# Patient Record
Sex: MALE | HISPANIC OR LATINO | Employment: FULL TIME | URBAN - METROPOLITAN AREA
[De-identification: names, ages, dates, MRNs, and addresses within clinical notes are randomized per-mention and may not be internally consistent; named-entity substitution may affect disease eponyms.]

---

## 2020-06-21 ENCOUNTER — HOSPITAL ENCOUNTER (INPATIENT)
Facility: CLINIC | Age: 55
LOS: 1 days | Discharge: ANOTHER HOSPITAL | End: 2020-06-22
Attending: EMERGENCY MEDICINE | Admitting: INTERNAL MEDICINE
Payer: MEDICAID

## 2020-06-21 ENCOUNTER — APPOINTMENT (OUTPATIENT)
Dept: GENERAL RADIOLOGY | Facility: CLINIC | Age: 55
End: 2020-06-21
Attending: EMERGENCY MEDICINE
Payer: MEDICAID

## 2020-06-21 DIAGNOSIS — J96.01 ACUTE RESPIRATORY FAILURE WITH HYPOXIA (H): ICD-10-CM

## 2020-06-21 DIAGNOSIS — Z20.822 SUSPECTED COVID-19 VIRUS INFECTION: ICD-10-CM

## 2020-06-21 DIAGNOSIS — R06.02 SHORTNESS OF BREATH: ICD-10-CM

## 2020-06-21 DIAGNOSIS — J18.9 PNEUMONIA OF BOTH LOWER LOBES DUE TO INFECTIOUS ORGANISM: ICD-10-CM

## 2020-06-21 DIAGNOSIS — R50.9 FEVER IN ADULT: ICD-10-CM

## 2020-06-21 DIAGNOSIS — U07.1 COVID-19: Primary | ICD-10-CM

## 2020-06-21 PROBLEM — J96.00 ACUTE RESPIRATORY FAILURE (H): Status: ACTIVE | Noted: 2020-06-21

## 2020-06-21 LAB
ABO + RH BLD: NORMAL
ALBUMIN SERPL-MCNC: 2.9 G/DL (ref 3.4–5)
ALBUMIN SERPL-MCNC: 3.2 G/DL (ref 3.4–5)
ALP SERPL-CCNC: 56 U/L (ref 40–150)
ALP SERPL-CCNC: 63 U/L (ref 40–150)
ALT SERPL W P-5'-P-CCNC: 69 U/L (ref 0–70)
ALT SERPL W P-5'-P-CCNC: 71 U/L (ref 0–70)
ANION GAP SERPL CALCULATED.3IONS-SCNC: 3 MMOL/L (ref 3–14)
ANION GAP SERPL CALCULATED.3IONS-SCNC: 8 MMOL/L (ref 3–14)
APTT PPP: 35 SEC (ref 22–37)
AST SERPL W P-5'-P-CCNC: 106 U/L (ref 0–45)
AST SERPL W P-5'-P-CCNC: 108 U/L (ref 0–45)
BASE EXCESS BLDA CALC-SCNC: 0.5 MMOL/L
BASE EXCESS BLDA CALC-SCNC: 0.7 MMOL/L
BASE EXCESS BLDV CALC-SCNC: 2.7 MMOL/L
BASOPHILS # BLD AUTO: 0 10E9/L (ref 0–0.2)
BASOPHILS # BLD AUTO: 0 10E9/L (ref 0–0.2)
BASOPHILS NFR BLD AUTO: 0.2 %
BASOPHILS NFR BLD AUTO: 0.2 %
BILIRUB SERPL-MCNC: 0.5 MG/DL (ref 0.2–1.3)
BILIRUB SERPL-MCNC: 0.5 MG/DL (ref 0.2–1.3)
BLD GP AB SCN SERPL QL: NORMAL
BLOOD BANK CMNT PATIENT-IMP: NORMAL
BLOOD BANK CMNT PATIENT-IMP: NORMAL
BUN SERPL-MCNC: 10 MG/DL (ref 7–30)
BUN SERPL-MCNC: 9 MG/DL (ref 7–30)
CALCIUM SERPL-MCNC: 7.8 MG/DL (ref 8.5–10.1)
CALCIUM SERPL-MCNC: 8.4 MG/DL (ref 8.5–10.1)
CHLORIDE SERPL-SCNC: 106 MMOL/L (ref 94–109)
CHLORIDE SERPL-SCNC: 106 MMOL/L (ref 94–109)
CK SERPL-CCNC: 1775 U/L (ref 30–300)
CK SERPL-CCNC: 1864 U/L (ref 30–300)
CO2 SERPL-SCNC: 23 MMOL/L (ref 20–32)
CO2 SERPL-SCNC: 28 MMOL/L (ref 20–32)
CREAT SERPL-MCNC: 0.62 MG/DL (ref 0.66–1.25)
CREAT SERPL-MCNC: 0.78 MG/DL (ref 0.66–1.25)
CRP SERPL-MCNC: 130 MG/L (ref 0–8)
CRP SERPL-MCNC: 132 MG/L (ref 0–8)
DIFFERENTIAL METHOD BLD: NORMAL
DIFFERENTIAL METHOD BLD: NORMAL
EOSINOPHIL # BLD AUTO: 0 10E9/L (ref 0–0.7)
EOSINOPHIL # BLD AUTO: 0 10E9/L (ref 0–0.7)
EOSINOPHIL NFR BLD AUTO: 0.3 %
EOSINOPHIL NFR BLD AUTO: 0.5 %
ERYTHROCYTE [DISTWIDTH] IN BLOOD BY AUTOMATED COUNT: 14.8 % (ref 10–15)
ERYTHROCYTE [DISTWIDTH] IN BLOOD BY AUTOMATED COUNT: 14.8 % (ref 10–15)
FERRITIN SERPL-MCNC: 840 NG/ML (ref 26–388)
FERRITIN SERPL-MCNC: 859 NG/ML (ref 26–388)
FIBRINOGEN PPP-MCNC: 497 MG/DL (ref 200–420)
GFR SERPL CREATININE-BSD FRML MDRD: >90 ML/MIN/{1.73_M2}
GFR SERPL CREATININE-BSD FRML MDRD: >90 ML/MIN/{1.73_M2}
GLUCOSE BLDC GLUCOMTR-MCNC: 108 MG/DL (ref 70–99)
GLUCOSE BLDC GLUCOMTR-MCNC: 98 MG/DL (ref 70–99)
GLUCOSE SERPL-MCNC: 101 MG/DL (ref 70–99)
GLUCOSE SERPL-MCNC: 104 MG/DL (ref 70–99)
HCO3 BLD-SCNC: 25 MMOL/L (ref 21–28)
HCO3 BLD-SCNC: 25 MMOL/L (ref 21–28)
HCO3 BLDV-SCNC: 27 MMOL/L (ref 21–28)
HCT VFR BLD AUTO: 44.1 % (ref 40–53)
HCT VFR BLD AUTO: 44.5 % (ref 40–53)
HGB BLD-MCNC: 14.3 G/DL (ref 13.3–17.7)
HGB BLD-MCNC: 14.7 G/DL (ref 13.3–17.7)
IMM GRANULOCYTES # BLD: 0.1 10E9/L (ref 0–0.4)
IMM GRANULOCYTES # BLD: 0.1 10E9/L (ref 0–0.4)
IMM GRANULOCYTES NFR BLD: 0.8 %
IMM GRANULOCYTES NFR BLD: 0.8 %
INR PPP: 1.07 (ref 0.86–1.14)
LACTATE BLD-SCNC: 1.2 MMOL/L (ref 0.7–2)
LDH SERPL L TO P-CCNC: 592 U/L (ref 85–227)
LDH SERPL L TO P-CCNC: 604 U/L (ref 85–227)
LYMPHOCYTES # BLD AUTO: 0.8 10E9/L (ref 0.8–5.3)
LYMPHOCYTES # BLD AUTO: 1.1 10E9/L (ref 0.8–5.3)
LYMPHOCYTES NFR BLD AUTO: 12.5 %
LYMPHOCYTES NFR BLD AUTO: 16.3 %
MAGNESIUM SERPL-MCNC: 2.3 MG/DL (ref 1.6–2.3)
MCH RBC QN AUTO: 28.9 PG (ref 26.5–33)
MCH RBC QN AUTO: 29.5 PG (ref 26.5–33)
MCHC RBC AUTO-ENTMCNC: 32.4 G/DL (ref 31.5–36.5)
MCHC RBC AUTO-ENTMCNC: 33 G/DL (ref 31.5–36.5)
MCV RBC AUTO: 89 FL (ref 78–100)
MCV RBC AUTO: 89 FL (ref 78–100)
MONOCYTES # BLD AUTO: 0.6 10E9/L (ref 0–1.3)
MONOCYTES # BLD AUTO: 0.7 10E9/L (ref 0–1.3)
MONOCYTES NFR BLD AUTO: 10.3 %
MONOCYTES NFR BLD AUTO: 8.5 %
NEUTROPHILS # BLD AUTO: 4.7 10E9/L (ref 1.6–8.3)
NEUTROPHILS # BLD AUTO: 5.1 10E9/L (ref 1.6–8.3)
NEUTROPHILS NFR BLD AUTO: 71.9 %
NEUTROPHILS NFR BLD AUTO: 77.7 %
NRBC # BLD AUTO: 0 10*3/UL
NRBC # BLD AUTO: 0 10*3/UL
NRBC BLD AUTO-RTO: 0 /100
NRBC BLD AUTO-RTO: 0 /100
O2/TOTAL GAS SETTING VFR VENT: 70 %
O2/TOTAL GAS SETTING VFR VENT: ABNORMAL %
OXYHGB MFR BLD: 92 % (ref 92–100)
OXYHGB MFR BLD: 95 % (ref 92–100)
OXYHGB MFR BLDV: 61 %
PCO2 BLD: 38 MM HG (ref 35–45)
PCO2 BLD: 39 MM HG (ref 35–45)
PCO2 BLDV: 40 MM HG (ref 40–50)
PH BLD: 7.41 PH (ref 7.35–7.45)
PH BLD: 7.43 PH (ref 7.35–7.45)
PH BLDV: 7.44 PH (ref 7.32–7.43)
PHOSPHATE SERPL-MCNC: 2.5 MG/DL (ref 2.5–4.5)
PLATELET # BLD AUTO: 152 10E9/L (ref 150–450)
PLATELET # BLD AUTO: 163 10E9/L (ref 150–450)
PO2 BLD: 65 MM HG (ref 80–105)
PO2 BLD: 75 MM HG (ref 80–105)
PO2 BLDV: 32 MM HG (ref 25–47)
POTASSIUM SERPL-SCNC: 3.7 MMOL/L (ref 3.4–5.3)
POTASSIUM SERPL-SCNC: 3.9 MMOL/L (ref 3.4–5.3)
PROCALCITONIN SERPL-MCNC: 0.06 NG/ML
PROT SERPL-MCNC: 7.6 G/DL (ref 6.8–8.8)
PROT SERPL-MCNC: 8.1 G/DL (ref 6.8–8.8)
RBC # BLD AUTO: 4.94 10E12/L (ref 4.4–5.9)
RBC # BLD AUTO: 4.98 10E12/L (ref 4.4–5.9)
RETICS # AUTO: 28.7 10E9/L (ref 25–95)
RETICS/RBC NFR AUTO: 0.6 % (ref 0.5–2)
SARS-COV-2 PCR COMMENT: ABNORMAL
SARS-COV-2 RNA SPEC QL NAA+PROBE: NORMAL
SARS-COV-2 RNA SPEC QL NAA+PROBE: POSITIVE
SODIUM SERPL-SCNC: 137 MMOL/L (ref 133–144)
SODIUM SERPL-SCNC: 137 MMOL/L (ref 133–144)
SPECIMEN EXP DATE BLD: NORMAL
SPECIMEN EXP DATE BLD: NORMAL
SPECIMEN SOURCE: ABNORMAL
SPECIMEN SOURCE: NORMAL
TROPONIN I SERPL-MCNC: <0.015 UG/L (ref 0–0.04)
TROPONIN I SERPL-MCNC: <0.015 UG/L (ref 0–0.04)
WBC # BLD AUTO: 6.6 10E9/L (ref 4–11)
WBC # BLD AUTO: 6.6 10E9/L (ref 4–11)

## 2020-06-21 PROCEDURE — C9803 HOPD COVID-19 SPEC COLLECT: HCPCS

## 2020-06-21 PROCEDURE — 86140 C-REACTIVE PROTEIN: CPT | Performed by: EMERGENCY MEDICINE

## 2020-06-21 PROCEDURE — 85384 FIBRINOGEN ACTIVITY: CPT | Performed by: INTERNAL MEDICINE

## 2020-06-21 PROCEDURE — 85025 COMPLETE CBC W/AUTO DIFF WBC: CPT | Performed by: EMERGENCY MEDICINE

## 2020-06-21 PROCEDURE — 85610 PROTHROMBIN TIME: CPT | Performed by: INTERNAL MEDICINE

## 2020-06-21 PROCEDURE — 83735 ASSAY OF MAGNESIUM: CPT | Performed by: EMERGENCY MEDICINE

## 2020-06-21 PROCEDURE — 25800030 ZZH RX IP 258 OP 636: Performed by: INTERNAL MEDICINE

## 2020-06-21 PROCEDURE — 87040 BLOOD CULTURE FOR BACTERIA: CPT | Performed by: EMERGENCY MEDICINE

## 2020-06-21 PROCEDURE — 83605 ASSAY OF LACTIC ACID: CPT | Performed by: EMERGENCY MEDICINE

## 2020-06-21 PROCEDURE — 99207 ZZC CDG-CODE CATEGORY CHANGED: CPT | Performed by: INTERNAL MEDICINE

## 2020-06-21 PROCEDURE — 25000128 H RX IP 250 OP 636: Performed by: INTERNAL MEDICINE

## 2020-06-21 PROCEDURE — 82805 BLOOD GASES W/O2 SATURATION: CPT | Performed by: INTERNAL MEDICINE

## 2020-06-21 PROCEDURE — 36415 COLL VENOUS BLD VENIPUNCTURE: CPT | Performed by: INTERNAL MEDICINE

## 2020-06-21 PROCEDURE — 99223 1ST HOSP IP/OBS HIGH 75: CPT | Mod: AI | Performed by: INTERNAL MEDICINE

## 2020-06-21 PROCEDURE — 40000983 ZZH STATISTIC HFNC ADULT NON-CPAP

## 2020-06-21 PROCEDURE — 99292 CRITICAL CARE ADDL 30 MIN: CPT | Performed by: INTERNAL MEDICINE

## 2020-06-21 PROCEDURE — 83520 IMMUNOASSAY QUANT NOS NONAB: CPT | Performed by: INTERNAL MEDICINE

## 2020-06-21 PROCEDURE — 25000132 ZZH RX MED GY IP 250 OP 250 PS 637: Performed by: INTERNAL MEDICINE

## 2020-06-21 PROCEDURE — 80053 COMPREHEN METABOLIC PANEL: CPT | Performed by: INTERNAL MEDICINE

## 2020-06-21 PROCEDURE — 40000275 ZZH STATISTIC RCP TIME EA 10 MIN

## 2020-06-21 PROCEDURE — 85730 THROMBOPLASTIN TIME PARTIAL: CPT | Performed by: INTERNAL MEDICINE

## 2020-06-21 PROCEDURE — 84100 ASSAY OF PHOSPHORUS: CPT | Performed by: EMERGENCY MEDICINE

## 2020-06-21 PROCEDURE — 36415 COLL VENOUS BLD VENIPUNCTURE: CPT

## 2020-06-21 PROCEDURE — 27210300 ZZH CANNULA HIGH FLOW, ADULT

## 2020-06-21 PROCEDURE — 85300 ANTITHROMBIN III ACTIVITY: CPT | Performed by: INTERNAL MEDICINE

## 2020-06-21 PROCEDURE — 85045 AUTOMATED RETICULOCYTE COUNT: CPT | Performed by: INTERNAL MEDICINE

## 2020-06-21 PROCEDURE — 36600 WITHDRAWAL OF ARTERIAL BLOOD: CPT

## 2020-06-21 PROCEDURE — 82728 ASSAY OF FERRITIN: CPT | Performed by: EMERGENCY MEDICINE

## 2020-06-21 PROCEDURE — 86901 BLOOD TYPING SEROLOGIC RH(D): CPT | Performed by: INTERNAL MEDICINE

## 2020-06-21 PROCEDURE — 5A1945Z RESPIRATORY VENTILATION, 24-96 CONSECUTIVE HOURS: ICD-10-PCS | Performed by: INTERNAL MEDICINE

## 2020-06-21 PROCEDURE — 86140 C-REACTIVE PROTEIN: CPT | Performed by: INTERNAL MEDICINE

## 2020-06-21 PROCEDURE — 82805 BLOOD GASES W/O2 SATURATION: CPT | Performed by: EMERGENCY MEDICINE

## 2020-06-21 PROCEDURE — 71045 X-RAY EXAM CHEST 1 VIEW: CPT

## 2020-06-21 PROCEDURE — 25000132 ZZH RX MED GY IP 250 OP 250 PS 637: Performed by: EMERGENCY MEDICINE

## 2020-06-21 PROCEDURE — 84484 ASSAY OF TROPONIN QUANT: CPT | Performed by: EMERGENCY MEDICINE

## 2020-06-21 PROCEDURE — 20000003 ZZH R&B ICU

## 2020-06-21 PROCEDURE — 84484 ASSAY OF TROPONIN QUANT: CPT | Performed by: INTERNAL MEDICINE

## 2020-06-21 PROCEDURE — 82550 ASSAY OF CK (CPK): CPT | Performed by: INTERNAL MEDICINE

## 2020-06-21 PROCEDURE — 99291 CRITICAL CARE FIRST HOUR: CPT | Performed by: INTERNAL MEDICINE

## 2020-06-21 PROCEDURE — 99291 CRITICAL CARE FIRST HOUR: CPT | Mod: 25

## 2020-06-21 PROCEDURE — 83615 LACTATE (LD) (LDH) ENZYME: CPT | Performed by: EMERGENCY MEDICINE

## 2020-06-21 PROCEDURE — 86900 BLOOD TYPING SEROLOGIC ABO: CPT | Performed by: INTERNAL MEDICINE

## 2020-06-21 PROCEDURE — 85379 FIBRIN DEGRADATION QUANT: CPT | Performed by: INTERNAL MEDICINE

## 2020-06-21 PROCEDURE — 82550 ASSAY OF CK (CPK): CPT | Performed by: EMERGENCY MEDICINE

## 2020-06-21 PROCEDURE — 86850 RBC ANTIBODY SCREEN: CPT | Performed by: INTERNAL MEDICINE

## 2020-06-21 PROCEDURE — 00000146 ZZHCL STATISTIC GLUCOSE BY METER IP

## 2020-06-21 PROCEDURE — 84145 PROCALCITONIN (PCT): CPT | Performed by: INTERNAL MEDICINE

## 2020-06-21 PROCEDURE — 85025 COMPLETE CBC W/AUTO DIFF WBC: CPT | Performed by: INTERNAL MEDICINE

## 2020-06-21 PROCEDURE — 80053 COMPREHEN METABOLIC PANEL: CPT | Performed by: EMERGENCY MEDICINE

## 2020-06-21 PROCEDURE — 99292 CRITICAL CARE ADDL 30 MIN: CPT

## 2020-06-21 PROCEDURE — 83615 LACTATE (LD) (LDH) ENZYME: CPT | Performed by: INTERNAL MEDICINE

## 2020-06-21 PROCEDURE — 82805 BLOOD GASES W/O2 SATURATION: CPT | Performed by: SURGERY

## 2020-06-21 PROCEDURE — 82728 ASSAY OF FERRITIN: CPT | Performed by: INTERNAL MEDICINE

## 2020-06-21 RX ORDER — HYDRALAZINE HYDROCHLORIDE 20 MG/ML
10 INJECTION INTRAMUSCULAR; INTRAVENOUS EVERY 4 HOURS PRN
Status: DISCONTINUED | OUTPATIENT
Start: 2020-06-21 | End: 2020-06-22 | Stop reason: HOSPADM

## 2020-06-21 RX ORDER — ONDANSETRON 2 MG/ML
4 INJECTION INTRAMUSCULAR; INTRAVENOUS EVERY 6 HOURS PRN
Status: DISCONTINUED | OUTPATIENT
Start: 2020-06-21 | End: 2020-06-22 | Stop reason: HOSPADM

## 2020-06-21 RX ORDER — ACETAMINOPHEN 325 MG/1
975 TABLET ORAL ONCE
Status: COMPLETED | OUTPATIENT
Start: 2020-06-21 | End: 2020-06-21

## 2020-06-21 RX ORDER — AZITHROMYCIN 500 MG/1
500 INJECTION, POWDER, LYOPHILIZED, FOR SOLUTION INTRAVENOUS EVERY 24 HOURS
Status: DISCONTINUED | OUTPATIENT
Start: 2020-06-21 | End: 2020-06-21 | Stop reason: ALTCHOICE

## 2020-06-21 RX ORDER — OXYCODONE HYDROCHLORIDE 5 MG/1
5-10 TABLET ORAL
Status: DISCONTINUED | OUTPATIENT
Start: 2020-06-21 | End: 2020-06-22 | Stop reason: HOSPADM

## 2020-06-21 RX ORDER — LIDOCAINE 40 MG/G
CREAM TOPICAL
Status: DISCONTINUED | OUTPATIENT
Start: 2020-06-21 | End: 2020-06-22 | Stop reason: HOSPADM

## 2020-06-21 RX ORDER — ONDANSETRON 4 MG/1
4 TABLET, ORALLY DISINTEGRATING ORAL EVERY 6 HOURS PRN
Status: DISCONTINUED | OUTPATIENT
Start: 2020-06-21 | End: 2020-06-22 | Stop reason: HOSPADM

## 2020-06-21 RX ORDER — ACETAMINOPHEN 325 MG/1
650 TABLET ORAL EVERY 4 HOURS PRN
Status: DISCONTINUED | OUTPATIENT
Start: 2020-06-21 | End: 2020-06-22 | Stop reason: HOSPADM

## 2020-06-21 RX ORDER — SODIUM CHLORIDE, SODIUM LACTATE, POTASSIUM CHLORIDE, CALCIUM CHLORIDE 600; 310; 30; 20 MG/100ML; MG/100ML; MG/100ML; MG/100ML
INJECTION, SOLUTION INTRAVENOUS CONTINUOUS
Status: DISCONTINUED | OUTPATIENT
Start: 2020-06-21 | End: 2020-06-21

## 2020-06-21 RX ORDER — CEFTRIAXONE 2 G/1
2 INJECTION, POWDER, FOR SOLUTION INTRAMUSCULAR; INTRAVENOUS EVERY 24 HOURS
Status: DISCONTINUED | OUTPATIENT
Start: 2020-06-21 | End: 2020-06-21 | Stop reason: ALTCHOICE

## 2020-06-21 RX ORDER — ALBUTEROL SULFATE 90 UG/1
2 AEROSOL, METERED RESPIRATORY (INHALATION) 4 TIMES DAILY
Status: DISCONTINUED | OUTPATIENT
Start: 2020-06-21 | End: 2020-06-22

## 2020-06-21 RX ORDER — PIPERACILLIN SODIUM, TAZOBACTAM SODIUM 3; .375 G/15ML; G/15ML
3.38 INJECTION, POWDER, LYOPHILIZED, FOR SOLUTION INTRAVENOUS EVERY 6 HOURS
Status: DISCONTINUED | OUTPATIENT
Start: 2020-06-21 | End: 2020-06-22 | Stop reason: HOSPADM

## 2020-06-21 RX ORDER — DEXAMETHASONE SODIUM PHOSPHATE 4 MG/ML
6 INJECTION, SOLUTION INTRA-ARTICULAR; INTRALESIONAL; INTRAMUSCULAR; INTRAVENOUS; SOFT TISSUE EVERY 24 HOURS
Status: DISCONTINUED | OUTPATIENT
Start: 2020-06-21 | End: 2020-06-22 | Stop reason: HOSPADM

## 2020-06-21 RX ORDER — PROCHLORPERAZINE MALEATE 5 MG
10 TABLET ORAL EVERY 6 HOURS PRN
Status: DISCONTINUED | OUTPATIENT
Start: 2020-06-21 | End: 2020-06-22 | Stop reason: HOSPADM

## 2020-06-21 RX ORDER — ALBUTEROL SULFATE 90 UG/1
2 AEROSOL, METERED RESPIRATORY (INHALATION)
Status: DISCONTINUED | OUTPATIENT
Start: 2020-06-21 | End: 2020-06-22 | Stop reason: HOSPADM

## 2020-06-21 RX ORDER — AMOXICILLIN 250 MG
1 CAPSULE ORAL 2 TIMES DAILY PRN
Status: DISCONTINUED | OUTPATIENT
Start: 2020-06-21 | End: 2020-06-22 | Stop reason: HOSPADM

## 2020-06-21 RX ORDER — AMOXICILLIN 250 MG
2 CAPSULE ORAL 2 TIMES DAILY PRN
Status: DISCONTINUED | OUTPATIENT
Start: 2020-06-21 | End: 2020-06-22 | Stop reason: HOSPADM

## 2020-06-21 RX ORDER — ACETAMINOPHEN 650 MG/1
650 SUPPOSITORY RECTAL EVERY 4 HOURS PRN
Status: DISCONTINUED | OUTPATIENT
Start: 2020-06-21 | End: 2020-06-22 | Stop reason: HOSPADM

## 2020-06-21 RX ORDER — PROCHLORPERAZINE 25 MG
25 SUPPOSITORY, RECTAL RECTAL EVERY 12 HOURS PRN
Status: DISCONTINUED | OUTPATIENT
Start: 2020-06-21 | End: 2020-06-22 | Stop reason: HOSPADM

## 2020-06-21 RX ORDER — NALOXONE HYDROCHLORIDE 0.4 MG/ML
.1-.4 INJECTION, SOLUTION INTRAMUSCULAR; INTRAVENOUS; SUBCUTANEOUS
Status: DISCONTINUED | OUTPATIENT
Start: 2020-06-21 | End: 2020-06-22 | Stop reason: HOSPADM

## 2020-06-21 RX ORDER — SODIUM CHLORIDE, SODIUM LACTATE, POTASSIUM CHLORIDE, CALCIUM CHLORIDE 600; 310; 30; 20 MG/100ML; MG/100ML; MG/100ML; MG/100ML
INJECTION, SOLUTION INTRAVENOUS CONTINUOUS
Status: DISCONTINUED | OUTPATIENT
Start: 2020-06-21 | End: 2020-06-22 | Stop reason: HOSPADM

## 2020-06-21 RX ADMIN — PIPERACILLIN SODIUM AND TAZOBACTAM SODIUM 3.38 G: 3; .375 INJECTION, POWDER, LYOPHILIZED, FOR SOLUTION INTRAVENOUS at 19:03

## 2020-06-21 RX ADMIN — ACETAMINOPHEN 975 MG: 325 TABLET, FILM COATED ORAL at 13:39

## 2020-06-21 RX ADMIN — GUAIFENESIN 10 ML: 200 SOLUTION ORAL at 20:42

## 2020-06-21 RX ADMIN — ENOXAPARIN SODIUM 40 MG: 40 INJECTION SUBCUTANEOUS at 20:02

## 2020-06-21 RX ADMIN — VANCOMYCIN HYDROCHLORIDE 2000 MG: 5 INJECTION, POWDER, LYOPHILIZED, FOR SOLUTION INTRAVENOUS at 19:59

## 2020-06-21 RX ADMIN — DEXAMETHASONE SODIUM PHOSPHATE 6 MG: 4 INJECTION, SOLUTION INTRAMUSCULAR; INTRAVENOUS at 18:56

## 2020-06-21 RX ADMIN — CEFTRIAXONE 2 G: 2 INJECTION, POWDER, FOR SOLUTION INTRAMUSCULAR; INTRAVENOUS at 18:19

## 2020-06-21 ASSESSMENT — ENCOUNTER SYMPTOMS
ABDOMINAL PAIN: 0
HEADACHES: 1
COUGH: 1
FEVER: 1
SHORTNESS OF BREATH: 1
DIARRHEA: 0

## 2020-06-21 ASSESSMENT — ACTIVITIES OF DAILY LIVING (ADL): ADLS_ACUITY_SCORE: 15

## 2020-06-21 ASSESSMENT — MIFFLIN-ST. JEOR: SCORE: 1664.07

## 2020-06-21 NOTE — PROGRESS NOTES
RT was called to place pt on HFNC. Pt is currently on HFNC 50 LPM, FiO2 70% with SpO2 93%. Mepilex and opti foam in place. Skin intact.  Will cont to monitor closely.  6/21/2020  Sierra Yoo, RT

## 2020-06-21 NOTE — PHARMACY-ADMISSION MEDICATION HISTORY
Pharmacy Medication History  Admission medication history interview status for the 6/21/2020  admission is complete. See EPIC admission navigator for prior to admission medications     Medication history sources: Patient  Medication history source reliability: Good  Adherence assessment: Good    Significant changes made to the medication list:  None      Additional medication history information:   None    Medication reconciliation completed by provider prior to medication history? No    Time spent in this activity: 5      Prior to Admission medications    Not on File     Rowena Wetzel, PharmD  [phone]  June 21, 2020

## 2020-06-21 NOTE — ED TRIAGE NOTES
SOB/cough/HA x3 days.  Febrile on arrival.  States took motrin this morning for HA.  O2 sats 85% on RA for medics.  Improved with 6L NC to 92-94%

## 2020-06-21 NOTE — H&P
[facility]    History and Physical - Hospitalist Service       Date of Admission:  6/21/2020    Assessment & Plan   Tobias Palmer is a 54 year old male with no known medical problems who presents with shortness of breath, fever and cough.    VIRTUAL (VIDEO) communication was used to evaluate Tobias due to the COVID pandemic.    Tobias consented to the use of video communication: yes  Video START time: 2:52 pm, 6/21/2020  Video STOP time: 3:00 pm, 6/21/2020   Patient's location: [facility]   Provider's location during the visit: [facility]       Bilateral pneumonia likely secondary to COVID-19  COVID-19 rule out  Hypoxic respiratory failure secondary to above  -Presented with dry cough, shortness of breath and fever.  Some generalized chest pain that started after multiple bouts of coughing today.  -Blood cultures drawn in the ED.  His symptoms high suspicion for COVID-19.  He however does not report to loss of sense of taste or smell and no GI symptoms.  -Until COVID ruled out will start him on antibiotics for community-acquired pneumonia, check procalcitonin, discontinue antibiotics if negative procalcitonin and positive COVID  -DVT prophylaxis with Lovenox  -Check d-dimer, ferritin, interleukin-6 level, fibrinogen level for prognosis  -Inhalers for breathing treatment  -Antitussive, incentive spirometer    Addendum: Called by ED physician that during ED stay he started becoming hypoxic even on 6 L oxygen and currently requiring high flow oxygen.  Discussed with intensivist on-call Dr Jones.  Will admit the patient in ICU and consult intensivist.      Hypertension  -No known history of hypertension, blood pressure on arrival was high at 146/100  -Could be related to acute stress, PRN hydralazine  -Monitor    Acute transaminitis  -Monitor    Diet: Regular  DVT Prophylaxis: Enoxaparin (Lovenox) SQ  Hillman Catheter: not present  Code Status: Full code, discussed with  "patient  Rule Out COVID-19 Handoff:  Tobias is NOT A LOW SUSPICION PUI (needs further investigation).    Follow these instructions:    If COVID test is positive -> continue isolation precautions    If 1st COVID test is negative -> continue isolation precautions    -  Order a repeat COVID test to be done 72 hours after the 1st test  -  Place \"PUI Isolation\" nurse communication order  -  Consider ID consult    If 2nd COVID test performed after 72 hours is negative -> consider discontinuing COVID-specific isolation precautions if clinical course atypical for COVID and/or an alternative diagnosis emerges       Disposition Plan   Expected discharge: 2 - 3 days, recommended to prior living arrangement once O2 use less than 1 liters/minute or if positive COVID to transfer to COVID specific facility.  Entered: Minerva Méndez MD 06/21/2020, 3:00 PM     The patient's care was discussed with the Patient.    Minerva Méndez MD  [facility]    ______________________________________________________________________    Chief Complaint   Cough, shortness of breath and fever    History is obtained from the patient    History of Present Illness      VIRTUAL (VIDEO) communication was used to evaluate Tobias due to the COVID pandemic.    Tobias consented to the use of video communication: yes  Video START time: 2:52 pm, 6/21/2020  Video STOP time: 3:00 pm, 6/21/2020   Patient's location: [facility]   Provider's location during the visit: [facility]       Tobias Palmer is a 54 year old male with no known medical problems who presents with shortness of breath, fever and cough.    The patient reports that his shortness of breath with cough that started 3 days back.  His cough started today.  With multiple episodes of coughing he reports chest pain mainly in the lower part of the chest.  He denied any fever but had fever upon arrival to the ED.  He does have some central headache.  He denies any " nausea vomiting or diarrhea.  He also denies loss of sense of taste or smell.  He denies any known contacts with COVID or any other sick contacts.    When he presented to the ED vitals showed temperature of 101.8, pulse rate 98 blood pressure 146/100 respiratory 28.  He was hypoxic on arrival.  Currently on 6 L oxygen.  Labs showed ALT 71  otherwise unremarkable CMP lactate 1.2, CBC unremarkable, blood cultures were drawn in the ED and COVID-19 test was sent.  Chest x-ray shows bilateral pneumonia.    Review of Systems    The 10 point Review of Systems is negative other than noted in the HPI or here.     Past Medical History    I have reviewed this patient's medical history and updated it with pertinent information if needed.   History reviewed. No pertinent past medical history.    Past Surgical History   I have reviewed this patient's surgical history and updated it with pertinent information if needed.  History reviewed. No pertinent surgical history.    Social History   I have reviewed this patient's social history and updated it with pertinent information if needed.  Social History     Tobacco Use     Smoking status: Never Smoker     Smokeless tobacco: Never Used   Substance Use Topics     Alcohol use: Yes     Comment: wine occ     Drug use: Never       Family History   Family history was reviewed and is noncontributory    Prior to Admission Medications   None     Allergies   Allergies   Allergen Reactions     Aspirin Rash       Physical Exam   Vital Signs: Temp: 101.8  F (38.8  C) Temp src: Oral BP: (!) 146/100   Heart Rate: 92 Resp: 28 SpO2: 94 % O2 Device: Nasal cannula Oxygen Delivery: 6 LPM  Weight: 200 lbs 0 oz    Visit/Communication Style   VIRTUAL (VIDEO) communication was used to evaluate Tobias due to the COVID pandemic.    Tobias consented to the use of video communication: yes  Video START time: 2:52 pm, 6/21/2020  Video STOP time: 3:00 pm, 6/21/2020   Patient's location: [facility]   Provider's location during the visit: [facility]       Exam:  Constitutional: Awake, alert and no distress. Appears comfortable on video examination  Head: Normocephalic.   For rest of the physical examination please review Dr. Mitchell's note       Data   Data reviewed today: I reviewed all medications, new labs and imaging results over the last 24 hours. I personally reviewed the chest x-ray image(s) showing Bilateral pneumonia, see below for details.    Recent Labs   Lab 06/21/20  1331   WBC 6.6   HGB 14.7   MCV 89         POTASSIUM 3.9   CHLORIDE 106   CO2 28   BUN 9   CR 0.78   ANIONGAP 3   FREDO 8.4*   *   ALBUMIN 3.2*   PROTTOTAL 8.1   BILITOTAL 0.5   ALKPHOS 63   ALT 71*   *     Recent Results (from the past 24 hour(s))   XR Chest Port 1 View    Narrative    CHEST PORTABLE ONE VIEW  6/21/2020 2:32 PM     HISTORY: Cough, fever.    COMPARISON: None.      Impression    IMPRESSION: Portable chest. Lung consolidation is noted bilaterally  consistent with bilateral pneumonia. No pneumothorax or significant  pleural effusions. Heart appears normal in size.    VAL SO MD

## 2020-06-21 NOTE — ED NOTES
Bed: ED07  Expected date: 6/21/20  Expected time: 1:09 PM  Means of arrival: Ambulance  Comments:  516 SOB

## 2020-06-21 NOTE — PLAN OF CARE
RECEIVING UNIT ED HANDOFF REVIEW    ED Nurse Handoff Report was reviewed by: Angeline Mac RN on June 21, 2020 at 3:25 PM

## 2020-06-21 NOTE — ED PROVIDER NOTES
"  History     Chief Complaint:  Shortness of Breath; Cough; and Fever      HPI   Tobias Palmer is a 54 year old male who presents with shortness of breath, cough, and fever. The patient reports that his shortness of breath started 3 days he ago. He also reports some chest pain and a cough that started today. He denies fever, but has a fever upon arrival today. He also notes a headache. The patient denies diarrhea, leg swelling, or abdominal pain. He has not had any known ill contacts, or been in contact with anyone who is known to have COVID. He has no history of asthma or blood clots.    Allergies:  Aspirin     Medications:    The patient is not currently taking any prescribed medications.     Past Medical History:    The patient denies any significant past medical history.     Past Surgical History:    The patient does not have any pertinent past surgical history.     Family History:    No past pertinent family history.     Social History:  Smoking status: No  Alcohol use: Yes  Drug use: No  Presents to the ED her self       Review of Systems   Constitutional: Positive for fever.   Respiratory: Positive for cough and shortness of breath.    Cardiovascular: Positive for chest pain. Negative for leg swelling.   Gastrointestinal: Negative for abdominal pain and diarrhea.   Neurological: Positive for headaches.   All other systems reviewed and are negative.    Physical Exam     Patient Vitals for the past 24 hrs:   BP Temp Temp src Heart Rate Resp SpO2 Height Weight   06/21/20 1349 -- -- -- 92 28 94 % -- --   06/21/20 1334 (!) 146/100 101.8  F (38.8  C) Oral 98 28 91 % 1.651 m (5' 5\") 90.7 kg (200 lb)     Physical Exam  General: Alert, appears well-developed and well-nourished. Cooperative.     In moderate respiratory distress  HEENT:  Head:  Atraumatic  Ears:  External ears are normal  Mouth/Throat:  Oropharynx is without erythema or exudate and mucous membranes are moist.   Eyes:   Conjunctivae normal and EOM are " normal. No scleral icterus.    Pupils are equal, round, and reactive to light.   Neck:   Normal range of motion. Neck supple.  CV:  Normal rate, regular rhythm, normal heart sounds and radial pulses are 2+ and symmetric.  No murmur.  Resp:  Breath sounds are coarse bilaterally with crackles to bilateral bases.    Mild laboring.  Required 6L O2 by NC to achieve O2 sat >92%.  Appears much improved after application of oxygen.   GI:  Obese. Abdomen is soft, no distension, no tenderness. No rebound or guarding.  No CVA tenderness bilaterally  MS:  Normal range of motion. No edema.    Normal strength in all 4 extremities.     Back atraumatic.    No midline cervical, thoracic, or lumbar tenderness  Skin:  Warm and dry.  No rash or lesions noted.  Neuro: Alert. Normal strength.  GCS: 15  Psych:  Normal mood and affect.    Emergency Department Course     Imaging:  Radiology findings were communicated with the patient who voiced understanding of the findings.    XR Chest Port 1 View:  Portable chest. Lung consolidation is noted bilaterally  consistent with bilateral pneumonia. No pneumothorax or significant pleural effusions. Heart appears normal in size.  As per radiology.    Laboratory:  Laboratory findings were communicated with the patient who voiced understanding of the findings.    CBC: WNL. (WBC 6.6, HGB 14.7, )   CMP: Glucose 104 (H), Calcium 8.4 (L), ALBUMIN 3.2 (L), ALT 71 (H),  (H) o/w WNL (Creatinine 0.78)    Lactic acid whole blood 1.2    Blood culture Pending x2    Symptomatic COVID-19 Virus (Coronavirus) by PCR Nasopharyngeal swab: pending       Interventions:  1339 Tylenol 975 mg PO    Emergency Department Course:    1331 IV was inserted and blood was drawn for laboratory testing, results above.    1336 A nasopharyngeal sample was obtained for laboratory testing as documented above.    1359 Nursing notes and vitals reviewed.    1402 I performed an exam of the patient as documented above.      1418 The patient was sent for a XR Chest Port 1 View while in the emergency department, results above.      1438 Blood was drawn for laboratory testing, results above.     1452 I spoke with Dr. Méndez of the hospitalist service from [facility] regarding patient's presentation, findings, and plan of care.     1605 I re-spoke with Dr. Méndez of the hospitalist service due to patient's increasing respiratory needs.  Now on HFNC.  Will change bed status to ICU admission.     Findings and plan explained to the Patient who consents to admission. Discussed the patient with Dr. Méndez, who will admit the patient to a ICU bed for further monitoring, evaluation, and treatment.     Impression & Plan     Covid-19  Tobias Palmer was evaluated during a global COVID-19 pandemic, which necessitated consideration that the patient might be at risk for infection with the SARS-CoV-2 virus that causes COVID-19.   Applicable protocols for evaluation were followed during the patient's care.   COVID-19 was considered as part of the patient's evaluation. The plan for testing is:  a test was obtained during this visit.     Medical Decision Making:  Tobias Palmer is a 54 year old male who presents to the Emergency Department with viral-like symptoms, SOB and hypoxia. I did my interview from the telephone through the glass doors. The patient appears unwell and short of breath on arrival.  Much improved after oxygen application.  Unfortunately, patient required escalating O2 needs while in the ED.  Initially 6L O2 by NC, eventually transitioning to oxymask at 8LPM to maintain sats >90%.  We will transition to HFNC for oxygenation support at this time and admit to ICU instead of originally intended medical bed.  Patient is full code and would want to be intubated if symptoms worsen.  He is febrile on arrival and given antipyretics here.  I have high suspicion his presentation is due to COVID 19.  WBC normal.  Lactate normal, low concern  for severe sepsis/septic shock.  CXR concerning for bilateral infiltrates.  Will defer antibiotics at this time as higher concern for viral PNA and COVID 19 rather than acute bacterial pneumonia.   The patient received a COVID test here in the ED and will be admitted to an ICU bed with Dr. Méndez.     Critical Care time was 40 minutes for this patient excluding procedures.    Discharge Diagnosis:    ICD-10-CM    1. Acute respiratory failure with hypoxia (H)  J96.01 CBC with platelets differential     Blood culture     Blood culture     Symptomatic COVID-19 Virus (Coronavirus) by PCR     Blood gas venous and oxyhgb     SARS-CoV-2 COVID-19 Virus (Coronavirus) RT-PCR     SARS-CoV-2 COVID-19 Virus (Coronavirus) RT-PCR   2. Shortness of breath  R06.02    3. Fever in adult  R50.9    4. Suspected COVID-19 virus infection  Z20.828    5. Pneumonia of both lower lobes due to infectious organism  J18.9      Disposition:  The patient is admitted into the care of Dr. Méndez.      Scribe Disclosure:  I, Malik Isidro, am serving as a scribe at 2:03 PM on 6/21/2020 to document services personally performed by Nemesio Mitchell MD based on my observations and the provider's statements to me.           Nemesio Mitchell MD  06/21/20 4520

## 2020-06-21 NOTE — ED NOTES
Video round by writer upon assuming pt care.  Pt alert and answering questions with no acute respiratory distress. Denies any needs at this time

## 2020-06-21 NOTE — ED NOTES
"[facility]  ED Nurse Handoff Report    ED Chief complaint: Shortness of Breath; Cough; and Fever      ED Diagnosis:   Final diagnoses:   Acute respiratory failure with hypoxia (H)   Shortness of breath   Fever in adult   Suspected COVID-19 virus infection       Code Status: Full Code    Allergies:   Allergies   Allergen Reactions     Aspirin Rash       Patient Story: Patient comes from home with a 3 day history of SOB/cough/Headache.  Called medics today d/t increasing SOB and difficulty breathing while lying flat.  Medics reported O2 sats in mid 80s on RA.  Focused Assessment:  Patient started on 6L NR with sats 92-96%.  RR 20s-30s.  Suspicious of COVID.  Received tylenol for fever of 101.8.  XR shows bilat. pneumonia.  Patient a/o x4 and independent.      Treatments and/or interventions provided: tylenol/antibiotics  Patient's response to treatments and/or interventions:     To be done/followed up on inpatient unit:  na    Does this patient have any cognitive concerns?: na    Activity level - Baseline/Home:  Independent  Activity Level - Current:   Stand with Assist    Patient's Preferred language: Data Unavailable   Needed?: No    Isolation: None and Other: contact/droplet  Infection: Not Applicable  Other pending COVID  Bariatric?: No    Vital Signs:   Vitals:    06/21/20 1334 06/21/20 1349   BP: (!) 146/100    Resp: 28 28   Temp: 101.8  F (38.8  C)    TempSrc: Oral    SpO2: 91% 94%   Weight: 90.7 kg (200 lb)    Height: 1.651 m (5' 5\")        Cardiac Rhythm:Cardiac Rhythm: Normal sinus rhythm    Was the PSS-3 completed:   Yes  What interventions are required if any?               Family Comments: na  OBS brochure/video discussed/provided to patient/family: N/A              Name of person given brochure if not patient: na              Relationship to patient:     For the majority of the shift this patient's behavior was Green.   Behavioral interventions performed were na.    ED NURSE " PHONE NUMBER: *[phone]

## 2020-06-21 NOTE — PHARMACY-VANCOMYCIN DOSING SERVICE
Pharmacy Vancomycin Initial Note  Date of Service 2020  Patient's  1965  54 year old, male    Indication: Aspiration Pneumonia    Current estimated CrCl = Estimated Creatinine Clearance: 109.5 mL/min (based on SCr of 0.78 mg/dL).    Creatinine for last 3 days  2020:  1:31 PM Creatinine 0.78 mg/dL      Nephrotoxins and other renal medications (From now, onward)    Start     Dose/Rate Route Frequency Ordered Stop    20 1900  vancomycin 2000 mg in 0.9% NaCl 500 ml intermittent infusion 2,000 mg      2,000 mg  over 90 Minutes Intravenous EVERY 12 HOURS 20 [id number]  piperacillin-tazobactam (ZOSYN) 3.375 g vial to attach to  mL bag      3.375 g  over 30 Minutes Intravenous EVERY 6 HOURS 20 181              Plan:  1.  Start vancomycin  2000 mg IV q12h.   2.  Goal Trough Level: 15-20 mg/L   3.  Pharmacy will check trough levels as appropriate in 3-4 doses.    4. Serum creatinine levels will be ordered daily for the first week of therapy and at least twice weekly for subsequent weeks.    5. Newborn method utilized to dose vancomycin therapy: Method 1    Luz Aburto RPH

## 2020-06-22 ENCOUNTER — ANESTHESIA EVENT (OUTPATIENT)
Dept: INTENSIVE CARE | Facility: CLINIC | Age: 55
End: 2020-06-22
Payer: MEDICAID

## 2020-06-22 ENCOUNTER — APPOINTMENT (OUTPATIENT)
Dept: GENERAL RADIOLOGY | Facility: CLINIC | Age: 55
End: 2020-06-22
Attending: INTERNAL MEDICINE
Payer: MEDICAID

## 2020-06-22 ENCOUNTER — HOSPITAL ENCOUNTER (INPATIENT)
Dept: MEDSURG UNIT | Facility: CLINIC | Age: 55
Discharge: NURSING, REHAB OR LONG-TERM CARE | End: 2020-08-04
Attending: INTERNAL MEDICINE | Admitting: INTERNAL MEDICINE
Payer: MEDICAID

## 2020-06-22 ENCOUNTER — ANESTHESIA (OUTPATIENT)
Dept: INTENSIVE CARE | Facility: CLINIC | Age: 55
End: 2020-06-22
Payer: MEDICAID

## 2020-06-22 ENCOUNTER — ANESTHESIA - HEALTHEAST (OUTPATIENT)
Dept: PULMONOLOGY | Facility: CLINIC | Age: 55
End: 2020-06-22

## 2020-06-22 ENCOUNTER — TRANSFERRED RECORDS (OUTPATIENT)
Dept: HEALTH INFORMATION MANAGEMENT | Facility: CLINIC | Age: 55
End: 2020-06-22

## 2020-06-22 VITALS
RESPIRATION RATE: 20 BRPM | HEIGHT: 65 IN | TEMPERATURE: 98.4 F | SYSTOLIC BLOOD PRESSURE: 105 MMHG | BODY MASS INDEX: 44.81 KG/M2 | WEIGHT: 268.96 LBS | HEART RATE: 86 BPM | OXYGEN SATURATION: 93 % | DIASTOLIC BLOOD PRESSURE: 70 MMHG

## 2020-06-22 DIAGNOSIS — J80 ARDS (ADULT RESPIRATORY DISTRESS SYNDROME) (H): ICD-10-CM

## 2020-06-22 DIAGNOSIS — J96.21 ACUTE AND CHRONIC RESPIRATORY FAILURE WITH HYPOXIA (H): ICD-10-CM

## 2020-06-22 DIAGNOSIS — R57.9 SHOCK (H): ICD-10-CM

## 2020-06-22 DIAGNOSIS — G93.40 ENCEPHALOPATHY: ICD-10-CM

## 2020-06-22 DIAGNOSIS — I48.91 ATRIAL FIBRILLATION WITH RAPID VENTRICULAR RESPONSE (H): ICD-10-CM

## 2020-06-22 DIAGNOSIS — Z99.11 ON MECHANICALLY ASSISTED VENTILATION (H): ICD-10-CM

## 2020-06-22 DIAGNOSIS — Z91.89 AT HIGH RISK FOR IMPAIRED SKIN INTEGRITY: ICD-10-CM

## 2020-06-22 DIAGNOSIS — J15.9 BACTERIAL PNEUMONIA: ICD-10-CM

## 2020-06-22 DIAGNOSIS — T14.8XXA OPEN WOUND: ICD-10-CM

## 2020-06-22 DIAGNOSIS — U07.1 COVID-19: ICD-10-CM

## 2020-06-22 LAB
A-TUMOR NECROSIS FACT SERPL-MCNC: 10.7 PG/ML
ABO/RH(D): NORMAL
ALBUMIN SERPL-MCNC: 2.7 G/DL (ref 3.5–5)
ALBUMIN SERPL-MCNC: 2.7 G/DL (ref 3.5–5)
ALBUMIN UR-MCNC: ABNORMAL MG/DL
ALP SERPL-CCNC: 49 U/L (ref 45–120)
ALP SERPL-CCNC: 49 U/L (ref 45–120)
ALT SERPL W P-5'-P-CCNC: 45 U/L (ref 0–45)
ALT SERPL W P-5'-P-CCNC: 45 U/L (ref 0–45)
ANION GAP SERPL CALCULATED.3IONS-SCNC: 12 MMOL/L (ref 5–18)
ANION GAP SERPL CALCULATED.3IONS-SCNC: 4 MMOL/L (ref 3–14)
APPEARANCE UR: ABNORMAL
APTT PPP: 38 SECONDS (ref 24–37)
AST SERPL W P-5'-P-CCNC: 69 U/L (ref 0–40)
AST SERPL W P-5'-P-CCNC: 69 U/L (ref 0–40)
AT III ACT/NOR PPP CHRO: 69 % (ref 85–135)
BASE EXCESS BLDA CALC-SCNC: 0.8 MMOL/L
BASE EXCESS BLDA CALC-SCNC: 4.2 MMOL/L
BASE EXCESS BLDA CALC-SCNC: 7 MMOL/L
BASE EXCESS BLDA CALC-SCNC: 8.1 MMOL/L
BASOPHILS # BLD AUTO: 0 THOU/UL (ref 0–0.2)
BASOPHILS NFR BLD AUTO: 0 % (ref 0–2)
BILIRUB DIRECT SERPL-MCNC: 0.2 MG/DL
BILIRUB SERPL-MCNC: 0.4 MG/DL (ref 0–1)
BILIRUB SERPL-MCNC: 0.4 MG/DL (ref 0–1)
BILIRUB UR QL STRIP: NEGATIVE
BLD PROD TYP BPU: NORMAL
BLD PROD TYP BPU: NORMAL
BLD UNIT ID BPU: NORMAL
BLD UNIT ID BPU: NORMAL
BLOOD PRODUCT CODE: NORMAL
BLOOD PRODUCT CODE: NORMAL
BPU ID: NORMAL
BPU ID: NORMAL
BUN SERPL-MCNC: 12 MG/DL (ref 7–30)
BUN SERPL-MCNC: 14 MG/DL (ref 8–22)
C REACTIVE PROTEIN LHE: 12.6 MG/DL (ref 0–0.8)
CALCIUM SERPL-MCNC: 7.7 MG/DL (ref 8.5–10.1)
CALCIUM SERPL-MCNC: 7.7 MG/DL (ref 8.5–10.5)
CHLORIDE BLD-SCNC: 105 MMOL/L (ref 98–107)
CHLORIDE SERPL-SCNC: 110 MMOL/L (ref 94–109)
CK SERPL-CCNC: 1062 U/L (ref 30–190)
CK SERPL-CCNC: 1168 U/L (ref 30–190)
CO2 SERPL-SCNC: 28 MMOL/L (ref 22–31)
CO2 SERPL-SCNC: 30 MMOL/L (ref 20–32)
COHGB MFR BLD: 95.3 % (ref 96–97)
COHGB MFR BLD: 96.3 % (ref 96–97)
COLOR UR AUTO: YELLOW
CREAT SERPL-MCNC: 0.77 MG/DL (ref 0.7–1.3)
CREAT SERPL-MCNC: 0.82 MG/DL (ref 0.66–1.25)
D DIMER PPP FEU-MCNC: 0.46 FEU UG/ML
D DIMER PPP FEU-MCNC: 0.7 UG/ML FEU (ref 0–0.5)
EOSINOPHIL # BLD AUTO: 0 THOU/UL (ref 0–0.4)
EOSINOPHIL NFR BLD AUTO: 0 % (ref 0–6)
ERYTHROCYTE [DISTWIDTH] IN BLOOD BY AUTOMATED COUNT: 14.8 % (ref 11–14.5)
FASTING STATUS PATIENT QL REPORTED: YES
FERRITIN SERPL-MCNC: 795 NG/ML (ref 27–300)
FERRITIN SERPL-MCNC: 807 NG/ML (ref 26–388)
FIBRINOGEN PPP-MCNC: 459 MG/DL (ref 170–410)
GFR SERPL CREATININE-BSD FRML MDRD: >60 ML/MIN/1.73M2
GFR SERPL CREATININE-BSD FRML MDRD: >90 ML/MIN/{1.73_M2}
GLUCOSE BLD-MCNC: 126 MG/DL (ref 70–125)
GLUCOSE BLDC GLUCOMTR-MCNC: 141 MG/DL (ref 70–99)
GLUCOSE SERPL-MCNC: 131 MG/DL (ref 70–99)
GLUCOSE UR STRIP-MCNC: ABNORMAL MG/DL
GRAM STN SPEC: NORMAL
GRAM STN SPEC: NORMAL
HCO3 BLD-SCNC: 28 MMOL/L (ref 21–28)
HCO3 BLD-SCNC: 29 MMOL/L (ref 21–28)
HCO3, ARTERIAL CALC - HISTORICAL: 30.3 MMOL/L (ref 23–29)
HCO3, ARTERIAL CALC - HISTORICAL: 31 MMOL/L (ref 23–29)
HCT VFR BLD AUTO: 40 % (ref 40–54)
HGB BLD-MCNC: 12.7 G/DL (ref 14–18)
HGB UR QL STRIP: ABNORMAL
IL-1BETA: 0.5 PG/ML
IL6 SERPL-MCNC: 158.11 PG/ML
IL6 SERPL-MCNC: 66.8 PG/ML
IL8 SERPL-MCNC: 61.3 PG/ML
INR PPP: 0.99 (ref 0.9–1.1)
KETONES UR STRIP-MCNC: ABNORMAL MG/DL
LACTATE SERPL-SCNC: 1.4 MMOL/L (ref 0.5–2.2)
LDH SERPL L TO P-CCNC: 722 U/L (ref 125–220)
LEUKOCYTE ESTERASE UR QL STRIP: NEGATIVE
LYMPHOCYTES # BLD AUTO: 0.7 THOU/UL (ref 0.8–4.4)
LYMPHOCYTES NFR BLD AUTO: 13 % (ref 20–40)
MAGNESIUM SERPL-MCNC: 2.1 MG/DL (ref 1.8–2.6)
MCH RBC QN AUTO: 28.7 PG (ref 27–34)
MCHC RBC AUTO-ENTMCNC: 31.8 G/DL (ref 32–36)
MCV RBC AUTO: 91 FL (ref 80–100)
MONOCYTES # BLD AUTO: 0.4 THOU/UL (ref 0–0.9)
MONOCYTES NFR BLD AUTO: 8 % (ref 2–10)
NEUTROPHILS # BLD AUTO: 4.2 THOU/UL (ref 2–7.7)
NEUTROPHILS NFR BLD AUTO: 78 % (ref 50–70)
NITRATE UR QL: NEGATIVE
O2/TOTAL GAS SETTING VFR VENT: 100 %
O2/TOTAL GAS SETTING VFR VENT: 100 %
O2/TOTAL GAS SETTING VFR VENT: 90 %
OXYHEMOGLOBIN - HISTORICAL: 93.8 % (ref 96–97)
OXYHEMOGLOBIN - HISTORICAL: 94.6 % (ref 96–97)
OXYHGB MFR BLD: 93 % (ref 92–100)
OXYHGB MFR BLD: 95 % (ref 92–100)
PCO2 BLD: 43 MM HG (ref 35–45)
PCO2 BLD: 45 MM HG (ref 35–45)
PCO2 BLD: 46 MM HG (ref 35–45)
PCO2 BLD: 57 MM HG (ref 35–45)
PEEP: 16 CM H2O
PEEP: 18 CM H2O
PH BLD: 7.31 PH (ref 7.35–7.45)
PH BLD: 7.42 PH (ref 7.35–7.45)
PH BLD: 7.45 [PH] (ref 7.37–7.44)
PH BLD: 7.48 [PH] (ref 7.37–7.44)
PH UR STRIP: 6 [PH] (ref 4.5–8)
PHOSPHATE SERPL-MCNC: 3.1 MG/DL (ref 2.5–4.5)
PLATELET # BLD AUTO: 150 THOU/UL (ref 140–440)
PMV BLD AUTO: 11.7 FL (ref 8.5–12.5)
PO2 BLD: 68 MM HG (ref 80–105)
PO2 BLD: 73 MM HG (ref 80–90)
PO2 BLD: 74 MM HG (ref 80–90)
PO2 BLD: 92 MM HG (ref 80–105)
POTASSIUM BLD-SCNC: 3.6 MMOL/L (ref 3.5–5)
POTASSIUM SERPL-SCNC: 3.7 MMOL/L (ref 3.4–5.3)
PROCALCITONIN SERPL-MCNC: 0.12 NG/ML (ref 0–0.49)
PROT SERPL-MCNC: 6.5 G/DL (ref 6–8)
PROT SERPL-MCNC: 6.5 G/DL (ref 6–8)
RATE: 20 RR/MIN
RATE: 20 RR/MIN
RBC # BLD AUTO: 4.42 MILL/UL (ref 4.4–6.2)
RETICS # AUTO: 0.03 MILL/UL (ref 0.01–0.11)
RETICS/RBC NFR AUTO: 0.57 % (ref 0.8–2.7)
SODIUM SERPL-SCNC: 144 MMOL/L (ref 133–144)
SODIUM SERPL-SCNC: 145 MMOL/L (ref 136–145)
SP GR UR STRIP: 1.03 (ref 1–1.03)
SPECIMEN SOURCE: NORMAL
TEMPERATURE: 37 DEGREES C
TEMPERATURE: 37 DEGREES C
TRANSFUSION STATUS PATIENT QL: NORMAL
TRIGL SERPL-MCNC: 138 MG/DL
TROPONIN I SERPL-MCNC: 0.02 NG/ML (ref 0–0.29)
TROPONIN I SERPL-MCNC: <0.015 UG/L (ref 0–0.04)
UROBILINOGEN UR STRIP-ACNC: ABNORMAL
VENTILATION MODE: ABNORMAL
VENTILATION MODE: AC
VENTILATOR TIDAL VOLUME: 450 ML
VENTILATOR TIDAL VOLUME: 480 ML
WBC: 5.3 THOU/UL (ref 4–11)

## 2020-06-22 PROCEDURE — 36600 WITHDRAWAL OF ARTERIAL BLOOD: CPT

## 2020-06-22 PROCEDURE — 00000146 ZZHCL STATISTIC GLUCOSE BY METER IP

## 2020-06-22 PROCEDURE — 80048 BASIC METABOLIC PNL TOTAL CA: CPT | Performed by: INTERNAL MEDICINE

## 2020-06-22 PROCEDURE — 25800030 ZZH RX IP 258 OP 636: Performed by: INTERNAL MEDICINE

## 2020-06-22 PROCEDURE — 83615 LACTATE (LD) (LDH) ENZYME: CPT | Performed by: INTERNAL MEDICINE

## 2020-06-22 PROCEDURE — 36415 COLL VENOUS BLD VENIPUNCTURE: CPT | Performed by: INTERNAL MEDICINE

## 2020-06-22 PROCEDURE — 25000128 H RX IP 250 OP 636: Performed by: NURSE ANESTHETIST, CERTIFIED REGISTERED

## 2020-06-22 PROCEDURE — 25000128 H RX IP 250 OP 636: Performed by: INTERNAL MEDICINE

## 2020-06-22 PROCEDURE — 25000128 H RX IP 250 OP 636: Performed by: SURGERY

## 2020-06-22 PROCEDURE — 84484 ASSAY OF TROPONIN QUANT: CPT | Performed by: INTERNAL MEDICINE

## 2020-06-22 PROCEDURE — 85027 COMPLETE CBC AUTOMATED: CPT | Performed by: INTERNAL MEDICINE

## 2020-06-22 PROCEDURE — 25000132 ZZH RX MED GY IP 250 OP 250 PS 637: Performed by: INTERNAL MEDICINE

## 2020-06-22 PROCEDURE — 40000275 ZZH STATISTIC RCP TIME EA 10 MIN

## 2020-06-22 PROCEDURE — 40000986 XR CHEST PORT 1 VW

## 2020-06-22 PROCEDURE — 40000344 ZZHCL STATISTIC THAWING COMPONENT: Performed by: PATHOLOGY

## 2020-06-22 PROCEDURE — 94002 VENT MGMT INPAT INIT DAY: CPT

## 2020-06-22 PROCEDURE — 40000008 ZZH STATISTIC AIRWAY CARE

## 2020-06-22 PROCEDURE — 82805 BLOOD GASES W/O2 SATURATION: CPT | Performed by: SURGERY

## 2020-06-22 PROCEDURE — 40001215: Performed by: PATHOLOGY

## 2020-06-22 PROCEDURE — 82728 ASSAY OF FERRITIN: CPT | Performed by: INTERNAL MEDICINE

## 2020-06-22 PROCEDURE — 87205 SMEAR GRAM STAIN: CPT | Performed by: INTERNAL MEDICINE

## 2020-06-22 PROCEDURE — 99239 HOSP IP/OBS DSCHRG MGMT >30: CPT | Performed by: INTERNAL MEDICINE

## 2020-06-22 PROCEDURE — 25000128 H RX IP 250 OP 636

## 2020-06-22 PROCEDURE — 82805 BLOOD GASES W/O2 SATURATION: CPT | Performed by: INTERNAL MEDICINE

## 2020-06-22 PROCEDURE — 93010 ELECTROCARDIOGRAM REPORT: CPT | Performed by: INTERNAL MEDICINE

## 2020-06-22 PROCEDURE — 31500 INSERT EMERGENCY AIRWAY: CPT

## 2020-06-22 PROCEDURE — 40000671 ZZH STATISTIC ANESTHESIA CASE

## 2020-06-22 PROCEDURE — 25000125 ZZHC RX 250: Performed by: INTERNAL MEDICINE

## 2020-06-22 PROCEDURE — 93005 ELECTROCARDIOGRAM TRACING: CPT

## 2020-06-22 PROCEDURE — 87070 CULTURE OTHR SPECIMN AEROBIC: CPT | Performed by: INTERNAL MEDICINE

## 2020-06-22 RX ORDER — ONDANSETRON 2 MG/ML
4 INJECTION INTRAMUSCULAR; INTRAVENOUS EVERY 6 HOURS PRN
Status: ON HOLD | DISCHARGE
Start: 2020-06-22 | End: 2020-08-17

## 2020-06-22 RX ORDER — MIDAZOLAM (PF) 1 MG/ML IN 0.9 % SODIUM CHLORIDE INTRAVENOUS SOLUTION
1-8 CONTINUOUS
Status: DISCONTINUED | OUTPATIENT
Start: 2020-06-22 | End: 2020-06-22 | Stop reason: HOSPADM

## 2020-06-22 RX ORDER — ALBUTEROL SULFATE 0.83 MG/ML
2.5 SOLUTION RESPIRATORY (INHALATION) EVERY 6 HOURS PRN
Status: DISCONTINUED | OUTPATIENT
Start: 2020-06-22 | End: 2020-06-22 | Stop reason: HOSPADM

## 2020-06-22 RX ORDER — PROPOFOL 10 MG/ML
INJECTION, EMULSION INTRAVENOUS PRN
Status: DISCONTINUED | OUTPATIENT
Start: 2020-06-22 | End: 2020-06-22

## 2020-06-22 RX ORDER — MIDAZOLAM (PF) 1 MG/ML IN 0.9 % SODIUM CHLORIDE INTRAVENOUS SOLUTION
1-8 CONTINUOUS
Status: ON HOLD | DISCHARGE
Start: 2020-06-22 | End: 2020-08-17

## 2020-06-22 RX ORDER — PROPOFOL 10 MG/ML
5-75 INJECTION, EMULSION INTRAVENOUS CONTINUOUS
Status: DISCONTINUED | OUTPATIENT
Start: 2020-06-22 | End: 2020-06-22 | Stop reason: HOSPADM

## 2020-06-22 RX ORDER — PROPOFOL 10 MG/ML
INJECTION, EMULSION INTRAVENOUS
Status: COMPLETED
Start: 2020-06-22 | End: 2020-06-22

## 2020-06-22 RX ORDER — HYDRALAZINE HYDROCHLORIDE 20 MG/ML
10 INJECTION INTRAMUSCULAR; INTRAVENOUS EVERY 4 HOURS PRN
Status: ON HOLD | DISCHARGE
Start: 2020-06-22 | End: 2020-08-17

## 2020-06-22 RX ORDER — DEXAMETHASONE SODIUM PHOSPHATE 4 MG/ML
INJECTION, SOLUTION INTRA-ARTICULAR; INTRALESIONAL; INTRAMUSCULAR; INTRAVENOUS; SOFT TISSUE
Status: ON HOLD | DISCHARGE
Start: 2020-06-22 | End: 2020-08-17

## 2020-06-22 RX ORDER — ALBUTEROL SULFATE 0.83 MG/ML
2.5 SOLUTION RESPIRATORY (INHALATION) EVERY 6 HOURS PRN
Status: ON HOLD | DISCHARGE
Start: 2020-06-22 | End: 2020-08-17

## 2020-06-22 RX ORDER — ALBUTEROL SULFATE 0.83 MG/ML
SOLUTION RESPIRATORY (INHALATION)
Status: DISCONTINUED
Start: 2020-06-22 | End: 2020-06-22 | Stop reason: HOSPADM

## 2020-06-22 RX ORDER — OXYCODONE HYDROCHLORIDE 5 MG/1
5-10 TABLET ORAL
Refills: 0 | Status: ON HOLD | DISCHARGE
Start: 2020-06-22 | End: 2020-08-17

## 2020-06-22 RX ADMIN — PIPERACILLIN SODIUM AND TAZOBACTAM SODIUM 3.38 G: 3; .375 INJECTION, POWDER, LYOPHILIZED, FOR SOLUTION INTRAVENOUS at 00:21

## 2020-06-22 RX ADMIN — MIDAZOLAM (PF) 1 MG/ML IN 0.9 % SODIUM CHLORIDE INTRAVENOUS SOLUTION 2 MG/HR: at 01:23

## 2020-06-22 RX ADMIN — PROPOFOL 150 MG: 10 INJECTION, EMULSION INTRAVENOUS at 01:31

## 2020-06-22 RX ADMIN — PROPOFOL 20 MCG/KG/MIN: 10 INJECTION, EMULSION INTRAVENOUS at 00:55

## 2020-06-22 RX ADMIN — PROPOFOL 30 MCG/KG/MIN: 10 INJECTION, EMULSION INTRAVENOUS at 08:02

## 2020-06-22 RX ADMIN — ALBUTEROL SULFATE 2 PUFF: 90 AEROSOL, METERED RESPIRATORY (INHALATION) at 00:09

## 2020-06-22 RX ADMIN — ALBUTEROL SULFATE 2.5 MG: 2.5 SOLUTION RESPIRATORY (INHALATION) at 09:46

## 2020-06-22 RX ADMIN — PROPOFOL 40 MCG/KG/MIN: 10 INJECTION, EMULSION INTRAVENOUS at 04:56

## 2020-06-22 RX ADMIN — ENOXAPARIN SODIUM 40 MG: 40 INJECTION SUBCUTANEOUS at 08:02

## 2020-06-22 RX ADMIN — PROPOFOL 55 MCG/KG/MIN: 10 INJECTION, EMULSION INTRAVENOUS at 02:14

## 2020-06-22 RX ADMIN — PIPERACILLIN SODIUM AND TAZOBACTAM SODIUM 3.38 G: 3; .375 INJECTION, POWDER, LYOPHILIZED, FOR SOLUTION INTRAVENOUS at 06:13

## 2020-06-22 RX ADMIN — SUCCINYLCHOLINE CHLORIDE 100 MG: 20 INJECTION, SOLUTION INTRAMUSCULAR; INTRAVENOUS; PARENTERAL at 01:31

## 2020-06-22 RX ADMIN — MIDAZOLAM HYDROCHLORIDE 3 MG: 1 INJECTION, SOLUTION INTRAMUSCULAR; INTRAVENOUS at 00:53

## 2020-06-22 RX ADMIN — VANCOMYCIN HYDROCHLORIDE 2000 MG: 5 INJECTION, POWDER, LYOPHILIZED, FOR SOLUTION INTRAVENOUS at 08:02

## 2020-06-22 RX ADMIN — EPOPROSTENOL 20 NG/KG/MIN: 1.5 INJECTION, POWDER, LYOPHILIZED, FOR SOLUTION INTRAVENOUS at 10:28

## 2020-06-22 RX ADMIN — SODIUM CHLORIDE, POTASSIUM CHLORIDE, SODIUM LACTATE AND CALCIUM CHLORIDE: 600; 310; 30; 20 INJECTION, SOLUTION INTRAVENOUS at 03:01

## 2020-06-22 ASSESSMENT — ACTIVITIES OF DAILY LIVING (ADL)
ADLS_ACUITY_SCORE: 16
ADLS_ACUITY_SCORE: 16
ADLS_ACUITY_SCORE: 15

## 2020-06-22 ASSESSMENT — MIFFLIN-ST. JEOR: SCORE: 1976.88

## 2020-06-22 NOTE — PROGRESS NOTES
Remdesivir Allocation Note      This patient has been selected using standard criteria to receive remdesivir by the [facility] Interdisciplinary Triage Team based on criteria developed and distributed by the [facility] on May 23, 2020.   This is under the FDA Emergency Use Authorization.     The treatment team should order Remdesivir 200 mg IV x1 and then 100 mg daily for 9 subsequent days if the patient/decision maker consent and if medically indicated.       Please contact Dr. Harley Cuevas with questions at [phone].      Vira Cheema PA-C

## 2020-06-22 NOTE — PROGRESS NOTES
ICU NOTE  Patient is coughing more  RR is 45.    PO2 is 65 on FIO2 of 0.7 (P:F is 93)  Will intubate and mechanically ventilate  Harshad Perez MD, FACS  [facility], Surgical Critical Care  [phone] pager

## 2020-06-22 NOTE — PROGRESS NOTES
[facility] Intensive Care Progress Note    Date of Service (when I saw the patient): 06/21/2020    PROBLEM LIST:    1.  Severe COVID-19 with respiratory failure  2.  Obesity    Past 24 Hrs:  Mr. Palmer reports no exposure to sick contacts.  He was admitted earlier today through the ED with respiratory failure.  He has cough which is been present for 3 days and headaches.       Assessment & Plan   Tobias Palmer is a 54 year old male who was admitted on 6/21/2020 with severe COVID disease and acute hypoxic respiratory failure      Cardiovascular  Hemodynamically stable.  Rhythm is sinus  No EKG available    Plan:  Monitor hemodynamics.  Replace electrolytes as needed.  EKG.    Pulmonary  Acute hypoxic respiratory failure due to COVID pneumonia/lung injury  Obesity  Plan:  High flow oxygen.  Would consider reintubation.    Renal  Creatinine in normal range.  CK elevated.  Calcium low  Plan:  Replace electrolytes as needed.  Renal dosing of medications.  Replace electrolytes as able  Monitor CK.  Consider bicarb drip if CK goes up higher    ID   SARS-CoV-2 positive  Plan:  - Dexamethasone 6 mg/day  - Consented for blood products.  Blood bank considering CCP.  - Cytokine storm panel sent.  Consider Tocilizumab if IL-6 elevated  - EUA remdesiveir if meets criteria.  This is done centrally.  -CRS panel sent    GI  Elevated AST.  Occasional alcohol use.  This is related to SARS-CoV-2 infection    Plan:  -Monitor    Nutrition    N.p.o. for now    Endocrine    ICU insulin protocol    Heme/Onc  WBC normal range.  Hemoglobin acceptable  Plan:  Transfuse per ICU parameters    DVT Prophylaxis: Enoxaparin (Lovenox) SQ  GI Prophylaxis: Not indicated    Restraints: Restraints for medical healing needed: NO    Family update by me today: Discussed with the patient    Plans for next 24 Hrs:        Time Spent on this Encounter   Billing:  I spent 90 minutes bedside and on the inpatient unit  today managing the critical care of Tobias Palmer in relation to the issues listed in this note.      Physical Exam   Temp: 101.8  F (38.8  C) Temp src: Oral Temp  Min: 101.8  F (38.8  C)  Max: 101.8  F (38.8  C) BP: 129/81 Pulse: 92 Heart Rate: 90 Resp: (!) 31 SpO2: 94 % O2 Device: High Flow Nasal Cannula (HFNC) Oxygen Delivery: Other (Comments)(50L)  Vitals:    06/21/20 1334   Weight: 90.7 kg (200 lb)       FiO2 (%): 70 %  Resp: (!) 31    No intake/output data recorded.    GEN: Obese in mild respiratory distress  HEENT: PERRLA  CHEST: Coarse breath sounds bilaterally  CVS: Clear rate and rhythm  ABDO: Obese, bowel sounds present  EXTREM: No clubbing no cyanosis no edema  SKIN: No rash  NEURO: Nonfocal    Lines: No erythema or discharge at entry site for No invasive lines  Current lines are required for patient management    Medications     lactated ringers       - MEDICATION INSTRUCTIONS -         albuterol  2 puff Inhalation 4x Daily     dexamethasone  6 mg Intravenous Q24H     enoxaparin ANTICOAGULANT  40 mg Subcutaneous Q12H     piperacillin-tazobactam  3.375 g Intravenous Q6H     sodium chloride (PF)  3 mL Intracatheter Q8H     vancomycin (VANCOCIN) IV  2,000 mg Intravenous Q12H       Data   Recent Labs   Lab 06/21/20  1811 06/21/20  1331   WBC 6.6 6.6   HGB 14.3 14.7   MCV 89 89    152   INR 1.07  --     137   POTASSIUM 3.7 3.9   CHLORIDE 106 106   CO2 23 28   BUN 10 9   CR 0.62* 0.78   ANIONGAP 8 3   FREDO 7.8* 8.4*   * 104*   ALBUMIN 2.9* 3.2*   PROTTOTAL 7.6 8.1   BILITOTAL 0.5 0.5   ALKPHOS 56 63   ALT 69 71*   * 106*   TROPI <0.015 <0.015     Recent Results (from the past 24 hour(s))   XR Chest Port 1 View    Narrative    CHEST PORTABLE ONE VIEW  6/21/2020 2:32 PM     HISTORY: Cough, fever.    COMPARISON: None.      Impression    IMPRESSION: Portable chest. Lung consolidation is noted bilaterally  consistent with bilateral pneumonia. No pneumothorax or significant  pleural  effusions. Heart appears normal in size.    VAL SO MD       This note was created using dragon voice recognition.  Please excuse transcription errors or typos created due to the software.

## 2020-06-22 NOTE — PROGRESS NOTES
FILIPE. Pt transported to [place] via EMS. Report given to BARRETT Rendon. Belongings sent with patient.

## 2020-06-22 NOTE — PROVIDER NOTIFICATION
Dr. Omega Jones informed verbally of critically elevated CK called by lab of 1864 - no new orders received as pt.likely covid positive and plan of care is being executed.

## 2020-06-22 NOTE — PLAN OF CARE
Pt intubated overnight for worsening hypoxia related to Covid -19 positive status. Sedated with prop/versed gtt. VSS. Given 2 units of  convalescent plasma. Several ABG's drawn overnight with subsequent vent changes made. Diminished lung sounds. OG in place. Blood sugar ok. Hillman in place with ok UOP. Peripheral lines for access. Distended abdominal with positive bowel sounds. BUE restraints in place. Patient updated family prior intubation. No skin issues and placed sacral mepilex.     Addendum.  Writer called son and updated him with POC.

## 2020-06-22 NOTE — PROGRESS NOTES
Date of Admission: 6/21/2020     Date of Intubation (most recent):6/22/2020     Reason for Mechanical Ventilation:Airway protection     Number of Days on Mechanical Ventilation:1     Met Criteria for Spontaneous Breathing Trial: No per MD order      Significant Events Today: intubated and abg was draw, peep was increases to +10 100%  Recent Labs   Lab 06/21/20  2348 06/21/20  1910 06/21/20  1438   PH 7.41 7.43  --    PCO2 39 38  --    PO2 65* 75*  --    HCO3 25 25  --    O2PER 70  --  91% 6L NC     Ventilation Mode: CMV/AC  (Continuous Mandatory Ventilation/ Assist Control)  FiO2 (%): 100 %  Rate Set (breaths/minute): 16 breaths/min  Tidal Volume Set (mL): 500 mL  PEEP (cm H2O): 10 cmH2O  Oxygen Concentration (%): 100 %  Resp: 17    Plan; continue ventilatory support SBT's daily

## 2020-06-22 NOTE — PROGRESS NOTES
Brief hospitalist note  Patient intubated for COVID 19 pneumonia with respiratory failure  hospitalist will sign off    Call if question

## 2020-06-22 NOTE — PLAN OF CARE
Pt. Admitted from ED at 1718, increased respiratory rate with shallow breathing noted, lungs clear and diminished, frequent non-productive  cough, elevated temp, adequate BP, pt. Was calm and accepting, plan of care explained by ICU MD at bedside including high probability for covid positive status, blood consent was signed, and covid plasma consent was planned to be initiated, IV fluid and antibiotics started, lab work suspicious of covid and pt. Ultimately called back as covid positive neat end of shift, no voiding yet, but pt. Feeling urge.

## 2020-06-22 NOTE — PROGRESS NOTES
Tobias VogelEdenes has consented (by self or legal proxy) to receive COVID convalescent plasma (CCP) as part of a research study.  Treatment has been completed. The patient received 2 units of CCP on 6/22/20.

## 2020-06-22 NOTE — PROVIDER NOTIFICATION
Received Covid positive result along with CK level of 1775.  Results reported to Dr. Jones.  Dr. Perez also aware of + Covid result.

## 2020-06-22 NOTE — DISCHARGE SUMMARY
[facility]    Discharge Summary  [facility] Intensive Care    Date of Admission:  6/21/2020  Date of Discharge:  6/22/2020  Discharging Provider: Hua Robert    Discharge Diagnoses   Patient Active Problem List   Diagnosis     Acute respiratory failure (H)       History of Present Illness   Tobias Palmer is a 54 year old male who presented with shortness of breath, cough, and fever.He tested positive for COVID-19 The patient reported that his shortness of breath started 3 days he ago. He also reported some chest pain and a cough that started yesterday. He denied fever, but had a fever upon arrival. He also noted a headache. The patient denied diarrhea, leg swelling, or abdominal pain. He has not had any known ill contacts, or been in contact with anyone who is known to have COVID. He has no history of asthma or blood clots. He rapidly developed respiratory failure upon presentation to the ED, and required high flow oxygen and then intubation and mechanical ventilation.    Hospital Course   Tobias Palmer was admitted on 6/21/2020.  The following problems were addressed during his hospitalization:    Respiratory failure due to COVID-19    Hua Robert MD    Significant Results and Procedures   Intubation and mechanical ventilation  COVID-19 positive    Pending Results     Unresulted Labs Ordered in the Past 30 Days of this Admission     Date and Time Order Name Status Description    6/22/2020 0001 Ferritin In process     6/22/2020 0001 Troponin I In process     6/22/2020 0001 Basic metabolic panel In process     6/21/2020 2240 Cytokine Storm 4-Plex In process     6/21/2020 2038 Convalescent Plasma Prepare Order unit In process     6/21/2020 1752 Interleukin 6 Blood In process     6/21/2020 1752 Antithrombin III In process     6/21/2020 1752 Sputum Culture Aerobic Bacterial Preliminary     6/21/2020 1329 Blood culture Preliminary     6/21/2020 1329 Blood culture  Preliminary           Code Status   Full Code    Primary Care Physician   Physician No Ref-Primary        Time Spent on this Encounter   I, Hua Robert MD, personally saw the patient today and spent greater than 30 minutes discharging this patient.    Discharge Disposition   Transferred to [address]  Condition at discharge: Critical    Consultations This Hospital Stay   INTENSIVIST IP CONSULT  PHARMACY TO DOSE VANCO    Discharge Orders   No discharge procedures on file.  Discharge Medications   There are no discharge medications for this patient.    Allergies   Allergies   Allergen Reactions     Aspirin Rash     Data   Most Recent 3 CBC's:  Recent Labs   Lab Test 06/21/20  1811 06/21/20  1331   WBC 6.6 6.6   HGB 14.3 14.7   MCV 89 89    152      Most Recent 3 BMP's:  Recent Labs   Lab Test 06/21/20  1811 06/21/20  1331    137   POTASSIUM 3.7 3.9   CHLORIDE 106 106   CO2 23 28   BUN 10 9   CR 0.62* 0.78   ANIONGAP 8 3   FREDO 7.8* 8.4*   * 104*     Most Recent 2 LFT's:  Recent Labs   Lab Test 06/21/20  1811 06/21/20  1331   * 106*   ALT 69 71*   ALKPHOS 56 63   BILITOTAL 0.5 0.5     Most Recent INR's and Anticoagulation Dosing History:  Anticoagulation Dose History     Recent Dosing and Labs Latest Ref Rng & Units 6/21/2020    INR 0.86 - 1.14 1.07        Most Recent 3 Troponin's:  Recent Labs   Lab Test 06/21/20  1811 06/21/20  1331   TROPI <0.015 <0.015     Most Recent Cholesterol Panel:No lab results found.  Most Recent 6 Bacteria Isolates From Any Culture (See EPIC Reports for Culture Details):  Recent Labs   Lab Test 06/22/20  0102 06/21/20  1438 06/21/20  1331   CULT PENDING No growth after 13 hours No growth after 15 hours     Most Recent TSH, T4 and A1c Labs:No lab results found.  Results for orders placed or performed during the hospital encounter of 06/21/20   XR Chest Port 1 View    Narrative    CHEST PORTABLE ONE VIEW  6/21/2020 2:32 PM     HISTORY:  Cough, fever.    COMPARISON: None.      Impression    IMPRESSION: Portable chest. Lung consolidation is noted bilaterally  consistent with bilateral pneumonia. No pneumothorax or significant  pleural effusions. Heart appears normal in size.    VAL SO MD   XR Chest Port 1 View    Narrative    EXAM: XR CHEST PORT 1 VW  LOCATION: [facility]  DATE/TIME: 6/22/2020 2:36 AM    INDICATION: Respiratory failure.  COMPARISON: None.      Impression    IMPRESSION: Difficult to evaluate heart size. Tip of ETT just above the melissa 1 cm. NG tube extends below left hemidiaphragm. Bilateral airspace opacities with consolidation greatest left lower lung. Findings suspicious for inflammatory or infectious   process. Covid-19 pneumonia could have this appearance. Small left pleural effusion may be present. No pneumothorax. Monitor electrodes.     Hua Robert MD  [facility] Intensivist Service

## 2020-06-23 LAB
ANION GAP SERPL CALCULATED.3IONS-SCNC: 13 MMOL/L (ref 5–18)
AT III ACT/NOR PPP CHRO: 63 % (ref 85–135)
BASE EXCESS BLDA CALC-SCNC: 1.6 MMOL/L
BASE EXCESS BLDA CALC-SCNC: 11.4 MMOL/L
BASE EXCESS BLDA CALC-SCNC: 6 MMOL/L
BASE EXCESS BLDA CALC-SCNC: 8 MMOL/L
BASOPHILS # BLD AUTO: 0 THOU/UL (ref 0–0.2)
BASOPHILS NFR BLD AUTO: 0 % (ref 0–2)
BUN SERPL-MCNC: 21 MG/DL (ref 8–22)
C REACTIVE PROTEIN LHE: 12 MG/DL (ref 0–0.8)
CALCIUM SERPL-MCNC: 8 MG/DL (ref 8.5–10.5)
CHLORIDE BLD-SCNC: 104 MMOL/L (ref 98–107)
CK SERPL-CCNC: 631 U/L (ref 30–190)
CO2 SERPL-SCNC: 31 MMOL/L (ref 22–31)
COHGB MFR BLD: 94.6 % (ref 96–97)
COHGB MFR BLD: 96.8 % (ref 96–97)
COHGB MFR BLD: 98.6 % (ref 96–97)
COHGB MFR BLD: 99.4 % (ref 96–97)
CREAT SERPL-MCNC: 0.79 MG/DL (ref 0.7–1.3)
D DIMER PPP FEU-MCNC: 0.42 FEU UG/ML
EOSINOPHIL # BLD AUTO: 0 THOU/UL (ref 0–0.4)
EOSINOPHIL NFR BLD AUTO: 0 % (ref 0–6)
ERYTHROCYTE [DISTWIDTH] IN BLOOD BY AUTOMATED COUNT: 15 % (ref 11–14.5)
FIBRINOGEN PPP-MCNC: 528 MG/DL (ref 170–410)
GFR SERPL CREATININE-BSD FRML MDRD: >60 ML/MIN/1.73M2
GLUCOSE BLD-MCNC: 144 MG/DL (ref 70–125)
GLUCOSE BLDC GLUCOMTR-MCNC: 133 MG/DL (ref 70–139)
GLUCOSE BLDC GLUCOMTR-MCNC: 170 MG/DL (ref 70–139)
GLUCOSE BLDC GLUCOMTR-MCNC: 177 MG/DL (ref 70–139)
GLUCOSE BLDC GLUCOMTR-MCNC: 194 MG/DL (ref 70–139)
GLUCOSE BLDC GLUCOMTR-MCNC: 210 MG/DL (ref 70–139)
HCO3, ARTERIAL CALC - HISTORICAL: 24.7 MMOL/L (ref 23–29)
HCO3, ARTERIAL CALC - HISTORICAL: 29.5 MMOL/L (ref 23–29)
HCO3, ARTERIAL CALC - HISTORICAL: 30.4 MMOL/L (ref 23–29)
HCO3, ARTERIAL CALC - HISTORICAL: 33.8 MMOL/L (ref 23–29)
HCT VFR BLD AUTO: 41.6 % (ref 40–54)
HGB BLD-MCNC: 13.2 G/DL (ref 14–18)
INR PPP: 0.99 (ref 0.9–1.1)
LDH SERPL L TO P-CCNC: 480 U/L (ref 125–220)
LYMPHOCYTES # BLD AUTO: 0.8 THOU/UL (ref 0.8–4.4)
LYMPHOCYTES NFR BLD AUTO: 9 % (ref 20–40)
MAGNESIUM SERPL-MCNC: 2.4 MG/DL (ref 1.8–2.6)
MCH RBC QN AUTO: 28.6 PG (ref 27–34)
MCHC RBC AUTO-ENTMCNC: 31.7 G/DL (ref 32–36)
MCV RBC AUTO: 90 FL (ref 80–100)
MONOCYTES # BLD AUTO: 0.6 THOU/UL (ref 0–0.9)
MONOCYTES NFR BLD AUTO: 7 % (ref 2–10)
NEUTROPHILS # BLD AUTO: 7.4 THOU/UL (ref 2–7.7)
NEUTROPHILS NFR BLD AUTO: 83 % (ref 50–70)
O2/TOTAL GAS SETTING VFR VENT: 70 %
O2/TOTAL GAS SETTING VFR VENT: 80 %
O2/TOTAL GAS SETTING VFR VENT: 85 %
O2/TOTAL GAS SETTING VFR VENT: 95 %
OXYHEMOGLOBIN - HISTORICAL: 92.9 % (ref 96–97)
OXYHEMOGLOBIN - HISTORICAL: 94.6 % (ref 96–97)
OXYHEMOGLOBIN - HISTORICAL: 96.9 % (ref 96–97)
OXYHEMOGLOBIN - HISTORICAL: 97.9 % (ref 96–97)
PCO2 BLD: 43 MM HG (ref 35–45)
PCO2 BLD: 45 MM HG (ref 35–45)
PCO2 BLD: 51 MM HG (ref 35–45)
PCO2 BLD: 55 MM HG (ref 35–45)
PEEP: 20 CM H2O
PH BLD: 7.32 [PH] (ref 7.37–7.44)
PH BLD: 7.41 [PH] (ref 7.37–7.44)
PH BLD: 7.46 [PH] (ref 7.37–7.44)
PH BLD: 7.51 [PH] (ref 7.37–7.44)
PHOSPHATE SERPL-MCNC: 3.1 MG/DL (ref 2.5–4.5)
PLATELET # BLD AUTO: 188 THOU/UL (ref 140–440)
PMV BLD AUTO: 11.5 FL (ref 8.5–12.5)
PO2 BLD: 161 MM HG (ref 80–90)
PO2 BLD: 212 MM HG (ref 80–90)
PO2 BLD: 82 MM HG (ref 80–90)
PO2 BLD: 83 MM HG (ref 80–90)
POTASSIUM BLD-SCNC: 3.9 MMOL/L (ref 3.5–5)
RATE: 18 RR/MIN
RATE: 20 RR/MIN
RBC # BLD AUTO: 4.62 MILL/UL (ref 4.4–6.2)
RETICS # AUTO: 0.03 MILL/UL (ref 0.01–0.11)
RETICS/RBC NFR AUTO: 0.63 % (ref 0.8–2.7)
SODIUM SERPL-SCNC: 148 MMOL/L (ref 136–145)
TEMPERATURE: 37 DEGREES C
TROPONIN I SERPL-MCNC: 0.02 NG/ML (ref 0–0.29)
VENTILATION MODE: AC
VENTILATOR TIDAL VOLUME: 450 ML
WBC: 8.9 THOU/UL (ref 4–11)

## 2020-06-24 LAB
ANION GAP SERPL CALCULATED.3IONS-SCNC: 7 MMOL/L (ref 5–18)
BACTERIA SPEC CULT: NORMAL
BASE EXCESS BLDA CALC-SCNC: -0.5 MMOL/L
BASE EXCESS BLDA CALC-SCNC: 0.7 MMOL/L
BASE EXCESS BLDA CALC-SCNC: 0.9 MMOL/L
BASE EXCESS BLDA CALC-SCNC: 1.2 MMOL/L
BASOPHILS # BLD AUTO: 0 THOU/UL (ref 0–0.2)
BASOPHILS NFR BLD AUTO: 0 % (ref 0–2)
BUN SERPL-MCNC: 22 MG/DL (ref 8–22)
C REACTIVE PROTEIN LHE: 10 MG/DL (ref 0–0.8)
CALCIUM SERPL-MCNC: 7.9 MG/DL (ref 8.5–10.5)
CHLORIDE BLD-SCNC: 111 MMOL/L (ref 98–107)
CK SERPL-CCNC: 364 U/L (ref 30–190)
CO2 SERPL-SCNC: 27 MMOL/L (ref 22–31)
COHGB MFR BLD: 97.3 % (ref 96–97)
COHGB MFR BLD: 97.5 % (ref 96–97)
COHGB MFR BLD: 98 % (ref 96–97)
COHGB MFR BLD: 98.8 % (ref 96–97)
CREAT SERPL-MCNC: 0.74 MG/DL (ref 0.7–1.3)
D DIMER PPP FEU-MCNC: 0.89 FEU UG/ML
EOSINOPHIL COUNT (ABSOLUTE): 0 THOU/UL (ref 0–0.4)
EOSINOPHIL NFR BLD AUTO: 0 % (ref 0–6)
ERYTHROCYTE [DISTWIDTH] IN BLOOD BY AUTOMATED COUNT: 15.1 % (ref 11–14.5)
FERRITIN SERPL-MCNC: 881 NG/ML (ref 27–300)
FIBRINOGEN PPP-MCNC: 570 MG/DL (ref 170–410)
GFR SERPL CREATININE-BSD FRML MDRD: >60 ML/MIN/1.73M2
GLUCOSE BLD-MCNC: 190 MG/DL (ref 70–125)
GLUCOSE BLDC GLUCOMTR-MCNC: 162 MG/DL (ref 70–139)
GLUCOSE BLDC GLUCOMTR-MCNC: 164 MG/DL (ref 70–139)
GLUCOSE BLDC GLUCOMTR-MCNC: 173 MG/DL (ref 70–139)
GLUCOSE BLDC GLUCOMTR-MCNC: 176 MG/DL (ref 70–139)
GLUCOSE BLDC GLUCOMTR-MCNC: 200 MG/DL (ref 70–139)
HCO3, ARTERIAL CALC - HISTORICAL: 24.5 MMOL/L (ref 23–29)
HCO3, ARTERIAL CALC - HISTORICAL: 24.6 MMOL/L (ref 23–29)
HCO3, ARTERIAL CALC - HISTORICAL: 25.6 MMOL/L (ref 23–29)
HCO3, ARTERIAL CALC - HISTORICAL: 25.8 MMOL/L (ref 23–29)
HCT VFR BLD AUTO: 45.8 % (ref 40–54)
HGB BLD-MCNC: 14 G/DL (ref 14–18)
INR PPP: 1.06 (ref 0.9–1.1)
INTERLEUKIN 6 BLOOD: 42.73 PG/ML
LDH SERPL L TO P-CCNC: 477 U/L (ref 125–220)
LYMPHOCYTES # BLD AUTO: 1 THOU/UL (ref 0.8–4.4)
LYMPHOCYTES NFR BLD AUTO: 13 % (ref 20–40)
Lab: NORMAL
MAGNESIUM SERPL-MCNC: 2.4 MG/DL (ref 1.8–2.6)
MANUAL NRBC PER 100 CELLS: 1
MCH RBC QN AUTO: 28.7 PG (ref 27–34)
MCHC RBC AUTO-ENTMCNC: 30.6 G/DL (ref 32–36)
MCV RBC AUTO: 94 FL (ref 80–100)
METAMYELOCYTES (ABSOLUTE): 0.2 THOU/UL
METAMYELOCYTES NFR BLD MANUAL: 3 %
MONOCYTES # BLD AUTO: 0.4 THOU/UL (ref 0–0.9)
MONOCYTES NFR BLD AUTO: 6 % (ref 2–10)
MYELOCYTES (ABSOLUTE): 0.2 THOU/UL
MYELOCYTES NFR BLD MANUAL: 3 %
O2/TOTAL GAS SETTING VFR VENT: 55 %
O2/TOTAL GAS SETTING VFR VENT: 65 %
OXYHEMOGLOBIN - HISTORICAL: 95.2 % (ref 96–97)
OXYHEMOGLOBIN - HISTORICAL: 95.8 % (ref 96–97)
OXYHEMOGLOBIN - HISTORICAL: 96 % (ref 96–97)
OXYHEMOGLOBIN - HISTORICAL: 96.7 % (ref 96–97)
PCO2 BLD: 41 MM HG (ref 35–45)
PCO2 BLD: 42 MM HG (ref 35–45)
PCO2 BLD: 46 MM HG (ref 35–45)
PCO2 BLD: 48 MM HG (ref 35–45)
PEEP: 20 CM H2O
PH BLD: 7.36 [PH] (ref 7.37–7.44)
PH BLD: 7.37 [PH] (ref 7.37–7.44)
PH BLD: 7.38 [PH] (ref 7.37–7.44)
PH BLD: 7.41 [PH] (ref 7.37–7.44)
PHOSPHATE SERPL-MCNC: 2.9 MG/DL (ref 2.5–4.5)
PLAT MORPH BLD: NORMAL
PLATELET # BLD AUTO: 190 THOU/UL (ref 140–440)
PMV BLD AUTO: 11.5 FL (ref 8.5–12.5)
PO2 BLD: 101 MM HG (ref 80–90)
PO2 BLD: 131 MM HG (ref 80–90)
PO2 BLD: 83 MM HG (ref 80–90)
PO2 BLD: 95 MM HG (ref 80–90)
POTASSIUM BLD-SCNC: 4.7 MMOL/L (ref 3.5–5)
PROCALCITONIN SERPL-MCNC: 0.1 NG/ML (ref 0–0.49)
RATE: 20 RR/MIN
RBC # BLD AUTO: 4.88 MILL/UL (ref 4.4–6.2)
RETICS # AUTO: 0.03 MILL/UL (ref 0.01–0.11)
RETICS/RBC NFR AUTO: 0.6 % (ref 0.8–2.7)
SODIUM SERPL-SCNC: 145 MMOL/L (ref 136–145)
SPECIMEN SOURCE: NORMAL
TEMPERATURE: 37 DEGREES C
TOTAL NEUTROPHILS-ABS(DIFF): 6.1 THOU/UL (ref 2–7.7)
TOTAL NEUTROPHILS-REL(DIFF): 77 % (ref 50–70)
VENTILATION MODE: AC
VENTILATION MODE: AC
VENTILATOR TIDAL VOLUME: 450 ML
WBC: 8 THOU/UL (ref 4–11)

## 2020-06-25 LAB
ANION GAP SERPL CALCULATED.3IONS-SCNC: 9 MMOL/L (ref 5–18)
BASE EXCESS BLDA CALC-SCNC: 0.2 MMOL/L
BASE EXCESS BLDA CALC-SCNC: 1.2 MMOL/L
BASE EXCESS BLDA CALC-SCNC: 2 MMOL/L
BASOPHILS # BLD AUTO: 0 THOU/UL (ref 0–0.2)
BASOPHILS NFR BLD AUTO: 0 % (ref 0–2)
BUN SERPL-MCNC: 33 MG/DL (ref 8–22)
C REACTIVE PROTEIN LHE: 4.2 MG/DL (ref 0–0.8)
CALCIUM SERPL-MCNC: 7.8 MG/DL (ref 8.5–10.5)
CHLORIDE BLD-SCNC: 114 MMOL/L (ref 98–107)
CK SERPL-CCNC: 240 U/L (ref 30–190)
CO2 SERPL-SCNC: 24 MMOL/L (ref 22–31)
COHGB MFR BLD: 97.6 % (ref 96–97)
COHGB MFR BLD: 97.9 % (ref 96–97)
COHGB MFR BLD: 98.3 % (ref 96–97)
CREAT SERPL-MCNC: 0.78 MG/DL (ref 0.7–1.3)
D DIMER PPP FEU-MCNC: 0.77 FEU UG/ML
EOSINOPHIL COUNT (ABSOLUTE): 0 THOU/UL (ref 0–0.4)
EOSINOPHIL NFR BLD AUTO: 0 % (ref 0–6)
ERYTHROCYTE [DISTWIDTH] IN BLOOD BY AUTOMATED COUNT: 15.3 % (ref 11–14.5)
FERRITIN SERPL-MCNC: 867 NG/ML (ref 27–300)
FIBRINOGEN PPP-MCNC: 506 MG/DL (ref 170–410)
GFR SERPL CREATININE-BSD FRML MDRD: >60 ML/MIN/1.73M2
GLUCOSE BLD-MCNC: 233 MG/DL (ref 70–125)
GLUCOSE BLDC GLUCOMTR-MCNC: 165 MG/DL (ref 70–139)
GLUCOSE BLDC GLUCOMTR-MCNC: 169 MG/DL (ref 70–139)
GLUCOSE BLDC GLUCOMTR-MCNC: 170 MG/DL (ref 70–139)
GLUCOSE BLDC GLUCOMTR-MCNC: 180 MG/DL (ref 70–139)
GLUCOSE BLDC GLUCOMTR-MCNC: 196 MG/DL (ref 70–139)
GLUCOSE BLDC GLUCOMTR-MCNC: 215 MG/DL (ref 70–139)
HBV SURFACE AG SERPL QL IA: NEGATIVE
HCO3, ARTERIAL CALC - HISTORICAL: 24.5 MMOL/L (ref 23–29)
HCO3, ARTERIAL CALC - HISTORICAL: 25.6 MMOL/L (ref 23–29)
HCO3, ARTERIAL CALC - HISTORICAL: 25.8 MMOL/L (ref 23–29)
HCT VFR BLD AUTO: 44.3 % (ref 40–54)
HCV AB SERPL QL IA: NEGATIVE
HGB BLD-MCNC: 13.6 G/DL (ref 14–18)
INR PPP: 1.07 (ref 0.9–1.1)
INTERPRETATION ECG - MUSE: NORMAL
LDH SERPL L TO P-CCNC: 434 U/L (ref 125–220)
LYMPHOCYTES # BLD AUTO: 0.9 THOU/UL (ref 0.8–4.4)
LYMPHOCYTES NFR BLD AUTO: 10 % (ref 20–40)
MAGNESIUM SERPL-MCNC: 2.6 MG/DL (ref 1.8–2.6)
MANUAL NRBC PER 100 CELLS: 1
MCH RBC QN AUTO: 28.8 PG (ref 27–34)
MCHC RBC AUTO-ENTMCNC: 30.7 G/DL (ref 32–36)
MCV RBC AUTO: 94 FL (ref 80–100)
METAMYELOCYTES (ABSOLUTE): 0.2 THOU/UL
METAMYELOCYTES NFR BLD MANUAL: 3 %
MONOCYTES # BLD AUTO: 0.3 THOU/UL (ref 0–0.9)
MONOCYTES NFR BLD AUTO: 3 % (ref 2–10)
MYELOCYTES (ABSOLUTE): 0.3 THOU/UL
MYELOCYTES NFR BLD MANUAL: 3 %
O2/TOTAL GAS SETTING VFR VENT: 65 %
O2/TOTAL GAS SETTING VFR VENT: 75 %
O2/TOTAL GAS SETTING VFR VENT: 90 %
OXYHEMOGLOBIN - HISTORICAL: 96 % (ref 96–97)
OXYHEMOGLOBIN - HISTORICAL: 96.2 % (ref 96–97)
OXYHEMOGLOBIN - HISTORICAL: 96.2 % (ref 96–97)
PCO2 BLD: 41 MM HG (ref 35–45)
PCO2 BLD: 41 MM HG (ref 35–45)
PCO2 BLD: 45 MM HG (ref 35–45)
PEEP: 20 CM H2O
PH BLD: 7.38 [PH] (ref 7.37–7.44)
PH BLD: 7.39 [PH] (ref 7.37–7.44)
PH BLD: 7.41 [PH] (ref 7.37–7.44)
PHOSPHATE SERPL-MCNC: 2 MG/DL (ref 2.5–4.5)
PLAT MORPH BLD: NORMAL
PLATELET # BLD AUTO: 199 THOU/UL (ref 140–440)
PMV BLD AUTO: 11.9 FL (ref 8.5–12.5)
PO2 BLD: 101 MM HG (ref 80–90)
PO2 BLD: 103 MM HG (ref 80–90)
PO2 BLD: 104 MM HG (ref 80–90)
POTASSIUM BLD-SCNC: 3.8 MMOL/L (ref 3.5–5)
RATE: 20 RR/MIN
RBC # BLD AUTO: 4.73 MILL/UL (ref 4.4–6.2)
RETICS # AUTO: 0.03 MILL/UL (ref 0.01–0.11)
RETICS/RBC NFR AUTO: 0.59 % (ref 0.8–2.7)
SODIUM SERPL-SCNC: 147 MMOL/L (ref 136–145)
TEMPERATURE: 37 DEGREES C
TOTAL NEUTROPHILS-ABS(DIFF): 7.5 THOU/UL (ref 2–7.7)
TOTAL NEUTROPHILS-REL(DIFF): 82 % (ref 50–70)
TROPONIN I SERPL-MCNC: 0.01 NG/ML (ref 0–0.29)
VENTILATION MODE: AC
VENTILATOR TIDAL VOLUME: 450 ML
VENTILATOR TIDAL VOLUME: 450 ML
VENTILATOR TIDAL VOLUME: 480 ML
WBC: 9.2 THOU/UL (ref 4–11)

## 2020-06-25 ASSESSMENT — MIFFLIN-ST. JEOR: SCORE: 1944.88

## 2020-06-26 LAB
ANION GAP SERPL CALCULATED.3IONS-SCNC: 8 MMOL/L (ref 5–18)
BACTERIA SPEC CULT: ABNORMAL
BACTERIA SPEC CULT: ABNORMAL
BASE EXCESS BLDA CALC-SCNC: 2.2 MMOL/L
BASE EXCESS BLDA CALC-SCNC: 2.4 MMOL/L
BASE EXCESS BLDA CALC-SCNC: 2.9 MMOL/L
BASOPHILS # BLD AUTO: 0 THOU/UL (ref 0–0.2)
BASOPHILS NFR BLD AUTO: 0 % (ref 0–2)
BUN SERPL-MCNC: 47 MG/DL (ref 8–22)
C REACTIVE PROTEIN LHE: 2.1 MG/DL (ref 0–0.8)
CALCIUM SERPL-MCNC: 7.9 MG/DL (ref 8.5–10.5)
CHLORIDE BLD-SCNC: 109 MMOL/L (ref 98–107)
CO2 SERPL-SCNC: 28 MMOL/L (ref 22–31)
COHGB MFR BLD: 98.2 % (ref 96–97)
COHGB MFR BLD: 99 % (ref 96–97)
COHGB MFR BLD: 99.1 % (ref 96–97)
CREAT SERPL-MCNC: 0.95 MG/DL (ref 0.7–1.3)
D DIMER PPP FEU-MCNC: 0.72 FEU UG/ML
EOSINOPHIL COUNT (ABSOLUTE): 0 THOU/UL (ref 0–0.4)
EOSINOPHIL NFR BLD AUTO: 0 % (ref 0–6)
ERYTHROCYTE [DISTWIDTH] IN BLOOD BY AUTOMATED COUNT: 15.4 % (ref 11–14.5)
FERRITIN SERPL-MCNC: 642 NG/ML (ref 27–300)
FIBRINOGEN PPP-MCNC: 505 MG/DL (ref 170–410)
GAMMA INTERFERON BACKGROUND BLD IA-ACNC: 0.09 IU/ML
GFR SERPL CREATININE-BSD FRML MDRD: >60 ML/MIN/1.73M2
GLUCOSE BLD-MCNC: 152 MG/DL (ref 70–125)
GLUCOSE BLDC GLUCOMTR-MCNC: 146 MG/DL (ref 70–139)
GLUCOSE BLDC GLUCOMTR-MCNC: 153 MG/DL (ref 70–139)
GLUCOSE BLDC GLUCOMTR-MCNC: 181 MG/DL (ref 70–139)
GLUCOSE BLDC GLUCOMTR-MCNC: 183 MG/DL (ref 70–139)
GLUCOSE BLDC GLUCOMTR-MCNC: 232 MG/DL (ref 70–139)
GRAM STAIN RESULT: ABNORMAL
GRAM STAIN RESULT: ABNORMAL
HCO3, ARTERIAL CALC - HISTORICAL: 26.6 MMOL/L (ref 23–29)
HCO3, ARTERIAL CALC - HISTORICAL: 26.8 MMOL/L (ref 23–29)
HCO3, ARTERIAL CALC - HISTORICAL: 27.2 MMOL/L (ref 23–29)
HCT VFR BLD AUTO: 45.9 % (ref 40–54)
HGB BLD-MCNC: 14 G/DL (ref 14–18)
INR PPP: 1.16 (ref 0.9–1.1)
LDH SERPL L TO P-CCNC: 452 U/L (ref 125–220)
LYMPHOCYTES # BLD AUTO: 0.2 THOU/UL (ref 0.8–4.4)
LYMPHOCYTES NFR BLD AUTO: 2 % (ref 20–40)
M TB IFN-G BLD-IMP: NEGATIVE
MAGNESIUM SERPL-MCNC: 2.6 MG/DL (ref 1.8–2.6)
MANUAL NRBC PER 100 CELLS: 1
MCH RBC QN AUTO: 28.2 PG (ref 27–34)
MCHC RBC AUTO-ENTMCNC: 30.5 G/DL (ref 32–36)
MCV RBC AUTO: 92 FL (ref 80–100)
METAMYELOCYTES (ABSOLUTE): 0.2 THOU/UL
METAMYELOCYTES NFR BLD MANUAL: 2 %
MITOGEN IGNF BCKGRD COR BLD-ACNC: -0.03 IU/ML
MITOGEN IGNF BCKGRD COR BLD-ACNC: 0 IU/ML
MONOCYTES # BLD AUTO: 0.6 THOU/UL (ref 0–0.9)
MONOCYTES NFR BLD AUTO: 6 % (ref 2–10)
MYELOCYTES (ABSOLUTE): 0.1 THOU/UL
MYELOCYTES NFR BLD MANUAL: 1 %
O2/TOTAL GAS SETTING VFR VENT: 75 %
O2/TOTAL GAS SETTING VFR VENT: ABNORMAL %
O2/TOTAL GAS SETTING VFR VENT: ABNORMAL %
OXYHEMOGLOBIN - HISTORICAL: 95.9 % (ref 96–97)
OXYHEMOGLOBIN - HISTORICAL: 96.6 % (ref 96–97)
OXYHEMOGLOBIN - HISTORICAL: 97 % (ref 96–97)
PCO2 BLD: 42 MM HG (ref 35–45)
PCO2 BLD: 43 MM HG (ref 35–45)
PCO2 BLD: 43 MM HG (ref 35–45)
PEEP: 20 CM H2O
PH BLD: 7.41 [PH] (ref 7.37–7.44)
PH BLD: 7.42 [PH] (ref 7.37–7.44)
PH BLD: 7.42 [PH] (ref 7.37–7.44)
PHOSPHATE SERPL-MCNC: 3.4 MG/DL (ref 2.5–4.5)
PLAT MORPH BLD: NORMAL
PLATELET # BLD AUTO: 220 THOU/UL (ref 140–440)
PMV BLD AUTO: 12.3 FL (ref 8.5–12.5)
PO2 BLD: 100 MM HG (ref 80–90)
PO2 BLD: 102 MM HG (ref 80–90)
PO2 BLD: 191 MM HG (ref 80–90)
POTASSIUM BLD-SCNC: 4.3 MMOL/L (ref 3.5–5)
QTF INTERPRETATION: NORMAL
QTF MITOGEN - NIL: 0.71 IU/ML
RATE: 20 RR/MIN
RBC # BLD AUTO: 4.97 MILL/UL (ref 4.4–6.2)
RETICS # AUTO: 0.04 MILL/UL (ref 0.01–0.11)
RETICS/RBC NFR AUTO: 0.71 % (ref 0.8–2.7)
SODIUM SERPL-SCNC: 145 MMOL/L (ref 136–145)
TEMPERATURE: 37 DEGREES C
TOTAL NEUTROPHILS-ABS(DIFF): 8.4 THOU/UL (ref 2–7.7)
TOTAL NEUTROPHILS-REL(DIFF): 89 % (ref 50–70)
VENTILATION MODE: AC
VENTILATOR TIDAL VOLUME: 450 ML
WBC: 9.4 THOU/UL (ref 4–11)

## 2020-06-27 LAB
AEROBIC BLOOD CULTURE BOTTLE: NO GROWTH
AEROBIC BLOOD CULTURE BOTTLE: NO GROWTH
ALBUMIN SERPL-MCNC: 2.4 G/DL (ref 3.5–5)
ALBUMIN UR-MCNC: NEGATIVE MG/DL
ALP SERPL-CCNC: 50 U/L (ref 45–120)
ALT SERPL W P-5'-P-CCNC: 53 U/L (ref 0–45)
ANAEROBIC BLOOD CULTURE BOTTLE: NO GROWTH
ANAEROBIC BLOOD CULTURE BOTTLE: NO GROWTH
ANION GAP SERPL CALCULATED.3IONS-SCNC: 10 MMOL/L (ref 5–18)
APPEARANCE UR: CLEAR
AST SERPL W P-5'-P-CCNC: 54 U/L (ref 0–40)
BACTERIA #/AREA URNS HPF: ABNORMAL HPF
BACTERIA SPEC CULT: NO GROWTH
BACTERIA SPEC CULT: NO GROWTH
BASE EXCESS BLDA CALC-SCNC: 3.2 MMOL/L
BASE EXCESS BLDA CALC-SCNC: 5.6 MMOL/L
BASE EXCESS BLDA CALC-SCNC: 6.8 MMOL/L
BASOPHILS # BLD AUTO: 0 THOU/UL (ref 0–0.2)
BASOPHILS NFR BLD AUTO: 0 % (ref 0–2)
BILIRUB DIRECT SERPL-MCNC: 0.2 MG/DL
BILIRUB SERPL-MCNC: 0.4 MG/DL (ref 0–1)
BILIRUB UR QL STRIP: NEGATIVE
BUN SERPL-MCNC: 45 MG/DL (ref 8–22)
CALCIUM SERPL-MCNC: 7.9 MG/DL (ref 8.5–10.5)
CHLORIDE BLD-SCNC: 108 MMOL/L (ref 98–107)
CO2 SERPL-SCNC: 27 MMOL/L (ref 22–31)
COHGB MFR BLD: 96.1 % (ref 96–97)
COHGB MFR BLD: 96.9 % (ref 96–97)
COHGB MFR BLD: 98.3 % (ref 96–97)
COLOR UR AUTO: YELLOW
CREAT SERPL-MCNC: 1.01 MG/DL (ref 0.7–1.3)
EOSINOPHIL # BLD AUTO: 0 THOU/UL (ref 0–0.4)
EOSINOPHIL NFR BLD AUTO: 0 % (ref 0–6)
ERYTHROCYTE [DISTWIDTH] IN BLOOD BY AUTOMATED COUNT: 15.2 % (ref 11–14.5)
GFR SERPL CREATININE-BSD FRML MDRD: >60 ML/MIN/1.73M2
GLUCOSE BLD-MCNC: 195 MG/DL (ref 70–125)
GLUCOSE BLDC GLUCOMTR-MCNC: 152 MG/DL (ref 70–139)
GLUCOSE BLDC GLUCOMTR-MCNC: 183 MG/DL (ref 70–139)
GLUCOSE BLDC GLUCOMTR-MCNC: 187 MG/DL (ref 70–139)
GLUCOSE BLDC GLUCOMTR-MCNC: 266 MG/DL (ref 70–139)
GLUCOSE BLDC GLUCOMTR-MCNC: 287 MG/DL (ref 70–139)
GLUCOSE UR STRIP-MCNC: NEGATIVE MG/DL
HCO3, ARTERIAL CALC - HISTORICAL: 27 MMOL/L (ref 23–29)
HCO3, ARTERIAL CALC - HISTORICAL: 28.9 MMOL/L (ref 23–29)
HCO3, ARTERIAL CALC - HISTORICAL: 30.2 MMOL/L (ref 23–29)
HCT VFR BLD AUTO: 45.7 % (ref 40–54)
HGB BLD-MCNC: 14.2 G/DL (ref 14–18)
HGB UR QL STRIP: ABNORMAL
KETONES UR STRIP-MCNC: NEGATIVE MG/DL
LACTATE SERPL-SCNC: 2.1 MMOL/L (ref 0.5–2.2)
LACTATE SERPL-SCNC: 2.4 MMOL/L (ref 0.5–2.2)
LACTATE SERPL-SCNC: 2.8 MMOL/L (ref 0.5–2.2)
LACTATE SERPL-SCNC: 3.3 MMOL/L (ref 0.5–2.2)
LEUKOCYTE ESTERASE UR QL STRIP: NEGATIVE
LYMPHOCYTES # BLD AUTO: 0.6 THOU/UL (ref 0.8–4.4)
LYMPHOCYTES NFR BLD AUTO: 5 % (ref 20–40)
MAGNESIUM SERPL-MCNC: 2.6 MG/DL (ref 1.8–2.6)
MCH RBC QN AUTO: 28.6 PG (ref 27–34)
MCHC RBC AUTO-ENTMCNC: 31.1 G/DL (ref 32–36)
MCV RBC AUTO: 92 FL (ref 80–100)
MONOCYTES # BLD AUTO: 1 THOU/UL (ref 0–0.9)
MONOCYTES NFR BLD AUTO: 9 % (ref 2–10)
NEUTROPHILS # BLD AUTO: 9.9 THOU/UL (ref 2–7.7)
NEUTROPHILS NFR BLD AUTO: 84 % (ref 50–70)
NITRATE UR QL: NEGATIVE
O2/TOTAL GAS SETTING VFR VENT: 45 %
OXYHEMOGLOBIN - HISTORICAL: 94.3 % (ref 96–97)
OXYHEMOGLOBIN - HISTORICAL: 94.7 % (ref 96–97)
OXYHEMOGLOBIN - HISTORICAL: 96.6 % (ref 96–97)
PCO2 BLD: 40 MM HG (ref 35–45)
PCO2 BLD: 42 MM HG (ref 35–45)
PCO2 BLD: 43 MM HG (ref 35–45)
PEEP: 12 CM H2O
PEEP: 12 CM H2O
PEEP: 18 CM H2O
PH BLD: 7.45 [PH] (ref 7.37–7.44)
PH BLD: 7.45 [PH] (ref 7.37–7.44)
PH BLD: 7.47 [PH] (ref 7.37–7.44)
PH UR STRIP: 7 [PH] (ref 4.5–8)
PHOSPHATE SERPL-MCNC: 2.8 MG/DL (ref 2.5–4.5)
PLATELET # BLD AUTO: 219 THOU/UL (ref 140–440)
PMV BLD AUTO: 12.6 FL (ref 8.5–12.5)
PO2 BLD: 113 MM HG (ref 80–90)
PO2 BLD: 74 MM HG (ref 80–90)
PO2 BLD: 80 MM HG (ref 80–90)
POTASSIUM BLD-SCNC: 4.2 MMOL/L (ref 3.5–5)
PROCALCITONIN SERPL-MCNC: 0.09 NG/ML (ref 0–0.49)
PROT SERPL-MCNC: 6.5 G/DL (ref 6–8)
RATE: 18 RR/MIN
RATE: 25 RR/MIN
RBC # BLD AUTO: 4.96 MILL/UL (ref 4.4–6.2)
RBC #/AREA URNS AUTO: ABNORMAL HPF
SODIUM SERPL-SCNC: 145 MMOL/L (ref 136–145)
SP GR UR STRIP: 1.02 (ref 1–1.03)
SPECIMEN SOURCE: NORMAL
SPECIMEN SOURCE: NORMAL
SQUAMOUS #/AREA URNS AUTO: ABNORMAL LPF
TEMPERATURE: 37 DEGREES C
UROBILINOGEN UR STRIP-ACNC: ABNORMAL
VENTILATION MODE: ABNORMAL
VENTILATION MODE: AC
VENTILATOR TIDAL VOLUME: 450 ML
VENTILATOR TIDAL VOLUME: 450 ML
WBC #/AREA URNS AUTO: ABNORMAL HPF
WBC: 11.7 THOU/UL (ref 4–11)

## 2020-06-28 LAB
ANION GAP SERPL CALCULATED.3IONS-SCNC: 8 MMOL/L (ref 5–18)
BASE EXCESS BLDA CALC-SCNC: 9 MMOL/L
BASOPHILS # BLD AUTO: 0 THOU/UL (ref 0–0.2)
BASOPHILS NFR BLD AUTO: 0 % (ref 0–2)
BUN SERPL-MCNC: 35 MG/DL (ref 8–22)
CALCIUM SERPL-MCNC: 7.8 MG/DL (ref 8.5–10.5)
CHLORIDE BLD-SCNC: 107 MMOL/L (ref 98–107)
CO2 SERPL-SCNC: 30 MMOL/L (ref 22–31)
COHGB MFR BLD: 93.1 % (ref 96–97)
CREAT SERPL-MCNC: 0.76 MG/DL (ref 0.7–1.3)
EOSINOPHIL # BLD AUTO: 0 THOU/UL (ref 0–0.4)
EOSINOPHIL NFR BLD AUTO: 0 % (ref 0–6)
ERYTHROCYTE [DISTWIDTH] IN BLOOD BY AUTOMATED COUNT: 14.9 % (ref 11–14.5)
GFR SERPL CREATININE-BSD FRML MDRD: >60 ML/MIN/1.73M2
GLUCOSE BLD-MCNC: 278 MG/DL (ref 70–125)
GLUCOSE BLDC GLUCOMTR-MCNC: 160 MG/DL (ref 70–139)
GLUCOSE BLDC GLUCOMTR-MCNC: 171 MG/DL (ref 70–139)
GLUCOSE BLDC GLUCOMTR-MCNC: 180 MG/DL (ref 70–139)
GLUCOSE BLDC GLUCOMTR-MCNC: 181 MG/DL (ref 70–139)
GLUCOSE BLDC GLUCOMTR-MCNC: 182 MG/DL (ref 70–139)
GLUCOSE BLDC GLUCOMTR-MCNC: 188 MG/DL (ref 70–139)
GLUCOSE BLDC GLUCOMTR-MCNC: 190 MG/DL (ref 70–139)
GLUCOSE BLDC GLUCOMTR-MCNC: 191 MG/DL (ref 70–139)
GLUCOSE BLDC GLUCOMTR-MCNC: 196 MG/DL (ref 70–139)
GLUCOSE BLDC GLUCOMTR-MCNC: 200 MG/DL (ref 70–139)
GLUCOSE BLDC GLUCOMTR-MCNC: 204 MG/DL (ref 70–139)
GLUCOSE BLDC GLUCOMTR-MCNC: 204 MG/DL (ref 70–139)
GLUCOSE BLDC GLUCOMTR-MCNC: 210 MG/DL (ref 70–139)
GLUCOSE BLDC GLUCOMTR-MCNC: 212 MG/DL (ref 70–139)
GLUCOSE BLDC GLUCOMTR-MCNC: 302 MG/DL (ref 70–139)
HCO3, ARTERIAL CALC - HISTORICAL: 31.4 MMOL/L (ref 23–29)
HCT VFR BLD AUTO: 44.6 % (ref 40–54)
HGB BLD-MCNC: 13.7 G/DL (ref 14–18)
LACTATE SERPL-SCNC: 2 MMOL/L (ref 0.5–2.2)
LYMPHOCYTES # BLD AUTO: 0.7 THOU/UL (ref 0.8–4.4)
LYMPHOCYTES NFR BLD AUTO: 6 % (ref 20–40)
MAGNESIUM SERPL-MCNC: 2.3 MG/DL (ref 1.8–2.6)
MCH RBC QN AUTO: 28.7 PG (ref 27–34)
MCHC RBC AUTO-ENTMCNC: 30.7 G/DL (ref 32–36)
MCV RBC AUTO: 94 FL (ref 80–100)
MONOCYTES # BLD AUTO: 0.9 THOU/UL (ref 0–0.9)
MONOCYTES NFR BLD AUTO: 7 % (ref 2–10)
NEUTROPHILS # BLD AUTO: 10.8 THOU/UL (ref 2–7.7)
NEUTROPHILS NFR BLD AUTO: 86 % (ref 50–70)
O2/TOTAL GAS SETTING VFR VENT: 45 %
OXYHEMOGLOBIN - HISTORICAL: 91.4 % (ref 96–97)
PCO2 BLD: 47 MM HG (ref 35–45)
PEEP: 12 CM H2O
PH BLD: 7.46 [PH] (ref 7.37–7.44)
PHOSPHATE SERPL-MCNC: 2.5 MG/DL (ref 2.5–4.5)
PLATELET # BLD AUTO: 196 THOU/UL (ref 140–440)
PLATELET # BLD AUTO: 198 THOU/UL (ref 140–440)
PMV BLD AUTO: 13.2 FL (ref 8.5–12.5)
PO2 BLD: 64 MM HG (ref 80–90)
POTASSIUM BLD-SCNC: 4 MMOL/L (ref 3.5–5)
POTASSIUM BLD-SCNC: 4.1 MMOL/L (ref 3.5–5)
RATE: 20 RR/MIN
RBC # BLD AUTO: 4.77 MILL/UL (ref 4.4–6.2)
SODIUM SERPL-SCNC: 145 MMOL/L (ref 136–145)
STRONGYLOIDES IGG SER IA-ACNC: 0.3 IV
TEMPERATURE: 37 DEGREES C
VANCOMYCIN SERPL-MCNC: 22.2 UG/ML
VENTILATION MODE: AC
VENTILATOR TIDAL VOLUME: 420 ML
WBC: 12.6 THOU/UL (ref 4–11)

## 2020-06-29 LAB
ANION GAP SERPL CALCULATED.3IONS-SCNC: 7 MMOL/L (ref 5–18)
ATRIAL RATE - MUSE: 150 BPM
BASE EXCESS BLDA CALC-SCNC: 5.6 MMOL/L
BUN SERPL-MCNC: 31 MG/DL (ref 8–22)
CALCIUM SERPL-MCNC: 7.7 MG/DL (ref 8.5–10.5)
CHLORIDE BLD-SCNC: 110 MMOL/L (ref 98–107)
CO2 SERPL-SCNC: 27 MMOL/L (ref 22–31)
COHGB MFR BLD: 95.7 % (ref 96–97)
CREAT SERPL-MCNC: 0.71 MG/DL (ref 0.7–1.3)
DIASTOLIC BLOOD PRESSURE - MUSE: NORMAL
ERYTHROCYTE [DISTWIDTH] IN BLOOD BY AUTOMATED COUNT: 14.9 % (ref 11–14.5)
GFR SERPL CREATININE-BSD FRML MDRD: >60 ML/MIN/1.73M2
GLUCOSE BLD-MCNC: 173 MG/DL (ref 70–125)
GLUCOSE BLDC GLUCOMTR-MCNC: 122 MG/DL (ref 70–139)
GLUCOSE BLDC GLUCOMTR-MCNC: 138 MG/DL (ref 70–139)
GLUCOSE BLDC GLUCOMTR-MCNC: 144 MG/DL (ref 70–139)
GLUCOSE BLDC GLUCOMTR-MCNC: 145 MG/DL (ref 70–139)
GLUCOSE BLDC GLUCOMTR-MCNC: 148 MG/DL (ref 70–139)
GLUCOSE BLDC GLUCOMTR-MCNC: 152 MG/DL (ref 70–139)
GLUCOSE BLDC GLUCOMTR-MCNC: 155 MG/DL (ref 70–139)
GLUCOSE BLDC GLUCOMTR-MCNC: 166 MG/DL (ref 70–139)
GLUCOSE BLDC GLUCOMTR-MCNC: 168 MG/DL (ref 70–139)
GLUCOSE BLDC GLUCOMTR-MCNC: 171 MG/DL (ref 70–139)
GLUCOSE BLDC GLUCOMTR-MCNC: 173 MG/DL (ref 70–139)
GLUCOSE BLDC GLUCOMTR-MCNC: 182 MG/DL (ref 70–139)
GLUCOSE BLDC GLUCOMTR-MCNC: 187 MG/DL (ref 70–139)
GLUCOSE BLDC GLUCOMTR-MCNC: 188 MG/DL (ref 70–139)
GLUCOSE BLDC GLUCOMTR-MCNC: 188 MG/DL (ref 70–139)
GLUCOSE BLDC GLUCOMTR-MCNC: 189 MG/DL (ref 70–139)
GLUCOSE BLDC GLUCOMTR-MCNC: 192 MG/DL (ref 70–139)
GLUCOSE BLDC GLUCOMTR-MCNC: 197 MG/DL (ref 70–139)
HCO3, ARTERIAL CALC - HISTORICAL: 29 MMOL/L (ref 23–29)
HCT VFR BLD AUTO: 44.4 % (ref 40–54)
HGB BLD-MCNC: 13.6 G/DL (ref 14–18)
INTERPRETATION ECG - MUSE: NORMAL
MAGNESIUM SERPL-MCNC: 2.2 MG/DL (ref 1.8–2.6)
MCH RBC QN AUTO: 28.5 PG (ref 27–34)
MCHC RBC AUTO-ENTMCNC: 30.6 G/DL (ref 32–36)
MCV RBC AUTO: 93 FL (ref 80–100)
O2/TOTAL GAS SETTING VFR VENT: 45 %
OXYHEMOGLOBIN - HISTORICAL: 94.1 % (ref 96–97)
P AXIS - MUSE: NORMAL
PCO2 BLD: 41 MM HG (ref 35–45)
PEEP: 12 CM H2O
PH BLD: 7.47 [PH] (ref 7.37–7.44)
PHOSPHATE SERPL-MCNC: 2.7 MG/DL (ref 2.5–4.5)
PLATELET # BLD AUTO: 198 THOU/UL (ref 140–440)
PMV BLD AUTO: 13.1 FL (ref 8.5–12.5)
PO2 BLD: 74 MM HG (ref 80–90)
POTASSIUM BLD-SCNC: 4.3 MMOL/L (ref 3.5–5)
PR INTERVAL - MUSE: NORMAL
QRS DURATION - MUSE: 70 MS
QT - MUSE: 298 MS
QTC - MUSE: 472 MS
R AXIS - MUSE: -1 DEGREES
RATE: 18 RR/MIN
RBC # BLD AUTO: 4.78 MILL/UL (ref 4.4–6.2)
SODIUM SERPL-SCNC: 144 MMOL/L (ref 136–145)
SYSTOLIC BLOOD PRESSURE - MUSE: NORMAL
T AXIS - MUSE: 23 DEGREES
TEMPERATURE: 37 DEGREES C
VANCOMYCIN SERPL-MCNC: 7.4 UG/ML
VENTILATION MODE: AC
VENTILATOR TIDAL VOLUME: 320 ML
VENTRICULAR RATE- MUSE: 151 BPM
WBC: 18.5 THOU/UL (ref 4–11)

## 2020-06-29 ASSESSMENT — MIFFLIN-ST. JEOR: SCORE: 1906.88

## 2020-06-30 LAB
ANION GAP SERPL CALCULATED.3IONS-SCNC: 5 MMOL/L (ref 5–18)
BACTERIA SPEC CULT: NORMAL
BASE EXCESS BLDA CALC-SCNC: 4.8 MMOL/L
BASE EXCESS BLDA CALC-SCNC: 5.3 MMOL/L
BUN SERPL-MCNC: 38 MG/DL (ref 8–22)
CALCIUM SERPL-MCNC: 7.5 MG/DL (ref 8.5–10.5)
CHLORIDE BLD-SCNC: 105 MMOL/L (ref 98–107)
CO2 SERPL-SCNC: 30 MMOL/L (ref 22–31)
COHGB MFR BLD: 98.3 % (ref 96–97)
COHGB MFR BLD: 99 % (ref 96–97)
CREAT SERPL-MCNC: 0.71 MG/DL (ref 0.7–1.3)
ERYTHROCYTE [DISTWIDTH] IN BLOOD BY AUTOMATED COUNT: 14.8 % (ref 11–14.5)
GFR SERPL CREATININE-BSD FRML MDRD: >60 ML/MIN/1.73M2
GLUCOSE BLD-MCNC: 151 MG/DL (ref 70–125)
GLUCOSE BLDC GLUCOMTR-MCNC: 107 MG/DL (ref 70–139)
GLUCOSE BLDC GLUCOMTR-MCNC: 114 MG/DL (ref 70–139)
GLUCOSE BLDC GLUCOMTR-MCNC: 119 MG/DL (ref 70–139)
GLUCOSE BLDC GLUCOMTR-MCNC: 133 MG/DL (ref 70–139)
GLUCOSE BLDC GLUCOMTR-MCNC: 135 MG/DL (ref 70–139)
GLUCOSE BLDC GLUCOMTR-MCNC: 137 MG/DL (ref 70–139)
GLUCOSE BLDC GLUCOMTR-MCNC: 141 MG/DL (ref 70–139)
GLUCOSE BLDC GLUCOMTR-MCNC: 141 MG/DL (ref 70–139)
GLUCOSE BLDC GLUCOMTR-MCNC: 142 MG/DL (ref 70–139)
GLUCOSE BLDC GLUCOMTR-MCNC: 143 MG/DL (ref 70–139)
GLUCOSE BLDC GLUCOMTR-MCNC: 146 MG/DL (ref 70–139)
GLUCOSE BLDC GLUCOMTR-MCNC: 148 MG/DL (ref 70–139)
GLUCOSE BLDC GLUCOMTR-MCNC: 155 MG/DL (ref 70–139)
GLUCOSE BLDC GLUCOMTR-MCNC: 158 MG/DL (ref 70–139)
GRAM STAIN RESULT: NORMAL
GRAM STAIN RESULT: NORMAL
HCO3, ARTERIAL CALC - HISTORICAL: 28.6 MMOL/L (ref 23–29)
HCO3, ARTERIAL CALC - HISTORICAL: 29 MMOL/L (ref 23–29)
HCT VFR BLD AUTO: 42.4 % (ref 40–54)
HGB BLD-MCNC: 13.2 G/DL (ref 14–18)
MAGNESIUM SERPL-MCNC: 2.3 MG/DL (ref 1.8–2.6)
MCH RBC QN AUTO: 28.8 PG (ref 27–34)
MCHC RBC AUTO-ENTMCNC: 31.1 G/DL (ref 32–36)
MCV RBC AUTO: 92 FL (ref 80–100)
O2/TOTAL GAS SETTING VFR VENT: 50 %
O2/TOTAL GAS SETTING VFR VENT: 60 %
OXYHEMOGLOBIN - HISTORICAL: 95.7 % (ref 96–97)
OXYHEMOGLOBIN - HISTORICAL: 96.4 % (ref 96–97)
PCO2 BLD: 45 MM HG (ref 35–45)
PCO2 BLD: 46 MM HG (ref 35–45)
PEEP: 10 CM H2O
PEEP: 12 CM H2O
PH BLD: 7.43 [PH] (ref 7.37–7.44)
PH BLD: 7.43 [PH] (ref 7.37–7.44)
PHOSPHATE SERPL-MCNC: 4.3 MG/DL (ref 2.5–4.5)
PLATELET # BLD AUTO: 165 THOU/UL (ref 140–440)
PMV BLD AUTO: 13.8 FL (ref 8.5–12.5)
PO2 BLD: 146 MM HG (ref 80–90)
PO2 BLD: 92 MM HG (ref 80–90)
POTASSIUM BLD-SCNC: 4 MMOL/L (ref 3.5–5)
RATE: 18 RR/MIN
RATE: 18 RR/MIN
RBC # BLD AUTO: 4.59 MILL/UL (ref 4.4–6.2)
SODIUM SERPL-SCNC: 140 MMOL/L (ref 136–145)
TEMPERATURE: 37 DEGREES C
TEMPERATURE: 37 DEGREES C
VANCOMYCIN SERPL-MCNC: 21.2 UG/ML
VENTILATION MODE: AC
VENTILATION MODE: AC
VENTILATOR TIDAL VOLUME: 430 ML
VENTILATOR TIDAL VOLUME: 430 ML
WBC: 15.1 THOU/UL (ref 4–11)

## 2020-07-01 LAB
ANION GAP SERPL CALCULATED.3IONS-SCNC: 7 MMOL/L (ref 5–18)
ARUP MISCELLANEOUS TEST: NORMAL
BASE EXCESS BLDA CALC-SCNC: 3.5 MMOL/L
BASE EXCESS BLDA CALC-SCNC: 6.2 MMOL/L
BUN SERPL-MCNC: 38 MG/DL (ref 8–22)
CALCIUM SERPL-MCNC: 7.5 MG/DL (ref 8.5–10.5)
CHLORIDE BLD-SCNC: 106 MMOL/L (ref 98–107)
CO2 SERPL-SCNC: 29 MMOL/L (ref 22–31)
COHGB MFR BLD: 98.3 % (ref 96–97)
COHGB MFR BLD: 98.5 % (ref 96–97)
CREAT SERPL-MCNC: 0.73 MG/DL (ref 0.7–1.3)
ERYTHROCYTE [DISTWIDTH] IN BLOOD BY AUTOMATED COUNT: 14.7 % (ref 11–14.5)
GFR SERPL CREATININE-BSD FRML MDRD: >60 ML/MIN/1.73M2
GLUCOSE BLD-MCNC: 116 MG/DL (ref 70–125)
GLUCOSE BLDC GLUCOMTR-MCNC: 105 MG/DL (ref 70–139)
GLUCOSE BLDC GLUCOMTR-MCNC: 121 MG/DL (ref 70–139)
GLUCOSE BLDC GLUCOMTR-MCNC: 128 MG/DL (ref 70–139)
GLUCOSE BLDC GLUCOMTR-MCNC: 129 MG/DL (ref 70–139)
GLUCOSE BLDC GLUCOMTR-MCNC: 132 MG/DL (ref 70–139)
GLUCOSE BLDC GLUCOMTR-MCNC: 162 MG/DL (ref 70–139)
HCO3, ARTERIAL CALC - HISTORICAL: 27.6 MMOL/L (ref 23–29)
HCO3, ARTERIAL CALC - HISTORICAL: 29.7 MMOL/L (ref 23–29)
HCT VFR BLD AUTO: 41.2 % (ref 40–54)
HGB BLD-MCNC: 12.8 G/DL (ref 14–18)
MAGNESIUM SERPL-MCNC: 2.3 MG/DL (ref 1.8–2.6)
MCH RBC QN AUTO: 28.5 PG (ref 27–34)
MCHC RBC AUTO-ENTMCNC: 31.1 G/DL (ref 32–36)
MCV RBC AUTO: 92 FL (ref 80–100)
O2/TOTAL GAS SETTING VFR VENT: 50 %
O2/TOTAL GAS SETTING VFR VENT: 50 %
OXYHEMOGLOBIN - HISTORICAL: 96.2 % (ref 96–97)
OXYHEMOGLOBIN - HISTORICAL: 96.5 % (ref 96–97)
PCO2 BLD: 47 MM HG (ref 35–45)
PCO2 BLD: 49 MM HG (ref 35–45)
PEEP: 10 CM H2O
PEEP: 8 CM H2O
PH BLD: 7.4 [PH] (ref 7.37–7.44)
PH BLD: 7.42 [PH] (ref 7.37–7.44)
PHOSPHATE SERPL-MCNC: 3.6 MG/DL (ref 2.5–4.5)
PLATELET # BLD AUTO: 141 THOU/UL (ref 140–440)
PMV BLD AUTO: 13.9 FL (ref 8.5–12.5)
PO2 BLD: 101 MM HG (ref 80–90)
PO2 BLD: 108 MM HG (ref 80–90)
POTASSIUM BLD-SCNC: 4.1 MMOL/L (ref 3.5–5)
RATE: 18 RR/MIN
RATE: 18 RR/MIN
RBC # BLD AUTO: 4.49 MILL/UL (ref 4.4–6.2)
SODIUM SERPL-SCNC: 142 MMOL/L (ref 136–145)
TEMPERATURE: 37 DEGREES C
TEMPERATURE: 37 DEGREES C
VENTILATION MODE: AC
VENTILATION MODE: AC
VENTILATOR TIDAL VOLUME: 430 ML
VENTILATOR TIDAL VOLUME: 430 ML
WBC: 12.5 THOU/UL (ref 4–11)

## 2020-07-02 LAB
AEROBIC BLOOD CULTURE BOTTLE: NO GROWTH
AEROBIC BLOOD CULTURE BOTTLE: NO GROWTH
ANAEROBIC BLOOD CULTURE BOTTLE: NO GROWTH
ANAEROBIC BLOOD CULTURE BOTTLE: NO GROWTH
ANION GAP SERPL CALCULATED.3IONS-SCNC: 5 MMOL/L (ref 5–18)
BASE EXCESS BLDA CALC-SCNC: 4 MMOL/L
BUN SERPL-MCNC: 32 MG/DL (ref 8–22)
CALCIUM SERPL-MCNC: 7.4 MG/DL (ref 8.5–10.5)
CHLORIDE BLD-SCNC: 110 MMOL/L (ref 98–107)
CO2 SERPL-SCNC: 28 MMOL/L (ref 22–31)
COHGB MFR BLD: 95.4 % (ref 96–97)
CREAT SERPL-MCNC: 0.69 MG/DL (ref 0.7–1.3)
ERYTHROCYTE [DISTWIDTH] IN BLOOD BY AUTOMATED COUNT: 14.9 % (ref 11–14.5)
GFR SERPL CREATININE-BSD FRML MDRD: >60 ML/MIN/1.73M2
GLUCOSE BLD-MCNC: 112 MG/DL (ref 70–125)
GLUCOSE BLDC GLUCOMTR-MCNC: 110 MG/DL (ref 70–139)
GLUCOSE BLDC GLUCOMTR-MCNC: 112 MG/DL (ref 70–139)
GLUCOSE BLDC GLUCOMTR-MCNC: 130 MG/DL (ref 70–139)
GLUCOSE BLDC GLUCOMTR-MCNC: 133 MG/DL (ref 70–139)
GLUCOSE BLDC GLUCOMTR-MCNC: 153 MG/DL (ref 70–139)
GLUCOSE BLDC GLUCOMTR-MCNC: 157 MG/DL (ref 70–139)
HCO3, ARTERIAL CALC - HISTORICAL: 27.9 MMOL/L (ref 23–29)
HCT VFR BLD AUTO: 40.9 % (ref 40–54)
HGB BLD-MCNC: 12.7 G/DL (ref 14–18)
LACTATE SERPL-SCNC: 1.5 MMOL/L (ref 0.5–2.2)
MAGNESIUM SERPL-MCNC: 2.4 MG/DL (ref 1.8–2.6)
MCH RBC QN AUTO: 28.9 PG (ref 27–34)
MCHC RBC AUTO-ENTMCNC: 31.1 G/DL (ref 32–36)
MCV RBC AUTO: 93 FL (ref 80–100)
O2/TOTAL GAS SETTING VFR VENT: 50 %
OXYHEMOGLOBIN - HISTORICAL: 92.8 % (ref 96–97)
PCO2 BLD: 48 MM HG (ref 35–45)
PEEP: 8 CM H2O
PH BLD: 7.4 [PH] (ref 7.37–7.44)
PHOSPHATE SERPL-MCNC: 3.5 MG/DL (ref 2.5–4.5)
PLATELET # BLD AUTO: 143 THOU/UL (ref 140–440)
PMV BLD AUTO: 14 FL (ref 8.5–12.5)
PO2 BLD: 69 MM HG (ref 80–90)
POTASSIUM BLD-SCNC: 4.3 MMOL/L (ref 3.5–5)
RATE: 18 RR/MIN
RBC # BLD AUTO: 4.4 MILL/UL (ref 4.4–6.2)
SODIUM SERPL-SCNC: 143 MMOL/L (ref 136–145)
TEMPERATURE: 37 DEGREES C
VENTILATOR TIDAL VOLUME: 430 ML
WBC: 9.4 THOU/UL (ref 4–11)

## 2020-07-03 LAB
ALBUMIN SERPL-MCNC: 2.4 G/DL (ref 3.5–5)
ALP SERPL-CCNC: 45 U/L (ref 45–120)
ALT SERPL W P-5'-P-CCNC: 41 U/L (ref 0–45)
ANION GAP SERPL CALCULATED.3IONS-SCNC: 5 MMOL/L (ref 5–18)
AST SERPL W P-5'-P-CCNC: 33 U/L (ref 0–40)
BACTERIA SPEC CULT: NO GROWTH
BASE EXCESS BLDA CALC-SCNC: 5.7 MMOL/L
BILIRUB DIRECT SERPL-MCNC: 0.2 MG/DL
BILIRUB SERPL-MCNC: 0.4 MG/DL (ref 0–1)
BUN SERPL-MCNC: 30 MG/DL (ref 8–22)
CALCIUM SERPL-MCNC: 7.2 MG/DL (ref 8.5–10.5)
CHLORIDE BLD-SCNC: 111 MMOL/L (ref 98–107)
CO2 SERPL-SCNC: 30 MMOL/L (ref 22–31)
COHGB MFR BLD: 98.1 % (ref 96–97)
CREAT SERPL-MCNC: 0.66 MG/DL (ref 0.7–1.3)
ERYTHROCYTE [DISTWIDTH] IN BLOOD BY AUTOMATED COUNT: 14.7 % (ref 11–14.5)
GFR SERPL CREATININE-BSD FRML MDRD: >60 ML/MIN/1.73M2
GLUCOSE BLD-MCNC: 137 MG/DL (ref 70–125)
GLUCOSE BLDC GLUCOMTR-MCNC: 131 MG/DL (ref 70–139)
GLUCOSE BLDC GLUCOMTR-MCNC: 141 MG/DL (ref 70–139)
GLUCOSE BLDC GLUCOMTR-MCNC: 142 MG/DL (ref 70–139)
GLUCOSE BLDC GLUCOMTR-MCNC: 145 MG/DL (ref 70–139)
GLUCOSE BLDC GLUCOMTR-MCNC: 157 MG/DL (ref 70–139)
HCO3, ARTERIAL CALC - HISTORICAL: 28.5 MMOL/L (ref 23–29)
HCT VFR BLD AUTO: 41.2 % (ref 40–54)
HGB BLD-MCNC: 12.6 G/DL (ref 14–18)
MAGNESIUM SERPL-MCNC: 2.3 MG/DL (ref 1.8–2.6)
MCH RBC QN AUTO: 28.5 PG (ref 27–34)
MCHC RBC AUTO-ENTMCNC: 30.6 G/DL (ref 32–36)
MCV RBC AUTO: 93 FL (ref 80–100)
MISCELLANEOUS TEST DEPT. - HE HISTORICAL: NORMAL
O2/TOTAL GAS SETTING VFR VENT: 55 %
OXYHEMOGLOBIN - HISTORICAL: 96.3 % (ref 96–97)
PCO2 BLD: 50 MM HG (ref 35–45)
PEEP: 8 CM H2O
PERFORMING LAB: NORMAL
PH BLD: 7.39 [PH] (ref 7.37–7.44)
PHOSPHATE SERPL-MCNC: 3 MG/DL (ref 2.5–4.5)
PLATELET # BLD AUTO: 160 THOU/UL (ref 140–440)
PMV BLD AUTO: 13.4 FL (ref 8.5–12.5)
PO2 BLD: 110 MM HG (ref 80–90)
POTASSIUM BLD-SCNC: 3.9 MMOL/L (ref 3.5–5)
PROT SERPL-MCNC: 5.2 G/DL (ref 6–8)
RATE: 18 RR/MIN
RBC # BLD AUTO: 4.42 MILL/UL (ref 4.4–6.2)
SODIUM SERPL-SCNC: 146 MMOL/L (ref 136–145)
SPECIMEN STATUS: NORMAL
TEMPERATURE: 37 DEGREES C
TEST NAME: NORMAL
VENTILATION MODE: AC
VENTILATOR TIDAL VOLUME: 430 ML
WBC: 9.4 THOU/UL (ref 4–11)

## 2020-07-03 ASSESSMENT — MIFFLIN-ST. JEOR: SCORE: 1908.55

## 2020-07-04 LAB
ANION GAP SERPL CALCULATED.3IONS-SCNC: 4 MMOL/L (ref 5–18)
BACTERIA SPEC CULT: ABNORMAL
BASE EXCESS BLDA CALC-SCNC: 4.5 MMOL/L
BUN SERPL-MCNC: 21 MG/DL (ref 8–22)
CALCIUM SERPL-MCNC: 7.3 MG/DL (ref 8.5–10.5)
CHLORIDE BLD-SCNC: 109 MMOL/L (ref 98–107)
CO2 SERPL-SCNC: 29 MMOL/L (ref 22–31)
COHGB MFR BLD: 94.8 % (ref 96–97)
CREAT SERPL-MCNC: 0.63 MG/DL (ref 0.7–1.3)
ERYTHROCYTE [DISTWIDTH] IN BLOOD BY AUTOMATED COUNT: 14.6 % (ref 11–14.5)
GFR SERPL CREATININE-BSD FRML MDRD: >60 ML/MIN/1.73M2
GLUCOSE BLD-MCNC: 136 MG/DL (ref 70–125)
GLUCOSE BLDC GLUCOMTR-MCNC: 125 MG/DL (ref 70–139)
GLUCOSE BLDC GLUCOMTR-MCNC: 144 MG/DL (ref 70–139)
GLUCOSE BLDC GLUCOMTR-MCNC: 148 MG/DL (ref 70–139)
GLUCOSE BLDC GLUCOMTR-MCNC: 157 MG/DL (ref 70–139)
GLUCOSE BLDC GLUCOMTR-MCNC: 181 MG/DL (ref 70–139)
GRAM STAIN RESULT: ABNORMAL
GRAM STAIN RESULT: ABNORMAL
HCO3, ARTERIAL CALC - HISTORICAL: 28.3 MMOL/L (ref 23–29)
HCT VFR BLD AUTO: 40.2 % (ref 40–54)
HGB BLD-MCNC: 12.3 G/DL (ref 14–18)
MAGNESIUM SERPL-MCNC: 2.2 MG/DL (ref 1.8–2.6)
MCH RBC QN AUTO: 28.1 PG (ref 27–34)
MCHC RBC AUTO-ENTMCNC: 30.6 G/DL (ref 32–36)
MCV RBC AUTO: 92 FL (ref 80–100)
O2/TOTAL GAS SETTING VFR VENT: 60 %
OXYHEMOGLOBIN - HISTORICAL: 92.8 % (ref 96–97)
PCO2 BLD: 46 MM HG (ref 35–45)
PEEP: 8 CM H2O
PH BLD: 7.42 [PH] (ref 7.37–7.44)
PHOSPHATE SERPL-MCNC: 2.3 MG/DL (ref 2.5–4.5)
PLATELET # BLD AUTO: 155 THOU/UL (ref 140–440)
PMV BLD AUTO: 13.5 FL (ref 8.5–12.5)
PO2 BLD: 64 MM HG (ref 80–90)
POTASSIUM BLD-SCNC: 4.2 MMOL/L (ref 3.5–5)
RBC # BLD AUTO: 4.37 MILL/UL (ref 4.4–6.2)
SODIUM SERPL-SCNC: 142 MMOL/L (ref 136–145)
TEMPERATURE: 37 DEGREES C
VANCOMYCIN SERPL-MCNC: 16.8 UG/ML
VENTILATION MODE: AC
WBC: 9.2 THOU/UL (ref 4–11)

## 2020-07-05 LAB
ANION GAP SERPL CALCULATED.3IONS-SCNC: 6 MMOL/L (ref 5–18)
BASE EXCESS BLDA CALC-SCNC: -0.3 MMOL/L
BASE EXCESS BLDA CALC-SCNC: 2 MMOL/L
BASE EXCESS BLDA CALC-SCNC: 3.4 MMOL/L
BASE EXCESS BLDA CALC-SCNC: 3.6 MMOL/L
BUN SERPL-MCNC: 18 MG/DL (ref 8–22)
CALCIUM SERPL-MCNC: 7.4 MG/DL (ref 8.5–10.5)
CALCIUM, IONIZED MEASURED: 1.01 MMOL/L (ref 1.11–1.3)
CHLORIDE BLD-SCNC: 109 MMOL/L (ref 98–107)
CO2 SERPL-SCNC: 27 MMOL/L (ref 22–31)
COHGB MFR BLD: 98.6 % (ref 96–97)
COHGB MFR BLD: 98.7 % (ref 96–97)
COHGB MFR BLD: 98.9 % (ref 96–97)
COHGB MFR BLD: 99 % (ref 96–97)
CREAT SERPL-MCNC: 0.6 MG/DL (ref 0.7–1.3)
ERYTHROCYTE [DISTWIDTH] IN BLOOD BY AUTOMATED COUNT: 15 % (ref 11–14.5)
GFR SERPL CREATININE-BSD FRML MDRD: >60 ML/MIN/1.73M2
GLUCOSE BLD-MCNC: 141 MG/DL (ref 70–125)
GLUCOSE BLDC GLUCOMTR-MCNC: 110 MG/DL (ref 70–139)
GLUCOSE BLDC GLUCOMTR-MCNC: 112 MG/DL (ref 70–139)
GLUCOSE BLDC GLUCOMTR-MCNC: 122 MG/DL (ref 70–139)
GLUCOSE BLDC GLUCOMTR-MCNC: 136 MG/DL (ref 70–139)
GLUCOSE BLDC GLUCOMTR-MCNC: 140 MG/DL (ref 70–139)
GLUCOSE BLDC GLUCOMTR-MCNC: 141 MG/DL (ref 70–139)
HCO3, ARTERIAL CALC - HISTORICAL: 24.7 MMOL/L (ref 23–29)
HCO3, ARTERIAL CALC - HISTORICAL: 26.5 MMOL/L (ref 23–29)
HCO3, ARTERIAL CALC - HISTORICAL: 26.5 MMOL/L (ref 23–29)
HCO3, ARTERIAL CALC - HISTORICAL: 27.6 MMOL/L (ref 23–29)
HCT VFR BLD AUTO: 40.4 % (ref 40–54)
HGB BLD-MCNC: 12.3 G/DL (ref 14–18)
ION CA PH 7.4: 1 MMOL/L (ref 1.11–1.3)
MAGNESIUM SERPL-MCNC: 2.2 MG/DL (ref 1.8–2.6)
MCH RBC QN AUTO: 28.8 PG (ref 27–34)
MCHC RBC AUTO-ENTMCNC: 30.4 G/DL (ref 32–36)
MCV RBC AUTO: 95 FL (ref 80–100)
O2/TOTAL GAS SETTING VFR VENT: 50 %
O2/TOTAL GAS SETTING VFR VENT: 50 %
O2/TOTAL GAS SETTING VFR VENT: 60 %
O2/TOTAL GAS SETTING VFR VENT: 80 %
OXYHEMOGLOBIN - HISTORICAL: 96.9 % (ref 96–97)
OXYHEMOGLOBIN - HISTORICAL: 96.9 % (ref 96–97)
OXYHEMOGLOBIN - HISTORICAL: 97 % (ref 96–97)
OXYHEMOGLOBIN - HISTORICAL: 97.3 % (ref 96–97)
PCO2 BLD: 45 MM HG (ref 35–45)
PCO2 BLD: 48 MM HG (ref 35–45)
PCO2 BLD: 55 MM HG (ref 35–45)
PCO2 BLD: 57 MM HG (ref 35–45)
PEEP: 12 CM H2O
PEEP: 12 CM H2O
PEEP: 14 CM H2O
PEEP: 14 CM H2O
PH BLD: 7.33 [PH] (ref 7.37–7.44)
PH BLD: 7.33 [PH] (ref 7.37–7.44)
PH BLD: 7.36 [PH] (ref 7.37–7.44)
PH BLD: 7.39 [PH] (ref 7.37–7.44)
PH: 7.37 (ref 7.35–7.45)
PHOSPHATE SERPL-MCNC: 3.6 MG/DL (ref 2.5–4.5)
PLATELET # BLD AUTO: 179 THOU/UL (ref 140–440)
PMV BLD AUTO: 13.1 FL (ref 8.5–12.5)
PO2 BLD: 116 MM HG (ref 80–90)
PO2 BLD: 125 MM HG (ref 80–90)
PO2 BLD: 135 MM HG (ref 80–90)
PO2 BLD: 171 MM HG (ref 80–90)
POTASSIUM BLD-SCNC: 3.8 MMOL/L (ref 3.5–5)
RATE: 18 RR/MIN
RBC # BLD AUTO: 4.27 MILL/UL (ref 4.4–6.2)
SODIUM SERPL-SCNC: 142 MMOL/L (ref 136–145)
TEMPERATURE: 37 DEGREES C
VENTILATION MODE: ABNORMAL
VENTILATION MODE: AC
VENTILATOR TIDAL VOLUME: 430 ML
WBC: 12.8 THOU/UL (ref 4–11)

## 2020-07-05 ASSESSMENT — MIFFLIN-ST. JEOR: SCORE: 2042.88

## 2020-07-06 LAB
ALBUMIN SERPL-MCNC: 2.1 G/DL (ref 3.5–5)
ALP SERPL-CCNC: 44 U/L (ref 45–120)
ALT SERPL W P-5'-P-CCNC: 27 U/L (ref 0–45)
ANION GAP SERPL CALCULATED.3IONS-SCNC: 5 MMOL/L (ref 5–18)
AST SERPL W P-5'-P-CCNC: 30 U/L (ref 0–40)
BACTERIA SPEC CULT: ABNORMAL
BACTERIA SPEC CULT: ABNORMAL
BASE EXCESS BLDA CALC-SCNC: 2 MMOL/L
BASE EXCESS BLDA CALC-SCNC: 2.5 MMOL/L
BASE EXCESS BLDA CALC-SCNC: 3.6 MMOL/L
BASE EXCESS BLDA CALC-SCNC: 3.9 MMOL/L
BILIRUB DIRECT SERPL-MCNC: 0.1 MG/DL
BILIRUB SERPL-MCNC: 0.3 MG/DL (ref 0–1)
BUN SERPL-MCNC: 16 MG/DL (ref 8–22)
CALCIUM SERPL-MCNC: 7.4 MG/DL (ref 8.5–10.5)
CHLORIDE BLD-SCNC: 111 MMOL/L (ref 98–107)
CK SERPL-CCNC: 832 U/L (ref 30–190)
CO2 SERPL-SCNC: 28 MMOL/L (ref 22–31)
COHGB MFR BLD: 93.7 % (ref 96–97)
COHGB MFR BLD: 95.2 % (ref 96–97)
COHGB MFR BLD: 95.2 % (ref 96–97)
COHGB MFR BLD: 97.9 % (ref 96–97)
CREAT SERPL-MCNC: 0.5 MG/DL (ref 0.7–1.3)
ERYTHROCYTE [DISTWIDTH] IN BLOOD BY AUTOMATED COUNT: 15.2 % (ref 11–14.5)
FASTING STATUS PATIENT QL REPORTED: YES
GFR SERPL CREATININE-BSD FRML MDRD: >60 ML/MIN/1.73M2
GLUCOSE BLD-MCNC: 107 MG/DL (ref 70–125)
GLUCOSE BLDC GLUCOMTR-MCNC: 105 MG/DL (ref 70–139)
GLUCOSE BLDC GLUCOMTR-MCNC: 110 MG/DL (ref 70–139)
GLUCOSE BLDC GLUCOMTR-MCNC: 125 MG/DL (ref 70–139)
GLUCOSE BLDC GLUCOMTR-MCNC: 98 MG/DL (ref 70–139)
GLUCOSE BLDC GLUCOMTR-MCNC: 99 MG/DL (ref 70–139)
GRAM STAIN RESULT: ABNORMAL
HCO3, ARTERIAL CALC - HISTORICAL: 26.5 MMOL/L (ref 23–29)
HCO3, ARTERIAL CALC - HISTORICAL: 26.8 MMOL/L (ref 23–29)
HCO3, ARTERIAL CALC - HISTORICAL: 27.6 MMOL/L (ref 23–29)
HCO3, ARTERIAL CALC - HISTORICAL: 27.9 MMOL/L (ref 23–29)
HCT VFR BLD AUTO: 36.2 % (ref 40–54)
HGB BLD-MCNC: 11.1 G/DL (ref 14–18)
LACTATE SERPL-SCNC: 1 MMOL/L (ref 0.5–2.2)
MAGNESIUM SERPL-MCNC: 2.2 MG/DL (ref 1.8–2.6)
MCH RBC QN AUTO: 28.8 PG (ref 27–34)
MCHC RBC AUTO-ENTMCNC: 30.7 G/DL (ref 32–36)
MCV RBC AUTO: 94 FL (ref 80–100)
O2/TOTAL GAS SETTING VFR VENT: 40 %
O2/TOTAL GAS SETTING VFR VENT: 50 %
O2/TOTAL GAS SETTING VFR VENT: 50 %
O2/TOTAL GAS SETTING VFR VENT: 75 %
OXYHEMOGLOBIN - HISTORICAL: 91.8 % (ref 96–97)
OXYHEMOGLOBIN - HISTORICAL: 92.9 % (ref 96–97)
OXYHEMOGLOBIN - HISTORICAL: 93 % (ref 96–97)
OXYHEMOGLOBIN - HISTORICAL: 95.8 % (ref 96–97)
PCO2 BLD: 46 MM HG (ref 35–45)
PCO2 BLD: 50 MM HG (ref 35–45)
PCO2 BLD: 55 MM HG (ref 35–45)
PCO2 BLD: 56 MM HG (ref 35–45)
PEEP: 12 CM H2O
PEEP: 14 CM H2O
PH BLD: 7.33 [PH] (ref 7.37–7.44)
PH BLD: 7.33 [PH] (ref 7.37–7.44)
PH BLD: 7.38 [PH] (ref 7.37–7.44)
PH BLD: 7.41 [PH] (ref 7.37–7.44)
PHOSPHATE SERPL-MCNC: 3.2 MG/DL (ref 2.5–4.5)
PLATELET # BLD AUTO: 143 THOU/UL (ref 140–440)
PMV BLD AUTO: 13 FL (ref 8.5–12.5)
PO2 BLD: 62 MM HG (ref 80–90)
PO2 BLD: 66 MM HG (ref 80–90)
PO2 BLD: 69 MM HG (ref 80–90)
PO2 BLD: 98 MM HG (ref 80–90)
POTASSIUM BLD-SCNC: 3.7 MMOL/L (ref 3.5–5)
PROT SERPL-MCNC: 4.5 G/DL (ref 6–8)
RATE: 18 RR/MIN
RBC # BLD AUTO: 3.86 MILL/UL (ref 4.4–6.2)
SODIUM SERPL-SCNC: 144 MMOL/L (ref 136–145)
TEMPERATURE: 37 DEGREES C
TRIGL SERPL-MCNC: 503 MG/DL
VENTILATION MODE: ABNORMAL
VENTILATION MODE: ABNORMAL
VENTILATION MODE: AC
VENTILATION MODE: AC
VENTILATOR TIDAL VOLUME: 430 ML
WBC: 8.5 THOU/UL (ref 4–11)

## 2020-07-07 LAB
AEROBIC BLOOD CULTURE BOTTLE: NO GROWTH
AEROBIC BLOOD CULTURE BOTTLE: NO GROWTH
ANAEROBIC BLOOD CULTURE BOTTLE: NO GROWTH
ANAEROBIC BLOOD CULTURE BOTTLE: NO GROWTH
ANION GAP SERPL CALCULATED.3IONS-SCNC: 7 MMOL/L (ref 5–18)
ANION GAP SERPL CALCULATED.3IONS-SCNC: 8 MMOL/L (ref 5–18)
BASE EXCESS BLDA CALC-SCNC: 4.2 MMOL/L
BASE EXCESS BLDA CALC-SCNC: 4.7 MMOL/L
BUN SERPL-MCNC: 17 MG/DL (ref 8–22)
BUN SERPL-MCNC: 18 MG/DL (ref 8–22)
CALCIUM SERPL-MCNC: 7.3 MG/DL (ref 8.5–10.5)
CALCIUM SERPL-MCNC: 7.4 MG/DL (ref 8.5–10.5)
CHLORIDE BLD-SCNC: 109 MMOL/L (ref 98–107)
CHLORIDE BLD-SCNC: 111 MMOL/L (ref 98–107)
CK SERPL-CCNC: 528 U/L (ref 30–190)
CO2 SERPL-SCNC: 25 MMOL/L (ref 22–31)
CO2 SERPL-SCNC: 28 MMOL/L (ref 22–31)
COHGB MFR BLD: 95.4 % (ref 96–97)
COHGB MFR BLD: 96.6 % (ref 96–97)
CREAT SERPL-MCNC: 0.48 MG/DL (ref 0.7–1.3)
CREAT SERPL-MCNC: 0.49 MG/DL (ref 0.7–1.3)
ERYTHROCYTE [DISTWIDTH] IN BLOOD BY AUTOMATED COUNT: 15.7 % (ref 11–14.5)
FASTING STATUS PATIENT QL REPORTED: NO
GFR SERPL CREATININE-BSD FRML MDRD: >60 ML/MIN/1.73M2
GFR SERPL CREATININE-BSD FRML MDRD: >60 ML/MIN/1.73M2
GLUCOSE BLD-MCNC: 101 MG/DL (ref 70–125)
GLUCOSE BLD-MCNC: 116 MG/DL (ref 70–125)
GLUCOSE BLDC GLUCOMTR-MCNC: 103 MG/DL (ref 70–139)
GLUCOSE BLDC GLUCOMTR-MCNC: 104 MG/DL (ref 70–139)
GLUCOSE BLDC GLUCOMTR-MCNC: 114 MG/DL (ref 70–139)
GLUCOSE BLDC GLUCOMTR-MCNC: 116 MG/DL (ref 70–139)
GLUCOSE BLDC GLUCOMTR-MCNC: 75 MG/DL (ref 70–139)
GLUCOSE BLDC GLUCOMTR-MCNC: 94 MG/DL (ref 70–139)
HCO3, ARTERIAL CALC - HISTORICAL: 27.5 MMOL/L (ref 23–29)
HCO3, ARTERIAL CALC - HISTORICAL: 28 MMOL/L (ref 23–29)
HCT VFR BLD AUTO: 37.6 % (ref 40–54)
HGB BLD-MCNC: 11.6 G/DL (ref 14–18)
MAGNESIUM SERPL-MCNC: 2.1 MG/DL (ref 1.8–2.6)
MCH RBC QN AUTO: 28.9 PG (ref 27–34)
MCHC RBC AUTO-ENTMCNC: 30.9 G/DL (ref 32–36)
MCV RBC AUTO: 94 FL (ref 80–100)
O2/TOTAL GAS SETTING VFR VENT: 0.65 %
O2/TOTAL GAS SETTING VFR VENT: 70 %
OXYHEMOGLOBIN - HISTORICAL: 93.5 % (ref 96–97)
OXYHEMOGLOBIN - HISTORICAL: 94.8 % (ref 96–97)
PCO2 BLD: 45 MM HG (ref 35–45)
PCO2 BLD: 48 MM HG (ref 35–45)
PEEP: 14 CM H2O
PEEP: 14 CM H2O
PH BLD: 7.4 [PH] (ref 7.37–7.44)
PH BLD: 7.42 [PH] (ref 7.37–7.44)
PHOSPHATE SERPL-MCNC: 3.4 MG/DL (ref 2.5–4.5)
PLATELET # BLD AUTO: 140 THOU/UL (ref 140–440)
PMV BLD AUTO: 12.9 FL (ref 8.5–12.5)
PO2 BLD: 75 MM HG (ref 80–90)
PO2 BLD: 87 MM HG (ref 80–90)
POTASSIUM BLD-SCNC: 3.7 MMOL/L (ref 3.5–5)
POTASSIUM BLD-SCNC: 4.1 MMOL/L (ref 3.5–5)
RATE: 22 RR/MIN
RATE: 22 RR/MIN
RBC # BLD AUTO: 4.01 MILL/UL (ref 4.4–6.2)
SODIUM SERPL-SCNC: 144 MMOL/L (ref 136–145)
SODIUM SERPL-SCNC: 144 MMOL/L (ref 136–145)
TEMPERATURE: 37 DEGREES C
TEMPERATURE: 37 DEGREES C
TRIGL SERPL-MCNC: 749 MG/DL
VENTILATION MODE: AC
VENTILATION MODE: AC
VENTILATOR TIDAL VOLUME: 430 ML
VENTILATOR TIDAL VOLUME: 430 ML
WBC: 7.8 THOU/UL (ref 4–11)

## 2020-07-08 LAB
ANION GAP SERPL CALCULATED.3IONS-SCNC: 6 MMOL/L (ref 5–18)
BASE EXCESS BLDA CALC-SCNC: 10.2 MMOL/L
BASE EXCESS BLDA CALC-SCNC: 8.4 MMOL/L
BASE EXCESS BLDA CALC-SCNC: 9.6 MMOL/L
BUN SERPL-MCNC: 17 MG/DL (ref 8–22)
CALCIUM SERPL-MCNC: 7.6 MG/DL (ref 8.5–10.5)
CHLORIDE BLD-SCNC: 108 MMOL/L (ref 98–107)
CO2 SERPL-SCNC: 30 MMOL/L (ref 22–31)
COHGB MFR BLD: 96.4 % (ref 96–97)
COHGB MFR BLD: 97.7 % (ref 96–97)
COHGB MFR BLD: 98.3 % (ref 96–97)
CREAT SERPL-MCNC: 0.52 MG/DL (ref 0.7–1.3)
ERYTHROCYTE [DISTWIDTH] IN BLOOD BY AUTOMATED COUNT: 15.9 % (ref 11–14.5)
GFR SERPL CREATININE-BSD FRML MDRD: >60 ML/MIN/1.73M2
GLUCOSE BLD-MCNC: 121 MG/DL (ref 70–125)
GLUCOSE BLDC GLUCOMTR-MCNC: 101 MG/DL (ref 70–139)
GLUCOSE BLDC GLUCOMTR-MCNC: 102 MG/DL (ref 70–139)
GLUCOSE BLDC GLUCOMTR-MCNC: 116 MG/DL (ref 70–139)
GLUCOSE BLDC GLUCOMTR-MCNC: 119 MG/DL (ref 70–139)
GLUCOSE BLDC GLUCOMTR-MCNC: 122 MG/DL (ref 70–139)
GLUCOSE BLDC GLUCOMTR-MCNC: 124 MG/DL (ref 70–139)
GLUCOSE BLDC GLUCOMTR-MCNC: 126 MG/DL (ref 70–139)
HCO3, ARTERIAL CALC - HISTORICAL: 30.8 MMOL/L (ref 23–29)
HCO3, ARTERIAL CALC - HISTORICAL: 32.4 MMOL/L (ref 23–29)
HCO3, ARTERIAL CALC - HISTORICAL: 32.5 MMOL/L (ref 23–29)
HCT VFR BLD AUTO: 37.5 % (ref 40–54)
HGB BLD-MCNC: 11.5 G/DL (ref 14–18)
MAGNESIUM SERPL-MCNC: 2.1 MG/DL (ref 1.8–2.6)
MCH RBC QN AUTO: 28.6 PG (ref 27–34)
MCHC RBC AUTO-ENTMCNC: 30.7 G/DL (ref 32–36)
MCV RBC AUTO: 93 FL (ref 80–100)
O2/TOTAL GAS SETTING VFR VENT: 0.5 %
O2/TOTAL GAS SETTING VFR VENT: 0.55 %
O2/TOTAL GAS SETTING VFR VENT: 60 %
OXYHEMOGLOBIN - HISTORICAL: 94.6 % (ref 96–97)
OXYHEMOGLOBIN - HISTORICAL: 95.9 % (ref 96–97)
OXYHEMOGLOBIN - HISTORICAL: 96.1 % (ref 96–97)
PCO2 BLD: 46 MM HG (ref 35–45)
PCO2 BLD: 51 MM HG (ref 35–45)
PCO2 BLD: 53 MM HG (ref 35–45)
PEEP: 14 CM H2O
PH BLD: 7.4 [PH] (ref 7.37–7.44)
PH BLD: 7.43 [PH] (ref 7.37–7.44)
PH BLD: 7.48 [PH] (ref 7.37–7.44)
PHOSPHATE SERPL-MCNC: 3.8 MG/DL (ref 2.5–4.5)
PLATELET # BLD AUTO: 132 THOU/UL (ref 140–440)
PMV BLD AUTO: 12.5 FL (ref 8.5–12.5)
PO2 BLD: 84 MM HG (ref 80–90)
PO2 BLD: 87 MM HG (ref 80–90)
PO2 BLD: 91 MM HG (ref 80–90)
POTASSIUM BLD-SCNC: 4.3 MMOL/L (ref 3.5–5)
RATE: 22 RR/MIN
RBC # BLD AUTO: 4.02 MILL/UL (ref 4.4–6.2)
SODIUM SERPL-SCNC: 144 MMOL/L (ref 136–145)
TEMPERATURE: 37 DEGREES C
VENTILATION MODE: AC
VENTILATOR TIDAL VOLUME: 430 ML
WBC: 6.9 THOU/UL (ref 4–11)

## 2020-07-08 ASSESSMENT — MIFFLIN-ST. JEOR: SCORE: 2026.88

## 2020-07-09 LAB
ANION GAP SERPL CALCULATED.3IONS-SCNC: 6 MMOL/L (ref 5–18)
BASE EXCESS BLDA CALC-SCNC: 11.1 MMOL/L
BASE EXCESS BLDA CALC-SCNC: 7.7 MMOL/L
BASE EXCESS BLDA CALC-SCNC: 9.6 MMOL/L
BUN SERPL-MCNC: 24 MG/DL (ref 8–22)
CALCIUM SERPL-MCNC: 7.8 MG/DL (ref 8.5–10.5)
CHLORIDE BLD-SCNC: 106 MMOL/L (ref 98–107)
CO2 SERPL-SCNC: 31 MMOL/L (ref 22–31)
COHGB MFR BLD: 95.3 % (ref 96–97)
COHGB MFR BLD: 96.3 % (ref 96–97)
COHGB MFR BLD: 97.2 % (ref 96–97)
CREAT SERPL-MCNC: 0.52 MG/DL (ref 0.7–1.3)
ERYTHROCYTE [DISTWIDTH] IN BLOOD BY AUTOMATED COUNT: 16.3 % (ref 11–14.5)
GFR SERPL CREATININE-BSD FRML MDRD: >60 ML/MIN/1.73M2
GLUCOSE BLD-MCNC: 106 MG/DL (ref 70–125)
GLUCOSE BLDC GLUCOMTR-MCNC: 103 MG/DL (ref 70–139)
GLUCOSE BLDC GLUCOMTR-MCNC: 107 MG/DL (ref 70–139)
GLUCOSE BLDC GLUCOMTR-MCNC: 113 MG/DL (ref 70–139)
GLUCOSE BLDC GLUCOMTR-MCNC: 115 MG/DL (ref 70–139)
GLUCOSE BLDC GLUCOMTR-MCNC: 121 MG/DL (ref 70–139)
HCO3, ARTERIAL CALC - HISTORICAL: 30.9 MMOL/L (ref 23–29)
HCO3, ARTERIAL CALC - HISTORICAL: 31.5 MMOL/L (ref 23–29)
HCO3, ARTERIAL CALC - HISTORICAL: 33.5 MMOL/L (ref 23–29)
HCT VFR BLD AUTO: 34.3 % (ref 40–54)
HGB BLD-MCNC: 10.7 G/DL (ref 14–18)
MAGNESIUM SERPL-MCNC: 2.2 MG/DL (ref 1.8–2.6)
MCH RBC QN AUTO: 28.9 PG (ref 27–34)
MCHC RBC AUTO-ENTMCNC: 31.2 G/DL (ref 32–36)
MCV RBC AUTO: 93 FL (ref 80–100)
O2/TOTAL GAS SETTING VFR VENT: 50 %
O2/TOTAL GAS SETTING VFR VENT: ABNORMAL %
O2/TOTAL GAS SETTING VFR VENT: ABNORMAL %
OXYHEMOGLOBIN - HISTORICAL: 93.3 % (ref 96–97)
OXYHEMOGLOBIN - HISTORICAL: 93.7 % (ref 96–97)
OXYHEMOGLOBIN - HISTORICAL: 95.1 % (ref 96–97)
PCO2 BLD: 46 MM HG (ref 35–45)
PCO2 BLD: 62 MM HG (ref 35–45)
PCO2 BLD: 62 MM HG (ref 35–45)
PEEP: 14 CM H2O
PH BLD: 7.36 [PH] (ref 7.37–7.44)
PH BLD: 7.36 [PH] (ref 7.37–7.44)
PH BLD: 7.48 [PH] (ref 7.37–7.44)
PHOSPHATE SERPL-MCNC: 3.2 MG/DL (ref 2.5–4.5)
PLATELET # BLD AUTO: 128 THOU/UL (ref 140–440)
PMV BLD AUTO: 12.2 FL (ref 8.5–12.5)
PO2 BLD: 100 MM HG (ref 80–90)
PO2 BLD: 72 MM HG (ref 80–90)
PO2 BLD: 73 MM HG (ref 80–90)
POTASSIUM BLD-SCNC: 4 MMOL/L (ref 3.5–5)
RATE: 22 RR/MIN
RBC # BLD AUTO: 3.7 MILL/UL (ref 4.4–6.2)
SODIUM SERPL-SCNC: 143 MMOL/L (ref 136–145)
TEMPERATURE: 37 DEGREES C
VENTILATION MODE: AC
VENTILATOR TIDAL VOLUME: 430 ML
WBC: 7.8 THOU/UL (ref 4–11)

## 2020-07-09 ASSESSMENT — MIFFLIN-ST. JEOR: SCORE: 2000.88

## 2020-07-10 LAB
ANION GAP SERPL CALCULATED.3IONS-SCNC: 4 MMOL/L (ref 5–18)
BASE EXCESS BLDA CALC-SCNC: 11.1 MMOL/L
BASE EXCESS BLDA CALC-SCNC: 11.2 MMOL/L
BASE EXCESS BLDA CALC-SCNC: 14.8 MMOL/L
BUN SERPL-MCNC: 21 MG/DL (ref 8–22)
CALCIUM SERPL-MCNC: 7.9 MG/DL (ref 8.5–10.5)
CHLORIDE BLD-SCNC: 102 MMOL/L (ref 98–107)
CO2 SERPL-SCNC: 35 MMOL/L (ref 22–31)
COHGB MFR BLD: 97.1 % (ref 96–97)
COHGB MFR BLD: 97.3 % (ref 96–97)
COHGB MFR BLD: 98.4 % (ref 96–97)
CREAT SERPL-MCNC: 0.54 MG/DL (ref 0.7–1.3)
ERYTHROCYTE [DISTWIDTH] IN BLOOD BY AUTOMATED COUNT: 16.4 % (ref 11–14.5)
GFR SERPL CREATININE-BSD FRML MDRD: >60 ML/MIN/1.73M2
GLUCOSE BLD-MCNC: 134 MG/DL (ref 70–125)
GLUCOSE BLDC GLUCOMTR-MCNC: 110 MG/DL (ref 70–139)
GLUCOSE BLDC GLUCOMTR-MCNC: 124 MG/DL (ref 70–139)
GLUCOSE BLDC GLUCOMTR-MCNC: 124 MG/DL (ref 70–139)
GLUCOSE BLDC GLUCOMTR-MCNC: 139 MG/DL (ref 70–139)
GLUCOSE BLDC GLUCOMTR-MCNC: 97 MG/DL (ref 70–139)
HCO3, ARTERIAL CALC - HISTORICAL: 32.4 MMOL/L (ref 23–29)
HCO3, ARTERIAL CALC - HISTORICAL: 33.6 MMOL/L (ref 23–29)
HCO3, ARTERIAL CALC - HISTORICAL: 36.2 MMOL/L (ref 23–29)
HCT VFR BLD AUTO: 38.1 % (ref 40–54)
HGB BLD-MCNC: 11.4 G/DL (ref 14–18)
MAGNESIUM SERPL-MCNC: 2.3 MG/DL (ref 1.8–2.6)
MCH RBC QN AUTO: 29 PG (ref 27–34)
MCHC RBC AUTO-ENTMCNC: 29.9 G/DL (ref 32–36)
MCV RBC AUTO: 97 FL (ref 80–100)
O2/TOTAL GAS SETTING VFR VENT: 60 %
O2/TOTAL GAS SETTING VFR VENT: 60 %
O2/TOTAL GAS SETTING VFR VENT: ABNORMAL %
OXYHEMOGLOBIN - HISTORICAL: 95 % (ref 96–97)
OXYHEMOGLOBIN - HISTORICAL: 95.3 % (ref 96–97)
OXYHEMOGLOBIN - HISTORICAL: 95.7 % (ref 96–97)
PCO2 BLD: 61 MM HG (ref 35–45)
PCO2 BLD: 67 MM HG (ref 35–45)
PCO2 BLD: 74 MM HG (ref 35–45)
PEEP: 14 CM H2O
PEEP: 14 CM H2O
PH BLD: 7.31 [PH] (ref 7.37–7.44)
PH BLD: 7.37 [PH] (ref 7.37–7.44)
PH BLD: 7.42 [PH] (ref 7.37–7.44)
PHOSPHATE SERPL-MCNC: 5 MG/DL (ref 2.5–4.5)
PLATELET # BLD AUTO: 125 THOU/UL (ref 140–440)
PMV BLD AUTO: 12.4 FL (ref 8.5–12.5)
PO2 BLD: 92 MM HG (ref 80–90)
PO2 BLD: 93 MM HG (ref 80–90)
PO2 BLD: 99 MM HG (ref 80–90)
POTASSIUM BLD-SCNC: 4.8 MMOL/L (ref 3.5–5)
RATE: 22 RR/MIN
RATE: 26 RR/MIN
RBC # BLD AUTO: 3.93 MILL/UL (ref 4.4–6.2)
SODIUM SERPL-SCNC: 141 MMOL/L (ref 136–145)
TEMPERATURE: 37 DEGREES C
VENTILATION MODE: AC
VENTILATOR TIDAL VOLUME: 350 ML
VENTILATOR TIDAL VOLUME: 350 ML
WBC: 10.3 THOU/UL (ref 4–11)

## 2020-07-10 ASSESSMENT — MIFFLIN-ST. JEOR: SCORE: 2006.88

## 2020-07-11 LAB
ANION GAP SERPL CALCULATED.3IONS-SCNC: 4 MMOL/L (ref 5–18)
BASE EXCESS BLDA CALC-SCNC: 10.8 MMOL/L
BASE EXCESS BLDA CALC-SCNC: 12.7 MMOL/L
BUN SERPL-MCNC: 25 MG/DL (ref 8–22)
C REACTIVE PROTEIN LHE: <0.1 MG/DL (ref 0–0.8)
CALCIUM SERPL-MCNC: 8 MG/DL (ref 8.5–10.5)
CHLORIDE BLD-SCNC: 102 MMOL/L (ref 98–107)
CO2 SERPL-SCNC: 36 MMOL/L (ref 22–31)
COHGB MFR BLD: 95.7 % (ref 96–97)
COHGB MFR BLD: 97.4 % (ref 96–97)
CREAT SERPL-MCNC: 0.58 MG/DL (ref 0.7–1.3)
ERYTHROCYTE [DISTWIDTH] IN BLOOD BY AUTOMATED COUNT: 16.2 % (ref 11–14.5)
GFR SERPL CREATININE-BSD FRML MDRD: >60 ML/MIN/1.73M2
GLUCOSE BLD-MCNC: 108 MG/DL (ref 70–125)
GLUCOSE BLDC GLUCOMTR-MCNC: 107 MG/DL (ref 70–139)
GLUCOSE BLDC GLUCOMTR-MCNC: 111 MG/DL (ref 70–139)
GLUCOSE BLDC GLUCOMTR-MCNC: 115 MG/DL (ref 70–139)
GLUCOSE BLDC GLUCOMTR-MCNC: 118 MG/DL (ref 70–139)
HCO3, ARTERIAL CALC - HISTORICAL: 33.3 MMOL/L (ref 23–29)
HCO3, ARTERIAL CALC - HISTORICAL: 34.7 MMOL/L (ref 23–29)
HCT VFR BLD AUTO: 34.6 % (ref 40–54)
HGB BLD-MCNC: 10.6 G/DL (ref 14–18)
LMWH PPP CHRO-ACNC: 0.46 IU/ML (ref 0.5–1)
MAGNESIUM SERPL-MCNC: 2.2 MG/DL (ref 1.8–2.6)
MCH RBC QN AUTO: 29 PG (ref 27–34)
MCHC RBC AUTO-ENTMCNC: 30.6 G/DL (ref 32–36)
MCV RBC AUTO: 95 FL (ref 80–100)
O2/TOTAL GAS SETTING VFR VENT: 45 %
O2/TOTAL GAS SETTING VFR VENT: ABNORMAL %
OXYHEMOGLOBIN - HISTORICAL: 92.8 % (ref 96–97)
OXYHEMOGLOBIN - HISTORICAL: 94.5 % (ref 96–97)
PCO2 BLD: 48 MM HG (ref 35–45)
PCO2 BLD: 54 MM HG (ref 35–45)
PEEP: 14 CM H2O
PH BLD: 7.46 [PH] (ref 7.37–7.44)
PH BLD: 7.48 [PH] (ref 7.37–7.44)
PHOSPHATE SERPL-MCNC: 3.1 MG/DL (ref 2.5–4.5)
PLATELET # BLD AUTO: 102 THOU/UL (ref 140–440)
PMV BLD AUTO: 11.5 FL (ref 8.5–12.5)
PO2 BLD: 68 MM HG (ref 80–90)
PO2 BLD: 77 MM HG (ref 80–90)
POTASSIUM BLD-SCNC: 3.8 MMOL/L (ref 3.5–5)
PROCALCITONIN SERPL-MCNC: 0.05 NG/ML (ref 0–0.49)
RATE: 26 RR/MIN
RBC # BLD AUTO: 3.65 MILL/UL (ref 4.4–6.2)
SODIUM SERPL-SCNC: 142 MMOL/L (ref 136–145)
TEMPERATURE: 37 DEGREES C
TEMPERATURE: 37 DEGREES C
VENTILATION MODE: AC
VENTILATOR TIDAL VOLUME: 350 ML
WBC: 10.4 THOU/UL (ref 4–11)

## 2020-07-11 ASSESSMENT — MIFFLIN-ST. JEOR: SCORE: 2007.88

## 2020-07-12 LAB
ANION GAP SERPL CALCULATED.3IONS-SCNC: 3 MMOL/L (ref 5–18)
BASE EXCESS BLDA CALC-SCNC: 10.3 MMOL/L
BASOPHILS # BLD AUTO: 0 THOU/UL (ref 0–0.2)
BASOPHILS NFR BLD AUTO: 0 % (ref 0–2)
BUN SERPL-MCNC: 24 MG/DL (ref 8–22)
CALCIUM SERPL-MCNC: 7.9 MG/DL (ref 8.5–10.5)
CHLORIDE BLD-SCNC: 104 MMOL/L (ref 98–107)
CO2 SERPL-SCNC: 34 MMOL/L (ref 22–31)
COHGB MFR BLD: 97.1 % (ref 96–97)
CREAT SERPL-MCNC: 0.57 MG/DL (ref 0.7–1.3)
EOSINOPHIL # BLD AUTO: 0.4 THOU/UL (ref 0–0.4)
EOSINOPHIL NFR BLD AUTO: 4 % (ref 0–6)
ERYTHROCYTE [DISTWIDTH] IN BLOOD BY AUTOMATED COUNT: 16.2 % (ref 11–14.5)
ERYTHROCYTE [DISTWIDTH] IN BLOOD BY AUTOMATED COUNT: 16.3 % (ref 11–14.5)
FASTING STATUS PATIENT QL REPORTED: NO
GFR SERPL CREATININE-BSD FRML MDRD: >60 ML/MIN/1.73M2
GLUCOSE BLD-MCNC: 106 MG/DL (ref 70–125)
GLUCOSE BLDC GLUCOMTR-MCNC: 109 MG/DL (ref 70–139)
GLUCOSE BLDC GLUCOMTR-MCNC: 123 MG/DL (ref 70–139)
GLUCOSE BLDC GLUCOMTR-MCNC: 131 MG/DL (ref 70–139)
HCO3, ARTERIAL CALC - HISTORICAL: 32.9 MMOL/L (ref 23–29)
HCT VFR BLD AUTO: 33.3 % (ref 40–54)
HCT VFR BLD AUTO: 34.1 % (ref 40–54)
HGB BLD-MCNC: 10.3 G/DL (ref 14–18)
HGB BLD-MCNC: 10.5 G/DL (ref 14–18)
LYMPHOCYTES # BLD AUTO: 0.9 THOU/UL (ref 0.8–4.4)
LYMPHOCYTES NFR BLD AUTO: 9 % (ref 20–40)
MAGNESIUM SERPL-MCNC: 2.2 MG/DL (ref 1.8–2.6)
MCH RBC QN AUTO: 29.1 PG (ref 27–34)
MCH RBC QN AUTO: 29.2 PG (ref 27–34)
MCHC RBC AUTO-ENTMCNC: 30.8 G/DL (ref 32–36)
MCHC RBC AUTO-ENTMCNC: 30.9 G/DL (ref 32–36)
MCV RBC AUTO: 94 FL (ref 80–100)
MCV RBC AUTO: 95 FL (ref 80–100)
MONOCYTES # BLD AUTO: 0.7 THOU/UL (ref 0–0.9)
MONOCYTES NFR BLD AUTO: 7 % (ref 2–10)
NEUTROPHILS # BLD AUTO: 8.1 THOU/UL (ref 2–7.7)
NEUTROPHILS NFR BLD AUTO: 79 % (ref 50–70)
O2/TOTAL GAS SETTING VFR VENT: 50 %
OXYHEMOGLOBIN - HISTORICAL: 94.5 % (ref 96–97)
PATH REPORT.MICROSCOPIC SPEC OTHER STN: ABNORMAL
PCO2 BLD: 53 MM HG (ref 35–45)
PEEP: 14 CM H2O
PH BLD: 7.44 [PH] (ref 7.37–7.44)
PHOSPHATE SERPL-MCNC: 4.2 MG/DL (ref 2.5–4.5)
PLATELET # BLD AUTO: 92 THOU/UL (ref 140–440)
PLATELET # BLD AUTO: 98 THOU/UL (ref 140–440)
PMV BLD AUTO: 12.4 FL (ref 8.5–12.5)
PMV BLD AUTO: 12.9 FL (ref 8.5–12.5)
PO2 BLD: 77 MM HG (ref 80–90)
POTASSIUM BLD-SCNC: 4 MMOL/L (ref 3.5–5)
RATE: 22 RR/MIN
RBC # BLD AUTO: 3.53 MILL/UL (ref 4.4–6.2)
RBC # BLD AUTO: 3.61 MILL/UL (ref 4.4–6.2)
SODIUM SERPL-SCNC: 141 MMOL/L (ref 136–145)
TEMPERATURE: 37 DEGREES C
TRIGL SERPL-MCNC: 195 MG/DL
VENTILATION MODE: AC
VENTILATOR TIDAL VOLUME: 350 ML
WBC: 10.2 THOU/UL (ref 4–11)
WBC: 8.8 THOU/UL (ref 4–11)

## 2020-07-12 ASSESSMENT — MIFFLIN-ST. JEOR: SCORE: 1980.88

## 2020-07-13 LAB
ANION GAP SERPL CALCULATED.3IONS-SCNC: 6 MMOL/L (ref 5–18)
BASE EXCESS BLDA CALC-SCNC: 10.2 MMOL/L
BUN SERPL-MCNC: 21 MG/DL (ref 8–22)
CALCIUM SERPL-MCNC: 8 MG/DL (ref 8.5–10.5)
CHLORIDE BLD-SCNC: 103 MMOL/L (ref 98–107)
CO2 SERPL-SCNC: 32 MMOL/L (ref 22–31)
COHGB MFR BLD: 94.7 % (ref 96–97)
CREAT SERPL-MCNC: 0.54 MG/DL (ref 0.7–1.3)
ERYTHROCYTE [DISTWIDTH] IN BLOOD BY AUTOMATED COUNT: 16.3 % (ref 11–14.5)
GFR SERPL CREATININE-BSD FRML MDRD: >60 ML/MIN/1.73M2
GLUCOSE BLD-MCNC: 119 MG/DL (ref 70–125)
GLUCOSE BLDC GLUCOMTR-MCNC: 111 MG/DL (ref 70–139)
GLUCOSE BLDC GLUCOMTR-MCNC: 115 MG/DL (ref 70–139)
GLUCOSE BLDC GLUCOMTR-MCNC: 122 MG/DL (ref 70–139)
GLUCOSE BLDC GLUCOMTR-MCNC: 123 MG/DL (ref 70–139)
HCO3, ARTERIAL CALC - HISTORICAL: 32.6 MMOL/L (ref 23–29)
HCT VFR BLD AUTO: 35 % (ref 40–54)
HGB BLD-MCNC: 10.8 G/DL (ref 14–18)
LAB AP CHARGES (HE HISTORICAL CONVERSION): NORMAL
MAGNESIUM SERPL-MCNC: 2.1 MG/DL (ref 1.8–2.6)
MCH RBC QN AUTO: 29.2 PG (ref 27–34)
MCHC RBC AUTO-ENTMCNC: 30.9 G/DL (ref 32–36)
MCV RBC AUTO: 95 FL (ref 80–100)
O2/TOTAL GAS SETTING VFR VENT: 45 %
OXYHEMOGLOBIN - HISTORICAL: 92.3 % (ref 96–97)
PATH REPORT.COMMENTS IMP SPEC: NORMAL
PATH REPORT.COMMENTS IMP SPEC: NORMAL
PATH REPORT.FINAL DX SPEC: NORMAL
PATH REPORT.MICROSCOPIC SPEC OTHER STN: NORMAL
PATH REPORT.RELEVANT HX SPEC: NORMAL
PCO2 BLD: 50 MM HG (ref 35–45)
PEEP: 14 CM H2O
PH BLD: 7.45 [PH] (ref 7.37–7.44)
PHOSPHATE SERPL-MCNC: 3.5 MG/DL (ref 2.5–4.5)
PLATELET # BLD AUTO: 87 THOU/UL (ref 140–440)
PMV BLD AUTO: 12.6 FL (ref 8.5–12.5)
PO2 BLD: 72 MM HG (ref 80–90)
POTASSIUM BLD-SCNC: 3.9 MMOL/L (ref 3.5–5)
RATE: 22 RR/MIN
RBC # BLD AUTO: 3.7 MILL/UL (ref 4.4–6.2)
SODIUM SERPL-SCNC: 141 MMOL/L (ref 136–145)
TEMPERATURE: 37 DEGREES C
VENTILATION MODE: AC
VENTILATOR TIDAL VOLUME: 350 ML
WBC: 10.8 THOU/UL (ref 4–11)

## 2020-07-14 LAB
ANION GAP SERPL CALCULATED.3IONS-SCNC: 7 MMOL/L (ref 5–18)
BASE EXCESS BLDA CALC-SCNC: 10.3 MMOL/L
BASE EXCESS BLDA CALC-SCNC: 8.5 MMOL/L
BUN SERPL-MCNC: 21 MG/DL (ref 8–22)
CALCIUM SERPL-MCNC: 8.3 MG/DL (ref 8.5–10.5)
CHLORIDE BLD-SCNC: 104 MMOL/L (ref 98–107)
CO2 SERPL-SCNC: 31 MMOL/L (ref 22–31)
COHGB MFR BLD: 92.4 % (ref 96–97)
COHGB MFR BLD: 92.6 % (ref 96–97)
CREAT SERPL-MCNC: 0.57 MG/DL (ref 0.7–1.3)
ERYTHROCYTE [DISTWIDTH] IN BLOOD BY AUTOMATED COUNT: 16.4 % (ref 11–14.5)
GFR SERPL CREATININE-BSD FRML MDRD: >60 ML/MIN/1.73M2
GLUCOSE BLD-MCNC: 120 MG/DL (ref 70–125)
GLUCOSE BLDC GLUCOMTR-MCNC: 120 MG/DL (ref 70–139)
HCO3, ARTERIAL CALC - HISTORICAL: 31.4 MMOL/L (ref 23–29)
HCO3, ARTERIAL CALC - HISTORICAL: 32.5 MMOL/L (ref 23–29)
HCT VFR BLD AUTO: 35.9 % (ref 40–54)
HGB BLD-MCNC: 11.3 G/DL (ref 14–18)
MAGNESIUM SERPL-MCNC: 2.1 MG/DL (ref 1.8–2.6)
MCH RBC QN AUTO: 29.8 PG (ref 27–34)
MCHC RBC AUTO-ENTMCNC: 31.5 G/DL (ref 32–36)
MCV RBC AUTO: 95 FL (ref 80–100)
O2/TOTAL GAS SETTING VFR VENT: 45 %
O2/TOTAL GAS SETTING VFR VENT: 45 %
OXYHEMOGLOBIN - HISTORICAL: 89.4 % (ref 96–97)
OXYHEMOGLOBIN - HISTORICAL: 90.3 % (ref 96–97)
PCO2 BLD: 50 MM HG (ref 35–45)
PCO2 BLD: 51 MM HG (ref 35–45)
PEEP: 12 CM H2O
PEEP: 12 CM H2O
PH BLD: 7.44 [PH] (ref 7.37–7.44)
PH BLD: 7.44 [PH] (ref 7.37–7.44)
PHOSPHATE SERPL-MCNC: 3.5 MG/DL (ref 2.5–4.5)
PLATELET # BLD AUTO: 94 THOU/UL (ref 140–440)
PMV BLD AUTO: 12.7 FL (ref 8.5–12.5)
PO2 BLD: 56 MM HG (ref 80–90)
PO2 BLD: 63 MM HG (ref 80–90)
POTASSIUM BLD-SCNC: 3.7 MMOL/L (ref 3.5–5)
RATE: 16 RR/MIN
RATE: 16 RR/MIN
RBC # BLD AUTO: 3.79 MILL/UL (ref 4.4–6.2)
SODIUM SERPL-SCNC: 142 MMOL/L (ref 136–145)
TEMPERATURE: 37 DEGREES C
TEMPERATURE: 37 DEGREES C
VENTILATION MODE: AC
VENTILATION MODE: AC
VENTILATOR TIDAL VOLUME: 350 ML
VENTILATOR TIDAL VOLUME: 350 ML
WBC: 9.3 THOU/UL (ref 4–11)

## 2020-07-14 ASSESSMENT — MIFFLIN-ST. JEOR: SCORE: 1988.88

## 2020-07-15 LAB
ANION GAP SERPL CALCULATED.3IONS-SCNC: 8 MMOL/L (ref 5–18)
BASE EXCESS BLDA CALC-SCNC: 7.9 MMOL/L
BASE EXCESS BLDA CALC-SCNC: 8.1 MMOL/L
BUN SERPL-MCNC: 20 MG/DL (ref 8–22)
CALCIUM SERPL-MCNC: 8.4 MG/DL (ref 8.5–10.5)
CHLORIDE BLD-SCNC: 104 MMOL/L (ref 98–107)
CO2 SERPL-SCNC: 29 MMOL/L (ref 22–31)
COHGB MFR BLD: 93.3 % (ref 96–97)
COHGB MFR BLD: 96 % (ref 96–97)
CREAT SERPL-MCNC: 0.58 MG/DL (ref 0.7–1.3)
ERYTHROCYTE [DISTWIDTH] IN BLOOD BY AUTOMATED COUNT: 16.2 % (ref 11–14.5)
GFR SERPL CREATININE-BSD FRML MDRD: >60 ML/MIN/1.73M2
GLUCOSE BLD-MCNC: 140 MG/DL (ref 70–125)
GLUCOSE BLDC GLUCOMTR-MCNC: 127 MG/DL (ref 70–139)
GLUCOSE BLDC GLUCOMTR-MCNC: 134 MG/DL (ref 70–139)
HCO3, ARTERIAL CALC - HISTORICAL: 30.5 MMOL/L (ref 23–29)
HCO3, ARTERIAL CALC - HISTORICAL: 30.8 MMOL/L (ref 23–29)
HCT VFR BLD AUTO: 36.2 % (ref 40–54)
HGB BLD-MCNC: 11.4 G/DL (ref 14–18)
MAGNESIUM SERPL-MCNC: 2.1 MG/DL (ref 1.8–2.6)
MCH RBC QN AUTO: 29.7 PG (ref 27–34)
MCHC RBC AUTO-ENTMCNC: 31.5 G/DL (ref 32–36)
MCV RBC AUTO: 94 FL (ref 80–100)
O2/TOTAL GAS SETTING VFR VENT: 55 %
O2/TOTAL GAS SETTING VFR VENT: 55 %
OXYHEMOGLOBIN - HISTORICAL: 91 % (ref 96–97)
OXYHEMOGLOBIN - HISTORICAL: 93.9 % (ref 96–97)
PCO2 BLD: 47 MM HG (ref 35–45)
PCO2 BLD: 51 MM HG (ref 35–45)
PEEP: 12 CM H2O
PEEP: 14 CM H2O
PH BLD: 7.42 [PH] (ref 7.37–7.44)
PH BLD: 7.44 [PH] (ref 7.37–7.44)
PHOSPHATE SERPL-MCNC: 3.9 MG/DL (ref 2.5–4.5)
PLATELET # BLD AUTO: 115 THOU/UL (ref 140–440)
PMV BLD AUTO: 12.7 FL (ref 8.5–12.5)
PO2 BLD: 66 MM HG (ref 80–90)
PO2 BLD: 82 MM HG (ref 80–90)
POTASSIUM BLD-SCNC: 4.3 MMOL/L (ref 3.5–5)
RATE: 16 RR/MIN
RATE: 16 RR/MIN
RBC # BLD AUTO: 3.84 MILL/UL (ref 4.4–6.2)
SODIUM SERPL-SCNC: 141 MMOL/L (ref 136–145)
TEMPERATURE: 37 DEGREES C
TEMPERATURE: 37 DEGREES C
VENTILATION MODE: AC
VENTILATION MODE: AC
VENTILATOR TIDAL VOLUME: 350 ML
VENTILATOR TIDAL VOLUME: 350 ML
WBC: 8.8 THOU/UL (ref 4–11)

## 2020-07-15 ASSESSMENT — MIFFLIN-ST. JEOR: SCORE: 1941.88

## 2020-07-16 LAB
ANION GAP SERPL CALCULATED.3IONS-SCNC: 5 MMOL/L (ref 5–18)
BASE EXCESS BLDA CALC-SCNC: 5.6 MMOL/L
BASE EXCESS BLDA CALC-SCNC: 6.5 MMOL/L
BASE EXCESS BLDA CALC-SCNC: 6.7 MMOL/L
BUN SERPL-MCNC: 23 MG/DL (ref 8–22)
CALCIUM SERPL-MCNC: 8.1 MG/DL (ref 8.5–10.5)
CHLORIDE BLD-SCNC: 104 MMOL/L (ref 98–107)
CO2 SERPL-SCNC: 30 MMOL/L (ref 22–31)
COHGB MFR BLD: 100 % (ref 96–97)
COHGB MFR BLD: 97.4 % (ref 96–97)
COHGB MFR BLD: 98 % (ref 96–97)
CREAT SERPL-MCNC: 0.54 MG/DL (ref 0.7–1.3)
ERYTHROCYTE [DISTWIDTH] IN BLOOD BY AUTOMATED COUNT: 15.9 % (ref 11–14.5)
GFR SERPL CREATININE-BSD FRML MDRD: >60 ML/MIN/1.73M2
GLUCOSE BLD-MCNC: 136 MG/DL (ref 70–125)
GLUCOSE BLDC GLUCOMTR-MCNC: 118 MG/DL (ref 70–139)
GLUCOSE BLDC GLUCOMTR-MCNC: 138 MG/DL (ref 70–139)
HCO3, ARTERIAL CALC - HISTORICAL: 29.3 MMOL/L (ref 23–29)
HCO3, ARTERIAL CALC - HISTORICAL: 29.8 MMOL/L (ref 23–29)
HCO3, ARTERIAL CALC - HISTORICAL: 29.9 MMOL/L (ref 23–29)
HCT VFR BLD AUTO: 35.2 % (ref 40–54)
HGB BLD-MCNC: 11 G/DL (ref 14–18)
MAGNESIUM SERPL-MCNC: 2.1 MG/DL (ref 1.8–2.6)
MCH RBC QN AUTO: 29.5 PG (ref 27–34)
MCHC RBC AUTO-ENTMCNC: 31.3 G/DL (ref 32–36)
MCV RBC AUTO: 94 FL (ref 80–100)
O2/TOTAL GAS SETTING VFR VENT: 50 %
O2/TOTAL GAS SETTING VFR VENT: 50 %
O2/TOTAL GAS SETTING VFR VENT: 60 %
OXYHEMOGLOBIN - HISTORICAL: 94.4 % (ref 96–97)
OXYHEMOGLOBIN - HISTORICAL: 95.1 % (ref 96–97)
OXYHEMOGLOBIN - HISTORICAL: 98 % (ref 96–97)
PCO2 BLD: 45 MM HG (ref 35–45)
PCO2 BLD: 51 MM HG (ref 35–45)
PCO2 BLD: 54 MM HG (ref 35–45)
PEEP: 14 CM H2O
PH BLD: 7.38 [PH] (ref 7.37–7.44)
PH BLD: 7.41 [PH] (ref 7.37–7.44)
PH BLD: 7.44 [PH] (ref 7.37–7.44)
PHOSPHATE SERPL-MCNC: 3.6 MG/DL (ref 2.5–4.5)
PLATELET # BLD AUTO: 139 THOU/UL (ref 140–440)
PMV BLD AUTO: 12.4 FL (ref 8.5–12.5)
PO2 BLD: 195 MM HG (ref 80–90)
PO2 BLD: 75 MM HG (ref 80–90)
PO2 BLD: 90 MM HG (ref 80–90)
POTASSIUM BLD-SCNC: 4.3 MMOL/L (ref 3.5–5)
RATE: 16 RR/MIN
RBC # BLD AUTO: 3.73 MILL/UL (ref 4.4–6.2)
SODIUM SERPL-SCNC: 139 MMOL/L (ref 136–145)
TEMPERATURE: 37 DEGREES C
VENTILATION MODE: AC
VENTILATOR TIDAL VOLUME: 350 ML
WBC: 7.7 THOU/UL (ref 4–11)

## 2020-07-16 ASSESSMENT — MIFFLIN-ST. JEOR: SCORE: 1956.88

## 2020-07-17 ENCOUNTER — APPOINTMENT (OUTPATIENT)
Dept: INTERPRETER SERVICES | Facility: CLINIC | Age: 55
End: 2020-07-17
Payer: MEDICAID

## 2020-07-17 LAB
ANION GAP SERPL CALCULATED.3IONS-SCNC: 8 MMOL/L (ref 5–18)
BASE EXCESS BLDA CALC-SCNC: 5.9 MMOL/L
BASE EXCESS BLDA CALC-SCNC: 7.2 MMOL/L
BUN SERPL-MCNC: 20 MG/DL (ref 8–22)
CALCIUM SERPL-MCNC: 8.3 MG/DL (ref 8.5–10.5)
CHLORIDE BLD-SCNC: 103 MMOL/L (ref 98–107)
CO2 SERPL-SCNC: 29 MMOL/L (ref 22–31)
COHGB MFR BLD: 97.1 % (ref 96–97)
COHGB MFR BLD: 98 % (ref 96–97)
CREAT SERPL-MCNC: 0.48 MG/DL (ref 0.7–1.3)
ERYTHROCYTE [DISTWIDTH] IN BLOOD BY AUTOMATED COUNT: 15.9 % (ref 11–14.5)
GFR SERPL CREATININE-BSD FRML MDRD: >60 ML/MIN/1.73M2
GLUCOSE BLD-MCNC: 155 MG/DL (ref 70–125)
GLUCOSE BLDC GLUCOMTR-MCNC: 130 MG/DL (ref 70–139)
GLUCOSE BLDC GLUCOMTR-MCNC: 135 MG/DL (ref 70–139)
GLUCOSE BLDC GLUCOMTR-MCNC: 141 MG/DL (ref 70–139)
HCO3, ARTERIAL CALC - HISTORICAL: 29.5 MMOL/L (ref 23–29)
HCO3, ARTERIAL CALC - HISTORICAL: 29.9 MMOL/L (ref 23–29)
HCT VFR BLD AUTO: 36.2 % (ref 40–54)
HGB BLD-MCNC: 11.2 G/DL (ref 14–18)
MAGNESIUM SERPL-MCNC: 2 MG/DL (ref 1.8–2.6)
MCH RBC QN AUTO: 29.2 PG (ref 27–34)
MCHC RBC AUTO-ENTMCNC: 30.9 G/DL (ref 32–36)
MCV RBC AUTO: 94 FL (ref 80–100)
O2/TOTAL GAS SETTING VFR VENT: ABNORMAL %
O2/TOTAL GAS SETTING VFR VENT: ABNORMAL %
OXYHEMOGLOBIN - HISTORICAL: 95 % (ref 96–97)
OXYHEMOGLOBIN - HISTORICAL: 95.2 % (ref 96–97)
PCO2 BLD: 50 MM HG (ref 35–45)
PCO2 BLD: 50 MM HG (ref 35–45)
PH BLD: 7.41 [PH] (ref 7.37–7.44)
PH BLD: 7.41 [PH] (ref 7.37–7.44)
PHOSPHATE SERPL-MCNC: 3.3 MG/DL (ref 2.5–4.5)
PLATELET # BLD AUTO: 163 THOU/UL (ref 140–440)
PMV BLD AUTO: 12.1 FL (ref 8.5–12.5)
PO2 BLD: 88 MM HG (ref 80–90)
PO2 BLD: 94 MM HG (ref 80–90)
POTASSIUM BLD-SCNC: 4.4 MMOL/L (ref 3.5–5)
RBC # BLD AUTO: 3.84 MILL/UL (ref 4.4–6.2)
SARS-COV-2 PCR COMMENT: ABNORMAL
SARS-COV-2 RNA SPEC QL NAA+PROBE: POSITIVE
SARS-COV-2 VIRUS SPECIMEN SOURCE: ABNORMAL
SODIUM SERPL-SCNC: 140 MMOL/L (ref 136–145)
TEMPERATURE: 37 DEGREES C
TEMPERATURE: 37 DEGREES C
WBC: 6.9 THOU/UL (ref 4–11)

## 2020-07-17 ASSESSMENT — MIFFLIN-ST. JEOR: SCORE: 1959.36

## 2020-07-18 LAB
ANION GAP SERPL CALCULATED.3IONS-SCNC: 7 MMOL/L (ref 5–18)
BACTERIA SPEC CULT: ABNORMAL
BACTERIA SPEC CULT: ABNORMAL
BASE EXCESS BLDA CALC-SCNC: 4.6 MMOL/L
BASE EXCESS BLDA CALC-SCNC: 4.9 MMOL/L
BUN SERPL-MCNC: 13 MG/DL (ref 8–22)
CALCIUM SERPL-MCNC: 8 MG/DL (ref 8.5–10.5)
CHLORIDE BLD-SCNC: 104 MMOL/L (ref 98–107)
CO2 SERPL-SCNC: 27 MMOL/L (ref 22–31)
COHGB MFR BLD: 98.1 % (ref 96–97)
COHGB MFR BLD: 98.4 % (ref 96–97)
CREAT SERPL-MCNC: 0.48 MG/DL (ref 0.7–1.3)
ERYTHROCYTE [DISTWIDTH] IN BLOOD BY AUTOMATED COUNT: 15.9 % (ref 11–14.5)
GFR SERPL CREATININE-BSD FRML MDRD: >60 ML/MIN/1.73M2
GLUCOSE BLD-MCNC: 154 MG/DL (ref 70–125)
GLUCOSE BLDC GLUCOMTR-MCNC: 121 MG/DL (ref 70–139)
GLUCOSE BLDC GLUCOMTR-MCNC: 132 MG/DL (ref 70–139)
GLUCOSE BLDC GLUCOMTR-MCNC: 132 MG/DL (ref 70–139)
GLUCOSE BLDC GLUCOMTR-MCNC: 142 MG/DL (ref 70–139)
GRAM STAIN RESULT: ABNORMAL
HBA1C MFR BLD: 6.5 %
HCO3, ARTERIAL CALC - HISTORICAL: 28.4 MMOL/L (ref 23–29)
HCO3, ARTERIAL CALC - HISTORICAL: 28.5 MMOL/L (ref 23–29)
HCT VFR BLD AUTO: 35.6 % (ref 40–54)
HGB BLD-MCNC: 11.1 G/DL (ref 14–18)
MAGNESIUM SERPL-MCNC: 1.8 MG/DL (ref 1.8–2.6)
MCH RBC QN AUTO: 29.2 PG (ref 27–34)
MCHC RBC AUTO-ENTMCNC: 31.2 G/DL (ref 32–36)
MCV RBC AUTO: 94 FL (ref 80–100)
O2/TOTAL GAS SETTING VFR VENT: ABNORMAL %
O2/TOTAL GAS SETTING VFR VENT: ABNORMAL %
OXYHEMOGLOBIN - HISTORICAL: 95.9 % (ref 96–97)
OXYHEMOGLOBIN - HISTORICAL: 96.2 % (ref 96–97)
PCO2 BLD: 42 MM HG (ref 35–45)
PCO2 BLD: 46 MM HG (ref 35–45)
PH BLD: 7.42 [PH] (ref 7.37–7.44)
PH BLD: 7.45 [PH] (ref 7.37–7.44)
PHOSPHATE SERPL-MCNC: 2.8 MG/DL (ref 2.5–4.5)
PLATELET # BLD AUTO: 174 THOU/UL (ref 140–440)
PMV BLD AUTO: 12.9 FL (ref 8.5–12.5)
PO2 BLD: 100 MM HG (ref 80–90)
PO2 BLD: 92 MM HG (ref 80–90)
POTASSIUM BLD-SCNC: 3.7 MMOL/L (ref 3.5–5)
RBC # BLD AUTO: 3.8 MILL/UL (ref 4.4–6.2)
SODIUM SERPL-SCNC: 138 MMOL/L (ref 136–145)
TEMPERATURE: 37 DEGREES C
TEMPERATURE: 37 DEGREES C
WBC: 6.9 THOU/UL (ref 4–11)

## 2020-07-18 ASSESSMENT — MIFFLIN-ST. JEOR: SCORE: 1961.88

## 2020-07-19 ENCOUNTER — TRANSFERRED RECORDS (OUTPATIENT)
Dept: HEALTH INFORMATION MANAGEMENT | Facility: CLINIC | Age: 55
End: 2020-07-19

## 2020-07-19 LAB
ANION GAP SERPL CALCULATED.3IONS-SCNC: 8 MMOL/L (ref 5–18)
BASE EXCESS BLDA CALC-SCNC: 5.5 MMOL/L
BUN SERPL-MCNC: 8 MG/DL (ref 8–22)
CALCIUM SERPL-MCNC: 8.2 MG/DL (ref 8.5–10.5)
CHLORIDE BLD-SCNC: 103 MMOL/L (ref 98–107)
CO2 SERPL-SCNC: 26 MMOL/L (ref 22–31)
COHGB MFR BLD: 95.9 % (ref 96–97)
CREAT SERPL-MCNC: 0.49 MG/DL (ref 0.7–1.3)
ERYTHROCYTE [DISTWIDTH] IN BLOOD BY AUTOMATED COUNT: 16 % (ref 11–14.5)
GFR SERPL CREATININE-BSD FRML MDRD: >60 ML/MIN/1.73M2
GLUCOSE BLD-MCNC: 151 MG/DL (ref 70–125)
GLUCOSE BLDC GLUCOMTR-MCNC: 130 MG/DL (ref 70–139)
GLUCOSE BLDC GLUCOMTR-MCNC: 148 MG/DL (ref 70–139)
HCO3, ARTERIAL CALC - HISTORICAL: 29.1 MMOL/L (ref 23–29)
HCT VFR BLD AUTO: 35.1 % (ref 40–54)
HGB BLD-MCNC: 11.1 G/DL (ref 14–18)
MAGNESIUM SERPL-MCNC: 1.8 MG/DL (ref 1.8–2.6)
MCH RBC QN AUTO: 29.1 PG (ref 27–34)
MCHC RBC AUTO-ENTMCNC: 31.6 G/DL (ref 32–36)
MCV RBC AUTO: 92 FL (ref 80–100)
O2/TOTAL GAS SETTING VFR VENT: ABNORMAL %
OXYHEMOGLOBIN - HISTORICAL: 92.9 % (ref 96–97)
PCO2 BLD: 37 MM HG (ref 35–45)
PH BLD: 7.5 [PH] (ref 7.37–7.44)
PHOSPHATE SERPL-MCNC: 3.1 MG/DL (ref 2.5–4.5)
PLATELET # BLD AUTO: 209 THOU/UL (ref 140–440)
PMV BLD AUTO: 12.2 FL (ref 8.5–12.5)
PO2 BLD: 71 MM HG (ref 80–90)
POTASSIUM BLD-SCNC: 4 MMOL/L (ref 3.5–5)
RBC # BLD AUTO: 3.81 MILL/UL (ref 4.4–6.2)
SODIUM SERPL-SCNC: 137 MMOL/L (ref 136–145)
TEMPERATURE: 37 DEGREES C
WBC: 8.2 THOU/UL (ref 4–11)

## 2020-07-19 ASSESSMENT — MIFFLIN-ST. JEOR: SCORE: 1965.25

## 2020-07-20 LAB
AEROBIC BLOOD CULTURE BOTTLE: NO GROWTH
AEROBIC BLOOD CULTURE BOTTLE: NO GROWTH
ANAEROBIC BLOOD CULTURE BOTTLE: NO GROWTH
ANAEROBIC BLOOD CULTURE BOTTLE: NO GROWTH
ANION GAP SERPL CALCULATED.3IONS-SCNC: 7 MMOL/L (ref 5–18)
BASE EXCESS BLDA CALC-SCNC: 2.7 MMOL/L
BUN SERPL-MCNC: 11 MG/DL (ref 8–22)
CALCIUM SERPL-MCNC: 8 MG/DL (ref 8.5–10.5)
CHLORIDE BLD-SCNC: 105 MMOL/L (ref 98–107)
CO2 SERPL-SCNC: 25 MMOL/L (ref 22–31)
COHGB MFR BLD: 96.9 % (ref 96–97)
CREAT SERPL-MCNC: 0.46 MG/DL (ref 0.7–1.3)
ERYTHROCYTE [DISTWIDTH] IN BLOOD BY AUTOMATED COUNT: 15.9 % (ref 11–14.5)
GFR SERPL CREATININE-BSD FRML MDRD: >60 ML/MIN/1.73M2
GLUCOSE BLD-MCNC: 142 MG/DL (ref 70–125)
GLUCOSE BLDC GLUCOMTR-MCNC: 116 MG/DL (ref 70–139)
GLUCOSE BLDC GLUCOMTR-MCNC: 125 MG/DL (ref 70–139)
GLUCOSE BLDC GLUCOMTR-MCNC: 125 MG/DL (ref 70–139)
GLUCOSE BLDC GLUCOMTR-MCNC: 135 MG/DL (ref 70–139)
GLUCOSE BLDC GLUCOMTR-MCNC: 153 MG/DL (ref 70–139)
HCO3, ARTERIAL CALC - HISTORICAL: 27 MMOL/L (ref 23–29)
HCT VFR BLD AUTO: 33.3 % (ref 40–54)
HGB BLD-MCNC: 10.7 G/DL (ref 14–18)
MAGNESIUM SERPL-MCNC: 1.8 MG/DL (ref 1.8–2.6)
MCH RBC QN AUTO: 29.7 PG (ref 27–34)
MCHC RBC AUTO-ENTMCNC: 32.1 G/DL (ref 32–36)
MCV RBC AUTO: 93 FL (ref 80–100)
O2/TOTAL GAS SETTING VFR VENT: ABNORMAL %
OXYHEMOGLOBIN - HISTORICAL: 94 % (ref 96–97)
PCO2 BLD: 35 MM HG (ref 35–45)
PH BLD: 7.48 [PH] (ref 7.37–7.44)
PHOSPHATE SERPL-MCNC: 3.2 MG/DL (ref 2.5–4.5)
PLATELET # BLD AUTO: 229 THOU/UL (ref 140–440)
PMV BLD AUTO: 11.5 FL (ref 8.5–12.5)
PO2 BLD: 80 MM HG (ref 80–90)
POTASSIUM BLD-SCNC: 3.7 MMOL/L (ref 3.5–5)
PROCALCITONIN SERPL-MCNC: 0.08 NG/ML (ref 0–0.49)
RBC # BLD AUTO: 3.6 MILL/UL (ref 4.4–6.2)
SODIUM SERPL-SCNC: 137 MMOL/L (ref 136–145)
TEMPERATURE: 37 DEGREES C
WBC: 8 THOU/UL (ref 4–11)

## 2020-07-20 ASSESSMENT — MIFFLIN-ST. JEOR: SCORE: 1978.26

## 2020-07-21 LAB
ANION GAP SERPL CALCULATED.3IONS-SCNC: 10 MMOL/L (ref 5–18)
BASE EXCESS BLDA CALC-SCNC: 1.1 MMOL/L
BUN SERPL-MCNC: 10 MG/DL (ref 8–22)
CALCIUM SERPL-MCNC: 8.3 MG/DL (ref 8.5–10.5)
CHLORIDE BLD-SCNC: 103 MMOL/L (ref 98–107)
CO2 SERPL-SCNC: 23 MMOL/L (ref 22–31)
COHGB MFR BLD: 97.5 % (ref 96–97)
CREAT SERPL-MCNC: 0.52 MG/DL (ref 0.7–1.3)
ERYTHROCYTE [DISTWIDTH] IN BLOOD BY AUTOMATED COUNT: 15.9 % (ref 11–14.5)
GFR SERPL CREATININE-BSD FRML MDRD: >60 ML/MIN/1.73M2
GLUCOSE BLD-MCNC: 135 MG/DL (ref 70–125)
GLUCOSE BLDC GLUCOMTR-MCNC: 116 MG/DL (ref 70–139)
GLUCOSE BLDC GLUCOMTR-MCNC: 130 MG/DL (ref 70–139)
GLUCOSE BLDC GLUCOMTR-MCNC: 143 MG/DL (ref 70–139)
GLUCOSE BLDC GLUCOMTR-MCNC: 143 MG/DL (ref 70–139)
HCO3, ARTERIAL CALC - HISTORICAL: 25.7 MMOL/L (ref 23–29)
HCT VFR BLD AUTO: 33 % (ref 40–54)
HGB BLD-MCNC: 10.5 G/DL (ref 14–18)
MAGNESIUM SERPL-MCNC: 1.8 MG/DL (ref 1.8–2.6)
MCH RBC QN AUTO: 29.2 PG (ref 27–34)
MCHC RBC AUTO-ENTMCNC: 31.8 G/DL (ref 32–36)
MCV RBC AUTO: 92 FL (ref 80–100)
MYCO F BLOOD CULTURE BOTTLE - HISTORICAL: NO GROWTH
O2/TOTAL GAS SETTING VFR VENT: 40 %
OXYHEMOGLOBIN - HISTORICAL: 94.7 % (ref 96–97)
PCO2 BLD: 37 MM HG (ref 35–45)
PEEP: 8 CM H2O
PH BLD: 7.44 [PH] (ref 7.37–7.44)
PHOSPHATE SERPL-MCNC: 3.9 MG/DL (ref 2.5–4.5)
PLATELET # BLD AUTO: 250 THOU/UL (ref 140–440)
PMV BLD AUTO: 12.1 FL (ref 8.5–12.5)
PO2 BLD: 82 MM HG (ref 80–90)
POTASSIUM BLD-SCNC: 4 MMOL/L (ref 3.5–5)
RATE: 16 RR/MIN
RBC # BLD AUTO: 3.6 MILL/UL (ref 4.4–6.2)
SODIUM SERPL-SCNC: 136 MMOL/L (ref 136–145)
TEMPERATURE: 37 DEGREES C
VENTILATION MODE: AC
VENTILATOR TIDAL VOLUME: 450 ML
WBC: 15.3 THOU/UL (ref 4–11)

## 2020-07-21 ASSESSMENT — MIFFLIN-ST. JEOR: SCORE: 1906.88

## 2020-07-22 LAB
ANION GAP SERPL CALCULATED.3IONS-SCNC: 11 MMOL/L (ref 5–18)
BACTERIA SPEC CULT: ABNORMAL
BASE EXCESS BLDA CALC-SCNC: 1.8 MMOL/L
BASOPHILS # BLD AUTO: 0.1 THOU/UL (ref 0–0.2)
BASOPHILS NFR BLD AUTO: 1 % (ref 0–2)
BUN SERPL-MCNC: 12 MG/DL (ref 8–22)
CALCIUM SERPL-MCNC: 8.6 MG/DL (ref 8.5–10.5)
CHLORIDE BLD-SCNC: 105 MMOL/L (ref 98–107)
CO2 SERPL-SCNC: 23 MMOL/L (ref 22–31)
COHGB MFR BLD: 97.6 % (ref 96–97)
CREAT SERPL-MCNC: 0.55 MG/DL (ref 0.7–1.3)
EOSINOPHIL # BLD AUTO: 0.6 THOU/UL (ref 0–0.4)
EOSINOPHIL NFR BLD AUTO: 5 % (ref 0–6)
ERYTHROCYTE [DISTWIDTH] IN BLOOD BY AUTOMATED COUNT: 16.1 % (ref 11–14.5)
GFR SERPL CREATININE-BSD FRML MDRD: >60 ML/MIN/1.73M2
GLUCOSE BLD-MCNC: 132 MG/DL (ref 70–125)
GLUCOSE BLDC GLUCOMTR-MCNC: 119 MG/DL (ref 70–139)
GLUCOSE BLDC GLUCOMTR-MCNC: 141 MG/DL (ref 70–139)
GLUCOSE BLDC GLUCOMTR-MCNC: 152 MG/DL (ref 70–139)
HCO3, ARTERIAL CALC - HISTORICAL: 26.3 MMOL/L (ref 23–29)
HCT VFR BLD AUTO: 32 % (ref 40–54)
HGB BLD-MCNC: 10.1 G/DL (ref 14–18)
LYMPHOCYTES # BLD AUTO: 1.3 THOU/UL (ref 0.8–4.4)
LYMPHOCYTES NFR BLD AUTO: 12 % (ref 20–40)
MAGNESIUM SERPL-MCNC: 2.1 MG/DL (ref 1.8–2.6)
MCH RBC QN AUTO: 29.5 PG (ref 27–34)
MCHC RBC AUTO-ENTMCNC: 31.6 G/DL (ref 32–36)
MCV RBC AUTO: 94 FL (ref 80–100)
MONOCYTES # BLD AUTO: 0.7 THOU/UL (ref 0–0.9)
MONOCYTES NFR BLD AUTO: 7 % (ref 2–10)
NEUTROPHILS # BLD AUTO: 7.7 THOU/UL (ref 2–7.7)
NEUTROPHILS NFR BLD AUTO: 74 % (ref 50–70)
O2/TOTAL GAS SETTING VFR VENT: ABNORMAL %
OXYHEMOGLOBIN - HISTORICAL: 95 % (ref 96–97)
PCO2 BLD: 45 MM HG (ref 35–45)
PH BLD: 7.39 [PH] (ref 7.37–7.44)
PHOSPHATE SERPL-MCNC: 4.7 MG/DL (ref 2.5–4.5)
PLATELET # BLD AUTO: 270 THOU/UL (ref 140–440)
PMV BLD AUTO: 12.6 FL (ref 8.5–12.5)
PO2 BLD: 87 MM HG (ref 80–90)
POTASSIUM BLD-SCNC: 3.9 MMOL/L (ref 3.5–5)
RBC # BLD AUTO: 3.42 MILL/UL (ref 4.4–6.2)
SODIUM SERPL-SCNC: 139 MMOL/L (ref 136–145)
TEMPERATURE: 37 DEGREES C
WBC: 10.4 THOU/UL (ref 4–11)

## 2020-07-22 ASSESSMENT — MIFFLIN-ST. JEOR: SCORE: 1904.02

## 2020-07-23 LAB
ANION GAP SERPL CALCULATED.3IONS-SCNC: 8 MMOL/L (ref 5–18)
BACTERIA SPEC CULT: ABNORMAL
BACTERIA SPEC CULT: ABNORMAL
BASE EXCESS BLDA CALC-SCNC: 3.8 MMOL/L
BASOPHILS # BLD AUTO: 0 THOU/UL (ref 0–0.2)
BASOPHILS NFR BLD AUTO: 0 % (ref 0–2)
BUN SERPL-MCNC: 15 MG/DL (ref 8–22)
CALCIUM SERPL-MCNC: 8.3 MG/DL (ref 8.5–10.5)
CHLORIDE BLD-SCNC: 108 MMOL/L (ref 98–107)
CO2 SERPL-SCNC: 26 MMOL/L (ref 22–31)
COHGB MFR BLD: 97.1 % (ref 96–97)
CREAT SERPL-MCNC: 0.51 MG/DL (ref 0.7–1.3)
EOSINOPHIL # BLD AUTO: 0.2 THOU/UL (ref 0–0.4)
EOSINOPHIL NFR BLD AUTO: 2 % (ref 0–6)
ERYTHROCYTE [DISTWIDTH] IN BLOOD BY AUTOMATED COUNT: 15.9 % (ref 11–14.5)
GFR SERPL CREATININE-BSD FRML MDRD: >60 ML/MIN/1.73M2
GLUCOSE BLD-MCNC: 120 MG/DL (ref 70–125)
GLUCOSE BLDC GLUCOMTR-MCNC: 130 MG/DL (ref 70–139)
GLUCOSE BLDC GLUCOMTR-MCNC: 136 MG/DL (ref 70–139)
GLUCOSE BLDC GLUCOMTR-MCNC: 137 MG/DL (ref 70–139)
GRAM STAIN RESULT: ABNORMAL
HCO3, ARTERIAL CALC - HISTORICAL: 27.8 MMOL/L (ref 23–29)
HCT VFR BLD AUTO: 29.3 % (ref 40–54)
HGB BLD-MCNC: 9.4 G/DL (ref 14–18)
LYMPHOCYTES # BLD AUTO: 1.2 THOU/UL (ref 0.8–4.4)
LYMPHOCYTES NFR BLD AUTO: 10 % (ref 20–40)
MAGNESIUM SERPL-MCNC: 1.8 MG/DL (ref 1.8–2.6)
MCH RBC QN AUTO: 29.7 PG (ref 27–34)
MCHC RBC AUTO-ENTMCNC: 32.1 G/DL (ref 32–36)
MCV RBC AUTO: 92 FL (ref 80–100)
MONOCYTES # BLD AUTO: 0.7 THOU/UL (ref 0–0.9)
MONOCYTES NFR BLD AUTO: 5 % (ref 2–10)
NEUTROPHILS # BLD AUTO: 9.9 THOU/UL (ref 2–7.7)
NEUTROPHILS NFR BLD AUTO: 81 % (ref 50–70)
O2/TOTAL GAS SETTING VFR VENT: ABNORMAL %
OXYHEMOGLOBIN - HISTORICAL: 93.8 % (ref 96–97)
PCO2 BLD: 38 MM HG (ref 35–45)
PH BLD: 7.47 [PH] (ref 7.37–7.44)
PHOSPHATE SERPL-MCNC: 3.5 MG/DL (ref 2.5–4.5)
PLATELET # BLD AUTO: 275 THOU/UL (ref 140–440)
PMV BLD AUTO: 11.1 FL (ref 8.5–12.5)
PO2 BLD: 77 MM HG (ref 80–90)
POTASSIUM BLD-SCNC: 3.6 MMOL/L (ref 3.5–5)
RBC # BLD AUTO: 3.17 MILL/UL (ref 4.4–6.2)
SODIUM SERPL-SCNC: 142 MMOL/L (ref 136–145)
TEMPERATURE: 37 DEGREES C
WBC: 12.1 THOU/UL (ref 4–11)

## 2020-07-23 ASSESSMENT — MIFFLIN-ST. JEOR: SCORE: 1954.88

## 2020-07-24 LAB
ANION GAP SERPL CALCULATED.3IONS-SCNC: 9 MMOL/L (ref 5–18)
BASE EXCESS BLDA CALC-SCNC: 3.1 MMOL/L
BASOPHILS # BLD AUTO: 0.1 THOU/UL (ref 0–0.2)
BASOPHILS NFR BLD AUTO: 1 % (ref 0–2)
BUN SERPL-MCNC: 14 MG/DL (ref 8–22)
CALCIUM SERPL-MCNC: 8.3 MG/DL (ref 8.5–10.5)
CHLORIDE BLD-SCNC: 105 MMOL/L (ref 98–107)
CO2 SERPL-SCNC: 25 MMOL/L (ref 22–31)
COHGB MFR BLD: 96 % (ref 96–97)
CREAT SERPL-MCNC: 0.52 MG/DL (ref 0.7–1.3)
EOSINOPHIL # BLD AUTO: 0.4 THOU/UL (ref 0–0.4)
EOSINOPHIL NFR BLD AUTO: 5 % (ref 0–6)
ERYTHROCYTE [DISTWIDTH] IN BLOOD BY AUTOMATED COUNT: 15.9 % (ref 11–14.5)
GFR SERPL CREATININE-BSD FRML MDRD: >60 ML/MIN/1.73M2
GLUCOSE BLD-MCNC: 125 MG/DL (ref 70–125)
GLUCOSE BLDC GLUCOMTR-MCNC: 103 MG/DL (ref 70–139)
GLUCOSE BLDC GLUCOMTR-MCNC: 131 MG/DL (ref 70–139)
GLUCOSE BLDC GLUCOMTR-MCNC: 146 MG/DL (ref 70–139)
GLUCOSE BLDC GLUCOMTR-MCNC: 147 MG/DL (ref 70–139)
GLUCOSE BLDC GLUCOMTR-MCNC: 161 MG/DL (ref 70–139)
HCO3, ARTERIAL CALC - HISTORICAL: 27 MMOL/L (ref 23–29)
HCT VFR BLD AUTO: 31.1 % (ref 40–54)
HGB BLD-MCNC: 9.7 G/DL (ref 14–18)
LYMPHOCYTES # BLD AUTO: 1.6 THOU/UL (ref 0.8–4.4)
LYMPHOCYTES NFR BLD AUTO: 19 % (ref 20–40)
MAGNESIUM SERPL-MCNC: 1.8 MG/DL (ref 1.8–2.6)
MCH RBC QN AUTO: 28.9 PG (ref 27–34)
MCHC RBC AUTO-ENTMCNC: 31.2 G/DL (ref 32–36)
MCV RBC AUTO: 93 FL (ref 80–100)
MONOCYTES # BLD AUTO: 0.7 THOU/UL (ref 0–0.9)
MONOCYTES NFR BLD AUTO: 8 % (ref 2–10)
NEUTROPHILS # BLD AUTO: 5.5 THOU/UL (ref 2–7.7)
NEUTROPHILS NFR BLD AUTO: 66 % (ref 50–70)
O2/TOTAL GAS SETTING VFR VENT: ABNORMAL %
OXYHEMOGLOBIN - HISTORICAL: 93.8 % (ref 96–97)
PCO2 BLD: 39 MM HG (ref 35–45)
PH BLD: 7.46 [PH] (ref 7.37–7.44)
PHOSPHATE SERPL-MCNC: 4.1 MG/DL (ref 2.5–4.5)
PLATELET # BLD AUTO: 293 THOU/UL (ref 140–440)
PMV BLD AUTO: 11 FL (ref 8.5–12.5)
PO2 BLD: 76 MM HG (ref 80–90)
POTASSIUM BLD-SCNC: 3.6 MMOL/L (ref 3.5–5)
RBC # BLD AUTO: 3.36 MILL/UL (ref 4.4–6.2)
SODIUM SERPL-SCNC: 139 MMOL/L (ref 136–145)
TEMPERATURE: 37 DEGREES C
WBC: 8.3 THOU/UL (ref 4–11)

## 2020-07-24 ASSESSMENT — MIFFLIN-ST. JEOR
SCORE: 1891.88
SCORE: 1926.88

## 2020-07-25 LAB
AEROBIC BLOOD CULTURE BOTTLE: NO GROWTH
AEROBIC BLOOD CULTURE BOTTLE: NO GROWTH
ANAEROBIC BLOOD CULTURE BOTTLE: NO GROWTH
ANAEROBIC BLOOD CULTURE BOTTLE: NO GROWTH
ANION GAP SERPL CALCULATED.3IONS-SCNC: 9 MMOL/L (ref 5–18)
BASE EXCESS BLDA CALC-SCNC: 3.1 MMOL/L
BASOPHILS # BLD AUTO: 0 THOU/UL (ref 0–0.2)
BASOPHILS NFR BLD AUTO: 0 % (ref 0–2)
BUN SERPL-MCNC: 13 MG/DL (ref 8–22)
CALCIUM SERPL-MCNC: 8.2 MG/DL (ref 8.5–10.5)
CHLORIDE BLD-SCNC: 103 MMOL/L (ref 98–107)
CO2 SERPL-SCNC: 25 MMOL/L (ref 22–31)
COHGB MFR BLD: 94 % (ref 96–97)
CREAT SERPL-MCNC: 0.53 MG/DL (ref 0.7–1.3)
EOSINOPHIL # BLD AUTO: 0.1 THOU/UL (ref 0–0.4)
EOSINOPHIL NFR BLD AUTO: 1 % (ref 0–6)
ERYTHROCYTE [DISTWIDTH] IN BLOOD BY AUTOMATED COUNT: 15.9 % (ref 11–14.5)
GFR SERPL CREATININE-BSD FRML MDRD: >60 ML/MIN/1.73M2
GLUCOSE BLD-MCNC: 136 MG/DL (ref 70–125)
GLUCOSE BLDC GLUCOMTR-MCNC: 131 MG/DL (ref 70–139)
GLUCOSE BLDC GLUCOMTR-MCNC: 132 MG/DL (ref 70–139)
GLUCOSE BLDC GLUCOMTR-MCNC: 146 MG/DL (ref 70–139)
GLUCOSE BLDC GLUCOMTR-MCNC: 148 MG/DL (ref 70–139)
HCO3, ARTERIAL CALC - HISTORICAL: 27.3 MMOL/L (ref 23–29)
HCT VFR BLD AUTO: 31.8 % (ref 40–54)
HGB BLD-MCNC: 10.4 G/DL (ref 14–18)
LYMPHOCYTES # BLD AUTO: 1.2 THOU/UL (ref 0.8–4.4)
LYMPHOCYTES NFR BLD AUTO: 12 % (ref 20–40)
MAGNESIUM SERPL-MCNC: 1.8 MG/DL (ref 1.8–2.6)
MCH RBC QN AUTO: 29.6 PG (ref 27–34)
MCHC RBC AUTO-ENTMCNC: 32.7 G/DL (ref 32–36)
MCV RBC AUTO: 91 FL (ref 80–100)
MONOCYTES # BLD AUTO: 0.6 THOU/UL (ref 0–0.9)
MONOCYTES NFR BLD AUTO: 7 % (ref 2–10)
NEUTROPHILS # BLD AUTO: 7.6 THOU/UL (ref 2–7.7)
NEUTROPHILS NFR BLD AUTO: 80 % (ref 50–70)
O2/TOTAL GAS SETTING VFR VENT: 35 %
OXYHEMOGLOBIN - HISTORICAL: 92.2 % (ref 96–97)
PCO2 BLD: 34 MM HG (ref 35–45)
PEEP: 5 CM H2O
PH BLD: 7.5 [PH] (ref 7.37–7.44)
PHOSPHATE SERPL-MCNC: 2.9 MG/DL (ref 2.5–4.5)
PLATELET # BLD AUTO: 331 THOU/UL (ref 140–440)
PMV BLD AUTO: 10.9 FL (ref 8.5–12.5)
PO2 BLD: 65 MM HG (ref 80–90)
POTASSIUM BLD-SCNC: 3.2 MMOL/L (ref 3.5–5)
RATE: 16 RR/MIN
RBC # BLD AUTO: 3.51 MILL/UL (ref 4.4–6.2)
SODIUM SERPL-SCNC: 137 MMOL/L (ref 136–145)
TEMPERATURE: 37 DEGREES C
VENTILATOR TIDAL VOLUME: 450 ML
WBC: 9.5 THOU/UL (ref 4–11)

## 2020-07-26 LAB
ANION GAP SERPL CALCULATED.3IONS-SCNC: 9 MMOL/L (ref 5–18)
BASE EXCESS BLDA CALC-SCNC: 0.1 MMOL/L
BUN SERPL-MCNC: 18 MG/DL (ref 8–22)
CALCIUM SERPL-MCNC: 7.6 MG/DL (ref 8.5–10.5)
CHLORIDE BLD-SCNC: 108 MMOL/L (ref 98–107)
CO2 SERPL-SCNC: 21 MMOL/L (ref 22–31)
COHGB MFR BLD: 98.7 % (ref 96–97)
CREAT SERPL-MCNC: 0.5 MG/DL (ref 0.7–1.3)
ERYTHROCYTE [DISTWIDTH] IN BLOOD BY AUTOMATED COUNT: 16.1 % (ref 11–14.5)
GFR SERPL CREATININE-BSD FRML MDRD: >60 ML/MIN/1.73M2
GLUCOSE BLD-MCNC: 110 MG/DL (ref 70–125)
GLUCOSE BLDC GLUCOMTR-MCNC: 115 MG/DL (ref 70–139)
GLUCOSE BLDC GLUCOMTR-MCNC: 133 MG/DL (ref 70–139)
GLUCOSE BLDC GLUCOMTR-MCNC: 141 MG/DL (ref 70–139)
HCO3, ARTERIAL CALC - HISTORICAL: 25 MMOL/L (ref 23–29)
HCT VFR BLD AUTO: 32.2 % (ref 40–54)
HGB BLD-MCNC: 10.2 G/DL (ref 14–18)
MAGNESIUM SERPL-MCNC: 1.7 MG/DL (ref 1.8–2.6)
MCH RBC QN AUTO: 29.6 PG (ref 27–34)
MCHC RBC AUTO-ENTMCNC: 31.7 G/DL (ref 32–36)
MCV RBC AUTO: 93 FL (ref 80–100)
O2/TOTAL GAS SETTING VFR VENT: ABNORMAL %
OXYHEMOGLOBIN - HISTORICAL: 96.4 % (ref 96–97)
PCO2 BLD: 32 MM HG (ref 35–45)
PH BLD: 7.47 [PH] (ref 7.37–7.44)
PHOSPHATE SERPL-MCNC: 3 MG/DL (ref 2.5–4.5)
PLATELET # BLD AUTO: 358 THOU/UL (ref 140–440)
PMV BLD AUTO: 11.5 FL (ref 8.5–12.5)
PO2 BLD: 109 MM HG (ref 80–90)
POTASSIUM BLD-SCNC: 3.4 MMOL/L (ref 3.5–5)
RBC # BLD AUTO: 3.45 MILL/UL (ref 4.4–6.2)
SODIUM SERPL-SCNC: 138 MMOL/L (ref 136–145)
TEMPERATURE: 37 DEGREES C
VENTILATION MODE: ABNORMAL
WBC: 9.7 THOU/UL (ref 4–11)

## 2020-07-27 LAB
ANION GAP SERPL CALCULATED.3IONS-SCNC: 6 MMOL/L (ref 5–18)
BASE EXCESS BLDA CALC-SCNC: -0.5 MMOL/L
BASE EXCESS BLDA CALC-SCNC: 0.5 MMOL/L
BUN SERPL-MCNC: 18 MG/DL (ref 8–22)
CALCIUM SERPL-MCNC: 8.5 MG/DL (ref 8.5–10.5)
CHLORIDE BLD-SCNC: 106 MMOL/L (ref 98–107)
CO2 SERPL-SCNC: 25 MMOL/L (ref 22–31)
COHGB MFR BLD: 94.5 % (ref 96–97)
COHGB MFR BLD: 98.2 % (ref 96–97)
CREAT SERPL-MCNC: 0.57 MG/DL (ref 0.7–1.3)
ERYTHROCYTE [DISTWIDTH] IN BLOOD BY AUTOMATED COUNT: 16 % (ref 11–14.5)
FLOW: 4 LPM
FLOW: 40 LPM
GFR SERPL CREATININE-BSD FRML MDRD: >60 ML/MIN/1.73M2
GLUCOSE BLD-MCNC: 123 MG/DL (ref 70–125)
GLUCOSE BLDC GLUCOMTR-MCNC: 118 MG/DL (ref 70–139)
GLUCOSE BLDC GLUCOMTR-MCNC: 126 MG/DL (ref 70–139)
GLUCOSE BLDC GLUCOMTR-MCNC: 130 MG/DL (ref 70–139)
GLUCOSE BLDC GLUCOMTR-MCNC: 131 MG/DL (ref 70–139)
HCO3, ARTERIAL CALC - HISTORICAL: 24.4 MMOL/L (ref 23–29)
HCO3, ARTERIAL CALC - HISTORICAL: 25.3 MMOL/L (ref 23–29)
HCT VFR BLD AUTO: 33.2 % (ref 40–54)
HGB BLD-MCNC: 10.3 G/DL (ref 14–18)
MAGNESIUM SERPL-MCNC: 2 MG/DL (ref 1.8–2.6)
MCH RBC QN AUTO: 29 PG (ref 27–34)
MCHC RBC AUTO-ENTMCNC: 31 G/DL (ref 32–36)
MCV RBC AUTO: 94 FL (ref 80–100)
OXYHEMOGLOBIN - HISTORICAL: 91.5 % (ref 96–97)
OXYHEMOGLOBIN - HISTORICAL: 95.1 % (ref 96–97)
PCO2 BLD: 33 MM HG (ref 35–45)
PCO2 BLD: 36 MM HG (ref 35–45)
PH BLD: 7.44 [PH] (ref 7.37–7.44)
PH BLD: 7.45 [PH] (ref 7.37–7.44)
PHOSPHATE SERPL-MCNC: 3.2 MG/DL (ref 2.5–4.5)
PLATELET # BLD AUTO: 339 THOU/UL (ref 140–440)
PMV BLD AUTO: 11.4 FL (ref 8.5–12.5)
PO2 BLD: 64 MM HG (ref 80–90)
PO2 BLD: 86 MM HG (ref 80–90)
POTASSIUM BLD-SCNC: 3.8 MMOL/L (ref 3.5–5)
RBC # BLD AUTO: 3.55 MILL/UL (ref 4.4–6.2)
SODIUM SERPL-SCNC: 137 MMOL/L (ref 136–145)
TEMPERATURE: 37 DEGREES C
TEMPERATURE: 37 DEGREES C
VENTILATION MODE: ABNORMAL
VENTILATION MODE: ABNORMAL
WBC: 7.2 THOU/UL (ref 4–11)

## 2020-07-27 ASSESSMENT — MIFFLIN-ST. JEOR: SCORE: 1885.88

## 2020-07-28 LAB
ANION GAP SERPL CALCULATED.3IONS-SCNC: 12 MMOL/L (ref 5–18)
BASE EXCESS BLDA CALC-SCNC: 0.7 MMOL/L
BUN SERPL-MCNC: 18 MG/DL (ref 8–22)
CALCIUM SERPL-MCNC: 9.1 MG/DL (ref 8.5–10.5)
CHLORIDE BLD-SCNC: 106 MMOL/L (ref 98–107)
CO2 SERPL-SCNC: 22 MMOL/L (ref 22–31)
COHGB MFR BLD: 97.9 % (ref 96–97)
CREAT SERPL-MCNC: 0.6 MG/DL (ref 0.7–1.3)
ERYTHROCYTE [DISTWIDTH] IN BLOOD BY AUTOMATED COUNT: 16.3 % (ref 11–14.5)
FLOW: 5 LPM
GFR SERPL CREATININE-BSD FRML MDRD: >60 ML/MIN/1.73M2
GLUCOSE BLD-MCNC: 114 MG/DL (ref 70–125)
GLUCOSE BLDC GLUCOMTR-MCNC: 111 MG/DL (ref 70–139)
HCO3, ARTERIAL CALC - HISTORICAL: 25.4 MMOL/L (ref 23–29)
HCT VFR BLD AUTO: 36.5 % (ref 40–54)
HGB BLD-MCNC: 11.4 G/DL (ref 14–18)
MAGNESIUM SERPL-MCNC: 2.1 MG/DL (ref 1.8–2.6)
MCH RBC QN AUTO: 28.9 PG (ref 27–34)
MCHC RBC AUTO-ENTMCNC: 31.2 G/DL (ref 32–36)
MCV RBC AUTO: 92 FL (ref 80–100)
OXYHEMOGLOBIN - HISTORICAL: 94.8 % (ref 96–97)
PCO2 BLD: 34 MM HG (ref 35–45)
PH BLD: 7.46 [PH] (ref 7.37–7.44)
PHOSPHATE SERPL-MCNC: 3.6 MG/DL (ref 2.5–4.5)
PLATELET # BLD AUTO: 337 THOU/UL (ref 140–440)
PMV BLD AUTO: 11.3 FL (ref 8.5–12.5)
PO2 BLD: 84 MM HG (ref 80–90)
POTASSIUM BLD-SCNC: 3.7 MMOL/L (ref 3.5–5)
RBC # BLD AUTO: 3.95 MILL/UL (ref 4.4–6.2)
SODIUM SERPL-SCNC: 140 MMOL/L (ref 136–145)
TEMPERATURE: 37 DEGREES C
WBC: 8.8 THOU/UL (ref 4–11)

## 2020-07-29 LAB
ANION GAP SERPL CALCULATED.3IONS-SCNC: 10 MMOL/L (ref 5–18)
BASE EXCESS BLDA CALC-SCNC: 0.8 MMOL/L
BUN SERPL-MCNC: 23 MG/DL (ref 8–22)
CALCIUM SERPL-MCNC: 9 MG/DL (ref 8.5–10.5)
CHLORIDE BLD-SCNC: 107 MMOL/L (ref 98–107)
CO2 SERPL-SCNC: 24 MMOL/L (ref 22–31)
COHGB MFR BLD: 87.9 % (ref 96–97)
CREAT SERPL-MCNC: 0.6 MG/DL (ref 0.7–1.3)
ERYTHROCYTE [DISTWIDTH] IN BLOOD BY AUTOMATED COUNT: 16.7 % (ref 11–14.5)
GFR SERPL CREATININE-BSD FRML MDRD: >60 ML/MIN/1.73M2
GLUCOSE BLD-MCNC: 131 MG/DL (ref 70–125)
HCO3, ARTERIAL CALC - HISTORICAL: 25.2 MMOL/L (ref 23–29)
HCT VFR BLD AUTO: 37 % (ref 40–54)
HGB BLD-MCNC: 11.5 G/DL (ref 14–18)
MAGNESIUM SERPL-MCNC: 2.1 MG/DL (ref 1.8–2.6)
MCH RBC QN AUTO: 28.8 PG (ref 27–34)
MCHC RBC AUTO-ENTMCNC: 31.1 G/DL (ref 32–36)
MCV RBC AUTO: 93 FL (ref 80–100)
O2/TOTAL GAS SETTING VFR VENT: ABNORMAL %
OXYHEMOGLOBIN - HISTORICAL: 84.7 % (ref 96–97)
PCO2 BLD: 33 MM HG (ref 35–45)
PH BLD: 7.47 [PH] (ref 7.37–7.44)
PHOSPHATE SERPL-MCNC: 3.6 MG/DL (ref 2.5–4.5)
PLATELET # BLD AUTO: 380 THOU/UL (ref 140–440)
PMV BLD AUTO: 11 FL (ref 8.5–12.5)
PO2 BLD: 50 MM HG (ref 80–90)
POTASSIUM BLD-SCNC: 3.8 MMOL/L (ref 3.5–5)
RBC # BLD AUTO: 4 MILL/UL (ref 4.4–6.2)
SARS-COV-2 PCR COMMENT: NORMAL
SARS-COV-2 RNA SPEC QL NAA+PROBE: NEGATIVE
SARS-COV-2 VIRUS SPECIMEN SOURCE: NORMAL
SODIUM SERPL-SCNC: 141 MMOL/L (ref 136–145)
TEMPERATURE: 37 DEGREES C
WBC: 7.8 THOU/UL (ref 4–11)

## 2020-07-29 ASSESSMENT — MIFFLIN-ST. JEOR: SCORE: 1882.88

## 2020-07-30 LAB
ANION GAP SERPL CALCULATED.3IONS-SCNC: 11 MMOL/L (ref 5–18)
BASE EXCESS BLDA CALC-SCNC: -0.4 MMOL/L
BUN SERPL-MCNC: 29 MG/DL (ref 8–22)
CALCIUM SERPL-MCNC: 9.5 MG/DL (ref 8.5–10.5)
CHLORIDE BLD-SCNC: 107 MMOL/L (ref 98–107)
CO2 SERPL-SCNC: 24 MMOL/L (ref 22–31)
COHGB MFR BLD: 94.9 % (ref 96–97)
CREAT SERPL-MCNC: 0.65 MG/DL (ref 0.7–1.3)
ERYTHROCYTE [DISTWIDTH] IN BLOOD BY AUTOMATED COUNT: 17.1 % (ref 11–14.5)
FLOW: 2 LPM
GFR SERPL CREATININE-BSD FRML MDRD: >60 ML/MIN/1.73M2
GLUCOSE BLD-MCNC: 122 MG/DL (ref 70–125)
HCO3, ARTERIAL CALC - HISTORICAL: 24.5 MMOL/L (ref 23–29)
HCT VFR BLD AUTO: 39.8 % (ref 40–54)
HGB BLD-MCNC: 12.4 G/DL (ref 14–18)
MAGNESIUM SERPL-MCNC: 2.2 MG/DL (ref 1.8–2.6)
MCH RBC QN AUTO: 29 PG (ref 27–34)
MCHC RBC AUTO-ENTMCNC: 31.2 G/DL (ref 32–36)
MCV RBC AUTO: 93 FL (ref 80–100)
OXYHEMOGLOBIN - HISTORICAL: 91.3 % (ref 96–97)
PCO2 BLD: 27 MM HG (ref 35–45)
PH BLD: 7.51 [PH] (ref 7.37–7.44)
PHOSPHATE SERPL-MCNC: 4.2 MG/DL (ref 2.5–4.5)
PLATELET # BLD AUTO: 382 THOU/UL (ref 140–440)
PMV BLD AUTO: 11.1 FL (ref 8.5–12.5)
PO2 BLD: 57 MM HG (ref 80–90)
POTASSIUM BLD-SCNC: 4 MMOL/L (ref 3.5–5)
RBC # BLD AUTO: 4.27 MILL/UL (ref 4.4–6.2)
SODIUM SERPL-SCNC: 142 MMOL/L (ref 136–145)
TEMPERATURE: 37 DEGREES C
WBC: 8.1 THOU/UL (ref 4–11)

## 2020-07-30 ASSESSMENT — MIFFLIN-ST. JEOR: SCORE: 1885.88

## 2020-07-31 ENCOUNTER — COMMUNICATION - HEALTHEAST (OUTPATIENT)
Dept: SCHEDULING | Facility: CLINIC | Age: 55
End: 2020-07-31

## 2020-07-31 ASSESSMENT — MIFFLIN-ST. JEOR: SCORE: 1771.88

## 2020-08-01 ENCOUNTER — APPOINTMENT (OUTPATIENT)
Dept: INTERPRETER SERVICES | Facility: CLINIC | Age: 55
End: 2020-08-01
Payer: MEDICAID

## 2020-08-02 LAB
ANION GAP SERPL CALCULATED.3IONS-SCNC: 11 MMOL/L (ref 5–18)
BUN SERPL-MCNC: 11 MG/DL (ref 8–22)
CALCIUM SERPL-MCNC: 8.9 MG/DL (ref 8.5–10.5)
CHLORIDE BLD-SCNC: 102 MMOL/L (ref 98–107)
CO2 SERPL-SCNC: 24 MMOL/L (ref 22–31)
CREAT SERPL-MCNC: 0.65 MG/DL (ref 0.7–1.3)
GFR SERPL CREATININE-BSD FRML MDRD: >60 ML/MIN/1.73M2
GLUCOSE BLD-MCNC: 100 MG/DL (ref 70–125)
MAGNESIUM SERPL-MCNC: 2.1 MG/DL (ref 1.8–2.6)
POTASSIUM BLD-SCNC: 3.7 MMOL/L (ref 3.5–5)
SODIUM SERPL-SCNC: 137 MMOL/L (ref 136–145)

## 2020-08-03 LAB
ANION GAP SERPL CALCULATED.3IONS-SCNC: 10 MMOL/L (ref 5–18)
BUN SERPL-MCNC: 9 MG/DL (ref 8–22)
CALCIUM SERPL-MCNC: 9.2 MG/DL (ref 8.5–10.5)
CHLORIDE BLD-SCNC: 103 MMOL/L (ref 98–107)
CO2 SERPL-SCNC: 24 MMOL/L (ref 22–31)
CREAT SERPL-MCNC: 0.63 MG/DL (ref 0.7–1.3)
GFR SERPL CREATININE-BSD FRML MDRD: >60 ML/MIN/1.73M2
GLUCOSE BLD-MCNC: 99 MG/DL (ref 70–125)
MAGNESIUM SERPL-MCNC: 2.1 MG/DL (ref 1.8–2.6)
POTASSIUM BLD-SCNC: 3.7 MMOL/L (ref 3.5–5)
SARS-COV-2 PCR COMMENT: NORMAL
SARS-COV-2 RNA SPEC QL NAA+PROBE: NEGATIVE
SARS-COV-2 VIRUS SPECIMEN SOURCE: NORMAL
SODIUM SERPL-SCNC: 137 MMOL/L (ref 136–145)

## 2020-08-04 ENCOUNTER — HOSPITAL ENCOUNTER (INPATIENT)
Facility: CLINIC | Age: 55
LOS: 14 days | Discharge: HOME HEALTH | End: 2020-08-18
Attending: PHYSICAL MEDICINE & REHABILITATION | Admitting: PHYSICAL MEDICINE & REHABILITATION
Payer: MEDICAID

## 2020-08-04 ENCOUNTER — APPOINTMENT (OUTPATIENT)
Dept: OCCUPATIONAL THERAPY | Facility: CLINIC | Age: 55
End: 2020-08-04
Payer: MEDICAID

## 2020-08-04 DIAGNOSIS — Z86.16 HISTORY OF 2019 NOVEL CORONAVIRUS DISEASE (COVID-19): Primary | ICD-10-CM

## 2020-08-04 PROCEDURE — 25000128 H RX IP 250 OP 636: Performed by: PHYSICIAN ASSISTANT

## 2020-08-04 PROCEDURE — 97166 OT EVAL MOD COMPLEX 45 MIN: CPT | Mod: GO | Performed by: OCCUPATIONAL THERAPIST

## 2020-08-04 PROCEDURE — 25000132 ZZH RX MED GY IP 250 OP 250 PS 637: Performed by: PHYSICIAN ASSISTANT

## 2020-08-04 PROCEDURE — 12800006 ZZH R&B REHAB

## 2020-08-04 PROCEDURE — 25000132 ZZH RX MED GY IP 250 OP 250 PS 637: Performed by: PHYSICAL MEDICINE & REHABILITATION

## 2020-08-04 RX ORDER — BENZONATATE 100 MG/1
100 CAPSULE ORAL 3 TIMES DAILY
Status: ON HOLD | COMMUNITY
End: 2020-08-17

## 2020-08-04 RX ORDER — CHLORHEXIDINE GLUCONATE ORAL RINSE 1.2 MG/ML
15 SOLUTION DENTAL 2 TIMES DAILY
Status: ON HOLD | COMMUNITY
End: 2020-08-17

## 2020-08-04 RX ORDER — FUROSEMIDE 20 MG
20 TABLET ORAL DAILY
Status: ON HOLD | COMMUNITY
End: 2020-08-17

## 2020-08-04 RX ORDER — LANOLIN ALCOHOL/MO/W.PET/CERES
3 CREAM (GRAM) TOPICAL AT BEDTIME
Status: DISCONTINUED | OUTPATIENT
Start: 2020-08-04 | End: 2020-08-18 | Stop reason: HOSPADM

## 2020-08-04 RX ORDER — AMOXICILLIN 250 MG
1-4 CAPSULE ORAL 2 TIMES DAILY PRN
Status: DISCONTINUED | OUTPATIENT
Start: 2020-08-04 | End: 2020-08-18 | Stop reason: HOSPADM

## 2020-08-04 RX ORDER — MULTIPLE VITAMINS W/ MINERALS TAB 9MG-400MCG
1 TAB ORAL DAILY
Status: DISCONTINUED | OUTPATIENT
Start: 2020-08-05 | End: 2020-08-18 | Stop reason: HOSPADM

## 2020-08-04 RX ORDER — QUETIAPINE FUMARATE 25 MG/1
25 TABLET, FILM COATED ORAL 2 TIMES DAILY
Status: DISCONTINUED | OUTPATIENT
Start: 2020-08-04 | End: 2020-08-07

## 2020-08-04 RX ORDER — BENZONATATE 100 MG/1
100 CAPSULE ORAL 3 TIMES DAILY PRN
Status: DISCONTINUED | OUTPATIENT
Start: 2020-08-04 | End: 2020-08-18 | Stop reason: HOSPADM

## 2020-08-04 RX ORDER — QUETIAPINE FUMARATE 25 MG/1
25 TABLET, FILM COATED ORAL 2 TIMES DAILY
Status: ON HOLD | COMMUNITY
End: 2020-08-17

## 2020-08-04 RX ORDER — TRAZODONE HYDROCHLORIDE 50 MG/1
50 TABLET, FILM COATED ORAL AT BEDTIME
Status: DISCONTINUED | OUTPATIENT
Start: 2020-08-04 | End: 2020-08-18 | Stop reason: HOSPADM

## 2020-08-04 RX ORDER — LACTOBACILLUS RHAMNOSUS GG 10B CELL
1 CAPSULE ORAL 2 TIMES DAILY
Status: DISCONTINUED | OUTPATIENT
Start: 2020-08-04 | End: 2020-08-18 | Stop reason: HOSPADM

## 2020-08-04 RX ORDER — ALBUTEROL SULFATE 90 UG/1
2 AEROSOL, METERED RESPIRATORY (INHALATION) EVERY 6 HOURS PRN
Status: DISCONTINUED | OUTPATIENT
Start: 2020-08-04 | End: 2020-08-18 | Stop reason: HOSPADM

## 2020-08-04 RX ORDER — METOPROLOL TARTRATE 25 MG/1
25 TABLET, FILM COATED ORAL 2 TIMES DAILY
Status: DISCONTINUED | OUTPATIENT
Start: 2020-08-04 | End: 2020-08-11

## 2020-08-04 RX ORDER — METOPROLOL TARTRATE 25 MG/1
25 TABLET, FILM COATED ORAL 2 TIMES DAILY
Status: ON HOLD | COMMUNITY
End: 2020-08-17

## 2020-08-04 RX ORDER — MULTIVITAMIN,THERAPEUTIC
1 TABLET ORAL DAILY
Status: ON HOLD | COMMUNITY
End: 2020-08-17

## 2020-08-04 RX ORDER — FUROSEMIDE 20 MG
20 TABLET ORAL DAILY
Status: DISCONTINUED | OUTPATIENT
Start: 2020-08-05 | End: 2020-08-04

## 2020-08-04 RX ORDER — FUROSEMIDE 20 MG
20 TABLET ORAL DAILY
Status: DISCONTINUED | OUTPATIENT
Start: 2020-08-05 | End: 2020-08-06

## 2020-08-04 RX ORDER — ACETAMINOPHEN 325 MG/1
325-975 TABLET ORAL EVERY 6 HOURS PRN
Status: DISCONTINUED | OUTPATIENT
Start: 2020-08-04 | End: 2020-08-18 | Stop reason: HOSPADM

## 2020-08-04 RX ORDER — LACTOBACILLUS RHAMNOSUS GG 10B CELL
1 CAPSULE ORAL 2 TIMES DAILY
Status: ON HOLD | COMMUNITY
End: 2020-08-17

## 2020-08-04 RX ORDER — CHLORHEXIDINE GLUCONATE ORAL RINSE 1.2 MG/ML
15 SOLUTION DENTAL 2 TIMES DAILY
Status: DISCONTINUED | OUTPATIENT
Start: 2020-08-04 | End: 2020-08-18 | Stop reason: HOSPADM

## 2020-08-04 RX ADMIN — Medication 1 CAPSULE: at 20:57

## 2020-08-04 RX ADMIN — MELATONIN TAB 3 MG 3 MG: 3 TAB at 22:32

## 2020-08-04 RX ADMIN — ENOXAPARIN SODIUM 40 MG: 40 INJECTION SUBCUTANEOUS at 20:57

## 2020-08-04 RX ADMIN — PSYLLIUM HUSK 1 PACKET: 3.4 POWDER ORAL at 18:34

## 2020-08-04 RX ADMIN — TRAZODONE HYDROCHLORIDE 50 MG: 50 TABLET ORAL at 22:32

## 2020-08-04 RX ADMIN — QUETIAPINE FUMARATE 25 MG: 25 TABLET ORAL at 20:57

## 2020-08-04 RX ADMIN — CHLORHEXIDINE GLUCONATE 0.12% ORAL RINSE 15 ML: 1.2 LIQUID ORAL at 20:57

## 2020-08-04 RX ADMIN — METOPROLOL TARTRATE 25 MG: 25 TABLET, FILM COATED ORAL at 20:57

## 2020-08-04 ASSESSMENT — MIFFLIN-ST. JEOR
SCORE: 1768.84
SCORE: 1767.49

## 2020-08-04 NOTE — PLAN OF CARE
"Patient admitted to unit at 1430 from [facility] for post COVID rehabilitation with two negative COVID tests. Oriented to unit, admission process began.    VS: /76   Pulse 104   Temp 96.6  F (35.9  C) (Axillary)   Resp 20   Ht 1.575 m (5' 2\")   Wt 105.8 kg (233 lb 4.8 oz)   SpO2 95%   BMI 42.67 kg/m     O2: Room air, no complaints of SOB - infrequent dry non-productive cough. Lung sounds in middle and lower lobes diminished.   Output: Patient due to void. Hillman pulled this morning (8/4/2020) with a spontaneous void of 150 and PVR of 250 mL per report. Will continue trial voids and PVRs.   Last BM: 8/4/2020 per report, passing gas   Activity: Up w/ x2 assist SPT to wheelchair   Skin: Mild scrotal edema. Scabbing from previous IV on RUE, WDL -  No drainage.   Pain: Denies   CMS: AO x 4. ROM on RUE mildly impaired, unable to lift arm past shoulder. LLE weak and unequal bilaterally. Right hand  weak.   Dressing: N/a   Diet: Regular / thin liquids / pills whole   LDA: N/a   Equipment: Personal belongings, walker, call light within reach   Plan: Continue to monitor.   Additional Info:        "

## 2020-08-04 NOTE — H&P
[facility]   Acute Rehabilitation Unit  Admission History and Physical    CHIEF COMPLAINT   weakness    HISTORY OF PRESENT ILLNESS  Tobias Palmer is a 54 year old man who presented to ED with fever, sob, cough, and headache, with progressive acute respiratory distress was admitted to ICU,COVID 19 test returned positive. Was transferred to [facility] 6/22/20 for ongoing COVID-19 management.  He was intubated and required flolan, he was successfully extubated 7/26/20.  Hospital stay additionally complicated by ZHEN, serattia pneumonia and serratia UTI, icu delirium, A-fib with RVR when critically ill and brief self resolving episode 7/31. Impaired oral intake, malnutrition s/p tf placement removed 7/31, scrotal edema, and debility.       In review of the therapy notes has impaired strength, weight shifting, balance, and activity tolerance.  He is currently sba for bed mobility, min assist of 2 for ambulating up to 25 feet. Min assist for standing grooming and hygiene tasks, mod assist for upper body dressing, max assist for toileting and clothing management.     On arrival to rehab Mr. Palmer reports he is overall feeling well. Ongoing cough, denies n/v/d, sob at rest, dizziness, and headaches. Reports generalized weakness, sob with activity, at times dizzy with standing. Describes right upper extremity as weakest, feels left leg weaker than right, denies numbness/tingling, sensation changes. Denies injury to shoulder/arm. Denies chest pain/palpitations. Appetite remains poor requests lactose free diet due to intolerance. Denies other questions or concerns. Motivated to get home to wife.       PAST MEDICAL HISTORY   Reviewed and updated in Epic.  No past medical history on file.    SURGICAL HISTORY  Reviewed and updated in Epic.  No past surgical history on file.    SOCIAL HISTORY  Reviewed and updated in Epic.  Marital Status:   Living situation: reports 1  [address]ester[address]  Family support: supportive  Vocational History: cook at [employer]  Tobacco use: none  Alcohol use: rare   Illicit drug use: none  Social History     Socioeconomic History     Marital status:      Spouse name: Not on file     Number of children: Not on file     Years of education: Not on file     Highest education level: Not on file   Occupational History     Not on file   Social Needs     Financial resource strain: Not on file     Food insecurity     Worry: Not on file     Inability: Not on file     Transportation needs     Medical: Not on file     Non-medical: Not on file   Tobacco Use     Smoking status: Never Smoker     Smokeless tobacco: Never Used   Substance and Sexual Activity     Alcohol use: Yes     Comment: wine occ     Drug use: Never     Sexual activity: Not on file   Lifestyle     Physical activity     Days per week: Not on file     Minutes per session: Not on file     Stress: Not on file   Relationships     Social connections     Talks on phone: Not on file     Gets together: Not on file     Attends Pentecostal service: Not on file     Active member of club or organization: Not on file     Attends meetings of clubs or organizations: Not on file     Relationship status: Not on file     Intimate partner violence     Fear of current or ex partner: Not on file     Emotionally abused: Not on file     Physically abused: Not on file     Forced sexual activity: Not on file   Other Topics Concern     Not on file   Social History Narrative     Not on file       FAMILY HISTORY  Reviewed and updated in Epic.  No family history on file.      PRIOR FUNCTIONAL HISTORY   Pt was independent with all ADLs/IADLs, transfers, mobility and gait.      MEDICATIONS  Scheduled meds  Medications Prior to Admission   Medication Sig Dispense Refill Last Dose     albuterol (PROVENTIL) (2.5 MG/3ML) 0.083% neb solution Take 1 vial (2.5 mg) by nebulization every 6 hours as needed for wheezing         "dexamethasone (DECADRON) 4 MG/ML injection Use 4 mg or dose determined by provider for iontophoresis.        enoxaparin ANTICOAGULANT (LOVENOX) 40 MG/0.4ML syringe Inject 0.4 mLs (40 mg) Subcutaneous every 12 hours        hydrALAZINE (APRESOLINE) 20 MG/ML injection Inject 0.5 mLs (10 mg) into the vein every 4 hours as needed for high blood pressure (give for SBP > 180)        midazolam (VERSED) drip - ADULT 100 mg/100 mL in NS PRE-MIX Inject 1-8 mg/hr into the vein continuous        ondansetron (ZOFRAN) 2 MG/ML SOLN injection Inject 2 mLs (4 mg) into the vein every 6 hours as needed for nausea or vomiting        oxyCODONE (ROXICODONE) 5 MG tablet Take 1-2 tablets (5-10 mg) by mouth every 3 hours as needed  0      sodium chloride 0.9% SOLN BOLUS Inject 1-250 mLs into the vein every hour as needed (To prime infusion tubing AND flush blood through tubing POST blood component administration.)        sterile water 25 mL with epoprostenol 500.1 mcg inhalation solution Take 1,230 ng/min by nebulization continuous          ALLERGIES     Allergies   Allergen Reactions     Aspirin Rash         REVIEW OF SYSTEMS  A 10 point ROS was performed and negative unless otherwise noted in HPI.       PHYSICAL EXAM  VITAL SIGNS:  /76   Pulse 104   Temp 96.6  F (35.9  C) (Axillary)   Resp 20   SpO2 95%   BMI:  Estimated body mass index is 44.76 kg/m  as calculated from the following:    Height as of 6/21/20: 1.651 m (5' 5\").    Weight as of 6/22/20: 122 kg (268 lb 15.4 oz).     General: awake alert nad  HEENT: mmm, eomi  Pulmonary: non labored on room air with bibaislar coarse lung sounds did improve with cough  Cardiovascular: rrr  Abdominal: obese non tender   Extremities: warm dry trace edema no calf tenderness   MSK/neuro:   Mental Status:  alert and oriented x3    Cranial Nerves: grossly normal    Sensory: Normal to light touch in bilateral upper and lower extremities    Strength:    SF  EF  EE  WE  G  HF  KE  DF        PF "   R  2           3           2          2          2          4-         4-          4            4  L  4           4           4          4          4           4-         4-          4           4   Reflexes: Present and symmetrical    Grossman's test: negative bilaterally    Babinski reflex: downgoing bilaterally    Tone per modified Susannah Scale: none   Abnormal movements: None none   Coordination: difficulty on right upper with rapid alternating finger due to impaired rom?    Speech: clear coherent   Cognition: linear logical    Gait: up with assist of 2 with walker with small shuffling gait  Skin: no bruising or bleeding and no redness, warmth, or swelling      LABS  COVID neg 8/3, 7/29  8/3  BMP WNL Cr 0.063.   7/30   WBC   8.1  Hgb 12.4      IMPRESSION/PLAN:  Tobias Palmer is a 54 year old man who presented 6/21 to [facility] with fever, cough, sob, with diagnosis of COVID 19, progressive hypoxic respiratory failure intubated and transferred to [facility] 7/22 for ongoing management. Hospitalization complicated by multiple infections, icu delirium,  A-fib with RVR,  Impaired oral intake, scrotal edema, and debility. Admitted to acute rehab 8/4/20 for ongoing rehabilitation and medical management.       Admission to acute inpatient rehab Debility  Impairment group code: 16      1. PT, OT  90 minutes of each on a daily basis, in addition to rehab nursing and close management of physiatrist.      2. Impairment of ADL's: Noted to have impaired strength, balance, activity tolerance goal for I with basic adls.     3. Impairment of mobility:  Noted to have impaired strength, balance, activity tolerance goal for I      4. Medical Conditions    Confirmed COVID-19 infection  Onset of COVID symptoms ~6/18/20 Date of positive test results 6/21/20   Research study Yes: CCP Research study protocol/COVID medications: convalescent plasma, remdesivir, tocilizumab  Acute Hypoxic Respiratory Failure secondary to  COVID-19 infection  Viral Pneumonia secondary to COVID-19 infection  Acute Respiratory Distress Syndrome (resolved)   Bacterial Superinfection (resolved)   admitted to [facility] on 6/21/2020 after presenting with fever, cough and dyspnea. He was intubated and transferred to [place] on 6/22. Imaging revealed diffuse bilateral infiltrates.  He required flolan. He remained intubated until 7/26 at which time he was successfully extubated.  stable on RA with exception of some desaturation with sleep, c/w JEROMY (see below): Multiple bacterial infections and septic shock thoughout his course including MSSA and serattia pneumonia and serratia UTI.   - f/u ICU survivorship clinic after discharge    Debility- in setting of prolonged icu stay with illness documented as above. RUE weakness ? Related to positioning while intubated.   -PT/OT       ICU delirium  sedated for weeks so likely will benefit from slow wean of benzos and opaites Started wean 7/29 as below and tolerating well. Ativan discontinued 8/2. Oxycodone discontinued 8/3, still on Seroquel and Trazodone.  - taper as appropriate  -continue seroquel      Suspected Obstructive Sleep Apnea  After weaning from O2,  continued to desat while in deep sleep requiring O2. Pt tried BiPAP/CPAP but found uncomfortable, tolerating for short time. Overnight 8/1 desat to 70's and bradycardic to 30's associated with apnea. Recovered quickly when awoken, wore CPAP for a few hours. Per discharge team likely has longstanding JEROMY  - Refer to sleep medicine as outpatient  -monitor for symptoms-  consider overnight oximetry study for home oxygen      A fib:   Developed A fib with RVR when critically ill. Resolved with amiodarone.brief self-resolving a fib/flutter starting overnight 7/31. Started metoprolol 25 mg two times a day 8/1 , HR in 60s to 110, BP slightly high, may need med adjustment. - No indication for anticoagulation for A fib at this  time.  -continue metoprolol  -monitor heart rate/bp titrate as indicated.     Scrotal edema  Marked scrotal edema, preventing removal of barlow-removed prior to ARU admission.   -monitor pvrs in setting of recent barlow removal (patient/rn say today)  -continue Lasix 20mg daily for 10 days   -monitor edema       5. Adjustment to disability:  Monitor mood  6. FEN: reg  7. Bowel: continent  8. Bladder: barlow removed prior to admission continue to monitor pvrs.   9. DVT Prophylaxis: *D-Dimer, Quant  0.72 (H) <=0.50 FEU ug/mL Final  lovenox 40mg two times a day (starting 8/4) for 30 days per discharge summary  10. GI Prophylaxis: reg  11. Code: full confirmed on admission  12. Disposition: home  13. ELOS:  ~ 14 days.  14. Rehab prognosis:  good  15. Follow up Appointments on Discharge: pcp.      discussed with Dr. Lind , PM&R staff physician     Sarika Cadet PA-C  Rehab Service  Pager [phone]

## 2020-08-04 NOTE — CONSULTS
20 1300   Living Arrangements   Lives With spouse;child(reno), adult   Living Arrangements house   Able to Return to Prior Arrangements yes   Living Arrangement Comments Rambler style with 1 MARY    Home Safety   Patient Feels Safe Living in Home? yes   Discharge Planning   Patient/Family Anticipates Transition to home with family   Disposition Comments Limited [facility] services covered by insurance    Concerns to be Addressed financial/insurance   Concerns Comments Emergency Medical Assistance    Plan   Plan Home    Discharge Needs Assessment   Transportation Anticipated family or friend will provide       Social Work: Initial Assessment with Discharge Plan    Patient Name: Tobias Palmer  : 1965  Age: 54 year old  MRN: [id number]  Completed assessment with: Chart review, attempted to interview pt over the phone multiple times, pt not available. Assessment completed based on chart review and sign out from admissions.   Admitted to ARU: 2020    Presenting Information   Date of SW assessment: 2020  Health Care Directive: Health Care Directive Agent (if patient not able to make decisions)  Primary Health Care Agent: Patient/self until further assessed  Secondary Health Care Agent: Pt wife NOK   Living Situation: Pt lives in a rambler-style home with 1 MARY with wife. Unclear if other family in the home.   Previous Functional Status: Independent with ADLs and IADLs PTA. Working at [employer]   DME available: None reported   Patient and family understanding of hospitalization: Unclear at this time, SLP to assess further   Cultural/Language/Spiritual Considerations: Scottish-speaking and English-speaking male.  and Yazidi nani.       Physical Health  Reason for admission: COVID-19.    Provider Information   Primary Care Physician:No Ref-Primary, Physician--None listed per EMR. May need to establish care with PCP. HUC notified.   : None reported    Mental  Health/Chemical Dependency:   Diagnosis: None per chart review  Alcohol/Tobacco/Narcotis: None per chart review  Support/Services in Place: None per chart review  Services Needed/Recommended: Supportive services available by consult   Sexuality/Intimacy: None per chart review    Support System  Marital Status: . Wife supportive per chart review.   Family support: Unclear at this time   Other support available: Unclear at this time     Community Resources  Current in home services: None per chart review  Previous services: None per chart review    Financial/Employment/Education  Employment Status: [employer]  Income Source: Wages   Education: Not discussed   Financial Concerns:  Unknown at this time   Insurance: Emergency Medical Assistance (Only has coverage for 5 home RN and 1 home SW visit)      Discharge Plan   Patient and family discharge goal: Home with family A and HHC services (1 SW and 5 RN) and home exercise program.   Provided Education on discharge plan: Will discuss at a later time   Patient agreeable to discharge plan:  Will discuss at a later time   Provided education and attained signature for Medicare IM and IRF Patient Rights and Privacy Information provided to patient : N/A  Provided patient with [facility] Resources: N/A  Barriers to discharge: EMA insurance     Discharge Recommendations   Disposition: See above   Transportation Needs: Family A   Name of Transportation Company and Phone: N/A     Additional comments   Attempted to assess pt multiple times. Pt did not answer, stated he was with therapy, busy tone, etc. Chart review completed. Will attempt to assess pt at a later time. Pt has emergency assistance and has limited coverage. Will plan to discuss with pt and continue to update team.     Please invite to Care Conference:  Check with pt     BELLA Buitrago, [facility] Acute Rehab Unit   Phone: [phone]  I   Pager:  [phone]

## 2020-08-05 LAB
ANION GAP SERPL CALCULATED.3IONS-SCNC: 7 MMOL/L (ref 3–14)
BASOPHILS # BLD AUTO: 0.1 10E9/L (ref 0–0.2)
BASOPHILS NFR BLD AUTO: 0.8 %
BUN SERPL-MCNC: 11 MG/DL (ref 7–30)
CALCIUM SERPL-MCNC: 9.1 MG/DL (ref 8.5–10.1)
CHLORIDE SERPL-SCNC: 104 MMOL/L (ref 94–109)
CO2 SERPL-SCNC: 25 MMOL/L (ref 20–32)
CREAT SERPL-MCNC: 0.55 MG/DL (ref 0.66–1.25)
DIFFERENTIAL METHOD BLD: ABNORMAL
EOSINOPHIL # BLD AUTO: 0.3 10E9/L (ref 0–0.7)
EOSINOPHIL NFR BLD AUTO: 3.5 %
ERYTHROCYTE [DISTWIDTH] IN BLOOD BY AUTOMATED COUNT: 16.3 % (ref 10–15)
GFR SERPL CREATININE-BSD FRML MDRD: >90 ML/MIN/{1.73_M2}
GLUCOSE SERPL-MCNC: 101 MG/DL (ref 70–99)
HCT VFR BLD AUTO: 39.9 % (ref 40–53)
HGB BLD-MCNC: 12.8 G/DL (ref 13.3–17.7)
IMM GRANULOCYTES # BLD: 0.2 10E9/L (ref 0–0.4)
IMM GRANULOCYTES NFR BLD: 2.4 %
LACTATE BLD-SCNC: 2.8 MMOL/L (ref 0.7–2)
LACTATE BLD-SCNC: 2.8 MMOL/L (ref 0.7–2)
LYMPHOCYTES # BLD AUTO: 2.5 10E9/L (ref 0.8–5.3)
LYMPHOCYTES NFR BLD AUTO: 34.5 %
MCH RBC QN AUTO: 29.6 PG (ref 26.5–33)
MCHC RBC AUTO-ENTMCNC: 32.1 G/DL (ref 31.5–36.5)
MCV RBC AUTO: 92 FL (ref 78–100)
MONOCYTES # BLD AUTO: 0.7 10E9/L (ref 0–1.3)
MONOCYTES NFR BLD AUTO: 9.3 %
NEUTROPHILS # BLD AUTO: 3.6 10E9/L (ref 1.6–8.3)
NEUTROPHILS NFR BLD AUTO: 49.5 %
NRBC # BLD AUTO: 0 10*3/UL
NRBC BLD AUTO-RTO: 0 /100
PLATELET # BLD AUTO: 356 10E9/L (ref 150–450)
POTASSIUM SERPL-SCNC: 3.6 MMOL/L (ref 3.4–5.3)
RBC # BLD AUTO: 4.32 10E12/L (ref 4.4–5.9)
SODIUM SERPL-SCNC: 136 MMOL/L (ref 133–144)
WBC # BLD AUTO: 7.2 10E9/L (ref 4–11)

## 2020-08-05 PROCEDURE — 36415 COLL VENOUS BLD VENIPUNCTURE: CPT | Performed by: INTERNAL MEDICINE

## 2020-08-05 PROCEDURE — 97162 PT EVAL MOD COMPLEX 30 MIN: CPT | Mod: GP

## 2020-08-05 PROCEDURE — 97116 GAIT TRAINING THERAPY: CPT | Mod: GP

## 2020-08-05 PROCEDURE — 97535 SELF CARE MNGMENT TRAINING: CPT | Mod: GO

## 2020-08-05 PROCEDURE — 12800006 ZZH R&B REHAB

## 2020-08-05 PROCEDURE — 92523 SPEECH SOUND LANG COMPREHEN: CPT | Mod: GN

## 2020-08-05 PROCEDURE — 25000132 ZZH RX MED GY IP 250 OP 250 PS 637: Performed by: PHYSICIAN ASSISTANT

## 2020-08-05 PROCEDURE — 36415 COLL VENOUS BLD VENIPUNCTURE: CPT | Performed by: PHYSICAL MEDICINE & REHABILITATION

## 2020-08-05 PROCEDURE — 99232 SBSQ HOSP IP/OBS MODERATE 35: CPT | Performed by: INTERNAL MEDICINE

## 2020-08-05 PROCEDURE — 85025 COMPLETE CBC W/AUTO DIFF WBC: CPT | Performed by: PHYSICAL MEDICINE & REHABILITATION

## 2020-08-05 PROCEDURE — 97110 THERAPEUTIC EXERCISES: CPT | Mod: GP

## 2020-08-05 PROCEDURE — 97167 OT EVAL HIGH COMPLEX 60 MIN: CPT | Mod: GO

## 2020-08-05 PROCEDURE — 25000132 ZZH RX MED GY IP 250 OP 250 PS 637: Performed by: PHYSICAL MEDICINE & REHABILITATION

## 2020-08-05 PROCEDURE — 99207 ZZC CDG-MDM COMPONENT: MEETS MODERATE - UP CODED: CPT | Performed by: INTERNAL MEDICINE

## 2020-08-05 PROCEDURE — 83605 ASSAY OF LACTIC ACID: CPT | Performed by: INTERNAL MEDICINE

## 2020-08-05 PROCEDURE — 83605 ASSAY OF LACTIC ACID: CPT | Performed by: PHYSICAL MEDICINE & REHABILITATION

## 2020-08-05 PROCEDURE — 25000128 H RX IP 250 OP 636: Performed by: PHYSICIAN ASSISTANT

## 2020-08-05 PROCEDURE — 25800030 ZZH RX IP 258 OP 636: Performed by: INTERNAL MEDICINE

## 2020-08-05 PROCEDURE — 97530 THERAPEUTIC ACTIVITIES: CPT | Mod: GP

## 2020-08-05 PROCEDURE — 80048 BASIC METABOLIC PNL TOTAL CA: CPT | Performed by: PHYSICAL MEDICINE & REHABILITATION

## 2020-08-05 RX ORDER — SODIUM CHLORIDE 9 MG/ML
INJECTION, SOLUTION INTRAVENOUS CONTINUOUS
Status: DISPENSED | OUTPATIENT
Start: 2020-08-05 | End: 2020-08-06

## 2020-08-05 RX ADMIN — Medication 1 CAPSULE: at 08:34

## 2020-08-05 RX ADMIN — CHLORHEXIDINE GLUCONATE 0.12% ORAL RINSE 15 ML: 1.2 LIQUID ORAL at 08:35

## 2020-08-05 RX ADMIN — Medication 1 CAPSULE: at 21:05

## 2020-08-05 RX ADMIN — MELATONIN TAB 3 MG 3 MG: 3 TAB at 21:05

## 2020-08-05 RX ADMIN — ENOXAPARIN SODIUM 40 MG: 40 INJECTION SUBCUTANEOUS at 08:34

## 2020-08-05 RX ADMIN — QUETIAPINE FUMARATE 25 MG: 25 TABLET ORAL at 21:05

## 2020-08-05 RX ADMIN — TRAZODONE HYDROCHLORIDE 50 MG: 50 TABLET ORAL at 21:05

## 2020-08-05 RX ADMIN — METOPROLOL TARTRATE 25 MG: 25 TABLET, FILM COATED ORAL at 21:05

## 2020-08-05 RX ADMIN — CHLORHEXIDINE GLUCONATE 0.12% ORAL RINSE 15 ML: 1.2 LIQUID ORAL at 21:05

## 2020-08-05 RX ADMIN — SODIUM CHLORIDE 1000 ML: 9 INJECTION, SOLUTION INTRAVENOUS at 18:41

## 2020-08-05 RX ADMIN — SODIUM CHLORIDE: 9 INJECTION, SOLUTION INTRAVENOUS at 22:34

## 2020-08-05 RX ADMIN — FUROSEMIDE 20 MG: 20 TABLET ORAL at 08:34

## 2020-08-05 RX ADMIN — ACETAMINOPHEN 650 MG: 325 TABLET, FILM COATED ORAL at 17:49

## 2020-08-05 RX ADMIN — ENOXAPARIN SODIUM 40 MG: 40 INJECTION SUBCUTANEOUS at 21:04

## 2020-08-05 RX ADMIN — QUETIAPINE FUMARATE 25 MG: 25 TABLET ORAL at 08:34

## 2020-08-05 RX ADMIN — METOPROLOL TARTRATE 25 MG: 25 TABLET, FILM COATED ORAL at 08:34

## 2020-08-05 RX ADMIN — PSYLLIUM HUSK 1 PACKET: 3.4 POWDER ORAL at 08:34

## 2020-08-05 RX ADMIN — MULTIPLE VITAMINS W/ MINERALS TAB 1 TABLET: TAB at 08:34

## 2020-08-05 NOTE — PLAN OF CARE
Pt admitted from [facility] - post COVID with 2 negative COVID test. Vitals are stable. Sating well on room air. Infrequent dry non-productive cough. Pt voided in BSC with some spillage on the floor. PVR less than 100. Will continue to monitor. Last BM was today. AO2 with gait belt and walker. Using BSC. Denied pain. Unable to lift bilateral arms past shoulders and bilateral LE weakness. Take pills whole with thin liquid. Call light within reach. Able to make his needs known. Will continue with plan of care.

## 2020-08-05 NOTE — PLAN OF CARE
Discharge Planner PT   Patient plan for discharge: Home with Assist, Home PT  Current status: Pt demo SBA for transfers, pt amb limited d/t fatigue at this time, will continue to progress.  Barriers to return to prior living situation: Decreased activity tolerance, 1 step, ambulation distance         Entered by: Katerin English 08/05/2020 10:56 AM      Initial eval completed today: ELOS (7-10 days), Pt presents following COVID-19 infection and subsequent clinical sequelae including: decreased activity tolerance, increased HR with activity. Condition is complicated by musculoskeletal impairments leading to significant RUE and LLE weakness with corresponding paresthesia possibly correlated with extended prone positioning. Pt is SBA for transfers at current status, but fatigues easily limiting his tolerance for increased ambulation.

## 2020-08-05 NOTE — H&P
Post Admission Physician Evaluation:    I have compared Tobias Palmer's condition on admission to acute rehabilitation to that outlined in the preadmission screen. History and physical exam performed by me.  No significant differences are identified and the patient remains appropriate for an inpatient rehabilitation facility level of care to manage medical issues and address functional impairments due to (rehabilitation diagnosis) debility post Covid.    Comorbid medical conditions being managed: recovering ICU delirium, A-fib, scrotal edema     Prior functional level: independent     Present function: mpaired strength, weight shifting, balance, and activity tolerance.  He is currently sba for bed mobility, min assist of 2 for ambulating up to 25 feet. Min assist for standing grooming and hygiene tasks, mod assist for upper body dressing, max assist for toileting and clothing management.     Anticipated rehabilitation course:     Will benefit from intensive rehabilitation includin minutes each of PT, OT and SLP  Rehabilitation nursing  Close management by physiatry    Prognosis: good     Estimated length of stay: 7 - 10 days         Raphael Lind DO

## 2020-08-05 NOTE — PROGRESS NOTES
08/05/20 1200   General Information, SLP   Type of Evaluation Cognitive-Linguistic   Type of Visit Initial   Start of Care Date 08/05/20   Onset of Illness/Injury or Date of Surgery - Date 06/21/20   Referring Physician Dr. Lind   Patient/Family Goals Statement Be able to walk and go home   Pertinent History of Current Problem Tobias Palmer is a 54 year old man who presented to ED with fever, sob, cough, and headache, with progressive acute respiratory distress was admitted to ICU,COVID 19 test returned positive. Was transferred to [facility] 6/22/20 for ongoing COVID-19 management.  He was intubated and required flolan, he was successfully extubated 7/26/20.  Hospital stay additionally complicated by ZHEN, serattia pneumonia and serratia UTI, icu delirium, A-fib with RVR when critically ill and brief self resolving episode 7/31. Impaired oral intake, malnutrition s/p tf placement removed 7/31, scrotal edema, and debility.    General Info Comments Patient was seen by speech therapy during acute care hospitalization for both dysphagia and cognitive evaluation/treatments.  Both goals were met though progressed very quickly with minimal follow-up.  Recommended additional evaluation here to ensure consistent performance.   Speech   Speech Comments Decreased vocal intensity but fully intelligible   Language: Auditory Comprehension (understanding of spoken language)   Two Step Commands (out of 5 total) 5   Yes/No Sentence and Simple Paragraph; Halbur Diagnostic Aphasia Exam 3 short (out of 6 total) 6   Functional Assessment Scale (Auditory Comprehension) No Impairment   Comments (Auditory Comprehension) Able to follow multistep directions correctly when presented in banish language.  Some difficulties noted with English presentations   Language: Verbal Expression (use of spoken language to express information)   Generative Naming Score; Cognitive Linguistic Quick Test 5   Generative Naming; Cognitive  "Linguistic Quick Test Result Below mean   Functional Assessment Scale (Verbal Expression) Minimal Impairment   Comments (Verbal Expression) Slightly slower processing speed with information on generative naming on 1 of the 2 categories.  The \"animals\" category completed fully within functional limits.  No word finding difficulties evident in casual conversation.   Cognitive Status Examination   Attention intact   Behavioral Observations WFL   Short Term Memory intact   Long Term Memory intact   Reasoning intact   Additional cognitive-linguistic evaluation indicated  no   Cognitive Status Exam Comments Would not be appropriate for formal cognitive assessment due to ESL status   Clinical Impression, SLP Eval   Criteria for Skilled Therapeutic Interventions Met Does not meet criteria for skilled intervention   SLP Diagnosis Cognitive function seems to be within functional limits   Rehab Potential Good, to achieve stated therapy goals   Rehab potential affected by Improving medical status   Predicted Duration of Therapy Intervention (days/wks) Eval only   Risks and Benefits of Treatment have been explained. Yes   Patient, Family & other staff in agreement with plan of care Yes   Clinical Impression Comments Patient presenting with cognitive linguistic functioning within functional limits.  Some mild difficulties noted with slower processing speed but patient feels as though he is at his baseline level of function.  Patient has shown significant improvement in this area over the past week or so.  Likely some lingering narcotic effects.  No ongoing SLP needs identified at this time though we will continue to collaborate and monitor with the rest of the therapy team if noting further functional impairments.   Total Evaluation Time      Total Evaluation Time (Minutes) 60     "

## 2020-08-05 NOTE — PROGRESS NOTES
"CLINICAL NUTRITION SERVICES - ASSESSMENT NOTE     Nutrition Prescription    RECOMMENDATIONS FOR MDs/PROVIDERS TO ORDER:  None today     Malnutrition Status:    Patient does not meet two of the established criteria necessary for diagnosing malnutrition    Recommendations already ordered by Registered Dietitian (RD):  None today - adequate oral intakes encouraged     Future/Additional Recommendations:  Monitor meal and supplement intakes  Monitor weight trends      REASON FOR ASSESSMENT  Tobias Palmer is a/an 54 year old male assessed by the dietitian for Admission Nutrition Risk Screen for reduced oral intake over the last month    NUTRITION/MEDICAL HISTORY  Per chart review: Pt admits to ARU for rehabilitation in the setting of s/p critical illness from COVID-19, no other past medical history noted.     Per pt visit: Pt reports improving appetite intakes, able to call for meals, but needed some assistance due to unclear questions for , pt reports lactose intolerance, but can take some foods will small amounts of lactose, so RD will update diet orders per pt request. RD reviewed significant wt loss during acute illness, pt agreeing. RD reviewed the importance of adequate nutritional intakes while pt is working with therapies, encouraged good source of protein (meat/dairy) at every meal, pt verbalizes agreement, denied additional nutrition needs at this time.     CURRENT NUTRITION ORDERS  Diet: Regular, no lactose   Intake/Tolerance: 75% thus far per flow sheets     LABS  Labs reviewed    MEDICATIONS  Culturell, Seroquel, Lasix, Metamucil, Peridex swish/spit, MVI    ANTHROPOMETRICS  Height: 157.5 cm (5' 2\") - question accuracy   Ht Readings from Last 10 Encounters:   08/04/20 1.575 m (5' 2\")   06/21/20 1.651 m (5' 5\")     165.1 cm (5' 5\") 06/25/2020 Per CE     Most Recent Weight: 105.8 kg (233 lb 4.8 oz)    IBW: 61.8 kg - based on 5'5\" ht   BMI: Obesity Grade III BMI >40  Weight History:   Wt Readings " from Last 10 Encounters:   08/04/20 105.8 kg (233 lb 4.8 oz)   06/22/20 122 kg (268 lb 15.4 oz)     Weight assessment: Significant 13% (35 lb) weight loss in 2 months, BMI still 42.67.    Dosing Weight: 72.8 kg - adjusted BW     ASSESSED NUTRITION NEEDS  Estimated Energy Needs: 6491-6701 kcals/day (25 - 30 kcals/kg)  Justification: Maintenance, obese  Estimated Protein Needs: 70-85 grams protein/day (1 - 1.2 grams of pro/kg)  Justification: Maintenance  Estimated Fluid Needs: 2180 mL/day (30 mL/kg)   Justification: Maintenance    MALNUTRITION  % Intake: Prior decreased intake does not meet criteria at present time   % Weight Loss: Up to 5% in 1 month (non-severe)  Subcutaneous Fat Loss: None observed  Muscle Loss: None observed  Fluid Accumulation/Edema: Mild per flow sheets   Malnutrition Diagnosis: Patient does not meet two of the established criteria necessary for diagnosing malnutrition    NUTRITION DIAGNOSIS  Predicted inadequate nutrient intake prior to this facility admission related to critical illness as evidenced by significant weight loss over 2 months     INTERVENTIONS  Implementation  Nutrition Education: RD reviewed the importance of adequate nutritional intakes for improved health status, encouraging good sources of protein at each meal.     Goals  Patient to consume % of nutritionally adequate meal trays TID, or the equivalent with supplements/snacks.     Monitoring/Evaluation  Progress toward goals will be monitored and evaluated per protocol.    Racquel Dewitt RD, LD  [facility] Pager: [phone]

## 2020-08-05 NOTE — PROGRESS NOTES
"  [facility]   Acute Rehabilitation Unit  Daily progress note    interval history  Tobias Palmer was seen and examined at bedside. He was completing therapy session. Reports he is doing well, did not sleep great due to interruptions, will discuss with nursing. Discussed ongoing therapy, encouraged IS.       Functionally, undergoing therapy evals anticipating 7-10 days.   OT: Initial evaluation started. Pt completing bed mobility with A x 1 and pivot transfer EOB <> wheelchair and commode with A x 2 using fww. Pt able to sit upright unsupported for several minutes with supervision. Pt with very limited shoulder ROM L>R. Recommend w/c based mobility at this time until further assessed by therapies. Issued equipment needed for safe transfers and toileting.    PT: Initial eval completed today: ELOS (7-10 days), Pt presents following COVID-19 infection and subsequent clinical sequelae including: decreased activity tolerance, significant RUE and LLE weakness with corresponding paresthesia possibly correlated with extended prone positioning. Pt is SBA for transfers at current status, but fatigues easily limiting his tolerance for increased ambulation.    medications    chlorhexidine  15 mL Swish & Spit BID     enoxaparin ANTICOAGULANT  40 mg Subcutaneous Q12H     furosemide  20 mg Oral Daily     lactobacillus rhamnosus (GG)  1 capsule Oral BID     melatonin  3 mg Oral At Bedtime     metoprolol tartrate  25 mg Oral BID     multivitamin w/minerals  1 tablet Oral Daily     psyllium  1 packet Oral Daily     QUEtiapine  25 mg Oral BID     traZODone  50 mg Oral At Bedtime        acetaminophen, albuterol, sore throat lozenge, benzonatate, - MEDICATION INSTRUCTIONS -, senna-docusate     physical exam  /86 (BP Location: Left arm)   Pulse 103   Temp 98.6  F (37  C) (Oral)   Resp 22   Ht 1.575 m (5' 2\")   Wt 105.8 kg (233 lb 4.8 oz)   SpO2 96%   BMI 42.67 kg/m    Gen: awake " alert  HEENT: mmm  Cardio: rrr- tachy  Pulm: non labored clear after cough, with dry cough following breathing excercises  Abd: soft obese  Ext: warm dry without significant edema.   Neuro/MSK: alert speech clear  RUE sf nearing 3/5 today, LUE 4/5.     labs  Lab Results   Component Value Date    WBC 7.2 08/05/2020     Lab Results   Component Value Date    RBC 4.32 08/05/2020     Lab Results   Component Value Date    HGB 12.8 08/05/2020     Lab Results   Component Value Date    HCT 39.9 08/05/2020     Lab Results   Component Value Date    MCV 92 08/05/2020     Lab Results   Component Value Date    MCH 29.6 08/05/2020     Lab Results   Component Value Date    MCHC 32.1 08/05/2020     Lab Results   Component Value Date    RDW 16.3 08/05/2020     Lab Results   Component Value Date     08/05/2020     Last Comprehensive Metabolic Panel:  Sodium   Date Value Ref Range Status   08/05/2020 136 133 - 144 mmol/L Final     Potassium   Date Value Ref Range Status   08/05/2020 3.6 3.4 - 5.3 mmol/L Final     Chloride   Date Value Ref Range Status   08/05/2020 104 94 - 109 mmol/L Final     Carbon Dioxide   Date Value Ref Range Status   08/05/2020 25 20 - 32 mmol/L Final     Anion Gap   Date Value Ref Range Status   08/05/2020 7 3 - 14 mmol/L Final     Glucose   Date Value Ref Range Status   08/05/2020 101 (H) 70 - 99 mg/dL Final     Urea Nitrogen   Date Value Ref Range Status   08/05/2020 11 7 - 30 mg/dL Final     Creatinine   Date Value Ref Range Status   08/05/2020 0.55 (L) 0.66 - 1.25 mg/dL Final     GFR Estimate   Date Value Ref Range Status   08/05/2020 >90 >60 mL/min/[1.73_m2] Final     Comment:     Non  GFR Calc  Starting 12/18/2018, serum creatinine based estimated GFR (eGFR) will be   calculated using the Chronic Kidney Disease Epidemiology Collaboration   (CKD-EPI) equation.       Calcium   Date Value Ref Range Status   08/05/2020 9.1 8.5 - 10.1 mg/dL Final         Rehabilitation - continue  comprehensive acute inpatient rehabilitation program with multidisciplinary approach including therapies, rehab nursing, and physiatry following. See interval history for updates.      assessment and plan  Tobias Palmer is a 54 year old man who presented 6/21 to [facility] with fever, cough, sob, with diagnosis of COVID 19, progressive hypoxic respiratory failure intubated and transferred to [facility] 7/22 for ongoing management. Hospitalization complicated by multiple infections, icu delirium,  A-fib with RVR,  Impaired oral intake, scrotal edema, and debility. Admitted to acute rehab 8/4/20 for ongoing rehabilitation and medical management.      Confirmed COVID-19 infection  Onset of COVID symptoms ~6/18/20 Date of positive test results 6/21/20   Research study Yes: [facility] Research study protocol/COVID medications: convalescent plasma, remdesivir, tocilizumab  Acute Hypoxic Respiratory Failure secondary to COVID-19 infection  Viral Pneumonia secondary to COVID-19 infection  Acute Respiratory Distress Syndrome (resolved)   Bacterial Superinfection (resolved)   admitted to [facility] on 6/21/2020 after presenting with fever, cough and dyspnea. He was intubated and transferred to [place] on 6/22. Imaging revealed diffuse bilateral infiltrates.  He required flolan. He remained intubated until 7/26 at which time he was successfully extubated.  stable on RA with exception of some desaturation with sleep, c/w JEROMY (see below): Multiple bacterial infections and septic shock thoughout his course including MSSA and serattia pneumonia and serratia UTI.   - f/u ICU survivorship clinic after discharge  -encourage IS  -monitor respiratory status  -oxygen prn     Debility- in setting of prolonged icu stay with illness documented as above. RUE weakness ? Related to positioning while intubated.   -PT/OT     ICU delirium  sedated for weeks so likely will benefit from slow wean of benzos and  opaites Started wean 7/29 as below and tolerating well. Ativan discontinued 8/2. Oxycodone discontinued 8/3, still on Seroquel and Trazodone.  - taper as appropriate  -continue seroquel        Suspected Obstructive Sleep Apnea  After weaning from O2,  continued to desat while in deep sleep requiring O2. Pt tried BiPAP/CPAP but found uncomfortable, tolerating for short time. Overnight 8/1 desat to 70's and bradycardic to 30's associated with apnea. Recovered quickly when awoken, wore CPAP for a few hours. Per discharge team likely has longstanding JEROMY  - Refer to sleep medicine as outpatient  -monitor for symptoms-  consider overnight oximetry study for home oxygen      A fib:   Developed A fib with RVR when critically ill. Resolved with amiodarone.brief self-resolving a fib/flutter starting overnight 7/31. Started metoprolol 25 mg two times a day 8/1 , HR in 60s to 110, BP slightly high, may need med adjustment. - No indication for anticoagulation for A fib at this time.  -continue metoprolol  -monitor heart rate/bp titrate as indicated.     Scrotal edema  Marked scrotal edema, preventing removal of barlow-removed prior to ARU admission.   -monitor pvrs in setting of recent barlow removal (patient/rn say today)  -continue Lasix 20mg daily for 10 days   -monitor edema       1. Adjustment to disability:  Monitor mood  2. FEN: reg  3. Bowel: continent  4. Bladder: continent  5. DVT Prophylaxis: lovenox  6. GI Prophylaxis: reg diet  7. Code: full  8. Disposition: home  9. ELOS: ~10 days  10. Follow up Appointments on Discharge: icu survivorship clinic, pcp, sleep medicine referral         Patient seen and discussed with Dr. Lind  PM&R Staff Physician    Sarika Cadet PA-C  Physical Medicine & Rehabilitation   Pager: [phone]

## 2020-08-05 NOTE — PLAN OF CARE
Patient has participated in therapies today, and has been occupied much of the day.  He notes that he does not have the leg strength that he expects.  Demonstrating good appetite, and desire to eat.  Able to use call light and make needs known.

## 2020-08-05 NOTE — PLAN OF CARE
Heart Rate Reserve Calculations:     Max HR = 164 bpm  Resting HR = 105 bpm    50% HRR = 135 bpm  70% HRR = 147 bpm (do not exceed with exercise)

## 2020-08-05 NOTE — PROVIDER NOTIFICATION
Patient triggered Sepsis alert, Lactic 2.8, oral temp 99.9, , MD notified, face to face to patient, PRN tylenol and 1,000mL bolus given. 2100 lactic acid recheck.    Addendum: Repeat lactic 2.8, MD notified, NS@50mL/hr and repeat lactic ordered.

## 2020-08-05 NOTE — PROGRESS NOTES
"   08/05/20 0830   Quick Adds   Type of Visit Initial PT Evaluation   Living Environment   Lives With spouse;child(reno), adult   Living Arrangements house  ([place])   Home Accessibility stairs to enter home   Number of Stairs, Main Entrance 1   Stair Railings, Main Entrance none   Transportation Anticipated family or friend will provide   Living Environment Comment Pt lives in [place] home with good social support.   Self-Care   Usual Activity Tolerance good   Current Activity Tolerance fair   Equipment Currently Used at Home walker, rolling   Activity/Exercise/Self-Care Comment PT: was a  in past   Functional Level Prior   Ambulation 0-->independent   Transferring 0-->independent   Toileting 0-->independent   Bathing 0-->independent   Communication 0-->understands/communicates without difficulty   Swallowing 0-->swallows foods/liquids without difficulty   Cognition 0 - no cognition issues reported   Fall history within last six months no   Which of the above functional risks had a recent onset or change? ambulation;transferring   Prior Functional Level Comment PT: pt was IND with all activities prior to hospitalization and reports good health prior with few medical problems d/t minimal visits to the MD. Pt works in maintenance at a [employer] and reports \"lots\" of exercise history, but did not expand.   General Information   Onset of Illness/Injury or Date of Surgery - Date 06/22/20   Referring Physician Diogo   Patient/Family Goals Statement to improve walking to return to home and work ASAP   Pertinent History of Current Problem (include personal factors and/or comorbidities that impact the POC) PMH: few documented, hostpial stay complicated by ZHEN, pneumonia, UTI, delirium, A-fib RVR, and malnutrition.    Heart Disease Risk Factors Overweight;Gender;Age   General Observations Pt demonstrates decreased activity tolerance, fatigues easily, BUE tremors worsening with fatigue    Cognitive " Status Examination   Orientation orientation to person, place and time   Level of Consciousness alert   Follows Commands and Answers Questions 100% of the time;able to follow multistep instructions   Personal Safety and Judgment intact   Memory intact   Cognitive Comment PT: pt appropriate and responding to all questions and cues.   Pain Assessment   Patient Currently in Pain Yes, see Vital Sign flowsheet  (Pain reported in back, TTP in posterior L hip)   Posture    Posture Forward head position;Protracted shoulders   Range of Motion (ROM)   ROM Comment PT: BUE/BLE ROM WNL, limited extension lumbar ROM.   MMT: Shoulder   Shoulder ABduction - Left Side (5/5) normal,left   Shoulder ABduction - Right Side (4-/5) good minus, right   MMT: Elbow/Forearm, Rehab Eval   Elbow Flexion - Left Side (5/5) normal,left   Elbow Extension - Left Side (5/5) normal,left   Elbow Flexion - Right Side (3/5) fair, right   Elbow Extension - Right Side (3/5) fair, right   MMT: Hand   Hand Muscle Testing Results Diminished R  strength    MMT: Hip, Rehab Eval   Hip Flexion - Left Side (4/5) good, left   Hip ABduction - Left Side (5/5) normal,left   Hip ADduction - Left Side (5/5) normal,left   Hip Flexion - Right Side (4-/5) good minus, right   Hip ABduction - Right Side (4-/5) good minus, right   Hip ADduction - Right Side (4-/5) good minus, right   MMT: Knee, Rehab Eval   Knee Extension - Left Side (5/5) normal,left   Knee Extension - Right Side (4-/5) good minus, right   MMT: Ankle, Rehab Eval   Ankle Dorsiflexion - Left Side 5/5) normal,left   Ankle Plantarflexion - Left Side 5/5) normal,left   Ankle Dorsiflexion - Right Side (4-/5) good minus, left   Ankle Plantarflexion - Right Side 5/5) normal,left   ARC Assessment Only   Acute Rehab Functional Assessment See IP Rehab Daily Documentation Flowsheet for Functional Mobility/ADL Assessment   Balance   Balance Comments PT: Pt reports poor balance, demo ability to stand w/o BUE support  with wide KODI. Demo unsteadiness and asked to sit after 30 s   Sensory Examination   Sensory Perception Comments Pt reports diminished sensation in L L2/L3 dermatome. Findings consistent with pirformis compression syndrome.   Sensation Light Touch   LLE w/i normal limits   RLE mild impairment   Coordination   Coordination no deficits were identified   Muscle Tone   Muscle Tone no deficits were identified   General Therapy Interventions   Planned Therapy Interventions balance training;bed mobility training;gait training;manual therapy;motor coordination training;neuromuscular re-education;ROM;strengthening;stretching;transfer training;risk factor education;home program guidelines;progressive activity/exercise   Clinical Impression   Criteria for Skilled Therapeutic Intervention yes, treatment indicated   PT Diagnosis Decreased activity tolerance, UE/LE weakness and deconditioning 2/2 COVID-19 infection and complicated hospital course.   Influenced by the following impairments See clinical impression comments   Functional limitations due to impairments See clinical impression comments   Clinical Presentation Evolving/Changing   Clinical Presentation Rationale Pt presents following COVID-19 infection and complicated hospital stay including: ZHEN, pneumonia, UTI, delirium, A-Fib, and malnutrition   Clinical Decision Making (Complexity) Moderate complexity   Therapy Frequency Daily   Predicted Duration of Therapy Intervention (days/wks) 10 days   Anticipated Equipment Needs at Discharge other (see comments)  (None)   Anticipated Discharge Disposition Home with Assist;Home with Home Therapy   Risk & Benefits of therapy have been explained Yes   Patient, Family & other staff in agreement with plan of care Yes   Clinical Impression Comments Pt presents following COVID-19 infection and subsequent clinical sequelae including: decreased activity tolerance, increased HR with activity. Condition is complicated by musculoskeletal  impairments leading to significant RUE and LLE weakness with corresponding paresthesia possibly correlated with extended prone positioning. Pt is SBA for transfers at current status, but fatigues easily limiting his tolerance for increased ambulation.   Total Evaluation Time   Total Evaluation Time (Minutes) 30

## 2020-08-05 NOTE — PHARMACY-MEDICATION REGIMEN REVIEW
Pharmacy Medication Regimen Review  Tobias Palmer is a 54 year old male who is currently in the Acute Rehab Unit.    Assessment: All medications have an appropriate indications, durations and no unnecessary use was found    Plan:   1. Continue current medications as ordered  2. Continue to wean seroquel as able.    Attending provider will be sent this note for review.  If there are any emergent issues noted above, pharmacist will contact provider directly by phone.      Pharmacy will periodically review the resident's medication regimen for any PRN medications not administered in > 72 hours and discontinue them. The pharmacist will discuss gradual dose reductions of psychopharmacologic medications with interdisciplinary team on a regular basis.    Please contact pharmacy if the above does not answer specific medication questions/concerns.    Background:  A pharmacist has reviewed all medications and pertinent medical history today.  Medications were reviewed for appropriate use and any irregularities found are listed with recommendations.      Current Facility-Administered Medications:      acetaminophen (TYLENOL) tablet 325-975 mg, 325-975 mg, Oral, Q6H PRN, Raphael Lind DO     albuterol (PROAIR HFA/PROVENTIL HFA/VENTOLIN HFA) 108 (90 Base) MCG/ACT inhaler 2 puff, 2 puff, Inhalation, Q6H PRN, Sarika Cadet PA     benzocaine-menthol (CEPACOL) 15-3.6 MG lozenge 1 lozenge, 1 lozenge, Buccal, Q1H PRN, Sarika Cadet PA     benzonatate (TESSALON) capsule 100 mg, 100 mg, Oral, TID PRN, Sarika Cadet PA     chlorhexidine (PERIDEX) 0.12 % solution 15 mL, 15 mL, Swish & Spit, BID, Sarika Cadet PA, 15 mL at 08/05/20 0835     enoxaparin ANTICOAGULANT (LOVENOX) injection 40 mg, 40 mg, Subcutaneous, Q12H, Sarika Cadet PA, 40 mg at 08/05/20 0834     furosemide (LASIX) tablet 20 mg, 20 mg, Oral, Daily, Sarika Cadet PA, 20 mg at 08/05/20 0834     lactobacillus rhamnosus (GG)  (CULTURELL) capsule 1 capsule, 1 capsule, Oral, BID, Sarika Cadet PA, 1 capsule at 08/05/20 0834     melatonin tablet 3 mg, 3 mg, Oral, At Bedtime, Sarika Cadet PA, 3 mg at 08/04/20 2232     metoprolol tartrate (LOPRESSOR) tablet 25 mg, 25 mg, Oral, BID, Sarika Cadet PA, 25 mg at 08/05/20 0834     multivitamin w/minerals (THERA-VIT-M) tablet 1 tablet, 1 tablet, Oral, Daily, Sarika Cadet PA, 1 tablet at 08/05/20 0834     Patient is already receiving anticoagulation with heparin, enoxaparin (LOVENOX), warfarin (COUMADIN)  or other anticoagulant medication, , Does not apply, Continuous PRN, Raphael Lind DO     psyllium (METAMUCIL/KONSYL) Packet 1 packet, 1 packet, Oral, Daily, Raphael Lind DO, 1 packet at 08/05/20 0834     QUEtiapine (SEROquel) tablet 25 mg, 25 mg, Oral, BID, Sarika Cadet PA, 25 mg at 08/05/20 0834     senna-docusate (SENOKOT-S/PERICOLACE) 8.6-50 MG per tablet 1-4 tablet, 1-4 tablet, Oral, BID PRN, Raphael Lind DO     traZODone (DESYREL) tablet 50 mg, 50 mg, Oral, At Bedtime, Sarika Cadet PA, 50 mg at 08/04/20 2232  No current outpatient prescriptions on file.   PMH: None significant prior to admission for Covid PNA

## 2020-08-05 NOTE — PLAN OF CARE
OT: Initial evaluation started. Pt completing bed mobility with A x 1 and pivot transfer EOB <> wheelchair and commode with A x 2 using fww. Pt able to sit upright unsupported for several minutes with supervision. Pt with very limited shoulder ROM L>R. Recommend w/c based mobility at this time until further assessed by therapies. Issued equipment needed for safe transfers and toileting.

## 2020-08-05 NOTE — PROGRESS NOTES
Sepsis Evaluation Progress Note    I was called to see Tobias Palmer due to abnormal vital signs triggering the Sepsis SIRS screening alert. He is not known to have an infection.     Physical Exam   Vital Signs:  Temp: 99.9  F (37.7  C) Temp src: Oral BP: 123/77 Pulse: 107   Resp: 22 SpO2: 95 % O2 Device: None (Room air)      Lab:  Lactic Acid   Date Value Ref Range Status   06/21/2020 1.2 0.7 - 2.0 mmol/L Final     Lactate for Sepsis Protocol   Date Value Ref Range Status   08/05/2020 2.8 (H) 0.7 - 2.0 mmol/L Final       The patient is at baseline mental status.   Sitting up and appears very comfortable   specifically denies chest pain/ SOB, chills, abdominal pain or urinary issues   The rest of their physical exam is significant for mild tachycardia     Assessment & Plan   NO EVIDENCE OF SEPSIS at this time.  Vital sign, physical exam, and lab findings are likely due to Low oral intake .    Disposition: The patient will remain on the current unit. We will continue to monitor this patient closely.   1 lts IV bolus   recheck lactate after bolus. Ordered..  Haleigh Valadez MD    Sepsis Criteria   Sepsis: 2+ SIRS criteria due to infection  Severe Sepsis: Sepsis AND 1+ new sign of acute organ dysfunction (Note: lactate >2 is organ dysfunction)  Septic Shock: Sepsis AND hypotension despite volume resuscitation with 30 ml/kg crystalloid

## 2020-08-06 ENCOUNTER — APPOINTMENT (OUTPATIENT)
Dept: PHYSICAL THERAPY | Facility: CLINIC | Age: 55
End: 2020-08-06
Payer: MEDICAID

## 2020-08-06 ENCOUNTER — APPOINTMENT (OUTPATIENT)
Dept: OCCUPATIONAL THERAPY | Facility: CLINIC | Age: 55
End: 2020-08-06
Payer: MEDICAID

## 2020-08-06 ENCOUNTER — APPOINTMENT (OUTPATIENT)
Dept: SPEECH THERAPY | Facility: CLINIC | Age: 55
End: 2020-08-06
Payer: MEDICAID

## 2020-08-06 LAB
ANION GAP SERPL CALCULATED.3IONS-SCNC: 7 MMOL/L (ref 3–14)
BUN SERPL-MCNC: 9 MG/DL (ref 7–30)
CALCIUM SERPL-MCNC: 8.3 MG/DL (ref 8.5–10.1)
CHLORIDE SERPL-SCNC: 110 MMOL/L (ref 94–109)
CO2 SERPL-SCNC: 23 MMOL/L (ref 20–32)
CREAT SERPL-MCNC: 0.56 MG/DL (ref 0.66–1.25)
GFR SERPL CREATININE-BSD FRML MDRD: >90 ML/MIN/{1.73_M2}
GLUCOSE SERPL-MCNC: 93 MG/DL (ref 70–99)
LACTATE BLD-SCNC: 1.4 MMOL/L (ref 0.7–2)
POTASSIUM SERPL-SCNC: 3.6 MMOL/L (ref 3.4–5.3)
SODIUM SERPL-SCNC: 140 MMOL/L (ref 133–144)

## 2020-08-06 PROCEDURE — 97530 THERAPEUTIC ACTIVITIES: CPT | Mod: GP | Performed by: REHABILITATION PRACTITIONER

## 2020-08-06 PROCEDURE — 36415 COLL VENOUS BLD VENIPUNCTURE: CPT | Performed by: INTERNAL MEDICINE

## 2020-08-06 PROCEDURE — 97110 THERAPEUTIC EXERCISES: CPT | Mod: GP | Performed by: REHABILITATION PRACTITIONER

## 2020-08-06 PROCEDURE — 97535 SELF CARE MNGMENT TRAINING: CPT | Mod: GO

## 2020-08-06 PROCEDURE — 97116 GAIT TRAINING THERAPY: CPT | Mod: GP | Performed by: REHABILITATION PRACTITIONER

## 2020-08-06 PROCEDURE — 36415 COLL VENOUS BLD VENIPUNCTURE: CPT | Performed by: PHYSICIAN ASSISTANT

## 2020-08-06 PROCEDURE — 97110 THERAPEUTIC EXERCISES: CPT | Mod: GO

## 2020-08-06 PROCEDURE — 25000132 ZZH RX MED GY IP 250 OP 250 PS 637: Performed by: PHYSICIAN ASSISTANT

## 2020-08-06 PROCEDURE — 83605 ASSAY OF LACTIC ACID: CPT | Performed by: INTERNAL MEDICINE

## 2020-08-06 PROCEDURE — 25000128 H RX IP 250 OP 636: Performed by: PHYSICIAN ASSISTANT

## 2020-08-06 PROCEDURE — 80048 BASIC METABOLIC PNL TOTAL CA: CPT | Performed by: PHYSICIAN ASSISTANT

## 2020-08-06 PROCEDURE — 12800006 ZZH R&B REHAB

## 2020-08-06 PROCEDURE — 92610 EVALUATE SWALLOWING FUNCTION: CPT | Mod: GN

## 2020-08-06 RX ADMIN — MULTIPLE VITAMINS W/ MINERALS TAB 1 TABLET: TAB at 08:58

## 2020-08-06 RX ADMIN — CHLORHEXIDINE GLUCONATE 0.12% ORAL RINSE 15 ML: 1.2 LIQUID ORAL at 20:19

## 2020-08-06 RX ADMIN — Medication 1 CAPSULE: at 20:19

## 2020-08-06 RX ADMIN — ENOXAPARIN SODIUM 40 MG: 40 INJECTION SUBCUTANEOUS at 20:19

## 2020-08-06 RX ADMIN — MELATONIN TAB 3 MG 3 MG: 3 TAB at 21:20

## 2020-08-06 RX ADMIN — METOPROLOL TARTRATE 25 MG: 25 TABLET, FILM COATED ORAL at 20:19

## 2020-08-06 RX ADMIN — QUETIAPINE FUMARATE 25 MG: 25 TABLET ORAL at 08:58

## 2020-08-06 RX ADMIN — TRAZODONE HYDROCHLORIDE 50 MG: 50 TABLET ORAL at 21:20

## 2020-08-06 RX ADMIN — METOPROLOL TARTRATE 25 MG: 25 TABLET, FILM COATED ORAL at 08:58

## 2020-08-06 RX ADMIN — CHLORHEXIDINE GLUCONATE 0.12% ORAL RINSE 15 ML: 1.2 LIQUID ORAL at 08:58

## 2020-08-06 RX ADMIN — QUETIAPINE FUMARATE 25 MG: 25 TABLET ORAL at 20:19

## 2020-08-06 RX ADMIN — ALBUTEROL SULFATE 2 PUFF: 108 INHALANT RESPIRATORY (INHALATION) at 08:58

## 2020-08-06 RX ADMIN — Medication 1 CAPSULE: at 08:58

## 2020-08-06 RX ADMIN — ENOXAPARIN SODIUM 40 MG: 40 INJECTION SUBCUTANEOUS at 08:58

## 2020-08-06 ASSESSMENT — MIFFLIN-ST. JEOR: SCORE: 1774.75

## 2020-08-06 NOTE — PLAN OF CARE
Discharge Planner OT   Patient plan for discharge: Home with assist, will not have HH or OP d/t EMA. Needs family training prior to discharge  Current status: CGA-min A with transfers, wc dependent for mobility d/t weakness. Mod A LB dressing and bathing, min A with UB dressing and SBA-min A grooming. Limited by deconditioning, weakness, LLE weakness, and RUE injury likely from extended prone positioning. Need to progress to FWW based mobility to return home with family assist 24/7. No supplemental 02 needs, cognitive intact, pt anxious regarding new status  Barriers to return to prior living situation: RUE weakness, deconditioning, fear and anxiety  Recommendations for discharge: Will likely need shower chair, FWW, may need wc pending mobility progression       Entered by: Sarika Paige 08/06/2020 1:08 PM

## 2020-08-06 NOTE — PROGRESS NOTES
"Lactate still at 2.8  /77 (BP Location: Left arm)   Pulse 104   Temp 98  F (36.7  C) (Oral)   Resp 22   Ht 1.575 m (5' 2\")   Wt 105.8 kg (233 lb 4.8 oz)   SpO2 94%   BMI 42.67 kg/m     No foci of infection  Will start maintenance fluid and recheck lactate in 2 hrs    Dr ABHIJEET Ricardo MD, [facility]  Hospitalist ( Internal medicine)  Pager: [phone]    "

## 2020-08-06 NOTE — PLAN OF CARE
Alert and oriented X 4. Able to make needs known. Call light in reach. Offers no C/O pain. Denies chest pain or SOB. Up to BR with assist of 2 and walker. Continent of bowel and bladder. Last BM today. PIV patent, IV fluids infusing as ordered. Appears to be sleeping during rounds. Continue with plan of care.

## 2020-08-06 NOTE — PLAN OF CARE
A/Ox4, Kiswahili and English speaking, A1-2, ambulated to bathroom to void with mod assist and walker, max temp 99.9, tachy, BP stable, SIRS alert this shift, Lactic 2.8, MD notified, new PIV placed, 1,000mL bolus given X1, PRN tylenol given X1, repeat lactic 2.8, MD notified, NS@50mL/hr started, repeat lactic ordered for 0015. Continent of B/B, tolerating regular/low lactose diet and thin liquids, LS clear/dim, no c/o pain, CP or SOB, pills whole with water. POC reviewed with patient, questions answered.

## 2020-08-06 NOTE — PLAN OF CARE
Pt had a good day in therapy today; had some shortness of breath this morning in PT with a slight dip in O2 sats, that corrected itself pretty quickly. Went over Albuterol inhaler with patient and had him use it due to the shortness of breath. He may need reinforcement on that education but pt didn't have further shortness of breath today so inhaler hasn't been needed again. No complaints of pain or nausea; tolerating his diet without issue. Meals ordered for tomorrow. Continent of bowel and bladder; LBM early this morning.

## 2020-08-06 NOTE — PROGRESS NOTES
08/06/20 1200   General Information   Onset Date 06/21/20   Start of Care Date 08/06/20   Referring Physician Dr. Lind   Patient Profile Review/OT: Additional Occupational Profile Info See Profile for full history and prior level of function   Patient/Family Goals Statement Be able to get home   Swallowing Evaluation Bedside swallow evaluation   Mode of current nutrition Oral diet   Type of oral diet Regular;Thin liquid   Comments Patient was seen by speech therapy during acute care hospitalization for dysphagia management.  Patient has been advanced to regular textures and thin liquids just prior to facility admission   Clinical Swallow Evaluation   Oral Musculature generally intact   Structural Abnormalities none present   Dentition present and adequate   Mucosal Quality good   Mandibular Strength and Mobility intact   Oral Labial Strength and Mobility WFL   Lingual Strength and Mobility WFL   Buccal Strength and Mobility intact   Laryngeal Function Cough;Throat clear;Swallow   Clinical Swallow Eval: Thin Liquid Texture Trial   Mode of Presentation, Thin Liquids cup;self-fed   Volume of Liquid or Food Presented single swallows   Oral Phase of Swallow WFL   Pharyngeal Phase of Swallow intact   Diagnostic Statement Tolerated without difficulties noted.    Clinical Swallow Eval: Solid Food Texture Trial   Mode of Presentation, Solid spoon;self-fed   Volume of Solid Food Presented single bites   Oral Phase, Solid WFL   Pharyngeal Phase, Solid intact   Diagnostic Statement Tolerating solid food textures without difficulties noted. Going at appropriate rate and handled distractions.    Swallow Eval: Clinical Impressions   Skilled Criteria for Therapy Intervention Skilled criteria met.  Treatment indicated.   Functional Assessment Scale (FAS) 7   Treatment Diagnosis Swallow function is WFL   Diet texture recommendations Regular diet;Thin liquids   Therapy Frequency Daily   Predicted Duration of Therapy  Intervention (days/wks) eval only   Anticipated Discharge Disposition home   Risks and Benefits of Treatment have been explained. Yes   Patient, family and/or staff in agreement with Plan of Care Yes   Clinical Impression Comments Patient able to tolerate regular texture diet and thin liquids without any overt signs and symptoms of aspiration or changes in vocal quality.  Did not seem to be affected by conversation and able to handle distractions appropriately.  No ongoing dysphagia needs identified at this time   Total Evaluation Time   Total Evaluation Time (Minutes) 30

## 2020-08-06 NOTE — PLAN OF CARE
Discharge Planner PT   Patient plan for discharge: home   Current status: pt up in chair at start of PT session willing to work with PT. Pt demo seated and standing TE x 8-12, short amb with WW. And Stand pivot transfers with WW needing CGA to slight min A for all,   Barriers to return to prior living situation: weakness, fatigue, SOB   Recommendations for discharge: PT - per plan established by the Physical Therapist, according to functional mobility the  discharge recommendation is home with assist DME needs, date of 8/15.   Rationale for recommendations: this date may needing to be pushed back few day for functional safety, due to slow progress.        Entered by: Vladislav Blackwell 08/06/2020 11:36 AM

## 2020-08-06 NOTE — PROGRESS NOTES
[facility]   Acute Rehabilitation Unit  Daily progress note    interval history  Tobias Palmer was seen resting in bed, reports dizzy and fatigued last night felt better after fluid and feeling well today. Denies n/v/d, sob, headaches, dizziness, fevers. Denies sob at time of visit. Ongoing dry cough. Will discontinue lasix given prescribed for edema with need for fluid bolus last evening, monitor volume status.     SlP: Patient able to tolerate regular texture diet and thin liquids without any overt signs and symptoms of aspiration or changes in vocal quality.  Did not seem to be affected by conversation and able to handle distractions appropriately.  No ongoing dysphagia needs identified at this time    PT: pt up in chair at start of PT session willing to work with PT. Pt demo seated and standing TE x 8-12, short amb with WW. And Stand pivot transfers with WW needing CGA to slight min A for all,     OT: CGA-min A with transfers, wc dependent for mobility d/t weakness. Mod A LB dressing and bathing, min A with UB dressing and SBA-min A grooming. Limited by deconditioning, weakness, LLE weakness, and RUE injury likely from extended prone positioning. Need to progress to FWW based mobility to return home with family assist 24/7. No supplemental 02 needs, cognitive intact, pt anxious regarding new status    medications    chlorhexidine  15 mL Swish & Spit BID     enoxaparin ANTICOAGULANT  40 mg Subcutaneous Q12H     lactobacillus rhamnosus (GG)  1 capsule Oral BID     melatonin  3 mg Oral At Bedtime     metoprolol tartrate  25 mg Oral BID     multivitamin w/minerals  1 tablet Oral Daily     psyllium  1 packet Oral Daily     QUEtiapine  25 mg Oral BID     traZODone  50 mg Oral At Bedtime        acetaminophen, albuterol, sore throat lozenge, benzonatate, - MEDICATION INSTRUCTIONS -, senna-docusate     physical exam  /82 (BP Location: Right arm)   Pulse 91   Temp 98.9  " F (37.2  C) (Oral)   Resp 20   Ht 1.575 m (5' 2\")   Wt 106.5 kg (234 lb 14.4 oz)   SpO2 93%   BMI 42.96 kg/m    Gen: awake alert  HEENT: mmm  Cardio: rrr  Pulm: non labored clear diminished  Abd: soft obese  Ext: warm dry without significant edema.   Neuro/MSK: alert speech clear  Moves all extremities.     labs  Lab Results   Component Value Date    WBC 7.2 08/05/2020     Lab Results   Component Value Date    RBC 4.32 08/05/2020     Lab Results   Component Value Date    HGB 12.8 08/05/2020     Lab Results   Component Value Date    HCT 39.9 08/05/2020     Lab Results   Component Value Date    MCV 92 08/05/2020     Lab Results   Component Value Date    MCH 29.6 08/05/2020     Lab Results   Component Value Date    MCHC 32.1 08/05/2020     Lab Results   Component Value Date    RDW 16.3 08/05/2020     Lab Results   Component Value Date     08/05/2020     Last Comprehensive Metabolic Panel:  Sodium   Date Value Ref Range Status   08/06/2020 140 133 - 144 mmol/L Final     Potassium   Date Value Ref Range Status   08/06/2020 3.6 3.4 - 5.3 mmol/L Final     Chloride   Date Value Ref Range Status   08/06/2020 110 (H) 94 - 109 mmol/L Final     Carbon Dioxide   Date Value Ref Range Status   08/06/2020 23 20 - 32 mmol/L Final     Anion Gap   Date Value Ref Range Status   08/06/2020 7 3 - 14 mmol/L Final     Glucose   Date Value Ref Range Status   08/06/2020 93 70 - 99 mg/dL Final     Urea Nitrogen   Date Value Ref Range Status   08/06/2020 9 7 - 30 mg/dL Final     Creatinine   Date Value Ref Range Status   08/06/2020 0.56 (L) 0.66 - 1.25 mg/dL Final     GFR Estimate   Date Value Ref Range Status   08/06/2020 >90 >60 mL/min/[1.73_m2] Final     Comment:     Non  GFR Calc  Starting 12/18/2018, serum creatinine based estimated GFR (eGFR) will be   calculated using the Chronic Kidney Disease Epidemiology Collaboration   (CKD-EPI) equation.       Calcium   Date Value Ref Range Status   08/06/2020 8.3 (L) " 8.5 - 10.1 mg/dL Final         Rehabilitation - continue comprehensive acute inpatient rehabilitation program with multidisciplinary approach including therapies, rehab nursing, and physiatry following. See interval history for updates.      assessment and plan  Tobias Palmer is a 54 year old man who presented 6/21 to [facility] with fever, cough, sob, with diagnosis of COVID 19, progressive hypoxic respiratory failure intubated and transferred to [facility] 7/22 for ongoing management. Hospitalization complicated by multiple infections, icu delirium,  A-fib with RVR,  Impaired oral intake, scrotal edema, and debility. Admitted to acute rehab 8/4/20 for ongoing rehabilitation and medical management.      Confirmed COVID-19 infection  Onset of COVID symptoms ~6/18/20 Date of positive test results 6/21/20   Research study Yes: [facility] Research study protocol/COVID medications: convalescent plasma, remdesivir, tocilizumab  Acute Hypoxic Respiratory Failure secondary to COVID-19 infection  Viral Pneumonia secondary to COVID-19 infection  Acute Respiratory Distress Syndrome (resolved)   Bacterial Superinfection (resolved)   admitted to [facility] on 6/21/2020 after presenting with fever, cough and dyspnea. He was intubated and transferred to [place] on 6/22. Imaging revealed diffuse bilateral infiltrates.  He required flolan. He remained intubated until 7/26 at which time he was successfully extubated.  stable on RA with exception of some desaturation with sleep, c/w JEROMY (see below): Multiple bacterial infections and septic shock thoughout his course including MSSA and serattia pneumonia and serratia UTI.   - f/u ICU survivorship clinic after discharge  -encourage IS  -monitor respiratory status  -oxygen prn     Debility- in setting of prolonged icu stay with illness documented as above. RUE weakness ? Related to positioning while intubated.   -PT/OT     ICU delirium  sedated for  weeks so likely will benefit from slow wean of benzos and opaites Started wean 7/29 as below and tolerating well. Ativan discontinued 8/2. Oxycodone discontinued 8/3, still on Seroquel and Trazodone.  - taper as appropriate  -continue seroquel given anxiety and insomnia.       Suspected Obstructive Sleep Apnea  After weaning from O2,  continued to desat while in deep sleep requiring O2. Pt tried BiPAP/CPAP but found uncomfortable, tolerating for short time. Overnight 8/1 desat to 70's and bradycardic to 30's associated with apnea. Recovered quickly when awoken, wore CPAP for a few hours. Per discharge team likely has longstanding JEROMY  - Refer to sleep medicine as outpatient  -monitor for symptoms-  consider overnight oximetry study for home oxygen      A fib:   Developed A fib with RVR when critically ill. Resolved with amiodarone.brief self-resolving a fib/flutter starting overnight 7/31. Started metoprolol 25 mg two times a day 8/1 , HR in 60s to 110, BP slightly high, may need med adjustment. - No indication for anticoagulation for A fib at this time.  -continue metoprolol  -monitor heart rate/bp titrate as indicated.     Scrotal edema  Marked scrotal edema, preventing removal of barlow-removed prior to ARU admission.  -monitor edema       1. Adjustment to disability:  Monitor mood  2. FEN: reg  3. Bowel: continent  4. Bladder: continent  5. DVT Prophylaxis: lovenox  6. GI Prophylaxis: reg diet  7. Code: full  8. Disposition: home  9. ELOS: ~10 days  10. Follow up Appointments on Discharge: icu survivorship clinic, pcp, sleep medicine referral         Patient seen and discussed with Dr. Lind  PM&R Staff Physician    Sarika Cadet PA-C  Physical Medicine & Rehabilitation   Pager: [phone]

## 2020-08-06 NOTE — PROGRESS NOTES
08/04/20 1500   Quick Adds   Type of Visit Initial Occupational Therapy Evaluation   Living Environment   Lives With child(reno), adult;grandchild(reno);spouse   Living Arrangements house   Home Accessibility stairs to enter home  (1 MARY)   Transportation Anticipated car, drives self;family or friend will provide   Living Environment Comment OT: Lives with family in one story house, doesn't use basement. Tub shower with grab bar   Self-Care   Usual Activity Tolerance good   Current Activity Tolerance poor   Regular Exercise No   Equipment Currently Used at Home grab bar, tub/shower  (has FWW that mother-in-law used previously)   Activity/Exercise/Self-Care Comment OT: pt was working maintenance at hotel and cook prior. Was  in the past. Likes to go on walks with granddaughter whenever possible   Functional Level   Ambulation 0-->independent   Transferring 0-->independent   Toileting 0-->independent   Bathing 0-->independent   Dressing 0-->independent   Eating 0-->independent   Communication 0-->understands/communicates without difficulty   Swallowing 0-->swallows foods/liquids without difficulty   Cognition 0 - no cognition issues reported   Fall history within last six months no   Which of the above functional risks had a recent onset or change? none   Prior Functional Level Comment PT: pt was IND with all activities prior to hospitalization and reports good health prior with few medical problems d/t minimal visits to the MD. Spouse performs most IADLs at home, spouse driving and working full time at baseline       Present no   Language Mongolian and English   General Information   Onset of Illness/Injury or Date of Surgery - Date 06/21/20   Referring Physician Raphael Lind, DO   Patient/Family Goals Statement To walk again and go home   Additional Occupational Profile Info/Pertinent History of Current Problem Tobias Palmer is a 54 year old man who presented  "to ED with fever, sob, cough, and headache, with progressive acute respiratory distress was admitted to ICU,COVID 19 test returned positive. Was transferred to [facility] 6/22/20 for ongoing COVID-19 management.  He was intubated and required flolan, he was successfully extubated 7/26/20.  Hospital stay additionally complicated by ZHEN, serattia pneumonia and serratia UTI, icu delirium, A-fib with RVR when critically ill and brief self resolving episode 7/31. Impaired oral intake, malnutrition s/p tf placement removed 7/31, scrotal edema, and debility.    Precautions/Limitations fall precautions   Weight-Bearing Status - LUE full weight-bearing   Weight-Bearing Status - RUE full weight-bearing   Weight-Bearing Status - LLE full weight-bearing   Weight-Bearing Status - RLE full weight-bearing   General Observations SOB with activity, no supplemental 02 needs   General Info Comments Heart Rate Reserve Guide: 50%= 135 bpm, 70%= 141 bpm   Cognitive Status Examination   Orientation orientation to person, place and time   Level of Consciousness alert   Cognitive Comment S/p ICU delirium following 5 week intubation. Denies cognitive changes during eval, will further assess in future tx. Difficulty assessing cog d/t English as second language   Visual Perception   Visual Perception No deficits were identified   Sensory Examination   Sensory Comments Numbness in L lateral thigh, dull touch impaired.    Pain Assessment   Patient Currently in Pain Yes, see Vital Sign flowsheet  (5/10 R hand \"pain in knuckles\" when making fist )   Range of Motion (ROM)   ROM Comment LUE AROM WNL. RUE ROM very impaired proximally likely d/t being prone for extended time during intubation. R shoulder flex 30* AROM, shoulder ext WFL, shoulder abd 60*. R hand opposition to 4th digit   Strength   Strength Comments LUE 3+/5 proximally, 4/5 distally   Hand Strength   Hand Strength Comments R hand poor, L hand fair+   Coordination   Upper " Extremity Coordination Right UE impaired   Functional Limitations Fine motor ADL performance impaired;Object transport impaired   Coordination Comments Performing self feeding, use of cell phone, ADLs, etc with nondominant L hand d/t RUE/hand injury   ARC Assessment Only   Acute Rehab Functional Assessment See IP Rehab Daily Documentation Flowsheet for Functional Mobility/ADL Assessment   Transfer Skill: Bed to Chair/Chair to Bed   Physical Assist/Nonphysical Assist: Bed to Chair 2 persons   Weight-Bearing Restrictions full weight-bearing   Assistive Device - Transfer Skill Bed to Chair Chair to Bed Rehab Eval rolling walker   Transfer Skill: Sit to Stand   Physical Assist/Nonphysical Assist: Sit/Stand 1 person assist   Transfer Skill: Sit to Stand full weight-bearing   Assistive Device for Transfer: Sit/Stand rolling walker   Transfer Skill: Toilet Transfer   Physical Assist/Nonphysical Assist: Toilet 2 persons   Weight-Bearing Restrictions: Toilet full weight-bearing   Assistive Device rolling walker   Activities of Daily Living Analysis   Impairments Contributing to Impaired Activities of Daily Living balance impaired;cognition impaired;coordination impaired;flexibility decreased;motor control impaired;pain;ROM decreased;sensation decreased;strength decreased   General Therapy Interventions   Planned Therapy Interventions ADL retraining;IADL retraining;cognition;balance training;bed mobility training;ROM;strengthening;stretching   Clinical Impression   Criteria for Skilled Therapeutic Interventions Met yes, treatment indicated   OT Diagnosis Functional decline in ADLs and mobility s/p prolonged hospitalization and AHRF secondary to COVID-19   Influenced by the following impairments balance impaired;cognition impaired;coordination impaired;flexibility decreased;motor control impaired;pain;ROM decreased;sensation decreased;strength decreased   Assessment of Occupational Performance 5 or more Performance Deficits    Identified Performance Deficits dressing, toileting, hygiene, grooming, transfers, mobility, meal prep, work, driving   Clinical Decision Making (Complexity) High complexity   Therapy Frequency Daily   Predicted Duration of Therapy Intervention (days/wks) 10-12 days   Anticipated Equipment Needs at Discharge shower chair;wheelchair;other (see comments)   Anticipated Discharge Disposition Home with Assist;Home with Home Therapy   Risks and Benefits of Treatment have been explained. Yes   Patient, Family & other staff in agreement with plan of care Yes   Clinical Impression Comments Pt significantly below baseline d/t deconditioning, decreased strength, RUE injury likely from prolonged prone positioning, and decreased balance. Pt requires skilled OT to address identified deficits to increase IND with self cares and return home with assist. Currently requires Ax2 for transfers, family can provide 24/7 supervision and assist as needed   Total Evaluation Time   Total Evaluation Time (Minutes) 30

## 2020-08-07 ENCOUNTER — APPOINTMENT (OUTPATIENT)
Dept: OCCUPATIONAL THERAPY | Facility: CLINIC | Age: 55
End: 2020-08-07
Payer: MEDICAID

## 2020-08-07 ENCOUNTER — APPOINTMENT (OUTPATIENT)
Dept: PHYSICAL THERAPY | Facility: CLINIC | Age: 55
End: 2020-08-07
Payer: MEDICAID

## 2020-08-07 PROCEDURE — 25000132 ZZH RX MED GY IP 250 OP 250 PS 637: Performed by: PHYSICIAN ASSISTANT

## 2020-08-07 PROCEDURE — 25000128 H RX IP 250 OP 636: Performed by: PHYSICIAN ASSISTANT

## 2020-08-07 PROCEDURE — 97110 THERAPEUTIC EXERCISES: CPT | Mod: GO

## 2020-08-07 PROCEDURE — 97110 THERAPEUTIC EXERCISES: CPT | Mod: GP

## 2020-08-07 PROCEDURE — 25000132 ZZH RX MED GY IP 250 OP 250 PS 637: Performed by: PHYSICAL MEDICINE & REHABILITATION

## 2020-08-07 PROCEDURE — 12800006 ZZH R&B REHAB

## 2020-08-07 PROCEDURE — 97535 SELF CARE MNGMENT TRAINING: CPT | Mod: GO

## 2020-08-07 RX ORDER — QUETIAPINE FUMARATE 25 MG/1
25 TABLET, FILM COATED ORAL AT BEDTIME
Status: DISCONTINUED | OUTPATIENT
Start: 2020-08-07 | End: 2020-08-11

## 2020-08-07 RX ORDER — CODEINE PHOSPHATE AND GUAIFENESIN 10; 100 MG/5ML; MG/5ML
5 SOLUTION ORAL EVERY 4 HOURS PRN
Status: DISCONTINUED | OUTPATIENT
Start: 2020-08-07 | End: 2020-08-18 | Stop reason: HOSPADM

## 2020-08-07 RX ADMIN — METOPROLOL TARTRATE 25 MG: 25 TABLET, FILM COATED ORAL at 08:53

## 2020-08-07 RX ADMIN — QUETIAPINE FUMARATE 25 MG: 25 TABLET ORAL at 21:50

## 2020-08-07 RX ADMIN — CHLORHEXIDINE GLUCONATE 0.12% ORAL RINSE 15 ML: 1.2 LIQUID ORAL at 21:50

## 2020-08-07 RX ADMIN — TRAZODONE HYDROCHLORIDE 50 MG: 50 TABLET ORAL at 21:50

## 2020-08-07 RX ADMIN — ENOXAPARIN SODIUM 40 MG: 40 INJECTION SUBCUTANEOUS at 08:53

## 2020-08-07 RX ADMIN — METOPROLOL TARTRATE 25 MG: 25 TABLET, FILM COATED ORAL at 21:58

## 2020-08-07 RX ADMIN — MELATONIN TAB 3 MG 3 MG: 3 TAB at 21:50

## 2020-08-07 RX ADMIN — ENOXAPARIN SODIUM 40 MG: 40 INJECTION SUBCUTANEOUS at 21:50

## 2020-08-07 RX ADMIN — QUETIAPINE FUMARATE 25 MG: 25 TABLET ORAL at 08:53

## 2020-08-07 RX ADMIN — MULTIPLE VITAMINS W/ MINERALS TAB 1 TABLET: TAB at 08:53

## 2020-08-07 RX ADMIN — CHLORHEXIDINE GLUCONATE 0.12% ORAL RINSE 15 ML: 1.2 LIQUID ORAL at 08:53

## 2020-08-07 RX ADMIN — Medication 1 CAPSULE: at 08:53

## 2020-08-07 RX ADMIN — Medication 1 CAPSULE: at 21:50

## 2020-08-07 ASSESSMENT — MIFFLIN-ST. JEOR: SCORE: 1795.61

## 2020-08-07 NOTE — PLAN OF CARE
Pt alert, oriented, able to express his needs appropriately, no c/o pain and continent of B/B.  Pt slept well and woke early this am.

## 2020-08-07 NOTE — PROGRESS NOTES
"  [facility]   Acute Rehabilitation Unit  Daily progress note    interval history  Tobias Palmer was seen resting in bed denies complaints or concerns today, specifically denies n/v/d, sob, headaches, dizziness, fevers, bowel or bladder concerns.  Reports doing ok. Notes ongoing RUE weakness likely injury related to prone position while intubated. No new concerns/complaints.       medications    chlorhexidine  15 mL Swish & Spit BID     enoxaparin ANTICOAGULANT  40 mg Subcutaneous Q12H     lactobacillus rhamnosus (GG)  1 capsule Oral BID     melatonin  3 mg Oral At Bedtime     metoprolol tartrate  25 mg Oral BID     multivitamin w/minerals  1 tablet Oral Daily     psyllium  1 packet Oral Daily     QUEtiapine  25 mg Oral At Bedtime     traZODone  50 mg Oral At Bedtime        acetaminophen, albuterol, sore throat lozenge, benzonatate, guaiFENesin-codeine, - MEDICATION INSTRUCTIONS -, senna-docusate     physical exam  BP (!) 153/87 (BP Location: Right arm)   Pulse 97   Temp 98.8  F (37.1  C) (Oral)   Resp 18   Ht 1.575 m (5' 2\")   Wt 108.6 kg (239 lb 8 oz)   SpO2 96%   BMI 43.81 kg/m    Gen: awake alert  HEENT: mmm  Cardio: rrr  Pulm: non labored clear diminished  Abd: soft obese  Ext: warm dry without significant edema.   Neuro/MSK: alert speech clear. LE HF 4-/5, ke/kf 4/5, df/pf 4/5, LUE 4/5 throughout.  RUE SF 2/5, ef 4-/5, ee 4-/5.  4-    labs  Lab Results   Component Value Date    WBC 7.2 08/05/2020     Lab Results   Component Value Date    RBC 4.32 08/05/2020     Lab Results   Component Value Date    HGB 12.8 08/05/2020     Lab Results   Component Value Date    HCT 39.9 08/05/2020     Lab Results   Component Value Date    MCV 92 08/05/2020     Lab Results   Component Value Date    MCH 29.6 08/05/2020     Lab Results   Component Value Date    MCHC 32.1 08/05/2020     Lab Results   Component Value Date    RDW 16.3 08/05/2020     Lab Results   Component Value " Date     08/05/2020     Last Comprehensive Metabolic Panel:  Sodium   Date Value Ref Range Status   08/06/2020 140 133 - 144 mmol/L Final     Potassium   Date Value Ref Range Status   08/06/2020 3.6 3.4 - 5.3 mmol/L Final     Chloride   Date Value Ref Range Status   08/06/2020 110 (H) 94 - 109 mmol/L Final     Carbon Dioxide   Date Value Ref Range Status   08/06/2020 23 20 - 32 mmol/L Final     Anion Gap   Date Value Ref Range Status   08/06/2020 7 3 - 14 mmol/L Final     Glucose   Date Value Ref Range Status   08/06/2020 93 70 - 99 mg/dL Final     Urea Nitrogen   Date Value Ref Range Status   08/06/2020 9 7 - 30 mg/dL Final     Creatinine   Date Value Ref Range Status   08/06/2020 0.56 (L) 0.66 - 1.25 mg/dL Final     GFR Estimate   Date Value Ref Range Status   08/06/2020 >90 >60 mL/min/[1.73_m2] Final     Comment:     Non  GFR Calc  Starting 12/18/2018, serum creatinine based estimated GFR (eGFR) will be   calculated using the Chronic Kidney Disease Epidemiology Collaboration   (CKD-EPI) equation.       Calcium   Date Value Ref Range Status   08/06/2020 8.3 (L) 8.5 - 10.1 mg/dL Final         Rehabilitation - continue comprehensive acute inpatient rehabilitation program with multidisciplinary approach including therapies, rehab nursing, and physiatry following. See interval history for updates.      assessment and plan  Tobias Palmer is a 54 year old man who presented 6/21 to [facility] with fever, cough, sob, with diagnosis of COVID 19, progressive hypoxic respiratory failure intubated and transferred to [facility] 7/22 for ongoing management. Hospitalization complicated by multiple infections, icu delirium,  A-fib with RVR,  Impaired oral intake, scrotal edema, and debility. Admitted to acute rehab 8/4/20 for ongoing rehabilitation and medical management.      Confirmed COVID-19 infection  Onset of COVID symptoms ~6/18/20 Date of positive test results 6/21/20   Research study  Yes: [facility] Research study protocol/COVID medications: convalescent plasma, remdesivir, tocilizumab  Acute Hypoxic Respiratory Failure secondary to COVID-19 infection  Viral Pneumonia secondary to COVID-19 infection  Acute Respiratory Distress Syndrome (resolved)   Bacterial Superinfection (resolved)   admitted to [facility] on 6/21/2020 after presenting with fever, cough and dyspnea. He was intubated and transferred to [place] on 6/22. Imaging revealed diffuse bilateral infiltrates.  He required flolan. He remained intubated until 7/26 at which time he was successfully extubated.  stable on RA with exception of some desaturation with sleep, c/w JEROMY (see below): Multiple bacterial infections and septic shock thoughout his course including MSSA and serattia pneumonia and serratia UTI.   - f/u ICU survivorship clinic after discharge  -encourage IS  -monitor respiratory status  -oxygen prn     Debility- in setting of prolonged icu stay with illness documented as above. RUE weakness ? Related to positioning while intubated.   -PT/OT     ICU delirium  sedated for weeks so likely will benefit from slow wean of benzos and opaites Started wean 7/29 as below and tolerating well. Ativan discontinued 8/2. Oxycodone discontinued 8/3, still on Seroquel and Trazodone. Ongoing issues with sleep have not yet started taper.   - taper as appropriate  -continue seroquel given anxiety and insomnia.       Suspected Obstructive Sleep Apnea  After weaning from O2,  continued to desat while in deep sleep requiring O2. Pt tried BiPAP/CPAP but found uncomfortable, tolerating for short time. Overnight 8/1 desat to 70's and bradycardic to 30's associated with apnea. Recovered quickly when awoken, wore CPAP for a few hours. Per discharge team likely has longstanding JEROMY  - Refer to sleep medicine as outpatient  -monitor for symptoms-  consider overnight oximetry study for home oxygen      A fib:   Developed A fib with  RVR when critically ill. Resolved with amiodarone.brief self-resolving a fib/flutter starting overnight 7/31. Started metoprolol 25 mg two times a day 8/1 , HR in 60s to 110, BP slightly high, may need med adjustment. - No indication for anticoagulation for A fib at this time.  -continue metoprolol  -monitor heart rate/bp titrate as indicated.       Scrotal edema  Marked scrotal edema, preventing removal of barlow-removed prior to ARU admission without voiding issues was on lasix for this with hypotension received iv bolus and lasix discontinued.   -monitor edema       1. Adjustment to disability:  Monitor mood  2. FEN: reg  3. Bowel: continent  4. Bladder: continent  5. DVT Prophylaxis: lovenox  6. GI Prophylaxis: reg diet  7. Code: full  8. Disposition: home  9. ELOS: ~10 days  10. Follow up Appointments on Discharge: icu survivorship clinic, pcp, sleep medicine referral         Patient seen and discussed with Dr. Lind  PM&R Staff Physician    Sarika Cadet PA-C  Physical Medicine & Rehabilitation   Pager: [phone]

## 2020-08-07 NOTE — PLAN OF CARE
OT: Continues to participate and progress well in therapies. RUE still limited but R shoulder AROM is increasing with use of shoulder pulleys and dowel tavares AAROM. Standing tolerance improved to 4-6 minutes for ADLs, pt able to amb with FWW from bedroom to bathroom to decrease dependency on wc. Updated board for pt to toilet in bathroom during day using FWW and use bedside commode at night.

## 2020-08-07 NOTE — PLAN OF CARE
FOCUS/GOAL  Bowel management, Nutrition/Feeding/Swallowing precautions, Mobility, and Skin integrity    ASSESSMENT, INTERVENTIONS AND CONTINUING PLAN FOR GOAL:  Alert and oriented x4, cont of B/B, LBM 8/7. No complaints of pain, SOB, or N/T. L PIV removed this shift. Good appetite and oral hydration. No other concerns, call light within reach, pt using appropriately.

## 2020-08-07 NOTE — PLAN OF CARE
"PT:  Pt able to ambulate 15' x 6 bouts with FWW and w/c follow today.  He was anxious that he would not be able to walk but was surprised and happy with his progress.  Vitals all WNL with activity.  Trialed step up on 4\" step in // bars.  Pt was successful but then became anxious and wanting to sit down.  "

## 2020-08-08 ENCOUNTER — APPOINTMENT (OUTPATIENT)
Dept: PHYSICAL THERAPY | Facility: CLINIC | Age: 55
End: 2020-08-08
Payer: MEDICAID

## 2020-08-08 ENCOUNTER — APPOINTMENT (OUTPATIENT)
Dept: OCCUPATIONAL THERAPY | Facility: CLINIC | Age: 55
End: 2020-08-08
Payer: MEDICAID

## 2020-08-08 PROCEDURE — 97110 THERAPEUTIC EXERCISES: CPT | Mod: GO

## 2020-08-08 PROCEDURE — 97110 THERAPEUTIC EXERCISES: CPT | Mod: GP

## 2020-08-08 PROCEDURE — 97530 THERAPEUTIC ACTIVITIES: CPT | Mod: GO

## 2020-08-08 PROCEDURE — 25000132 ZZH RX MED GY IP 250 OP 250 PS 637: Performed by: PHYSICAL MEDICINE & REHABILITATION

## 2020-08-08 PROCEDURE — 97116 GAIT TRAINING THERAPY: CPT | Mod: GP

## 2020-08-08 PROCEDURE — 25000128 H RX IP 250 OP 636: Performed by: PHYSICIAN ASSISTANT

## 2020-08-08 PROCEDURE — 12800006 ZZH R&B REHAB

## 2020-08-08 PROCEDURE — 25000132 ZZH RX MED GY IP 250 OP 250 PS 637: Performed by: PHYSICIAN ASSISTANT

## 2020-08-08 PROCEDURE — 97530 THERAPEUTIC ACTIVITIES: CPT | Mod: GP

## 2020-08-08 PROCEDURE — 97535 SELF CARE MNGMENT TRAINING: CPT | Mod: GO

## 2020-08-08 RX ADMIN — MULTIPLE VITAMINS W/ MINERALS TAB 1 TABLET: TAB at 10:05

## 2020-08-08 RX ADMIN — Medication 1 CAPSULE: at 20:23

## 2020-08-08 RX ADMIN — TRAZODONE HYDROCHLORIDE 50 MG: 50 TABLET ORAL at 22:13

## 2020-08-08 RX ADMIN — BENZONATATE 100 MG: 100 CAPSULE ORAL at 20:27

## 2020-08-08 RX ADMIN — METOPROLOL TARTRATE 25 MG: 25 TABLET, FILM COATED ORAL at 20:23

## 2020-08-08 RX ADMIN — Medication 1 CAPSULE: at 10:05

## 2020-08-08 RX ADMIN — CHLORHEXIDINE GLUCONATE 0.12% ORAL RINSE 15 ML: 1.2 LIQUID ORAL at 20:23

## 2020-08-08 RX ADMIN — CHLORHEXIDINE GLUCONATE 0.12% ORAL RINSE 15 ML: 1.2 LIQUID ORAL at 10:05

## 2020-08-08 RX ADMIN — QUETIAPINE FUMARATE 25 MG: 25 TABLET ORAL at 22:13

## 2020-08-08 RX ADMIN — MELATONIN TAB 3 MG 3 MG: 3 TAB at 22:13

## 2020-08-08 RX ADMIN — ENOXAPARIN SODIUM 40 MG: 40 INJECTION SUBCUTANEOUS at 20:23

## 2020-08-08 RX ADMIN — ENOXAPARIN SODIUM 40 MG: 40 INJECTION SUBCUTANEOUS at 10:05

## 2020-08-08 RX ADMIN — METOPROLOL TARTRATE 25 MG: 25 TABLET, FILM COATED ORAL at 10:05

## 2020-08-08 ASSESSMENT — MIFFLIN-ST. JEOR: SCORE: 1782.46

## 2020-08-08 NOTE — PLAN OF CARE
Appears to be sleeping well. A&O x4, able to make needs known. Denies pain. Up w/ Ax1 pivot to wheelchair. Continent of bowel and bladder using the bathroom. Using call light appropriately. Continue w/ plan of care.

## 2020-08-08 NOTE — PLAN OF CARE
Patient is alert and oriented x4. Denies shortness of breath, dizziness and pain. Good appetite eatting 100% of the meal with good hydration. Continent of bowel and bladder using the toilet. Using call light appropriately to make his needs known. Calm and cooperative with his cares.

## 2020-08-08 NOTE — PLAN OF CARE
Ambulates 25 feet with RW and WC follow with CGA.  Limited by fatigue.  Requires restbreaks in between activities,  SBA with transfers sit to stand and WC to/from Nu Step.  O2 97%  with activity. O2 97% and HR 92 at rest

## 2020-08-08 NOTE — PLAN OF CARE
OT: Pt demos in room ambulation with CGA and FWW. HR at 110 and O2 sats at 95% with activity. Improving with standing tolerance however notes inc fatigue as he just finished with PT. R hand strength and coordination diminished, provided in room exercises for pt to complete to improve function of R hand.

## 2020-08-08 NOTE — PLAN OF CARE
FOCUS/GOAL  Bowel management, Bladder management, Pain management, and Mobility    ASSESSMENT, INTERVENTIONS AND CONTINUING PLAN FOR GOAL:    Alert and oriented X 4, calling approprietly to verbalize needs. VSS. Denies pain during shift. Continent of bowel and bladder using the toilet. LBM 08/08. Up w/ Ax1 pivot to wheelchair.call light within reach. Continue with POC.

## 2020-08-09 ENCOUNTER — APPOINTMENT (OUTPATIENT)
Dept: PHYSICAL THERAPY | Facility: CLINIC | Age: 55
End: 2020-08-09
Payer: MEDICAID

## 2020-08-09 ENCOUNTER — APPOINTMENT (OUTPATIENT)
Dept: OCCUPATIONAL THERAPY | Facility: CLINIC | Age: 55
End: 2020-08-09
Payer: MEDICAID

## 2020-08-09 PROCEDURE — 97530 THERAPEUTIC ACTIVITIES: CPT | Mod: GO

## 2020-08-09 PROCEDURE — 97110 THERAPEUTIC EXERCISES: CPT | Mod: GO

## 2020-08-09 PROCEDURE — 97110 THERAPEUTIC EXERCISES: CPT | Mod: GP

## 2020-08-09 PROCEDURE — 97110 THERAPEUTIC EXERCISES: CPT | Mod: GP | Performed by: PHYSICAL THERAPIST

## 2020-08-09 PROCEDURE — 25000132 ZZH RX MED GY IP 250 OP 250 PS 637: Performed by: PHYSICAL MEDICINE & REHABILITATION

## 2020-08-09 PROCEDURE — 12800006 ZZH R&B REHAB

## 2020-08-09 PROCEDURE — 25000128 H RX IP 250 OP 636: Performed by: PHYSICIAN ASSISTANT

## 2020-08-09 PROCEDURE — 97530 THERAPEUTIC ACTIVITIES: CPT | Mod: GP | Performed by: PHYSICAL THERAPIST

## 2020-08-09 PROCEDURE — 97116 GAIT TRAINING THERAPY: CPT | Mod: GP | Performed by: PHYSICAL THERAPIST

## 2020-08-09 PROCEDURE — 97535 SELF CARE MNGMENT TRAINING: CPT | Mod: GO

## 2020-08-09 PROCEDURE — 25000132 ZZH RX MED GY IP 250 OP 250 PS 637: Performed by: PHYSICIAN ASSISTANT

## 2020-08-09 RX ADMIN — CHLORHEXIDINE GLUCONATE 0.12% ORAL RINSE 15 ML: 1.2 LIQUID ORAL at 08:33

## 2020-08-09 RX ADMIN — ENOXAPARIN SODIUM 40 MG: 40 INJECTION SUBCUTANEOUS at 21:10

## 2020-08-09 RX ADMIN — METOPROLOL TARTRATE 25 MG: 25 TABLET, FILM COATED ORAL at 08:34

## 2020-08-09 RX ADMIN — MELATONIN TAB 3 MG 3 MG: 3 TAB at 21:10

## 2020-08-09 RX ADMIN — CHLORHEXIDINE GLUCONATE 0.12% ORAL RINSE 15 ML: 1.2 LIQUID ORAL at 21:10

## 2020-08-09 RX ADMIN — TRAZODONE HYDROCHLORIDE 50 MG: 50 TABLET ORAL at 21:10

## 2020-08-09 RX ADMIN — MULTIPLE VITAMINS W/ MINERALS TAB 1 TABLET: TAB at 08:33

## 2020-08-09 RX ADMIN — BENZONATATE 100 MG: 100 CAPSULE ORAL at 08:37

## 2020-08-09 RX ADMIN — Medication 1 CAPSULE: at 21:10

## 2020-08-09 RX ADMIN — QUETIAPINE FUMARATE 25 MG: 25 TABLET ORAL at 21:10

## 2020-08-09 RX ADMIN — ENOXAPARIN SODIUM 40 MG: 40 INJECTION SUBCUTANEOUS at 08:33

## 2020-08-09 RX ADMIN — METOPROLOL TARTRATE 25 MG: 25 TABLET, FILM COATED ORAL at 21:10

## 2020-08-09 RX ADMIN — Medication 1 CAPSULE: at 08:33

## 2020-08-09 NOTE — PLAN OF CARE
FOCUS/GOAL  Bowel management, Bladder management, Pain management, and Mobility    ASSESSMENT, INTERVENTIONS AND CONTINUING PLAN FOR GOAL:    Alert and oriented X 4, calling approprietly to verbalize needs. VSS. Patient was seen coughing in AM, PRN tessalon given x1 per patient request. Denies pain during shift. Continent of bowel and bladder using the toilet. LBM 08/09. Up w/ Ax1 pivot to wheelchair.call light within reach. Continue with POC.

## 2020-08-09 NOTE — PLAN OF CARE
OT: improved tolerance noted this date. Pt able to walk ~ 200 ft with CGA- SBA and FWW, did require 2 seated rest breaks. Able to complete ADL's with set- up A and CGA- SBA with FWW. O2 sats at 95%+ and HR ~  with ax. Biggest barrier is dec ax tolerance and strength

## 2020-08-09 NOTE — PLAN OF CARE
Discharge Planner PT   Patient plan for discharge: Home with Assist, Home PT  Current status: Pt continues to progress with overall activity tolerance.  Pt still has noted LLE and RUE weakness.  Pt able to complete intervals on NUstep and gait for increased functional endurance.  Overall stability and balance are very good.  Declined Stairs today with try tomorrow.  Barriers to return to prior living situation: Decreased activity tolerance, 1 step, ambulation distance         Entered by: Jaspal Cortez 08/09/2020 4:15 PM

## 2020-08-09 NOTE — PLAN OF CARE
FOCUS/GOAL  Medical management    ASSESSMENT, INTERVENTIONS AND CONTINUING PLAN FOR GOAL:  Patient slept well, no c/o pain. He called for assistance to the bathroom, he used a w/c. He transferred with minimal assistance, total assistance with w/c mobility. He performed leif cares with supervision, minimal assistance with clothing management. O2 sat 98% on room air. Mild SOB with activity, relief at rest.

## 2020-08-09 NOTE — PLAN OF CARE
Pt is alert and oriented, able to make needs known. Ambulated to bathroom with CGA and walker. Continent of bowel and bladder. LBM today.   Denied pain. Prn tessalon given x1 per request.

## 2020-08-10 ENCOUNTER — APPOINTMENT (OUTPATIENT)
Dept: PHYSICAL THERAPY | Facility: CLINIC | Age: 55
End: 2020-08-10
Payer: MEDICAID

## 2020-08-10 ENCOUNTER — APPOINTMENT (OUTPATIENT)
Dept: OCCUPATIONAL THERAPY | Facility: CLINIC | Age: 55
End: 2020-08-10
Payer: MEDICAID

## 2020-08-10 LAB
ANION GAP SERPL CALCULATED.3IONS-SCNC: 6 MMOL/L (ref 3–14)
BUN SERPL-MCNC: 6 MG/DL (ref 7–30)
CALCIUM SERPL-MCNC: 8.4 MG/DL (ref 8.5–10.1)
CHLORIDE SERPL-SCNC: 109 MMOL/L (ref 94–109)
CO2 SERPL-SCNC: 26 MMOL/L (ref 20–32)
CREAT SERPL-MCNC: 0.49 MG/DL (ref 0.66–1.25)
ERYTHROCYTE [DISTWIDTH] IN BLOOD BY AUTOMATED COUNT: 16.9 % (ref 10–15)
GFR SERPL CREATININE-BSD FRML MDRD: >90 ML/MIN/{1.73_M2}
GLUCOSE SERPL-MCNC: 90 MG/DL (ref 70–99)
HCT VFR BLD AUTO: 40.2 % (ref 40–53)
HGB BLD-MCNC: 12.7 G/DL (ref 13.3–17.7)
MCH RBC QN AUTO: 29.3 PG (ref 26.5–33)
MCHC RBC AUTO-ENTMCNC: 31.6 G/DL (ref 31.5–36.5)
MCV RBC AUTO: 93 FL (ref 78–100)
PLATELET # BLD AUTO: 312 10E9/L (ref 150–450)
POTASSIUM SERPL-SCNC: 3.8 MMOL/L (ref 3.4–5.3)
RBC # BLD AUTO: 4.33 10E12/L (ref 4.4–5.9)
SODIUM SERPL-SCNC: 141 MMOL/L (ref 133–144)
WBC # BLD AUTO: 6.4 10E9/L (ref 4–11)

## 2020-08-10 PROCEDURE — 97530 THERAPEUTIC ACTIVITIES: CPT | Mod: GO | Performed by: OCCUPATIONAL THERAPIST

## 2020-08-10 PROCEDURE — 36415 COLL VENOUS BLD VENIPUNCTURE: CPT | Performed by: PHYSICIAN ASSISTANT

## 2020-08-10 PROCEDURE — 97110 THERAPEUTIC EXERCISES: CPT | Mod: GO | Performed by: OCCUPATIONAL THERAPIST

## 2020-08-10 PROCEDURE — 25000132 ZZH RX MED GY IP 250 OP 250 PS 637: Performed by: PHYSICIAN ASSISTANT

## 2020-08-10 PROCEDURE — 12800006 ZZH R&B REHAB

## 2020-08-10 PROCEDURE — 25000128 H RX IP 250 OP 636: Performed by: PHYSICIAN ASSISTANT

## 2020-08-10 PROCEDURE — 85027 COMPLETE CBC AUTOMATED: CPT | Performed by: PHYSICIAN ASSISTANT

## 2020-08-10 PROCEDURE — 25000132 ZZH RX MED GY IP 250 OP 250 PS 637: Performed by: PHYSICAL MEDICINE & REHABILITATION

## 2020-08-10 PROCEDURE — 80048 BASIC METABOLIC PNL TOTAL CA: CPT | Performed by: PHYSICIAN ASSISTANT

## 2020-08-10 PROCEDURE — 97535 SELF CARE MNGMENT TRAINING: CPT | Mod: GO | Performed by: OCCUPATIONAL THERAPIST

## 2020-08-10 PROCEDURE — 97110 THERAPEUTIC EXERCISES: CPT | Mod: GP

## 2020-08-10 PROCEDURE — 97110 THERAPEUTIC EXERCISES: CPT | Mod: GP | Performed by: PHYSICAL THERAPIST

## 2020-08-10 RX ADMIN — METOPROLOL TARTRATE 25 MG: 25 TABLET, FILM COATED ORAL at 09:00

## 2020-08-10 RX ADMIN — METOPROLOL TARTRATE 25 MG: 25 TABLET, FILM COATED ORAL at 20:54

## 2020-08-10 RX ADMIN — MULTIPLE VITAMINS W/ MINERALS TAB 1 TABLET: TAB at 09:00

## 2020-08-10 RX ADMIN — Medication 1 CAPSULE: at 20:54

## 2020-08-10 RX ADMIN — Medication 1 CAPSULE: at 09:00

## 2020-08-10 RX ADMIN — QUETIAPINE FUMARATE 25 MG: 25 TABLET ORAL at 20:53

## 2020-08-10 RX ADMIN — ENOXAPARIN SODIUM 40 MG: 40 INJECTION SUBCUTANEOUS at 09:00

## 2020-08-10 RX ADMIN — ENOXAPARIN SODIUM 40 MG: 40 INJECTION SUBCUTANEOUS at 20:54

## 2020-08-10 RX ADMIN — BENZONATATE 100 MG: 100 CAPSULE ORAL at 20:57

## 2020-08-10 RX ADMIN — CHLORHEXIDINE GLUCONATE 0.12% ORAL RINSE 15 ML: 1.2 LIQUID ORAL at 09:00

## 2020-08-10 RX ADMIN — CHLORHEXIDINE GLUCONATE 0.12% ORAL RINSE 15 ML: 1.2 LIQUID ORAL at 20:54

## 2020-08-10 RX ADMIN — TRAZODONE HYDROCHLORIDE 50 MG: 50 TABLET ORAL at 20:54

## 2020-08-10 RX ADMIN — MELATONIN TAB 3 MG 3 MG: 3 TAB at 20:54

## 2020-08-10 ASSESSMENT — MIFFLIN-ST. JEOR: SCORE: 1780.19

## 2020-08-10 NOTE — PLAN OF CARE
Appears to be sleeping well. A&O x4, able to make needs known. Denies pain. Up w/ CGA and walker. Content of bladder using the urinal and toilet. Using call light appropriately. Continue w/ plan of care.

## 2020-08-10 NOTE — PROGRESS NOTES
Diagnosis: History of Novel coronavirus 19 ( progressive acute respiratory distress )  Precautions: uses 2ww  OT: Pt participated in whole body dressing with set up only this day. Pt completed UE exercises while seated and demonstrated fatigue. Pt ambulates with 2ww but requires increased rest breaks and did not make it to the gym with walking in occupational therapy.  Continue with UE exercises and increasing activity tolerance

## 2020-08-10 NOTE — PROGRESS NOTES
[facility], [place]   Physical Medicine and Rehabilitation Daily Note           Assessment and Plan of Care:   Tobias Palmer is a 54 year old male who was admitted for hypoxic respiratory failure with intubation due to COVID-19, with hospitalization course complicated by multiple infections, ICU delirium, and A. fib with RVR.  He was admitted to [place] acute rehab on 8/4/2020 for ongoing medical management and multidisciplinary rehab.    --Vitals stable. No labs today.  --Continue ongoing medical management.  --Continue therapies and plan of care.             Interval history:   Patient seen and examined at bedside following therapies this morning.  Patient states he is doing well other than the fact that he develops significant coughing episodes during therapies or after prolonged periods of talking.  He is saturating well on room air.  Denies fever, chills, CP, SOB, N/V, abdominal pain, new pain or weakness/numbness/tingling.             Physical Exam:   VS:   Vitals:    08/09/20 0834 08/09/20 1619 08/09/20 2110 08/10/20 0606   BP: 128/86 126/75 (!) 132/97    BP Location: Right arm Right arm     Pulse:  100 88    Resp:  18     Temp:  99.3  F (37.4  C)     TempSrc:  Oral     SpO2:  100%     Weight:    107.1 kg (236 lb 1.6 oz)   Height:         Gen: NAD, resting comfortably in bed  Heart: RRR  Lungs: breathing unlabored on room air  Abd: soft and non-tender  Ext: no edema in BLE, no calf tenderness  MSK/neuro: Alert and oriented, speech fluent, moves all four extremities volitionally.          Data:   Scheduled meds    chlorhexidine  15 mL Swish & Spit BID     enoxaparin ANTICOAGULANT  40 mg Subcutaneous Q12H     lactobacillus rhamnosus (GG)  1 capsule Oral BID     melatonin  3 mg Oral At Bedtime     metoprolol tartrate  25 mg Oral BID     multivitamin w/minerals  1 tablet Oral Daily     psyllium  1 packet Oral Daily     QUEtiapine  25 mg Oral At Bedtime     traZODone  50 mg Oral  At Bedtime       PRN meds:  acetaminophen, albuterol, sore throat lozenge, benzonatate, guaiFENesin-codeine, - MEDICATION INSTRUCTIONS -, senna-docusate      Sierra Weathers MD  Physical Medicine and Rehabilitation     I spent a total of 15 minutes face-to-face and managing the care of Tobias Palmer. Over 50% of my time on the unit was spent counseling the patient and coordinating care. Please see note for details.

## 2020-08-10 NOTE — PLAN OF CARE
A/Ox4, [place] and English speaking, A1, ambulated to bathroom to void with mod assist and walker, VSS, continent of B/B, tolerating regular/low lactose diet and thin liquids, LS clear/dim, no c/o pain, CP or SOB, pills whole with water. POC reviewed with patient, questions answered.

## 2020-08-10 NOTE — PLAN OF CARE
PT: Focus on CV conditioning this date. Pt able to maintain O2 sats >94% while on RA. HR: 104 bpm max.  Ambulation distance limited to 85' max d/t fatigue.

## 2020-08-10 NOTE — PROGRESS NOTES
"  [facility]   Acute Rehabilitation Unit  Daily progress note    interval history  Tobias Palmer was seen resting from therapy session.  No acute events overnight. Denies n/v/d, sob, headaches, dizziness, fevers, bowel or bladder concerns.  Reports doing ok.  He has post viral cough and cough suppressants helping.  Labs reviewed, no acute abnormalities.     Functional:  PT: Pt continues to progress with overall activity tolerance.  Pt still has noted LLE and RUE weakness.  Pt able to complete intervals on NUstep and gait for increased functional endurance.  Overall stability and balance are very good.  Will try stairs today.      ROS: 10 point ROS neg other than the symptoms noted above in the HPI.        medications    chlorhexidine  15 mL Swish & Spit BID     enoxaparin ANTICOAGULANT  40 mg Subcutaneous Q12H     lactobacillus rhamnosus (GG)  1 capsule Oral BID     melatonin  3 mg Oral At Bedtime     metoprolol tartrate  25 mg Oral BID     multivitamin w/minerals  1 tablet Oral Daily     psyllium  1 packet Oral Daily     QUEtiapine  25 mg Oral At Bedtime     traZODone  50 mg Oral At Bedtime        acetaminophen, albuterol, sore throat lozenge, benzonatate, guaiFENesin-codeine, - MEDICATION INSTRUCTIONS -, senna-docusate     physical exam  /89 (BP Location: Right arm)   Pulse 92   Temp 98.6  F (37  C) (Oral)   Resp 18   Ht 1.575 m (5' 2\")   Wt 107.1 kg (236 lb 1.6 oz)   SpO2 94%   BMI 43.18 kg/m    Gen: awake alert, doing therapy sessions  HEENT: NCAT, EOMI  Cardio: 2+ radial pulse, well perfused  Pulm: non labored to RA, no distress  Abd: soft, obese  Ext: warm dry without significant edema, no calf tenderness   Neuro/MSK: alert speech clear. LE HF 4-/5, ke/kf 4/5, df/pf 4/5, LUE 4/5 throughout.  RUE SF 2/5, ef 4-/5, ee 4-/5.  4-    Labs    Lab Results   Component Value Date    WBC 6.4 08/10/2020     Lab Results   Component Value Date    RBC 4.33 08/10/2020 "     Lab Results   Component Value Date    HGB 12.7 08/10/2020     Lab Results   Component Value Date    HCT 40.2 08/10/2020     Lab Results   Component Value Date    MCV 93 08/10/2020     Lab Results   Component Value Date    MCH 29.3 08/10/2020     Lab Results   Component Value Date    MCHC 31.6 08/10/2020     Lab Results   Component Value Date    RDW 16.9 08/10/2020     Lab Results   Component Value Date     08/10/2020           Last Comprehensive Metabolic Panel:  Sodium   Date Value Ref Range Status   08/10/2020 141 133 - 144 mmol/L Final     Potassium   Date Value Ref Range Status   08/10/2020 3.8 3.4 - 5.3 mmol/L Final     Chloride   Date Value Ref Range Status   08/10/2020 109 94 - 109 mmol/L Final     Carbon Dioxide   Date Value Ref Range Status   08/10/2020 26 20 - 32 mmol/L Final     Anion Gap   Date Value Ref Range Status   08/10/2020 6 3 - 14 mmol/L Final     Glucose   Date Value Ref Range Status   08/10/2020 90 70 - 99 mg/dL Final     Urea Nitrogen   Date Value Ref Range Status   08/10/2020 6 (L) 7 - 30 mg/dL Final     Creatinine   Date Value Ref Range Status   08/10/2020 0.49 (L) 0.66 - 1.25 mg/dL Final     GFR Estimate   Date Value Ref Range Status   08/10/2020 >90 >60 mL/min/[1.73_m2] Final     Comment:     Non  GFR Calc  Starting 12/18/2018, serum creatinine based estimated GFR (eGFR) will be   calculated using the Chronic Kidney Disease Epidemiology Collaboration   (CKD-EPI) equation.       Calcium   Date Value Ref Range Status   08/10/2020 8.4 (L) 8.5 - 10.1 mg/dL Final         Rehabilitation - continue comprehensive acute inpatient rehabilitation program with multidisciplinary approach including therapies, rehab nursing, and physiatry following. See interval history for updates.      assessment and plan  Tobias Palmer is a 54 year old man who presented 6/21 to [facility] with fever, cough, sob, with diagnosis of COVID 19, progressive hypoxic respiratory failure  intubated and transferred to [facility] 7/22 for ongoing management. Hospitalization complicated by multiple infections, icu delirium,  A-fib with RVR,  Impaired oral intake, scrotal edema, and debility. Admitted to acute rehab 8/4/20 for ongoing rehabilitation and medical management.      Confirmed COVID-19 infection  Onset of COVID symptoms ~6/18/20 Date of positive test results 6/21/20   Research study Yes: [facility] Research study protocol/COVID medications: convalescent plasma, remdesivir, tocilizumab  Acute Hypoxic Respiratory Failure secondary to COVID-19 infection  Viral Pneumonia secondary to COVID-19 infection  Acute Respiratory Distress Syndrome (resolved)   Bacterial Superinfection (resolved)   admitted to [facility] on 6/21/2020 after presenting with fever, cough and dyspnea. He was intubated and transferred to [place] on 6/22. Imaging revealed diffuse bilateral infiltrates.  He required flolan. He remained intubated until 7/26 at which time he was successfully extubated.  stable on RA with exception of some desaturation with sleep, c/w JEROMY (see below): Multiple bacterial infections and septic shock thoughout his course including MSSA and serattia pneumonia and serratia UTI.   - f/u ICU survivorship clinic after discharge  -encourage IS  -monitor respiratory status  -oxygen prn   -Has post viral cough - added prn cough suppressants which are helping, encouraged to ask for these at night time if worse.       Debility- in setting of prolonged icu stay with illness documented as above. RUE weakness ? Related to positioning while intubated.   -PT/OT     ICU delirium  sedated for weeks so likely will benefit from slow wean of benzos and opaites Started wean 7/29 as below and tolerating well. Ativan discontinued 8/2. Oxycodone discontinued 8/3, still on Seroquel and Trazodone. Ongoing issues with sleep have not yet started taper.   - taper as appropriate  -continue seroquel given  anxiety and insomnia.       Suspected Obstructive Sleep Apnea  After weaning from O2,  continued to desat while in deep sleep requiring O2. Pt tried BiPAP/CPAP but found uncomfortable, tolerating for short time. Overnight 8/1 desat to 70's and bradycardic to 30's associated with apnea. Recovered quickly when awoken, wore CPAP for a few hours. Per discharge team likely has longstanding JEROMY  - Refer to sleep medicine as outpatient  -monitor for symptoms-  consider overnight oximetry study for home oxygen      A fib:   Developed A fib with RVR when critically ill. Resolved with amiodarone.brief self-resolving a fib/flutter starting overnight 7/31. Started metoprolol 25 mg two times a day 8/1 , HR in 60s to 110, BP slightly high, may need med adjustment. - No indication for anticoagulation for A fib at this time.  -continue metoprolol  -monitor heart rate/bp titrate as indicated.       Scrotal edema  Marked scrotal edema, preventing removal of barlow-removed prior to ARU admission without voiding issues was on lasix for this with hypotension received iv bolus and lasix discontinued.   -monitor edema       1. Adjustment to disability:  Monitor mood  2. FEN: reg  3. Bowel: continent  4. Bladder: continent  5. DVT Prophylaxis: lovenox  6. GI Prophylaxis: reg diet  7. Code: full  8. Disposition: home  9. ELOS: ~10 days  10. Follow up Appointments on Discharge: icu survivorship clinic, pcp, sleep medicine referral         Raphael Lind DO        I spent a total of 25 minutes face to face and coordinating care of Tobias Palmer. Over 50% of my time on the unit was spent counseling the patient and /or coordinating care regarding severe debility post Covid.

## 2020-08-11 ENCOUNTER — APPOINTMENT (OUTPATIENT)
Dept: OCCUPATIONAL THERAPY | Facility: CLINIC | Age: 55
End: 2020-08-11
Payer: MEDICAID

## 2020-08-11 ENCOUNTER — APPOINTMENT (OUTPATIENT)
Dept: PHYSICAL THERAPY | Facility: CLINIC | Age: 55
End: 2020-08-11
Payer: MEDICAID

## 2020-08-11 PROCEDURE — 12800006 ZZH R&B REHAB

## 2020-08-11 PROCEDURE — 97110 THERAPEUTIC EXERCISES: CPT | Mod: GP

## 2020-08-11 PROCEDURE — 97535 SELF CARE MNGMENT TRAINING: CPT | Mod: GO | Performed by: OCCUPATIONAL THERAPIST

## 2020-08-11 PROCEDURE — 97110 THERAPEUTIC EXERCISES: CPT | Mod: GO | Performed by: OCCUPATIONAL THERAPIST

## 2020-08-11 PROCEDURE — 97530 THERAPEUTIC ACTIVITIES: CPT | Mod: GO | Performed by: OCCUPATIONAL THERAPIST

## 2020-08-11 PROCEDURE — 25000128 H RX IP 250 OP 636: Performed by: PHYSICIAN ASSISTANT

## 2020-08-11 PROCEDURE — 25000132 ZZH RX MED GY IP 250 OP 250 PS 637: Performed by: PHYSICIAN ASSISTANT

## 2020-08-11 PROCEDURE — 40000187 ZZH STATISTIC PATIENT MED CONFERENCE < 30 MIN: Performed by: OCCUPATIONAL THERAPIST

## 2020-08-11 PROCEDURE — 40000183 ZZH STATISTIC PT MED CONFERENCE < 30 MIN

## 2020-08-11 RX ORDER — METOPROLOL TARTRATE 50 MG
50 TABLET ORAL 2 TIMES DAILY
Status: DISCONTINUED | OUTPATIENT
Start: 2020-08-11 | End: 2020-08-18 | Stop reason: HOSPADM

## 2020-08-11 RX ADMIN — ENOXAPARIN SODIUM 40 MG: 40 INJECTION SUBCUTANEOUS at 07:53

## 2020-08-11 RX ADMIN — BENZONATATE 100 MG: 100 CAPSULE ORAL at 16:26

## 2020-08-11 RX ADMIN — MULTIPLE VITAMINS W/ MINERALS TAB 1 TABLET: TAB at 07:52

## 2020-08-11 RX ADMIN — MELATONIN TAB 3 MG 3 MG: 3 TAB at 21:03

## 2020-08-11 RX ADMIN — METOPROLOL TARTRATE 50 MG: 50 TABLET, FILM COATED ORAL at 21:03

## 2020-08-11 RX ADMIN — BENZONATATE 100 MG: 100 CAPSULE ORAL at 07:59

## 2020-08-11 RX ADMIN — METOPROLOL TARTRATE 25 MG: 25 TABLET, FILM COATED ORAL at 07:52

## 2020-08-11 RX ADMIN — CHLORHEXIDINE GLUCONATE 0.12% ORAL RINSE 15 ML: 1.2 LIQUID ORAL at 21:03

## 2020-08-11 RX ADMIN — Medication 1 CAPSULE: at 07:51

## 2020-08-11 RX ADMIN — CHLORHEXIDINE GLUCONATE 0.12% ORAL RINSE 15 ML: 1.2 LIQUID ORAL at 07:53

## 2020-08-11 RX ADMIN — ENOXAPARIN SODIUM 40 MG: 40 INJECTION SUBCUTANEOUS at 21:03

## 2020-08-11 RX ADMIN — TRAZODONE HYDROCHLORIDE 50 MG: 50 TABLET ORAL at 21:03

## 2020-08-11 RX ADMIN — Medication 1 CAPSULE: at 21:03

## 2020-08-11 NOTE — PROGRESS NOTES
"  [facility]   Acute Rehabilitation Unit  Daily progress note    interval history  Tobias Palmer was seen sitting up in chair during team rounds, reports he is working hard and doing well limited by RUE weakness, and impaired activity tolerance. Tobias is making ongoing progress with therapy and has good family support for discharge will continue therapy with plan for home with family support 8/18. He denies any new complaints or concerns, notes sleeping better.  Nursing concerned about scrotal edema patient reports chronic issue \"for years\", has found benefit from scrotal support in past will order. See rounds note by Dr. Lind for further details.          medications    chlorhexidine  15 mL Swish & Spit BID     enoxaparin ANTICOAGULANT  40 mg Subcutaneous Q12H     lactobacillus rhamnosus (GG)  1 capsule Oral BID     melatonin  3 mg Oral At Bedtime     metoprolol tartrate  25 mg Oral BID     multivitamin w/minerals  1 tablet Oral Daily     psyllium  1 packet Oral Daily     QUEtiapine  25 mg Oral At Bedtime     traZODone  50 mg Oral At Bedtime        acetaminophen, albuterol, sore throat lozenge, benzonatate, guaiFENesin-codeine, - MEDICATION INSTRUCTIONS -, senna-docusate     physical exam  BP (!) 149/96 (BP Location: Right arm)   Pulse 96   Temp 98.3  F (36.8  C) (Oral)   Resp 20   Ht 1.575 m (5' 2\")   Wt 107.1 kg (236 lb 1.6 oz)   SpO2 95%   BMI 43.18 kg/m    Gen: awake alert, NAD  HEENT: NCAT, EOMI  Pulm: non labored to RA, no distress  Abd: soft, obese  Ext: warm dry without significant edema,   Neuro/MSK: alert speech clear. LLE HF 4-/5, ke/kf 4/5, df/pf 4/5, LUE 4/5 throughout.  RUE SF 2/5, ef 4-/5, ee 4-/5.  4- LUE 4/5 throughout.     Labs    Lab Results   Component Value Date    WBC 6.4 08/10/2020     Lab Results   Component Value Date    RBC 4.33 08/10/2020     Lab Results   Component Value Date    HGB 12.7 08/10/2020     Lab Results   Component Value " Date    HCT 40.2 08/10/2020     Lab Results   Component Value Date    MCV 93 08/10/2020     Lab Results   Component Value Date    MCH 29.3 08/10/2020     Lab Results   Component Value Date    MCHC 31.6 08/10/2020     Lab Results   Component Value Date    RDW 16.9 08/10/2020     Lab Results   Component Value Date     08/10/2020           Last Comprehensive Metabolic Panel:  Sodium   Date Value Ref Range Status   08/10/2020 141 133 - 144 mmol/L Final     Potassium   Date Value Ref Range Status   08/10/2020 3.8 3.4 - 5.3 mmol/L Final     Chloride   Date Value Ref Range Status   08/10/2020 109 94 - 109 mmol/L Final     Carbon Dioxide   Date Value Ref Range Status   08/10/2020 26 20 - 32 mmol/L Final     Anion Gap   Date Value Ref Range Status   08/10/2020 6 3 - 14 mmol/L Final     Glucose   Date Value Ref Range Status   08/10/2020 90 70 - 99 mg/dL Final     Urea Nitrogen   Date Value Ref Range Status   08/10/2020 6 (L) 7 - 30 mg/dL Final     Creatinine   Date Value Ref Range Status   08/10/2020 0.49 (L) 0.66 - 1.25 mg/dL Final     GFR Estimate   Date Value Ref Range Status   08/10/2020 >90 >60 mL/min/[1.73_m2] Final     Comment:     Non  GFR Calc  Starting 12/18/2018, serum creatinine based estimated GFR (eGFR) will be   calculated using the Chronic Kidney Disease Epidemiology Collaboration   (CKD-EPI) equation.       Calcium   Date Value Ref Range Status   08/10/2020 8.4 (L) 8.5 - 10.1 mg/dL Final         Rehabilitation - continue comprehensive acute inpatient rehabilitation program with multidisciplinary approach including therapies, rehab nursing, and physiatry following. See interval history for updates.      assessment and plan  Tobias Palmer is a 54 year old man who presented 6/21 to [facility] with fever, cough, sob, with diagnosis of COVID 19, progressive hypoxic respiratory failure intubated and transferred to [facility] 7/22 for ongoing management. Hospitalization  complicated by multiple infections, icu delirium,  A-fib with RVR,  Impaired oral intake, scrotal edema, and debility. Admitted to acute rehab 8/4/20 for ongoing rehabilitation and medical management.      Confirmed COVID-19 infection  Onset of COVID symptoms ~6/18/20 Date of positive test results 6/21/20   Research study Yes: [facility] Research study protocol/COVID medications: convalescent plasma, remdesivir, tocilizumab  Acute Hypoxic Respiratory Failure secondary to COVID-19 infection  Viral Pneumonia secondary to COVID-19 infection  Acute Respiratory Distress Syndrome (resolved)   Bacterial Superinfection (resolved)   admitted to [facility] on 6/21/2020 after presenting with fever, cough and dyspnea. He was intubated and transferred to [place] on 6/22. Imaging revealed diffuse bilateral infiltrates.  He required flolan. He remained intubated until 7/26 at which time he was successfully extubated.  stable on RA with exception of some desaturation with sleep, c/w JEROMY (see below): Multiple bacterial infections and septic shock thoughout his course including MSSA and serattia pneumonia and serratia UTI.   - f/u ICU survivorship clinic after discharge  -encourage IS  -monitor respiratory status  -oxygen prn   -Has post viral cough -  prn cough suppressants     Debility- in setting of prolonged icu stay with illness documented as above. RUE weakness ? Nerve injury/ Related to positioning while intubated.   -PT/OT     ICU delirium  sedated for weeks so likely will benefit from slow wean of benzos and opaites Started wean 7/29 as below and tolerating well. Ativan discontinued 8/2. Oxycodone discontinued 8/3, still on Seroquel and Trazodone. Reports sleep improved will dc seroquel  -continue trazodone  -discontinue seroquel       Suspected Obstructive Sleep Apnea  After weaning from O2,  continued to desat while in deep sleep requiring O2. Pt tried BiPAP/CPAP but found uncomfortable, tolerating for  short time. Overnight 8/1 desat to 70's and bradycardic to 30's associated with apnea. Recovered quickly when awoken, wore CPAP for a few hours. Per discharge team likely has longstanding JEROMY  - Refer to sleep medicine as outpatient  -monitor for symptoms-  consider overnight oximetry study for home oxygen      A fib:   Developed A fib with RVR when critically ill. Resolved with amiodarone.brief self-resolving a fib/flutter starting overnight 7/31. Started metoprolol 25 mg two times a day 8/1 , HR in 60s to 110, BP 110s-140s may need med adjustment. - No indication for anticoagulation for A fib at this time.  -continue metoprolol- increase to 50 mg bid  -monitor heart rate/bp titrate as indicated.       Chronic Scrotal edema  Marked scrotal edema, preventing removal of barlow-removed prior to ARU admission without voiding issues was on lasix for this with hypotension received iv bolus and lasix discontinued. Reportedly quite chronic without acute change per patient   -try scrotal support  -monitor edema       1. Adjustment to disability:  Monitor mood  2. FEN: reg  3. Bowel: continent  4. Bladder: continent  5. DVT Prophylaxis: lovenox  6. GI Prophylaxis: reg diet  7. Code: full  8. Disposition: home  9. ELOS: ~10 days  10. Follow up Appointments on Discharge: icu survivorship clinic, pcp, sleep medicine referral     Sarika Cadet PA-C  PM&R    Patient seen and discussed with Dr. Lind

## 2020-08-11 NOTE — PROGRESS NOTES
Diagnosis: History of 2019 novel coronavirus disease.   Precautions: fall precautions, fatigues easily  OT: Pt completed transfers with CGA using 2ww. Continues to fatigue easily. Pt demonstrates decreased activity tolerance. Family training is set for 8/16/2020

## 2020-08-11 NOTE — PROGRESS NOTES
Post Rounds Family Phone Call  Length of call: 3 min  Family involved: Spouse Lucita  Projected discharge date: 8/18/20  Projected discharge location: Home with assistance  Equipment needs: extended tub bench and FWW  Other questions and misc: Will call pt's children closer to discharge to set-up family training as spouse understood minimal English. Provided spouse brief update on plan for discharge and pt's CLOF.

## 2020-08-11 NOTE — PLAN OF CARE
FOCUS/GOAL  Bowel management, Nutrition/Feeding/Swallowing precautions, and Medical management    ASSESSMENT, INTERVENTIONS AND CONTINUING PLAN FOR GOAL:  Alert and oriented x4, cont of B/B LBM 8/10. Pt had good appetite, ate 100% of both meals. Pt denies SOB but displays dry frequent cough, tessalon PRN given x1. Lung sounds clear. Call light within reach, alarms on for safety purposes. Continue POC.

## 2020-08-11 NOTE — CARE CONFERENCE
"Acute Rehab Care Conference/Team Rounds      Type: Team Rounds    Present: Dr. Raphael Lind, Sarika Cadet PA, Angel Barker PT, Nalini Santana OT, Dixie BROWN, Racquel Dewitt RD, Deandre-O Aaron Durbin, Debbi Burt RN      Discharge Barriers/Treatment/Education    Rehab Diagnosis: severe debility post Covid    Active Medical Co-morbidities/Prognosis: recovering ICU delirium, A-fib, scrotal edema     Safety: fall precautions     Pain: address as needed     Medications, Skin, Tubes/Lines: PO intake    Swallowing/Nutrition: regular with thins     Bowel/Bladder: continent     Psychosocial: , lives with family. Good support. Independent PTA. No mental health or substance abuse reported. Emergency medical assistance insurance.     ADLs/IADLs: Pt making steady progress with ADLs with FWW but limited by fatigue. Pt requires SBA with toileting with FWW and SBA with G/H tasks standing at EOS with FWW. Pt requires set-up with UBD and CGA with LBD tasks. Pt will need extended tub bench upon discharge and FWW. Pt continues to have bilateral UE weakness and impaired RUE ROM.   Recommending ongoing IP rehab and HC OT services upon discharge.     Mobility: Pt progressing steadily with functional amb. Demo sup<>sit SBA, STS FWW SBA, amb 55 ft FWW SBA with w/c follow, and navigating 3x6\" step ups with B rails Fer. Need to progress independence with longer distance gait with FWW, Recommend  PT. Will need FWW from [facility] at discharge.    Cognition/Language: intact     Community Re-Entry: w/c based for longer community distances, mod I at discharge in home.    Transportation: requires assist from friends/family.    Decision maker: self    Plan of Care and goals reviewed and updated.    Discharge Plan/Recommendations    Fall Precautions: continue    Patient/Family input to goals: yes     Estimated length of stay: 12 - 14 days    Overall plan for the patient: achieve a level of mod I       Utilization Review " and Continued Stay Justification    Medical Necessity Criteria:    For any criteria that is not met, please document reason and plan for discharge, transfer, or modification of plan of care to address.    Requires intensive rehabilitation program to treat functional deficits?: Yes    Requires 3x per week or greater involvement of rehabilitation physician to oversee rehabilitation program?: Yes    Requires rehabilitation nursing interventions?: Yes    Patient is making functional progress?: Yes    There is a potential for additional functional progress? Yes    Patient is participating in therapy 3 hours per day a minimum of 5 days per week or 15 hours per week in 7 day period?:Yes    Has discharge needs that require coordinated discharge planning approach?:Yes      Barriers/Concerns related to meeting medical necessity criteria:  none    Team Plan to Address Concern:  As needed       Final Physician Sign off    Statement of Approval: Raphael Lind, DO      Patient Goals  Social Work Goals:Confirm discharge recommendations with therapy, coordinate safe discharge plan and remain available to support and assist as needed.    OT Frequency: Daily 60-90 minutes  OT goal: hygiene/grooming: modified independent  OT goal: upper body dressing: Modified independent  OT goal: lower body dressing: Minimal assist  OT goal: upper body bathing: Supervision/stand-by assist  OT goal: lower body bathing: Minimal assist  OT Goal: transfer: Modified independent  OT goal: toilet transfer/toileting: Modified independent  OT goal: perform aerobic activity with stable cardiovascular response: continuous activity, 10 minutes  OT goal 1: Pt will perform HEP for BUE strengthening and RUE coordination to increase IND with ADLs and mobility        PT Frequency: Daily, 60-90 minutes  PT goal: bed mobility: Independent, Supine to/from sit, Rolling  PT goal: transfers: Modified independent, Sit to/from stand, Bed to/from chair, Assistive  device(FWW)  PT goal: gait: Modified independent, Rolling walker, Greater than 200 feet  PT goal: stairs: 1 stair, Modified independent, Assistive device(FWW)  PT goal 1: Complete BBS  PT Goal 2: Car transfer, Gini with FWW  PT Goal 3: HEP - independent with handout for BLE strengthening seated or standing and for walking program        Nursing Goals  Fall prevention  Medication Education  Skin care protection  Bowel and bladder management

## 2020-08-11 NOTE — PLAN OF CARE
Appears to be sleeping well. A&O x4, able to make needs known. Denies pain. Up w/ CGA and walker. Content of bladder using the toilet. Using call light appropriately. Continue w/ plan of care.

## 2020-08-11 NOTE — PLAN OF CARE
Doing well. Denies pain.  No fevers. Cooperative with cares, pleasant gentleman. Scrotal edema continues - MD/ therapy team aware.  Good appetite.  Denies chest pain, shortness of breath, nausea or vomiting. Requesting and given cough suppressant this am- has non productive cough. Will continue to monitor.

## 2020-08-12 ENCOUNTER — APPOINTMENT (OUTPATIENT)
Dept: PHYSICAL THERAPY | Facility: CLINIC | Age: 55
End: 2020-08-12
Payer: MEDICAID

## 2020-08-12 ENCOUNTER — APPOINTMENT (OUTPATIENT)
Dept: OCCUPATIONAL THERAPY | Facility: CLINIC | Age: 55
End: 2020-08-12
Payer: MEDICAID

## 2020-08-12 LAB — PLATELET # BLD AUTO: 336 10E9/L (ref 150–450)

## 2020-08-12 PROCEDURE — 25000128 H RX IP 250 OP 636: Performed by: PHYSICIAN ASSISTANT

## 2020-08-12 PROCEDURE — 25000132 ZZH RX MED GY IP 250 OP 250 PS 637: Performed by: PHYSICAL MEDICINE & REHABILITATION

## 2020-08-12 PROCEDURE — 36415 COLL VENOUS BLD VENIPUNCTURE: CPT | Performed by: PHYSICAL MEDICINE & REHABILITATION

## 2020-08-12 PROCEDURE — 97535 SELF CARE MNGMENT TRAINING: CPT | Mod: GO | Performed by: OCCUPATIONAL THERAPIST

## 2020-08-12 PROCEDURE — 97530 THERAPEUTIC ACTIVITIES: CPT | Mod: GO | Performed by: OCCUPATIONAL THERAPIST

## 2020-08-12 PROCEDURE — 97110 THERAPEUTIC EXERCISES: CPT | Mod: GP

## 2020-08-12 PROCEDURE — 85049 AUTOMATED PLATELET COUNT: CPT | Performed by: PHYSICAL MEDICINE & REHABILITATION

## 2020-08-12 PROCEDURE — 12800006 ZZH R&B REHAB

## 2020-08-12 PROCEDURE — 25000132 ZZH RX MED GY IP 250 OP 250 PS 637: Performed by: PHYSICIAN ASSISTANT

## 2020-08-12 RX ADMIN — CHLORHEXIDINE GLUCONATE 0.12% ORAL RINSE 15 ML: 1.2 LIQUID ORAL at 08:47

## 2020-08-12 RX ADMIN — MELATONIN TAB 3 MG 3 MG: 3 TAB at 21:03

## 2020-08-12 RX ADMIN — CHLORHEXIDINE GLUCONATE 0.12% ORAL RINSE 15 ML: 1.2 LIQUID ORAL at 21:07

## 2020-08-12 RX ADMIN — MULTIPLE VITAMINS W/ MINERALS TAB 1 TABLET: TAB at 07:29

## 2020-08-12 RX ADMIN — METOPROLOL TARTRATE 50 MG: 50 TABLET, FILM COATED ORAL at 08:52

## 2020-08-12 RX ADMIN — ENOXAPARIN SODIUM 40 MG: 40 INJECTION SUBCUTANEOUS at 08:53

## 2020-08-12 RX ADMIN — BENZONATATE 100 MG: 100 CAPSULE ORAL at 03:15

## 2020-08-12 RX ADMIN — Medication 1 CAPSULE: at 21:03

## 2020-08-12 RX ADMIN — ACETAMINOPHEN 650 MG: 325 TABLET, FILM COATED ORAL at 03:15

## 2020-08-12 RX ADMIN — Medication 1 CAPSULE: at 08:47

## 2020-08-12 RX ADMIN — GUAIFENESIN AND CODEINE PHOSPHATE 5 ML: 10; 100 LIQUID ORAL at 07:28

## 2020-08-12 RX ADMIN — ENOXAPARIN SODIUM 40 MG: 40 INJECTION SUBCUTANEOUS at 21:04

## 2020-08-12 RX ADMIN — TRAZODONE HYDROCHLORIDE 50 MG: 50 TABLET ORAL at 21:04

## 2020-08-12 RX ADMIN — METOPROLOL TARTRATE 50 MG: 50 TABLET, FILM COATED ORAL at 21:01

## 2020-08-12 RX ADMIN — GUAIFENESIN AND CODEINE PHOSPHATE 5 ML: 10; 100 LIQUID ORAL at 21:07

## 2020-08-12 ASSESSMENT — MIFFLIN-ST. JEOR: SCORE: 1787.45

## 2020-08-12 NOTE — PLAN OF CARE
PT: making good progress in PT. Close to MOD I in room with WW, pending OT toileting assessment. On track for discharge next week. Will ensure to issue a robust HEP as pt will have limited OP or HC followup d/t emergency MA status.

## 2020-08-12 NOTE — PLAN OF CARE
Alert and oriented x4. Continent of bowel and bladder in toilet. Last bowel movement 8/12. Denies pain. Patient reports coughing for most of the night and this writer also observed pt coughing. Guaifen/codeine given with results of cough supression. Transfers with an assist of 1 with a walker. On a regular thin diet with low lactose.   Bed alarm on for safety, call light within reach, Ok to continue with care plan.

## 2020-08-12 NOTE — PROGRESS NOTES
Plan to discharge home on 08/18 with family A and [facility] services. Pt has emergency medical assistance which will only cover 5-RN and 1-SW HHC visit. With  Dept funds, 1 home PT and 1 home OT eval can be coordinated for total cost of $430.00.    SWer called pt at bedside, offered  but pt declined. Discussed discharge plans and HHC services. Discussed what EMA would cover and what  dept can assist with. Discussed purpose of home PT and home OT assessment and eval at home. Pt in agreement with plan and appreciative. Expressed understanding. Discussed [facility] and pt gave  permission to coordinate referral.    Confirmed pt phone and home address.  fund request form completed and referral for 5 RN visits, 1 SW visit and 1 PT and 1 OT visit was sent to [facility] intake, [facility] liaison and  accommodations. Information added to pt AVS and pt aware and notified.     Pt does not have a PCP, [facility]UC will assist with establishing care. Dr Streeter to sign orders until PCP is established. Dr Lind notified and [facility] aware.     No further SW needs at this time. SWer will continue to follow.     Home Health Care:   [facility] Ph: [phone]  RN, PT, OT and SW    Dixie Chavez, BELLA, CAD-[place]  [facility]   Phone: [phone]  I   Pager: [phone]

## 2020-08-12 NOTE — PLAN OF CARE
FOCUS/GOAL  Bowel management, Bladder management, Pain management, Mobility, and Cognition/Memory/Judgment/Problem solving    ASSESSMENT, INTERVENTIONS AND CONTINUING PLAN FOR GOAL:    A&Ox4, Ax1 ww. LBM 8/11, voiding in toilet spontaneously. Continent of bowel and bladder. Denies pain. BP slightly elevated this shift, 130&140s systolic, scheduled metoprolol given. Resting comfortably, will continue with POC.

## 2020-08-12 NOTE — PROGRESS NOTES
OT: Pt okay for mod IND with FWW during day with ADLs and functional mobility and supervision with functional mobility and ADLs at night with FWW.    Pt req'd SBA with shower transfer with FWW and on/off of extended tub bench and assistance to wash/rinse/dry 2/10 body parts while seated on extended tub bench. Pt is mod IND with FB dressing tasks and toileting with FWW. Provided pt with lindsey scrotal support to help decrease scrotal edema.

## 2020-08-12 NOTE — PROGRESS NOTES
"  [facility]   Acute Rehabilitation Unit  Daily progress note    interval history  Tobias Palmer was seen resting in bed this morning.  Denies chest pain, shortness of breath, no fever or chills.  No issues with bowel or bladder.  BP running better today.   Continues to excel with therapy program.     Functional:  OT: Pt okay for mod IND with FWW during day with ADLs and functional mobility and supervision with functional mobility and ADLs at night with FWW.     ROS: 10 point ROS neg other than the symptoms noted above in the HPI.          medications    chlorhexidine  15 mL Swish & Spit BID     enoxaparin ANTICOAGULANT  40 mg Subcutaneous Q12H     lactobacillus rhamnosus (GG)  1 capsule Oral BID     melatonin  3 mg Oral At Bedtime     metoprolol tartrate  50 mg Oral BID     multivitamin w/minerals  1 tablet Oral Daily     psyllium  1 packet Oral Daily     traZODone  50 mg Oral At Bedtime        acetaminophen, albuterol, sore throat lozenge, benzonatate, guaiFENesin-codeine, - MEDICATION INSTRUCTIONS -, senna-docusate     physical exam  /40 (BP Location: Right arm)   Pulse 91   Temp 98.4  F (36.9  C) (Oral)   Resp 18   Ht 1.575 m (5' 2\")   Wt 107.8 kg (237 lb 11.2 oz)   SpO2 99%   BMI 43.48 kg/m    Gen: awake alert, NAD  HEENT: NCAT, EOMI  Pulm: non labored to RA, no distress  Abd: soft, obese  Ext: warm dry without significant edema, no calf tenderness  Neuro/MSK: alert speech clear. LLE HF 4-/5, ke/kf 4/5, df/pf 4/5, LUE 4/5 throughout.  RUE SF 2/5, ef 4-/5, ee 4-/5.  4- LUE 4/5 throughout.     Labs    Lab Results   Component Value Date    WBC 6.4 08/10/2020     Lab Results   Component Value Date    RBC 4.33 08/10/2020     Lab Results   Component Value Date    HGB 12.7 08/10/2020     Lab Results   Component Value Date    HCT 40.2 08/10/2020     Lab Results   Component Value Date    MCV 93 08/10/2020     Lab Results   Component Value Date    MCH 29.3 " 08/10/2020     Lab Results   Component Value Date    MCHC 31.6 08/10/2020     Lab Results   Component Value Date    RDW 16.9 08/10/2020     Lab Results   Component Value Date     08/10/2020           Last Comprehensive Metabolic Panel:  Sodium   Date Value Ref Range Status   08/10/2020 141 133 - 144 mmol/L Final     Potassium   Date Value Ref Range Status   08/10/2020 3.8 3.4 - 5.3 mmol/L Final     Chloride   Date Value Ref Range Status   08/10/2020 109 94 - 109 mmol/L Final     Carbon Dioxide   Date Value Ref Range Status   08/10/2020 26 20 - 32 mmol/L Final     Anion Gap   Date Value Ref Range Status   08/10/2020 6 3 - 14 mmol/L Final     Glucose   Date Value Ref Range Status   08/10/2020 90 70 - 99 mg/dL Final     Urea Nitrogen   Date Value Ref Range Status   08/10/2020 6 (L) 7 - 30 mg/dL Final     Creatinine   Date Value Ref Range Status   08/10/2020 0.49 (L) 0.66 - 1.25 mg/dL Final     GFR Estimate   Date Value Ref Range Status   08/10/2020 >90 >60 mL/min/[1.73_m2] Final     Comment:     Non  GFR Calc  Starting 12/18/2018, serum creatinine based estimated GFR (eGFR) will be   calculated using the Chronic Kidney Disease Epidemiology Collaboration   (CKD-EPI) equation.       Calcium   Date Value Ref Range Status   08/10/2020 8.4 (L) 8.5 - 10.1 mg/dL Final         Rehabilitation - continue comprehensive acute inpatient rehabilitation program with multidisciplinary approach including therapies, rehab nursing, and physiatry following. See interval history for updates.      assessment and plan  Tobias Palmer is a 54 year old man who presented 6/21 to [facility] with fever, cough, sob, with diagnosis of COVID 19, progressive hypoxic respiratory failure intubated and transferred to [facility] 7/22 for ongoing management. Hospitalization complicated by multiple infections, icu delirium,  A-fib with RVR,  Impaired oral intake, scrotal edema, and debility. Admitted to acute rehab  8/4/20 for ongoing rehabilitation and medical management.      Confirmed COVID-19 infection  Onset of COVID symptoms ~6/18/20 Date of positive test results 6/21/20   Research study Yes: [facility] Research study protocol/COVID medications: convalescent plasma, remdesivir, tocilizumab  Acute Hypoxic Respiratory Failure secondary to COVID-19 infection  Viral Pneumonia secondary to COVID-19 infection  Acute Respiratory Distress Syndrome (resolved)   Bacterial Superinfection (resolved)   admitted to [facility] on 6/21/2020 after presenting with fever, cough and dyspnea. He was intubated and transferred to [facility] on 6/22. Imaging revealed diffuse bilateral infiltrates.  He required flolan. He remained intubated until 7/26 at which time he was successfully extubated.  stable on RA with exception of some desaturation with sleep, c/w JEROMY (see below): Multiple bacterial infections and septic shock thoughout his course including MSSA and serattia pneumonia and serratia UTI.   - f/u ICU survivorship clinic after discharge  -encourage IS  -monitor respiratory status  -oxygen prn   -Has post viral cough -  prn cough suppressants     Debility- in setting of prolonged icu stay with illness documented as above. RUE weakness ? Nerve injury/ Related to positioning while intubated.   -PT/OT     ICU delirium  sedated for weeks so likely will benefit from slow wean of benzos and opaites Started wean 7/29 as below and tolerating well. Ativan discontinued 8/2. Oxycodone discontinued 8/3, still on Seroquel and Trazodone. Reports sleep improved will dc seroquel  -continue trazodone  -discontinue seroquel       Suspected Obstructive Sleep Apnea  After weaning from O2,  continued to desat while in deep sleep requiring O2. Pt tried BiPAP/CPAP but found uncomfortable, tolerating for short time. Overnight 8/1 desat to 70's and bradycardic to 30's associated with apnea. Recovered quickly when awoken, wore CPAP for a few  hours. Per discharge team likely has longstanding JEROMY  - Refer to sleep medicine as outpatient  -monitor for symptoms-  consider overnight oximetry study for home oxygen      A fib:   Developed A fib with RVR when critically ill. Resolved with amiodarone.brief self-resolving a fib/flutter starting overnight 7/31. Started metoprolol 25 mg two times a day 8/1 , HR in 60s to 110, BP 110s-140s may need med adjustment. - No indication for anticoagulation for A fib at this time.  -continue metoprolol- increased on 8/11 to 50 mg bid -- BP running better today  -monitor heart rate/bp titrate as indicated.       Chronic Scrotal edema  Marked scrotal edema, preventing removal of barlow-removed prior to ARU admission without voiding issues was on lasix for this with hypotension received iv bolus and lasix discontinued. Reportedly quite chronic without acute change per patient   -try scrotal support  -monitor edema       1. Adjustment to disability:  Monitor mood  2. FEN: reg  3. Bowel: continent  4. Bladder: continent  5. DVT Prophylaxis: lovenox  6. GI Prophylaxis: reg diet  7. Code: full  8. Disposition: home  9. ELOS: ~10 days  10. Follow up Appointments on Discharge: icu survivorship clinic, pcp, sleep medicine referral       Raphael Lind,       I spent a total of 25 minutes face to face and coordinating care of Tobias Palmer. Over 50% of my time on the unit was spent counseling the patient and /or coordinating care regarding severe debility post covid.

## 2020-08-12 NOTE — PROGRESS NOTES
"CLINICAL NUTRITION SERVICES - REASSESSMENT NOTE     Nutrition Prescription    RECOMMENDATIONS FOR MDs/PROVIDERS TO ORDER:  None today    Malnutrition Status:    Patient does not meet two of the established criteria necessary for diagnosing malnutrition    Recommendations already ordered by Registered Dietitian (RD):  None today    Future/Additional Recommendations:  Monitor po intakes and weight trends.     EVALUATION OF THE PROGRESS TOWARD GOALS   Diet: Regular Diet, Low Lactose  Comments:  Pt can tolerate some foods with small amounts of lactose.   Room Service Appropriate  Comments: Pt may ask unit staff for assistance prn.    Intake: PO intakes have been % of recorded meals since admission.  Good appetite noted per chart review.  Per pt meals have been going well.  He states has to ask nursing to help with ordering because Room Service has some difficulty understanding him.       NEW FINDINGS   Current Weight (8/12):  107.8 kg/237 lbs 11.2 oz  [facility] Admission Weight (8/4):  105.8 kg/233 lbs 4.8 oz    Weight has trended up since [facility] admission.    Per initial assessment significant 13% (35 lb) weight loss in 2 months, BMI 43.48.    Pt confirms height is 5'2\".    MALNUTRITION  % Intake: No decreased intake noted   % Weight Loss: current weight stable/increased-previously noted up to 5% in 1 month (non-severe)  Subcutaneous Fat Loss: Unable to assess today-per RD note 8/5: none observed  Muscle Loss: Unable to assess today-per RD note 8/5: none observed  Fluid Accumulation/Edema: trace to mild per flow sheets.   Malnutrition Diagnosis: Patient does not meet two of the established criteria necessary for diagnosing malnutrition    Previous Goals   Patient to consume % of nutritionally adequate meal trays TID, or the equivalent with supplements/snacks.  Evaluation: Met    Previous Nutrition Diagnosis  Predicted inadequate nutrient intake prior to this facility admission related to critical illness as " evidenced by significant weight loss over 2 months.  Evaluation: Improving    CURRENT NUTRITION DIAGNOSIS  Predicted inadequate nutrient intake related to recent critical illness as evidenced by significant weight loss over 2 months.    INTERVENTIONS  Implementation  Nutrition Education:  Pt visit via phone today in light of reduced in person exposure with COVID 19 pandemic.  Per pt he is eating well at meals and ordering with nursing assistance.    Goals  Patient to consume % of nutritionally adequate meal trays TID, or the equivalent with supplements/snacks.    Monitoring/Evaluation  Progress toward goals will be monitored and evaluated per protocol.    Jamee Nash RD, LD

## 2020-08-12 NOTE — PLAN OF CARE
FOCUS/GOAL  Bowel management, Bladder management, and Pain management    ASSESSMENT, INTERVENTIONS AND CONTINUING PLAN FOR GOAL:  Pt slept well,  Called and c/o headache and cough, PRN tylenol and Tessalon applied with relief. VSS.  Cont of B/b, uses urinal.   A & O  x4.  SBA with walker to transfer.

## 2020-08-13 ENCOUNTER — APPOINTMENT (OUTPATIENT)
Dept: PHYSICAL THERAPY | Facility: CLINIC | Age: 55
End: 2020-08-13
Payer: MEDICAID

## 2020-08-13 ENCOUNTER — APPOINTMENT (OUTPATIENT)
Dept: OCCUPATIONAL THERAPY | Facility: CLINIC | Age: 55
End: 2020-08-13
Payer: MEDICAID

## 2020-08-13 LAB
ANION GAP SERPL CALCULATED.3IONS-SCNC: 8 MMOL/L (ref 3–14)
BUN SERPL-MCNC: 8 MG/DL (ref 7–30)
CALCIUM SERPL-MCNC: 9 MG/DL (ref 8.5–10.1)
CHLORIDE SERPL-SCNC: 105 MMOL/L (ref 94–109)
CO2 SERPL-SCNC: 27 MMOL/L (ref 20–32)
CREAT SERPL-MCNC: 0.56 MG/DL (ref 0.66–1.25)
GFR SERPL CREATININE-BSD FRML MDRD: >90 ML/MIN/{1.73_M2}
GLUCOSE SERPL-MCNC: 91 MG/DL (ref 70–99)
POTASSIUM SERPL-SCNC: 4 MMOL/L (ref 3.4–5.3)
SODIUM SERPL-SCNC: 140 MMOL/L (ref 133–144)

## 2020-08-13 PROCEDURE — 25000128 H RX IP 250 OP 636: Performed by: PHYSICIAN ASSISTANT

## 2020-08-13 PROCEDURE — 25000132 ZZH RX MED GY IP 250 OP 250 PS 637: Performed by: PHYSICIAN ASSISTANT

## 2020-08-13 PROCEDURE — 97110 THERAPEUTIC EXERCISES: CPT | Mod: GP

## 2020-08-13 PROCEDURE — 36415 COLL VENOUS BLD VENIPUNCTURE: CPT | Performed by: PHYSICIAN ASSISTANT

## 2020-08-13 PROCEDURE — 97530 THERAPEUTIC ACTIVITIES: CPT | Mod: GO | Performed by: OCCUPATIONAL THERAPIST

## 2020-08-13 PROCEDURE — 97535 SELF CARE MNGMENT TRAINING: CPT | Mod: GO | Performed by: OCCUPATIONAL THERAPIST

## 2020-08-13 PROCEDURE — 25000132 ZZH RX MED GY IP 250 OP 250 PS 637: Performed by: PHYSICAL MEDICINE & REHABILITATION

## 2020-08-13 PROCEDURE — 97110 THERAPEUTIC EXERCISES: CPT | Mod: GO | Performed by: OCCUPATIONAL THERAPIST

## 2020-08-13 PROCEDURE — 12800006 ZZH R&B REHAB

## 2020-08-13 PROCEDURE — 80048 BASIC METABOLIC PNL TOTAL CA: CPT | Performed by: PHYSICIAN ASSISTANT

## 2020-08-13 RX ADMIN — CHLORHEXIDINE GLUCONATE 0.12% ORAL RINSE 15 ML: 1.2 LIQUID ORAL at 08:01

## 2020-08-13 RX ADMIN — MELATONIN TAB 3 MG 3 MG: 3 TAB at 21:06

## 2020-08-13 RX ADMIN — Medication 1 CAPSULE: at 08:00

## 2020-08-13 RX ADMIN — ENOXAPARIN SODIUM 40 MG: 40 INJECTION SUBCUTANEOUS at 21:05

## 2020-08-13 RX ADMIN — METOPROLOL TARTRATE 50 MG: 50 TABLET, FILM COATED ORAL at 08:00

## 2020-08-13 RX ADMIN — Medication 1 CAPSULE: at 21:06

## 2020-08-13 RX ADMIN — PSYLLIUM HUSK 1 PACKET: 3.4 POWDER ORAL at 07:58

## 2020-08-13 RX ADMIN — CHLORHEXIDINE GLUCONATE 0.12% ORAL RINSE 15 ML: 1.2 LIQUID ORAL at 21:05

## 2020-08-13 RX ADMIN — METOPROLOL TARTRATE 50 MG: 50 TABLET, FILM COATED ORAL at 21:06

## 2020-08-13 RX ADMIN — GUAIFENESIN AND CODEINE PHOSPHATE 5 ML: 10; 100 LIQUID ORAL at 13:12

## 2020-08-13 RX ADMIN — TRAZODONE HYDROCHLORIDE 50 MG: 50 TABLET ORAL at 21:06

## 2020-08-13 RX ADMIN — GUAIFENESIN AND CODEINE PHOSPHATE 5 ML: 10; 100 LIQUID ORAL at 19:36

## 2020-08-13 RX ADMIN — MULTIPLE VITAMINS W/ MINERALS TAB 1 TABLET: TAB at 07:57

## 2020-08-13 RX ADMIN — ENOXAPARIN SODIUM 40 MG: 40 INJECTION SUBCUTANEOUS at 08:00

## 2020-08-13 NOTE — PLAN OF CARE
PTA:  focus on CV conditioning and HEP.  provided handouts and instructed pt in standing HEP including heel/toe ups, mini squats, marching, hip ext and hamstring curls.  pt able to complete 10 reps each LE requiring seated rests b/t completion of 2 exercises.  Nu-step circuit 8 minutes x3 WL3 averaged METS 2.3 averaged 62spm.  Ambulaton as exercise with 'x2.  Pt required rests b/t bouts of Nu-step and ambulation

## 2020-08-13 NOTE — PLAN OF CARE
Alert and oriented. Able to make needs known. Call light in reach. Offers no C/O pain/discomfort. Denies SOB or chest pain. Infrequent nonproductive cough.  Mod I in room with walker during day, supervision at night. . Continent of bowel and bladder. Appears to be sleeping during rounds. Continue with plan of care.

## 2020-08-13 NOTE — PLAN OF CARE
Patient is alert and oriented x4. Denies shortness of breath, dizziness and pain. Good appetite eatting 100% of the meal with good hydration. Mod I in room to the bathroom, no alarms in use. Continent of bowel and bladder using the toilet. Patient complains of a dry infrequent cough, PRN cough suppresent given with good effect. Using call light appropriately to make his needs known. Calm and cooperative with his cares.

## 2020-08-13 NOTE — PLAN OF CARE
Pt alert and oriented x4. VSS. No complaints of shortness of breath or chest pain. Denied pain. Non productive cough present. Robitussin given PRN. Mod I in room with walker during the day and SBA at night. Regular diet, thin liquids. Takes pills whole. Continent of bowel and bladder. LBM: 8/13, per pt. Able to make his needs known.   Pt was moved from room 554 to 544.

## 2020-08-13 NOTE — PROGRESS NOTES
"  [facility]   Acute Rehabilitation Unit  Daily progress note    interval history  Tobias Palmer was seen sitting in chair this afternoon.  Denies chest pain, shortness of breath, no fever or chills.  No issues with bowel or bladder.  Feels he is getting stronger day by day.  Beginning to plan for discharge on 8/18.      Functional:  PT: making good progress in PT. Close to MOD I in room with WW.     ROS: 10 point ROS neg other than the symptoms noted above in the HPI.          medications    chlorhexidine  15 mL Swish & Spit BID     enoxaparin ANTICOAGULANT  40 mg Subcutaneous Q12H     lactobacillus rhamnosus (GG)  1 capsule Oral BID     melatonin  3 mg Oral At Bedtime     metoprolol tartrate  50 mg Oral BID     multivitamin w/minerals  1 tablet Oral Daily     psyllium  1 packet Oral Daily     traZODone  50 mg Oral At Bedtime        acetaminophen, albuterol, sore throat lozenge, benzonatate, guaiFENesin-codeine, - MEDICATION INSTRUCTIONS -, senna-docusate     physical exam  /85   Pulse 100   Temp 98.5  F (36.9  C) (Oral)   Resp 18   Ht 1.575 m (5' 2\")   Wt 107.8 kg (237 lb 11.2 oz)   SpO2 98%   BMI 43.48 kg/m    Gen: awake alert, NAD  HEENT: NCAT, EOMI  Pulm: non labored to RA, no distress  Abd: soft, obese  Ext: warm dry without significant edema, no calf tenderness  Neuro/MSK: alert speech clear. LLE HF 4-/5, ke/kf 4/5, df/pf 4/5, LUE 4/5 throughout.  RUE SF 2/5, ef 4-/5, ee 4-/5.  4- LUE 4/5 throughout. Has vast improvmetn in R shoulder ROM, still with R hand weakness.     Labs    Lab Results   Component Value Date    WBC 6.4 08/10/2020     Lab Results   Component Value Date    RBC 4.33 08/10/2020     Lab Results   Component Value Date    HGB 12.7 08/10/2020     Lab Results   Component Value Date    HCT 40.2 08/10/2020     Lab Results   Component Value Date    MCV 93 08/10/2020     Lab Results   Component Value Date    MCH 29.3 08/10/2020     Lab Results "   Component Value Date    MCHC 31.6 08/10/2020     Lab Results   Component Value Date    RDW 16.9 08/10/2020     Lab Results   Component Value Date     08/10/2020           Last Comprehensive Metabolic Panel:  Sodium   Date Value Ref Range Status   08/13/2020 140 133 - 144 mmol/L Final     Potassium   Date Value Ref Range Status   08/13/2020 4.0 3.4 - 5.3 mmol/L Final     Chloride   Date Value Ref Range Status   08/13/2020 105 94 - 109 mmol/L Final     Carbon Dioxide   Date Value Ref Range Status   08/13/2020 27 20 - 32 mmol/L Final     Anion Gap   Date Value Ref Range Status   08/13/2020 8 3 - 14 mmol/L Final     Glucose   Date Value Ref Range Status   08/13/2020 91 70 - 99 mg/dL Final     Urea Nitrogen   Date Value Ref Range Status   08/13/2020 8 7 - 30 mg/dL Final     Creatinine   Date Value Ref Range Status   08/13/2020 0.56 (L) 0.66 - 1.25 mg/dL Final     GFR Estimate   Date Value Ref Range Status   08/13/2020 >90 >60 mL/min/[1.73_m2] Final     Comment:     Non  GFR Calc  Starting 12/18/2018, serum creatinine based estimated GFR (eGFR) will be   calculated using the Chronic Kidney Disease Epidemiology Collaboration   (CKD-EPI) equation.       Calcium   Date Value Ref Range Status   08/13/2020 9.0 8.5 - 10.1 mg/dL Final         Rehabilitation - continue comprehensive acute inpatient rehabilitation program with multidisciplinary approach including therapies, rehab nursing, and physiatry following. See interval history for updates.      assessment and plan  Tobias Palmer is a 54 year old man who presented 6/21 to [facility] with fever, cough, sob, with diagnosis of COVID 19, progressive hypoxic respiratory failure intubated and transferred to [facility] 7/22 for ongoing management. Hospitalization complicated by multiple infections, icu delirium,  A-fib with RVR,  Impaired oral intake, scrotal edema, and debility. Admitted to acute rehab 8/4/20 for ongoing rehabilitation and  medical management.      Confirmed COVID-19 infection  Onset of COVID symptoms ~6/18/20 Date of positive test results 6/21/20   Research study Yes: [facility] Research study protocol/COVID medications: convalescent plasma, remdesivir, tocilizumab  Acute Hypoxic Respiratory Failure secondary to COVID-19 infection  Viral Pneumonia secondary to COVID-19 infection  Acute Respiratory Distress Syndrome (resolved)   Bacterial Superinfection (resolved)   admitted to [facility] on 6/21/2020 after presenting with fever, cough and dyspnea. He was intubated and transferred to [facility] on 6/22. Imaging revealed diffuse bilateral infiltrates.  He required flolan. He remained intubated until 7/26 at which time he was successfully extubated.  stable on RA with exception of some desaturation with sleep, c/w JEROMY (see below): Multiple bacterial infections and septic shock thoughout his course including MSSA and serattia pneumonia and serratia UTI.   - f/u ICU survivorship clinic after discharge  -encourage IS  -monitor respiratory status  -oxygen prn   -Has post viral cough -  prn cough suppressants     Debility- in setting of prolonged icu stay with illness documented as above. RUE weakness ? Nerve injury/ Related to positioning while intubated.   -PT/OT     ICU delirium  sedated for weeks so likely will benefit from slow wean of benzos and opaites Started wean 7/29 as below and tolerating well. Ativan discontinued 8/2. Oxycodone discontinued 8/3, still on Seroquel and Trazodone. Reports sleep improved will dc seroquel  -continue trazodone  -discontinue seroquel       Suspected Obstructive Sleep Apnea  After weaning from O2,  continued to desat while in deep sleep requiring O2. Pt tried BiPAP/CPAP but found uncomfortable, tolerating for short time. Overnight 8/1 desat to 70's and bradycardic to 30's associated with apnea. Recovered quickly when awoken, wore CPAP for a few hours. Per discharge team likely has  longstanding JEROMY  - Refer to sleep medicine as outpatient  -monitor for symptoms-  consider overnight oximetry study for home oxygen      A fib:   Developed A fib with RVR when critically ill. Resolved with amiodarone.brief self-resolving a fib/flutter starting overnight 7/31. Started metoprolol 25 mg two times a day 8/1 , HR in 60s to 110, BP 110s-140s may need med adjustment. - No indication for anticoagulation for A fib at this time.  -continue metoprolol- increased on 8/11 to 50 mg bid -- BP running better today, but does have elevated HR, asymptomatic   -monitor heart rate/bp titrate as indicated.       Chronic Scrotal edema  Marked scrotal edema, preventing removal of barlow-removed prior to ARU admission without voiding issues was on lasix for this with hypotension received iv bolus and lasix discontinued. Reportedly quite chronic without acute change per patient   -try scrotal support  -monitor edema       1. Adjustment to disability:  Monitor mood  2. FEN: reg  3. Bowel: continent  4. Bladder: continent  5. DVT Prophylaxis: lovenox  6. GI Prophylaxis: reg diet  7. Code: full  8. Disposition: home  9. ELOS: ~10 days  10. Follow up Appointments on Discharge: icu survivorship clinic, pcp, sleep medicine referral       Raphael Lind,       I spent a total of 25 minutes face to face and coordinating care of Tobias Vogela Palmer. Over 50% of my time on the unit was spent counseling the patient and /or coordinating care regarding severe debility post covid.

## 2020-08-13 NOTE — PROGRESS NOTES
"Discharge Planner OT   Patient plan for discharge: Home with HC OT for 1 visit d/t Emergency MA  Current status: Pt is mod IND with ADLs in BR with FWW. Assessed safety with med mgmt-pt req'd assistance to correctly set-up 1/5 medications d/t pt not understanding what \"as needed\" means. Pt requires SBA with light home mgmt tasks while standing.   Barriers to return to prior living situation: deconditioning, and decreased activity tolerance  Recommendations for discharge: assistance with bathing. AE/DME: extended tub bench, toilet tongs, FWW, sock aid  Rationale for recommendations: To increase IND and safety with ADLs/IADLs.        Entered by: Nalini Santana 08/13/2020 3:36 PM     -5 d/t fatigue    "

## 2020-08-14 ENCOUNTER — APPOINTMENT (OUTPATIENT)
Dept: OCCUPATIONAL THERAPY | Facility: CLINIC | Age: 55
End: 2020-08-14
Payer: MEDICAID

## 2020-08-14 ENCOUNTER — APPOINTMENT (OUTPATIENT)
Dept: PHYSICAL THERAPY | Facility: CLINIC | Age: 55
End: 2020-08-14
Payer: MEDICAID

## 2020-08-14 PROCEDURE — 25000128 H RX IP 250 OP 636: Performed by: PHYSICIAN ASSISTANT

## 2020-08-14 PROCEDURE — 97530 THERAPEUTIC ACTIVITIES: CPT | Mod: GO

## 2020-08-14 PROCEDURE — 25000132 ZZH RX MED GY IP 250 OP 250 PS 637: Performed by: PHYSICAL MEDICINE & REHABILITATION

## 2020-08-14 PROCEDURE — 25000132 ZZH RX MED GY IP 250 OP 250 PS 637: Performed by: PHYSICIAN ASSISTANT

## 2020-08-14 PROCEDURE — 12800006 ZZH R&B REHAB

## 2020-08-14 PROCEDURE — 97110 THERAPEUTIC EXERCISES: CPT | Mod: GP

## 2020-08-14 PROCEDURE — 97110 THERAPEUTIC EXERCISES: CPT | Mod: GO

## 2020-08-14 RX ADMIN — TRAZODONE HYDROCHLORIDE 50 MG: 50 TABLET ORAL at 21:16

## 2020-08-14 RX ADMIN — MULTIPLE VITAMINS W/ MINERALS TAB 1 TABLET: TAB at 10:22

## 2020-08-14 RX ADMIN — Medication 1 CAPSULE: at 10:21

## 2020-08-14 RX ADMIN — ENOXAPARIN SODIUM 40 MG: 40 INJECTION SUBCUTANEOUS at 10:22

## 2020-08-14 RX ADMIN — GUAIFENESIN AND CODEINE PHOSPHATE 5 ML: 10; 100 LIQUID ORAL at 07:21

## 2020-08-14 RX ADMIN — CHLORHEXIDINE GLUCONATE 0.12% ORAL RINSE 15 ML: 1.2 LIQUID ORAL at 21:16

## 2020-08-14 RX ADMIN — Medication 1 CAPSULE: at 21:16

## 2020-08-14 RX ADMIN — METOPROLOL TARTRATE 50 MG: 50 TABLET, FILM COATED ORAL at 10:21

## 2020-08-14 RX ADMIN — METOPROLOL TARTRATE 50 MG: 50 TABLET, FILM COATED ORAL at 21:16

## 2020-08-14 RX ADMIN — ENOXAPARIN SODIUM 40 MG: 40 INJECTION SUBCUTANEOUS at 21:16

## 2020-08-14 RX ADMIN — MELATONIN TAB 3 MG 3 MG: 3 TAB at 21:16

## 2020-08-14 ASSESSMENT — MIFFLIN-ST. JEOR: SCORE: 1792.44

## 2020-08-14 NOTE — PLAN OF CARE
A/Ox4, Guinean and English speaking, Mod I, tachy, other VSS, continent of B/B, tolerating regular/low lactose diet and thin liquids, LS clear/dim, no c/o pain, CP or SOB, pills whole with water. Psych consult ordered. POC reviewed with patient, questions answered.

## 2020-08-14 NOTE — PROGRESS NOTES
"  [facility]   Acute Rehabilitation Unit  Daily progress note    interval history  Tobias Palmer was seen resting in bed.  Denies chest pain, shortness of breath, no fever or chills.  No issues with bowel or bladder.  Feels he is getting stronger day by day.  His R arm, shoulder range has improved, his  strength still not at baseline.  He is encouraged with progress and planning for home next week.       Functional:  OT:  Pt is mod IND with ADLs in BR with FWW. Assessed safety with med mgmt-pt req'd assistance to correctly set-up 1/5 medications d/t pt not understanding what \"as needed\" means. Pt requires SBA with light home mgmt tasks while standing.      ROS: 10 point ROS neg other than the symptoms noted above in the HPI.          medications    chlorhexidine  15 mL Swish & Spit BID     enoxaparin ANTICOAGULANT  40 mg Subcutaneous Q12H     lactobacillus rhamnosus (GG)  1 capsule Oral BID     melatonin  3 mg Oral At Bedtime     metoprolol tartrate  50 mg Oral BID     multivitamin w/minerals  1 tablet Oral Daily     psyllium  1 packet Oral Daily     traZODone  50 mg Oral At Bedtime        acetaminophen, albuterol, sore throat lozenge, benzonatate, guaiFENesin-codeine, - MEDICATION INSTRUCTIONS -, senna-docusate     physical exam  /78 (BP Location: Left arm)   Pulse 85   Temp 98.2  F (36.8  C) (Oral)   Resp 22   Ht 1.575 m (5' 2\")   Wt 107.8 kg (237 lb 11.2 oz)   SpO2 93%   BMI 43.48 kg/m    Gen: awake alert, NAD  HEENT: NCAT, EOMI  Pulm: non labored to RA, no distress  Abd: soft, obese  Ext: warm dry without significant edema, no calf tenderness  Neuro/MSK: alert speech clear. LLE HF 4-/5, ke/kf 4/5, df/pf 4/5, LUE 4/5 throughout.  RUE SF 2/5, ef 4-/5, ee 4-/5.  4- LUE 4/5 throughout. Has vast improvmetn in R shoulder ROM, still with R hand weakness.     Labs    Lab Results   Component Value Date    WBC 6.4 08/10/2020     Lab Results   Component Value " Date    RBC 4.33 08/10/2020     Lab Results   Component Value Date    HGB 12.7 08/10/2020     Lab Results   Component Value Date    HCT 40.2 08/10/2020     Lab Results   Component Value Date    MCV 93 08/10/2020     Lab Results   Component Value Date    MCH 29.3 08/10/2020     Lab Results   Component Value Date    MCHC 31.6 08/10/2020     Lab Results   Component Value Date    RDW 16.9 08/10/2020     Lab Results   Component Value Date     08/10/2020           Last Comprehensive Metabolic Panel:  Sodium   Date Value Ref Range Status   08/13/2020 140 133 - 144 mmol/L Final     Potassium   Date Value Ref Range Status   08/13/2020 4.0 3.4 - 5.3 mmol/L Final     Chloride   Date Value Ref Range Status   08/13/2020 105 94 - 109 mmol/L Final     Carbon Dioxide   Date Value Ref Range Status   08/13/2020 27 20 - 32 mmol/L Final     Anion Gap   Date Value Ref Range Status   08/13/2020 8 3 - 14 mmol/L Final     Glucose   Date Value Ref Range Status   08/13/2020 91 70 - 99 mg/dL Final     Urea Nitrogen   Date Value Ref Range Status   08/13/2020 8 7 - 30 mg/dL Final     Creatinine   Date Value Ref Range Status   08/13/2020 0.56 (L) 0.66 - 1.25 mg/dL Final     GFR Estimate   Date Value Ref Range Status   08/13/2020 >90 >60 mL/min/[1.73_m2] Final     Comment:     Non  GFR Calc  Starting 12/18/2018, serum creatinine based estimated GFR (eGFR) will be   calculated using the Chronic Kidney Disease Epidemiology Collaboration   (CKD-EPI) equation.       Calcium   Date Value Ref Range Status   08/13/2020 9.0 8.5 - 10.1 mg/dL Final         Rehabilitation - continue comprehensive acute inpatient rehabilitation program with multidisciplinary approach including therapies, rehab nursing, and physiatry following. See interval history for updates.      assessment and plan  Tobias Palmer is a 54 year old man who presented 6/21 to [facility] with fever, cough, sob, with diagnosis of COVID 19, progressive hypoxic  respiratory failure intubated and transferred to [facility] 7/22 for ongoing management. Hospitalization complicated by multiple infections, icu delirium,  A-fib with RVR,  Impaired oral intake, scrotal edema, and debility. Admitted to acute rehab 8/4/20 for ongoing rehabilitation and medical management.      Confirmed COVID-19 infection  Onset of COVID symptoms ~6/18/20 Date of positive test results 6/21/20   Research study Yes: CCP Research study protocol/COVID medications: convalescent plasma, remdesivir, tocilizumab  Acute Hypoxic Respiratory Failure secondary to COVID-19 infection  Viral Pneumonia secondary to COVID-19 infection  Acute Respiratory Distress Syndrome (resolved)   Bacterial Superinfection (resolved)   admitted to [facility] on 6/21/2020 after presenting with fever, cough and dyspnea. He was intubated and transferred to [place] on 6/22. Imaging revealed diffuse bilateral infiltrates.  He required flolan. He remained intubated until 7/26 at which time he was successfully extubated.  stable on RA with exception of some desaturation with sleep, c/w JEROMY (see below): Multiple bacterial infections and septic shock thoughout his course including MSSA and serattia pneumonia and serratia UTI.   - f/u ICU survivorship clinic after discharge  -encourage IS  -monitor respiratory status  -oxygen prn   -Has post viral cough -  prn cough suppressants     Debility- in setting of prolonged icu stay with illness documented as above. RUE weakness ? Nerve injury/ Related to positioning while intubated.   -PT/OT     ICU delirium  sedated for weeks so likely will benefit from slow wean of benzos and opaites Started wean 7/29 as below and tolerating well. Ativan discontinued 8/2. Oxycodone discontinued 8/3, still on Seroquel and Trazodone. Reports sleep improved will dc seroquel  -continue trazodone  -discontinue seroquel       Suspected Obstructive Sleep Apnea  After weaning from O2,   continued to desat while in deep sleep requiring O2. Pt tried BiPAP/CPAP but found uncomfortable, tolerating for short time. Overnight 8/1 desat to 70's and bradycardic to 30's associated with apnea. Recovered quickly when awoken, wore CPAP for a few hours. Per discharge team likely has longstanding JEROMY  - Refer to sleep medicine as outpatient  -monitor for symptoms-  consider overnight oximetry study for home oxygen      A fib:   Developed A fib with RVR when critically ill. Resolved with amiodarone.brief self-resolving a fib/flutter starting overnight 7/31. Started metoprolol 25 mg two times a day 8/1 , HR in 60s to 110, BP 110s-140s may need med adjustment. - No indication for anticoagulation for A fib at this time.  -continue metoprolol- increased on 8/11 to 50 mg bid -- BP running well today, but does have elevated HR, asymptomatic   -monitor heart rate/bp titrate as indicated.       Chronic Scrotal edema - stable  Marked scrotal edema, preventing removal of barlow-removed prior to ARU admission without voiding issues was on lasix for this with hypotension received iv bolus and lasix discontinued. Reportedly quite chronic without acute change per patient   -try scrotal support  -monitor edema       1. Adjustment to disability:  Monitor mood, stable  2. FEN: reg  3. Bowel: continent  4. Bladder: continent  5. DVT Prophylaxis: lovenox  6. GI Prophylaxis: reg diet  7. Code: full  8. Disposition: home  9. ELOS: ~10 days on 8/18  10. Follow up Appointments on Discharge: icu survivorship clinic, pcp, sleep medicine referral       Raphael Lind DO      I spent a total of 25 minutes face to face and coordinating care of Tobias Palmer. Over 50% of my time on the unit was spent counseling the patient and /or coordinating care regarding severe debility post covid.

## 2020-08-14 NOTE — PLAN OF CARE
Vitals: VSS  Lung sounds clear, diminished at the bases. Denies CP, SOB.   Output: Continent of B/B; Voiding spontaneously w/out difficulty.   Last BM: 8/13   Activity: MOD I w/ FWW during day; SBA @ NOC   Skin: Intact. Ex bruising   Pain: Denies   CMS/Neuro: Intact. A&O x 4   Dressing: None   Diet: Regular/Thin   LDA: None   Equipment: Walker   Plan/Add'l info: Belarusian speaking, but is able to communicate in english. Pt likes to take PRN Robitussin AC for infrequent cough. Able to communicate needs clearly. Call light within reach. Continue with POC.  Discharge plan: 8/18

## 2020-08-14 NOTE — PLAN OF CARE
Discharge Planner PT   Patient plan for discharge: Home with family assist prn on 8/18/20.  Current status: MOD I in room with WW, pt hesitant to wean from walker d/t fear. Limited by BLE weakness L>R, paraesthesia in L thigh, and CV limitations s/p COVID-19. Focus of care plan on increasing endurance and durability with functional mobility in preparation for discharge to home early next week.     Has own WW at home. Will not be eligible for ongoing therapy service d/t emergency MA insurance status.       Entered by: Janet Griffith 08/14/2020 12:45 PM

## 2020-08-15 ENCOUNTER — APPOINTMENT (OUTPATIENT)
Dept: OCCUPATIONAL THERAPY | Facility: CLINIC | Age: 55
End: 2020-08-15
Payer: MEDICAID

## 2020-08-15 ENCOUNTER — APPOINTMENT (OUTPATIENT)
Dept: PHYSICAL THERAPY | Facility: CLINIC | Age: 55
End: 2020-08-15
Payer: MEDICAID

## 2020-08-15 PROCEDURE — 97750 PHYSICAL PERFORMANCE TEST: CPT | Mod: GP | Performed by: PHYSICAL THERAPIST

## 2020-08-15 PROCEDURE — 25000128 H RX IP 250 OP 636: Performed by: PHYSICIAN ASSISTANT

## 2020-08-15 PROCEDURE — 25000132 ZZH RX MED GY IP 250 OP 250 PS 637: Performed by: PHYSICAL MEDICINE & REHABILITATION

## 2020-08-15 PROCEDURE — 12800006 ZZH R&B REHAB

## 2020-08-15 PROCEDURE — 25000132 ZZH RX MED GY IP 250 OP 250 PS 637: Performed by: PHYSICIAN ASSISTANT

## 2020-08-15 PROCEDURE — 97110 THERAPEUTIC EXERCISES: CPT | Mod: GO | Performed by: OCCUPATIONAL THERAPIST

## 2020-08-15 PROCEDURE — 97535 SELF CARE MNGMENT TRAINING: CPT | Mod: GO | Performed by: OCCUPATIONAL THERAPIST

## 2020-08-15 PROCEDURE — 97110 THERAPEUTIC EXERCISES: CPT | Mod: GP | Performed by: PHYSICAL THERAPIST

## 2020-08-15 RX ADMIN — TRAZODONE HYDROCHLORIDE 50 MG: 50 TABLET ORAL at 21:52

## 2020-08-15 RX ADMIN — Medication 1 CAPSULE: at 21:51

## 2020-08-15 RX ADMIN — Medication 1 CAPSULE: at 10:22

## 2020-08-15 RX ADMIN — ENOXAPARIN SODIUM 40 MG: 40 INJECTION SUBCUTANEOUS at 10:21

## 2020-08-15 RX ADMIN — MULTIPLE VITAMINS W/ MINERALS TAB 1 TABLET: TAB at 10:22

## 2020-08-15 RX ADMIN — METOPROLOL TARTRATE 50 MG: 50 TABLET, FILM COATED ORAL at 10:21

## 2020-08-15 RX ADMIN — CHLORHEXIDINE GLUCONATE 0.12% ORAL RINSE 15 ML: 1.2 LIQUID ORAL at 21:51

## 2020-08-15 RX ADMIN — ENOXAPARIN SODIUM 40 MG: 40 INJECTION SUBCUTANEOUS at 21:51

## 2020-08-15 RX ADMIN — GUAIFENESIN AND CODEINE PHOSPHATE 5 ML: 10; 100 LIQUID ORAL at 06:34

## 2020-08-15 RX ADMIN — BENZONATATE 100 MG: 100 CAPSULE ORAL at 21:51

## 2020-08-15 RX ADMIN — METOPROLOL TARTRATE 50 MG: 50 TABLET, FILM COATED ORAL at 21:51

## 2020-08-15 RX ADMIN — MELATONIN TAB 3 MG 3 MG: 3 TAB at 21:52

## 2020-08-15 NOTE — PLAN OF CARE
Alert and oriented, VSS. Mod I with walker. Denies pain, no SOB. Continues to have dry cough, declined interventions. Adequate fluid and food intake. No skin concerns ex scrotal edema. Lotion applied to dry feet. Continent of B/B; LBM 8/14. Reports not sleeping well; scheduled sleep medications given and promoted quite environment.

## 2020-08-15 NOTE — PLAN OF CARE
Cared for patient for 12 hours: A/Ox4, Zimbabwean and English speaking, Mod I, VSS, continent of B/B, tolerating regular/low lactose diet and thin liquids, LS clear, no c/o pain, CP or SOB, pills whole with water. POC reviewed with patient, questions answered.

## 2020-08-15 NOTE — PROGRESS NOTES
"  [facility]   Acute Rehabilitation Unit  Daily progress note    INTERVAL HISTORY  No acute events overnight.  This morning Mr. Ney Palmer has no new concerns or complaints.  Denies chest pain or shortness of breath.  Continues to have a cough, but says he was \"given some medicine\" this morning and is better.  Had a BM this morning.  Functionally he continues to make improvements, and tell me just one week ago he could not even stand up, and now he is Mod I in the room with a walker.        MEDICATIONS  Scheduled:    chlorhexidine  15 mL Swish & Spit BID     enoxaparin ANTICOAGULANT  40 mg Subcutaneous Q12H     lactobacillus rhamnosus (GG)  1 capsule Oral BID     melatonin  3 mg Oral At Bedtime     metoprolol tartrate  50 mg Oral BID     multivitamin w/minerals  1 tablet Oral Daily     psyllium  1 packet Oral Daily     traZODone  50 mg Oral At Bedtime        PRN:  acetaminophen, albuterol, sore throat lozenge, benzonatate, guaiFENesin-codeine, - MEDICATION INSTRUCTIONS -, senna-docusate       PHYSICAL EXAM  Patient Vitals for the past 24 hrs:   BP Temp Temp src Pulse Resp SpO2 Weight   08/15/20 0621 118/74 97.9  F (36.6  C) Oral 81 20 93 % --   08/14/20 2116 122/82 -- -- 104 -- -- --   08/14/20 1705 -- -- -- -- -- -- 108.3 kg (238 lb 12.8 oz)   08/14/20 1701 110/57 98.4  F (36.9  C) Oral 94 16 96 % --   08/14/20 1021 138/63 98.2  F (36.8  C) Oral 90 20 95 % --       GEN: NAD, pleasant and cooperative, obese  HEENT: NC/AT  CVS: RRR, S1+S2, no m/r/g  PULM: CTA b/l, no w/r/r  ABD: Soft, NT, ND, bowel sounds present  EXT: No LE edema or calf tenderness b/l  Neuro: Answers appropriately, follows commands    LABS  CBC RESULTS:   Recent Labs   Lab Test 08/12/20  0551 08/10/20  0625   WBC  --  6.4   RBC  --  4.33*   HGB  --  12.7*   HCT  --  40.2   MCV  --  93   MCH  --  29.3   MCHC  --  31.6   RDW  --  16.9*    312       Last Comprehensive Metabolic Panel:  Sodium   Date Value " Ref Range Status   08/13/2020 140 133 - 144 mmol/L Final     Potassium   Date Value Ref Range Status   08/13/2020 4.0 3.4 - 5.3 mmol/L Final     Chloride   Date Value Ref Range Status   08/13/2020 105 94 - 109 mmol/L Final     Carbon Dioxide   Date Value Ref Range Status   08/13/2020 27 20 - 32 mmol/L Final     Anion Gap   Date Value Ref Range Status   08/13/2020 8 3 - 14 mmol/L Final     Glucose   Date Value Ref Range Status   08/13/2020 91 70 - 99 mg/dL Final     Urea Nitrogen   Date Value Ref Range Status   08/13/2020 8 7 - 30 mg/dL Final     Creatinine   Date Value Ref Range Status   08/13/2020 0.56 (L) 0.66 - 1.25 mg/dL Final     GFR Estimate   Date Value Ref Range Status   08/13/2020 >90 >60 mL/min/[1.73_m2] Final     Comment:     Non  GFR Calc  Starting 12/18/2018, serum creatinine based estimated GFR (eGFR) will be   calculated using the Chronic Kidney Disease Epidemiology Collaboration   (CKD-EPI) equation.       Calcium   Date Value Ref Range Status   08/13/2020 9.0 8.5 - 10.1 mg/dL Final       Recent Labs   Lab 08/13/20  0703 08/10/20  0625   GLC 91 90         ASSESSMENT  Tobias Palmer is a 54 year old man who presented 6/21 to [facility] with fever, cough, sob, with diagnosis of COVID 19, progressive hypoxic respiratory failure intubated and transferred to [facility] 7/22 for ongoing management. Hospitalization complicated by multiple infections, icu delirium,  A-fib with RVR,  Impaired oral intake, scrotal edema, and debility. Admitted to acute rehab 8/4/20 for ongoing rehabilitation and medical management.         PLAN  -Continue with inpatient rehabilitation program including PT and OT for 3 hrs/day, rehab nursing, social work, nutrition, and close monitoring by physiatry  -Vital signs stable.  No labs today.  -Health psych has been consulted  -Continue with plan of care.  No medication changes today and tentative discharge date set at 8/18.        Gerhard Vang  MD  Department of Rehabilitation Medicine  Pager: [phone]    Time Spent on this Encounter   I, Gerhard Vang, spent a total of 15 minutes face-to-face or managing the care of Tobias Palmer. Over 50% of my time on the unit was spent counseling the patient and coordinating care. See note for details.

## 2020-08-15 NOTE — PROGRESS NOTES
OT: Completed full shower during PM session. Pt req'd SBA to transfer in/out of walk in shower with FWW and on/off of extended tub bench. Pt req'd supervision with bathing while seated on extended tub bench.     AM session pt demonstrated mod IND with kitchen mobility and simple meal prep with FWW. Pt req'd SBA/CGA with sweeping floors while standing without assistive device.    Family training set for tomorrow with spouse for bathtub transfer training and to provide AE/DME recommendations: sock aid and reacher

## 2020-08-15 NOTE — PLAN OF CARE
FOCUS/GOAL  Medication management and Medical management    ASSESSMENT, INTERVENTIONS AND CONTINUING PLAN FOR GOAL:  A&O, Mod I in room w/ walker. Makes needs known, alarms off.  Pt denied pain during night.  Continent of B/B, LBM 8/14.  No further concerns at this time, continue with POC.

## 2020-08-15 NOTE — PROGRESS NOTES
08/15/20 1100   Signing Clinician's Name / Credentials   Signing clinician's name / credentials Brian Ledesma, PT   Bashir Balance Scale (LORI K, OH S, JENNY MARCOS, GONZALO REYES: MEASURING BALANCE IN THE ELDERLY: VALIDATION OF AN INSTRUMENT. CAN. J. PUB. HEALTH, JULY/AUGUST SUPPLEMENT 2:S7-11, 1992.)   Sit To Stand 4   Standing Unsupported 4   Sitting Unsupported 4   Stand to Sit 4   Transfers 4   Standing with Eyes Closed 4   Standing Unsupported, Feet Together 4   Reach Forward With Outstretched Arm 3   Retrieve Object From Floor 2   Turning to Look Behind 4   Turn 360 Degrees 2   Placing Alternate Foot on Stool (4-6 inches) 4   Unsupported Tandem Stand (Demonstrate to Subject) 4   One Leg Stand 1   Total Score (A score of 45 or less has been correlated with an increased risk of falls)   Total Score (out of 56) 48

## 2020-08-15 NOTE — PLAN OF CARE
"PT: Assessment of static/dynamic balance and safety with functional mobility:  TU.0 sec with FWW  30\" sit<>stand: 10x without UE A  SANDOVAL/56    Above assessment reveals patient will be safe for discharge home with family A. While at slightly increased risk for future falls, he does have a FWW that he will plan to use for safety. Plan to continue to progress standing LE strength, balance, and CV endurance training to improve community mobility tolerance and decrease risk of future falls.  "

## 2020-08-16 ENCOUNTER — APPOINTMENT (OUTPATIENT)
Dept: PHYSICAL THERAPY | Facility: CLINIC | Age: 55
End: 2020-08-16
Payer: MEDICAID

## 2020-08-16 ENCOUNTER — APPOINTMENT (OUTPATIENT)
Dept: OCCUPATIONAL THERAPY | Facility: CLINIC | Age: 55
End: 2020-08-16
Payer: MEDICAID

## 2020-08-16 PROCEDURE — 25000132 ZZH RX MED GY IP 250 OP 250 PS 637: Performed by: PHYSICIAN ASSISTANT

## 2020-08-16 PROCEDURE — 97112 NEUROMUSCULAR REEDUCATION: CPT | Mod: GP | Performed by: PHYSICAL THERAPIST

## 2020-08-16 PROCEDURE — 97110 THERAPEUTIC EXERCISES: CPT | Mod: GO | Performed by: OCCUPATIONAL THERAPIST

## 2020-08-16 PROCEDURE — 97110 THERAPEUTIC EXERCISES: CPT | Mod: GP | Performed by: PHYSICAL THERAPIST

## 2020-08-16 PROCEDURE — 25000128 H RX IP 250 OP 636: Performed by: PHYSICIAN ASSISTANT

## 2020-08-16 PROCEDURE — 97535 SELF CARE MNGMENT TRAINING: CPT | Mod: GO | Performed by: OCCUPATIONAL THERAPIST

## 2020-08-16 PROCEDURE — 12800006 ZZH R&B REHAB

## 2020-08-16 RX ADMIN — BENZOCAINE, MENTHOL 1 LOZENGE: 15; 3.6 LOZENGE ORAL at 21:51

## 2020-08-16 RX ADMIN — BENZONATATE 100 MG: 100 CAPSULE ORAL at 08:41

## 2020-08-16 RX ADMIN — BENZOCAINE, MENTHOL 1 LOZENGE: 15; 3.6 LOZENGE ORAL at 16:51

## 2020-08-16 RX ADMIN — MELATONIN TAB 3 MG 3 MG: 3 TAB at 21:45

## 2020-08-16 RX ADMIN — ENOXAPARIN SODIUM 40 MG: 40 INJECTION SUBCUTANEOUS at 08:41

## 2020-08-16 RX ADMIN — BENZONATATE 100 MG: 100 CAPSULE ORAL at 16:51

## 2020-08-16 RX ADMIN — TRAZODONE HYDROCHLORIDE 50 MG: 50 TABLET ORAL at 21:45

## 2020-08-16 RX ADMIN — Medication 1 CAPSULE: at 08:40

## 2020-08-16 RX ADMIN — CHLORHEXIDINE GLUCONATE 0.12% ORAL RINSE 15 ML: 1.2 LIQUID ORAL at 21:45

## 2020-08-16 RX ADMIN — Medication 1 CAPSULE: at 21:45

## 2020-08-16 RX ADMIN — MULTIPLE VITAMINS W/ MINERALS TAB 1 TABLET: TAB at 08:41

## 2020-08-16 RX ADMIN — METOPROLOL TARTRATE 50 MG: 50 TABLET, FILM COATED ORAL at 08:40

## 2020-08-16 RX ADMIN — ENOXAPARIN SODIUM 40 MG: 40 INJECTION SUBCUTANEOUS at 21:45

## 2020-08-16 RX ADMIN — METOPROLOL TARTRATE 50 MG: 50 TABLET, FILM COATED ORAL at 21:45

## 2020-08-16 RX ADMIN — BENZONATATE 100 MG: 100 CAPSULE ORAL at 21:51

## 2020-08-16 NOTE — PLAN OF CARE
FOCUS/GOAL  Bowel management, Bladder management, Nutrition/Feeding/Swallowing precautions, and Medical management    ASSESSMENT, INTERVENTIONS AND CONTINUING PLAN FOR GOAL:  Alert & oriented, uses call light to make needs known. MOD I in room for transfers and toileting. Continent of bladder and bowel, reported BM this AM. Scrotal edema continues. Tessalon PRN given for dry cough with effective results. Denies pain or discomfort.

## 2020-08-16 NOTE — PROGRESS NOTES
Discharge Planner OT   Patient plan for discharge: Home with HC OT for 1 visit as pt has EMA  Current status: Pt is mod IND with FB dressing tasks, toileting and G/H tasks. Pt requires SBA with bathtub transfer with FWW utilizing extended tub bench. Pt is mod IND with light meal prep and home mgmt tasks with FWW. Family training completed 8/16/20 with spouse who provided SBA with tub transfer with extended tub bench and FWW. Providing pt with several BUE HEP's d/t limited HC therapy to continue to improve pt's strength/endurance.   Barriers to return to prior living situation: fatigue/deconditioning  Recommendations for discharge: Pt to purchase sock aid and reacher. Provided pt with donated extended tub bench. Recommending initial supervision with IADLs and SBA with bathing.   Rationale for recommendations: To increase safety and IND with ADLs/IADLs and functional mobility.        Entered by: Nalini Santana 08/16/2020 1:41 PM

## 2020-08-16 NOTE — PLAN OF CARE
[id number]  VS: A&Ox4. Uzbek speaking, understands English. VSS. Denied chest pain. Denied SOB. No c/o pain. Given PRN tessalon @ bedtime for infrequent cough, slept well throughout the night.   Output: Up to bathroom voiding spontaneously w/o difficulty. Last BM 8/15/2020.   Activity: Mod I in room w/ walker. SBA overnight.   Skin: Intact except for generalized bruising.   Dressing: N/A   Diet: Regular, tolerating well   LDA: N/A   Equipment: Walker, personal belongings   Additional Info/Plan: Pt is able to make his needs known. Call light within reach. Continue POC.

## 2020-08-16 NOTE — PLAN OF CARE
PT: Pt remains highly motivated during therapy. SpO2 >= 95% on RA and HR <= 95 bpm with activity during AM/PM sessions. Plan for discharge home with family on Tuesday, 8/18. Pt does have FWW at home already. Plan to continue to progress standing LE strength, balance, and CV endurance training to improve community mobility tolerance and decrease risk of future falls.

## 2020-08-17 ENCOUNTER — APPOINTMENT (OUTPATIENT)
Dept: PHYSICAL THERAPY | Facility: CLINIC | Age: 55
End: 2020-08-17
Payer: MEDICAID

## 2020-08-17 ENCOUNTER — APPOINTMENT (OUTPATIENT)
Dept: OCCUPATIONAL THERAPY | Facility: CLINIC | Age: 55
End: 2020-08-17
Payer: MEDICAID

## 2020-08-17 LAB
ANION GAP SERPL CALCULATED.3IONS-SCNC: 4 MMOL/L (ref 3–14)
BUN SERPL-MCNC: 9 MG/DL (ref 7–30)
CALCIUM SERPL-MCNC: 9.4 MG/DL (ref 8.5–10.1)
CHLORIDE SERPL-SCNC: 108 MMOL/L (ref 94–109)
CO2 SERPL-SCNC: 27 MMOL/L (ref 20–32)
CREAT SERPL-MCNC: 0.52 MG/DL (ref 0.66–1.25)
ERYTHROCYTE [DISTWIDTH] IN BLOOD BY AUTOMATED COUNT: 17.1 % (ref 10–15)
GFR SERPL CREATININE-BSD FRML MDRD: >90 ML/MIN/{1.73_M2}
GLUCOSE SERPL-MCNC: 93 MG/DL (ref 70–99)
HCT VFR BLD AUTO: 42.8 % (ref 40–53)
HGB BLD-MCNC: 13.8 G/DL (ref 13.3–17.7)
MCH RBC QN AUTO: 30 PG (ref 26.5–33)
MCHC RBC AUTO-ENTMCNC: 32.2 G/DL (ref 31.5–36.5)
MCV RBC AUTO: 93 FL (ref 78–100)
PLATELET # BLD AUTO: 271 10E9/L (ref 150–450)
POTASSIUM SERPL-SCNC: 4.2 MMOL/L (ref 3.4–5.3)
RBC # BLD AUTO: 4.6 10E12/L (ref 4.4–5.9)
SODIUM SERPL-SCNC: 139 MMOL/L (ref 133–144)
WBC # BLD AUTO: 7.6 10E9/L (ref 4–11)

## 2020-08-17 PROCEDURE — 25000128 H RX IP 250 OP 636: Performed by: PHYSICAL MEDICINE & REHABILITATION

## 2020-08-17 PROCEDURE — 97110 THERAPEUTIC EXERCISES: CPT | Mod: GP

## 2020-08-17 PROCEDURE — 97110 THERAPEUTIC EXERCISES: CPT | Mod: GO | Performed by: OCCUPATIONAL THERAPIST

## 2020-08-17 PROCEDURE — 97530 THERAPEUTIC ACTIVITIES: CPT | Mod: GP

## 2020-08-17 PROCEDURE — 85027 COMPLETE CBC AUTOMATED: CPT | Performed by: PHYSICIAN ASSISTANT

## 2020-08-17 PROCEDURE — 36415 COLL VENOUS BLD VENIPUNCTURE: CPT | Performed by: PHYSICIAN ASSISTANT

## 2020-08-17 PROCEDURE — 25000128 H RX IP 250 OP 636: Performed by: PHYSICIAN ASSISTANT

## 2020-08-17 PROCEDURE — 80048 BASIC METABOLIC PNL TOTAL CA: CPT | Performed by: PHYSICIAN ASSISTANT

## 2020-08-17 PROCEDURE — 97110 THERAPEUTIC EXERCISES: CPT | Mod: GO

## 2020-08-17 PROCEDURE — 97535 SELF CARE MNGMENT TRAINING: CPT | Mod: GO | Performed by: OCCUPATIONAL THERAPIST

## 2020-08-17 PROCEDURE — 25000132 ZZH RX MED GY IP 250 OP 250 PS 637: Performed by: PHYSICAL MEDICINE & REHABILITATION

## 2020-08-17 PROCEDURE — 25000132 ZZH RX MED GY IP 250 OP 250 PS 637: Performed by: PHYSICIAN ASSISTANT

## 2020-08-17 PROCEDURE — 12800006 ZZH R&B REHAB

## 2020-08-17 RX ORDER — BENZONATATE 100 MG/1
100 CAPSULE ORAL 3 TIMES DAILY PRN
Qty: 90 CAPSULE | Refills: 0 | Status: SHIPPED | OUTPATIENT
Start: 2020-08-17 | End: 2020-09-16

## 2020-08-17 RX ORDER — METOPROLOL TARTRATE 50 MG
50 TABLET ORAL 2 TIMES DAILY
Qty: 60 TABLET | Refills: 0 | Status: SHIPPED | OUTPATIENT
Start: 2020-08-17 | End: 2020-09-16

## 2020-08-17 RX ORDER — ALBUTEROL SULFATE 90 UG/1
2 AEROSOL, METERED RESPIRATORY (INHALATION) EVERY 6 HOURS PRN
Qty: 1 INHALER | Refills: 0 | Status: SHIPPED | OUTPATIENT
Start: 2020-08-17 | End: 2023-04-10 | Stop reason: ALTCHOICE

## 2020-08-17 RX ORDER — MULTIPLE VITAMINS W/ MINERALS TAB 9MG-400MCG
1 TAB ORAL DAILY
Qty: 30 TABLET | Refills: 0 | Status: SHIPPED | OUTPATIENT
Start: 2020-08-18 | End: 2020-09-17

## 2020-08-17 RX ORDER — LANOLIN ALCOHOL/MO/W.PET/CERES
3 CREAM (GRAM) TOPICAL
Qty: 30 TABLET | Refills: 0 | Status: SHIPPED | OUTPATIENT
Start: 2020-08-17 | End: 2020-09-16

## 2020-08-17 RX ORDER — TRAZODONE HYDROCHLORIDE 50 MG/1
50 TABLET, FILM COATED ORAL
Qty: 30 TABLET | Refills: 0 | Status: SHIPPED | OUTPATIENT
Start: 2020-08-17 | End: 2020-08-31

## 2020-08-17 RX ORDER — AMOXICILLIN 250 MG
1-4 CAPSULE ORAL 2 TIMES DAILY PRN
Qty: 60 TABLET | Refills: 0 | Status: SHIPPED | OUTPATIENT
Start: 2020-08-17 | End: 2020-09-16

## 2020-08-17 RX ORDER — LACTOBACILLUS RHAMNOSUS GG 10B CELL
1 CAPSULE ORAL 2 TIMES DAILY
Qty: 60 CAPSULE | Refills: 0 | Status: SHIPPED | OUTPATIENT
Start: 2020-08-17 | End: 2020-09-16

## 2020-08-17 RX ADMIN — METOPROLOL TARTRATE 50 MG: 50 TABLET, FILM COATED ORAL at 21:43

## 2020-08-17 RX ADMIN — GUAIFENESIN AND CODEINE PHOSPHATE 5 ML: 10; 100 LIQUID ORAL at 10:45

## 2020-08-17 RX ADMIN — Medication 1 CAPSULE: at 21:43

## 2020-08-17 RX ADMIN — MULTIPLE VITAMINS W/ MINERALS TAB 1 TABLET: TAB at 08:08

## 2020-08-17 RX ADMIN — MELATONIN TAB 3 MG 3 MG: 3 TAB at 21:43

## 2020-08-17 RX ADMIN — Medication 1 CAPSULE: at 08:17

## 2020-08-17 RX ADMIN — CHLORHEXIDINE GLUCONATE 0.12% ORAL RINSE 15 ML: 1.2 LIQUID ORAL at 08:16

## 2020-08-17 RX ADMIN — TRAZODONE HYDROCHLORIDE 50 MG: 50 TABLET ORAL at 21:43

## 2020-08-17 RX ADMIN — BENZONATATE 100 MG: 100 CAPSULE ORAL at 21:50

## 2020-08-17 RX ADMIN — ENOXAPARIN SODIUM 40 MG: 40 INJECTION SUBCUTANEOUS at 08:17

## 2020-08-17 RX ADMIN — METOPROLOL TARTRATE 50 MG: 50 TABLET, FILM COATED ORAL at 08:17

## 2020-08-17 RX ADMIN — CHLORHEXIDINE GLUCONATE 0.12% ORAL RINSE 15 ML: 1.2 LIQUID ORAL at 21:43

## 2020-08-17 RX ADMIN — ENOXAPARIN SODIUM 40 MG: 40 INJECTION SUBCUTANEOUS at 21:43

## 2020-08-17 ASSESSMENT — MIFFLIN-ST. JEOR: SCORE: 1791.08

## 2020-08-17 NOTE — PLAN OF CARE
C/o frequent non productive cough, lung sounds clear, guaifenesin given with good effect. Patient is currently pauline in his room, on track to discharge tomorrow. Patient wife need to demonstrate lovenox shot prior discharge tomorrow. Will continue poc

## 2020-08-17 NOTE — PLAN OF CARE
Two Minute Walk Test:     Pt completed 2MWT with WW on 50 ft course between two cones. Amb safely without assistance, however needs prolonged rest break to recover respiratory rate after test.     Amb 277 ft in 2 minutes on room air. This is a baseline measurement and should be compared to future tests to assess endurance improvement. Recommending ongoing Pulmonary Rehab, however pt's insurance is a barrier to access services.

## 2020-08-17 NOTE — PLAN OF CARE
Pt denies pain, chest pain or SOB. C/o dry cough and sore throat, prn cepacol and tessalon given x2 per request with some relief. VSS. Mod I with FWW for functional mobility. When offered pt to practice Lovenox injection, pt reports his wife already knows.

## 2020-08-17 NOTE — PLAN OF CARE
FOCUS/GOAL  Medication management and Medical management     ASSESSMENT, INTERVENTIONS AND CONTINUING PLAN FOR GOAL:  A&O, SBA w/ walker at night. Makes needs known, alarms off.  Pt denied pain during night.  Continent of B/B, LBM 8/16.  No further concerns at this time, continue with POC.

## 2020-08-17 NOTE — PLAN OF CARE
OT:  Pt completed total body and shower mod I with reacher and sock aide as needed.  Therapist also issuing red theraband HEP and Pt completed and tolerated well.

## 2020-08-17 NOTE — PROGRESS NOTES
"  [facility]   Acute Rehabilitation Unit  Daily progress note    interval history  Tobias Palmer was seen resting in bed.  Denies chest pain, shortness of breath, no fever or chills.  No issues with bowel or bladder.  Continues to make excellent progress.  Still some R hand weakness, but his R shoulder has improved.  Plan is for discharge home tomorrow.       Functional:  PT: Pt remains highly motivated during therapy. SpO2 >= 95% on RA and HR <= 95 bpm with activity during AM/PM sessions. Plan for discharge home with family on Tuesday, 8/18. Pt does have FWW at home already. Plan to continue to progress standing LE strength, balance, and CV endurance training to improve community mobility tolerance and decrease risk of future falls.     ROS: 10 point ROS neg other than the symptoms noted above in the HPI.          medications    [START ON 8/18/2020] apixaban ANTICOAGULANT  2.5 mg Oral BID     chlorhexidine  15 mL Swish & Spit BID     enoxaparin ANTICOAGULANT  40 mg Subcutaneous Q12H     lactobacillus rhamnosus (GG)  1 capsule Oral BID     melatonin  3 mg Oral At Bedtime     metoprolol tartrate  50 mg Oral BID     multivitamin w/minerals  1 tablet Oral Daily     psyllium  1 packet Oral Daily     traZODone  50 mg Oral At Bedtime        acetaminophen, albuterol, sore throat lozenge, benzonatate, guaiFENesin-codeine, - MEDICATION INSTRUCTIONS -, senna-docusate     physical exam  /74 (BP Location: Right arm)   Pulse 87   Temp 97.8  F (36.6  C) (Oral)   Resp 16   Ht 1.575 m (5' 2\")   Wt 108.2 kg (238 lb 8 oz)   SpO2 97%   BMI 43.62 kg/m    Gen: awake alert, NAD  HEENT: NCAT, EOMI  Pulm: non labored to RA, no distress  Abd: soft, obese  Ext: warm dry without significant edema, no calf tenderness  Neuro/MSK: alert speech clear. LLE HF 4/5, ke/kf 4/5, df/pf 4/5, LUE 4/5 throughout.  RUE SF 4-/5, ef 4-/5, ee 4-/5.  4- LUE 4/5 throughout. Has vast improvmetn in R " shoulder ROM, still with R hand weakness.     Labs    Lab Results   Component Value Date    WBC 7.6 08/17/2020     Lab Results   Component Value Date    RBC 4.60 08/17/2020     Lab Results   Component Value Date    HGB 13.8 08/17/2020     Lab Results   Component Value Date    HCT 42.8 08/17/2020     Lab Results   Component Value Date    MCV 93 08/17/2020     Lab Results   Component Value Date    MCH 30.0 08/17/2020     Lab Results   Component Value Date    MCHC 32.2 08/17/2020     Lab Results   Component Value Date    RDW 17.1 08/17/2020     Lab Results   Component Value Date     08/17/2020         Last Comprehensive Metabolic Panel:  Sodium   Date Value Ref Range Status   08/17/2020 139 133 - 144 mmol/L Final     Potassium   Date Value Ref Range Status   08/17/2020 4.2 3.4 - 5.3 mmol/L Final     Chloride   Date Value Ref Range Status   08/17/2020 108 94 - 109 mmol/L Final     Carbon Dioxide   Date Value Ref Range Status   08/17/2020 27 20 - 32 mmol/L Final     Anion Gap   Date Value Ref Range Status   08/17/2020 4 3 - 14 mmol/L Final     Glucose   Date Value Ref Range Status   08/17/2020 93 70 - 99 mg/dL Final     Urea Nitrogen   Date Value Ref Range Status   08/17/2020 9 7 - 30 mg/dL Final     Creatinine   Date Value Ref Range Status   08/17/2020 0.52 (L) 0.66 - 1.25 mg/dL Final     GFR Estimate   Date Value Ref Range Status   08/17/2020 >90 >60 mL/min/[1.73_m2] Final     Comment:     Non  GFR Calc  Starting 12/18/2018, serum creatinine based estimated GFR (eGFR) will be   calculated using the Chronic Kidney Disease Epidemiology Collaboration   (CKD-EPI) equation.       Calcium   Date Value Ref Range Status   08/17/2020 9.4 8.5 - 10.1 mg/dL Final         Rehabilitation - continue comprehensive acute inpatient rehabilitation program with multidisciplinary approach including therapies, rehab nursing, and physiatry following. See interval history for updates.      assessment and plan  Tobias  Ney Palmer is a 54 year old man who presented 6/21 to [facility] with fever, cough, sob, with diagnosis of COVID 19, progressive hypoxic respiratory failure intubated and transferred to [facility] 7/22 for ongoing management. Hospitalization complicated by multiple infections, icu delirium,  A-fib with RVR,  Impaired oral intake, scrotal edema, and debility. Admitted to acute rehab 8/4/20 for ongoing rehabilitation and medical management.      Confirmed COVID-19 infection  Onset of COVID symptoms ~6/18/20 Date of positive test results 6/21/20   Research study Yes: [facility] Research study protocol/COVID medications: convalescent plasma, remdesivir, tocilizumab  Acute Hypoxic Respiratory Failure secondary to COVID-19 infection  Viral Pneumonia secondary to COVID-19 infection  Acute Respiratory Distress Syndrome (resolved)   Bacterial Superinfection (resolved)   admitted to [facility] on 6/21/2020 after presenting with fever, cough and dyspnea. He was intubated and transferred to [place] on 6/22. Imaging revealed diffuse bilateral infiltrates.  He required flolan. He remained intubated until 7/26 at which time he was successfully extubated.  stable on RA with exception of some desaturation with sleep, c/w JEROMY (see below): Multiple bacterial infections and septic shock thoughout his course including MSSA and serattia pneumonia and serratia UTI.   - f/u ICU survivorship clinic after discharge  -encourage IS  -monitor respiratory status  -oxygen prn   -Has post viral cough -  prn cough suppressants     Debility- in setting of prolonged icu stay with illness documented as above. RUE weakness ? Nerve injury/ Related to positioning while intubated.   -PT/OT     ICU delirium  sedated for weeks so likely will benefit from slow wean of benzos and opaites Started wean 7/29 as below and tolerating well. Ativan discontinued 8/2. Oxycodone discontinued 8/3, still on Seroquel and Trazodone. Reports  sleep improved will dc seroquel  -continue trazodone  -discontinue seroquel       Suspected Obstructive Sleep Apnea  After weaning from O2,  continued to desat while in deep sleep requiring O2. Pt tried BiPAP/CPAP but found uncomfortable, tolerating for short time. Overnight 8/1 desat to 70's and bradycardic to 30's associated with apnea. Recovered quickly when awoken, wore CPAP for a few hours. Per discharge team likely has longstanding JEROMY  - Refer to sleep medicine as outpatient  -monitor for symptoms-  consider overnight oximetry study for home oxygen      A fib:   Developed A fib with RVR when critically ill. Resolved with amiodarone.brief self-resolving a fib/flutter starting overnight 7/31. Started metoprolol 25 mg two times a day 8/1 , HR in 60s to 110, BP 110s-140s may need med adjustment. - No indication for anticoagulation for A fib at this time.  -continue metoprolol- increased on 8/11 to 50 mg bid -- BP running well today, but does have elevated HR, asymptomatic   -monitor heart rate/bp titrate as indicated.       Chronic Scrotal edema - stable  Marked scrotal edema, preventing removal of barlow-removed prior to ARU admission without voiding issues was on lasix for this with hypotension received iv bolus and lasix discontinued. Reportedly quite chronic without acute change per patient   -try scrotal support  -monitor edema       1. Adjustment to disability:  Monitor mood, stable  2. FEN: reg  3. Bowel: continent  4. Bladder: continent  5. DVT Prophylaxis: Lovenox while inpatient and will complete 30 day Covid course on 9/3 with home eliquis BID 2.5 mg   6. GI Prophylaxis: reg diet  7. Code: full  8. Disposition: home  9. ELOS: ~10 days on 8/18  10. Follow up Appointments on Discharge: icu survivorship clinic, pcp, sleep medicine referral       Raphael Lind,       I spent a total of 25 minutes face to face and coordinating care of Tobias Palmer. Over 50% of my time on the unit was spent  counseling the patient and /or coordinating care regarding severe debility post covid.

## 2020-08-17 NOTE — PLAN OF CARE
OT pt educated in supine shoulder ex to increase stablization of scapula and isolating shoulder ex to increase use of r arm with decrease shoulder clicking with movements. Pt able to vebalize and carry out 4 b shoulder stabalization ex and 5 gross motor coordination ex with good breathing technique

## 2020-08-18 VITALS
RESPIRATION RATE: 16 BRPM | DIASTOLIC BLOOD PRESSURE: 51 MMHG | SYSTOLIC BLOOD PRESSURE: 120 MMHG | TEMPERATURE: 97.9 F | HEART RATE: 90 BPM | HEIGHT: 62 IN | WEIGHT: 237.4 LBS | OXYGEN SATURATION: 95 % | BODY MASS INDEX: 43.69 KG/M2

## 2020-08-18 PROCEDURE — 25000132 ZZH RX MED GY IP 250 OP 250 PS 637: Performed by: PHYSICIAN ASSISTANT

## 2020-08-18 PROCEDURE — 25000132 ZZH RX MED GY IP 250 OP 250 PS 637: Performed by: PHYSICAL MEDICINE & REHABILITATION

## 2020-08-18 RX ADMIN — MULTIPLE VITAMINS W/ MINERALS TAB 1 TABLET: TAB at 09:02

## 2020-08-18 RX ADMIN — APIXABAN 2.5 MG: 2.5 TABLET, FILM COATED ORAL at 09:00

## 2020-08-18 RX ADMIN — METOPROLOL TARTRATE 50 MG: 50 TABLET, FILM COATED ORAL at 09:00

## 2020-08-18 RX ADMIN — GUAIFENESIN AND CODEINE PHOSPHATE 5 ML: 10; 100 LIQUID ORAL at 00:10

## 2020-08-18 RX ADMIN — CHLORHEXIDINE GLUCONATE 0.12% ORAL RINSE 15 ML: 1.2 LIQUID ORAL at 08:59

## 2020-08-18 RX ADMIN — Medication 1 CAPSULE: at 09:00

## 2020-08-18 RX ADMIN — ALBUTEROL SULFATE 2 PUFF: 108 INHALANT RESPIRATORY (INHALATION) at 09:05

## 2020-08-18 RX ADMIN — BENZOCAINE, MENTHOL 1 LOZENGE: 15; 3.6 LOZENGE ORAL at 01:00

## 2020-08-18 ASSESSMENT — MIFFLIN-ST. JEOR: SCORE: 1786.09

## 2020-08-18 NOTE — PLAN OF CARE
A/Ox4, Chinese and English speaking, Mod I, VSS, continent of B/B, tolerating regular/low lactose diet and thin liquids, LS clear, BS+, no c/o pain, CP or SOB, PRN tessalon anand given X1 @HS, pills whole with water. Plan is discharge to home tomorrow. POC reviewed with patient, questions answered

## 2020-08-18 NOTE — PLAN OF CARE
Patient awake with complained of infrequent non productive dry cough, sitting at the edge of bed, he denies shortness of breath and pain but states cough is keeping him from sleep. PRN Robitussin with codeine given with some relief, patient also has Lozenge and reported relief, he was able to fall asleep after 0200 and noted to be sleeping during safety round checks. Continue current plan of care.

## 2020-08-18 NOTE — DISCHARGE INSTRUCTIONS
Follow up Appointments    - Primary care  You are scheduled for a telephone visit with Dr. Steele on  Monday, August 31st at 10:20 AM.    Address  [facility]  Phone   [phone]    - Sleep medicine referral  You are scheduled to see Dr. Kumar on Wednesday, September 2nd at 10:00 AM.    Address  [facility] [place] [address]  Phone   [phone]  Fax                  [phone]      Home Health Care:   [facility] Ph: [phone]  5-Nursing visits   1-Social work visit   1-Physical therapy evaluation  1-Occupational therapy evaluation

## 2020-08-18 NOTE — PLAN OF CARE
Patient discharged home, discharge meds and summary reviewed with patient and his wife. Patient was stable at the time.

## 2020-08-18 NOTE — DISCHARGE SUMMARY
[facility]   Acute Rehabilitation Unit  Discharge summary     Date of Admission: 8/4/2020  Date of Discharge: 8/18/2020  Disposition: home with home care  Primary Care Physician: Sherrell Ref-Primary, Physician  Attending physician: Raphael Lind DO        discharge diagnosis      Severe debility post Covid  1. Impaired functional mobility  2. Impaired ADLs  3. Impaired cognition     --recovering ICU delirium, A-fib, scrotal edema       brief summary  Tobias Palmer is a 54 year old man who presented to ED with fever, sob, cough, and headache, with progressive acute respiratory distress was admitted to ICU,COVID 19 test returned positive. Was transferred to [facility] 6/22/20 for ongoing COVID-19 management.  He was intubated and required flolan, he was successfully extubated 7/26/20.  Hospital stay additionally complicated by ZHEN, serattia pneumonia and serratia UTI, icu delirium, A-fib with RVR when critically ill and brief self resolving episode 7/31. Impaired oral intake, malnutrition s/p tf placement removed 7/31, scrotal edema, and debility.       In review of the therapy notes has impaired strength, weight shifting, balance, and activity tolerance.  He is currently sba for bed mobility, min assist of 2 for ambulating up to 25 feet. Min assist for standing grooming and hygiene tasks, mod assist for upper body dressing, max assist for toileting and clothing management.           rehabilitaiton course    PT:  Pt completed 2MWT with WW on 50 ft course between two cones. Amb safely without assistance, however needs prolonged rest break to recover respiratory rate after test.      Amb 277 ft in 2 minutes on room air.    OT:  Pt completed total body and shower mod I with reacher and sock aide as needed.        mEDICAL COURSE    Tobias Palmer is a 54 year old man who presented 6/21 to [facility] with fever, cough, sob, with diagnosis of COVID 19, progressive  hypoxic respiratory failure intubated and transferred to [facility] 7/22 for ongoing management. Hospitalization complicated by multiple infections, icu delirium,  A-fib with RVR,  Impaired oral intake, scrotal edema, and debility. Admitted to acute rehab 8/4/20 for ongoing rehabilitation and medical management.      Confirmed COVID-19 infection  Onset of COVID symptoms ~6/18/20 Date of positive test results 6/21/20   Research study Yes: [facility] Research study protocol/COVID medications: convalescent plasma, remdesivir, tocilizumab  Acute Hypoxic Respiratory Failure secondary to COVID-19 infection  Viral Pneumonia secondary to COVID-19 infection  Acute Respiratory Distress Syndrome (resolved)   Bacterial Superinfection (resolved)   admitted to [facility] on 6/21/2020 after presenting with fever, cough and dyspnea. He was intubated and transferred to [place] on 6/22. Imaging revealed diffuse bilateral infiltrates.  He required flolan. He remained intubated until 7/26 at which time he was successfully extubated.  stable on RA with exception of some desaturation with sleep, c/w JEROMY (see below): Multiple bacterial infections and septic shock thoughout his course including MSSA and serattia pneumonia and serratia UTI.   - f/u ICU survivorship clinic after discharge  -encourage IS  -monitor respiratory status  -oxygen prn   -Has post viral cough -  prn cough suppressants     Debility- in setting of prolonged icu stay with illness documented as above. RUE weakness ? Nerve injury/ Related to positioning while intubated.   -PT/OT     ICU delirium  sedated for weeks so likely will benefit from slow wean of benzos and opaites Started wean 7/29 as below and tolerating well. Ativan discontinued 8/2. Oxycodone discontinued 8/3, still on Seroquel and Trazodone. Reports sleep improved will dc seroquel  -continue trazodone  -discontinue seroquel       Suspected Obstructive Sleep Apnea  After weaning  from O2,  continued to desat while in deep sleep requiring O2. Pt tried BiPAP/CPAP but found uncomfortable, tolerating for short time. Overnight 8/1 desat to 70's and bradycardic to 30's associated with apnea. Recovered quickly when awoken, wore CPAP for a few hours. Per discharge team likely has longstanding JEROMY  - Refer to sleep medicine as outpatient  -monitor for symptoms-  consider overnight oximetry study for home oxygen      A fib:   Developed A fib with RVR when critically ill. Resolved with amiodarone.brief self-resolving a fib/flutter starting overnight 7/31. Started metoprolol 25 mg two times a day 8/1 , HR in 60s to 110, BP 110s-140s may need med adjustment. - No indication for anticoagulation for A fib at this time.  -continue metoprolol- increased on 8/11 to 50 mg bid -- BP running well today, but does have elevated HR, asymptomatic   -monitor heart rate/bp titrate as indicated.       Chronic Scrotal edema - stable  Marked scrotal edema, preventing removal of barlow-removed prior to ARU admission without voiding issues was on lasix for this with hypotension received iv bolus and lasix discontinued. Reportedly quite chronic without acute change per patient   -try scrotal support  -monitor edema       dISCHARGE MEDICATIONS  Current Discharge Medication List      START taking these medications    Details   albuterol (PROAIR HFA/PROVENTIL HFA/VENTOLIN HFA) 108 (90 Base) MCG/ACT inhaler Inhale 2 puffs into the lungs every 6 hours as needed for wheezing  Qty: 1 Inhaler, Refills: 0    Comments: Pharmacy may dispense brand covered by insurance (Proair, or proventil or ventolin or generic albuterol inhaler)  Associated Diagnoses: History of 2019 novel coronavirus disease (COVID-19)      apixaban ANTICOAGULANT (ELIQUIS) 2.5 MG tablet Take 1 tablet (2.5 mg) by mouth 2 times daily for 16 days  Qty: 32 tablet, Refills: 0    Associated Diagnoses: History of 2019 novel coronavirus disease (COVID-19)      lactobacillus  rhamnosus, GG, (CULTURELL) capsule Take 1 capsule by mouth 2 times daily  Qty: 60 capsule, Refills: 0    Associated Diagnoses: History of 2019 novel coronavirus disease (COVID-19)      multivitamin w/minerals (THERA-VIT-M) tablet Take 1 tablet by mouth daily  Qty: 30 tablet, Refills: 0    Associated Diagnoses: History of 2019 novel coronavirus disease (COVID-19)      senna-docusate (SENOKOT-S/PERICOLACE) 8.6-50 MG tablet Take 1-4 tablets by mouth 2 times daily as needed for constipation  Qty: 60 tablet, Refills: 0    Associated Diagnoses: History of 2019 novel coronavirus disease (COVID-19)      traZODone (DESYREL) 50 MG tablet Take 1 tablet (50 mg) by mouth nightly as needed for sleep  Qty: 30 tablet, Refills: 0    Associated Diagnoses: History of 2019 novel coronavirus disease (COVID-19)         CONTINUE these medications which have CHANGED    Details   benzocaine-menthol (CEPACOL) 15-3.6 MG lozenge Place 1 lozenge inside cheek every hour as needed for moderate pain  Qty: 60 lozenge, Refills: 0    Associated Diagnoses: History of 2019 novel coronavirus disease (COVID-19)      benzonatate (TESSALON) 100 MG capsule Take 1 capsule (100 mg) by mouth 3 times daily as needed for cough  Qty: 90 capsule, Refills: 0    Associated Diagnoses: History of 2019 novel coronavirus disease (COVID-19)      metoprolol tartrate (LOPRESSOR) 50 MG tablet Take 1 tablet (50 mg) by mouth 2 times daily  Qty: 60 tablet, Refills: 0    Associated Diagnoses: History of 2019 novel coronavirus disease (COVID-19)         CONTINUE these medications which have NOT CHANGED    Details   melatonin 3 MG tablet Take 1 tablet (3 mg) by mouth nightly as needed for sleep  Qty: 30 tablet, Refills: 0    Associated Diagnoses: History of 2019 novel coronavirus disease (COVID-19)         STOP taking these medications       albuterol (PROVENTIL) (2.5 MG/3ML) 0.083% neb solution Comments:   Reason for Stopping:         chlorhexidine (CHLORHEXIDINE) 0.12 %  solution Comments:   Reason for Stopping:         dexamethasone (DECADRON) 4 MG/ML injection Comments:   Reason for Stopping:         enoxaparin ANTICOAGULANT (LOVENOX) 40 MG/0.4ML syringe Comments:   Reason for Stopping:         furosemide (LASIX) 20 MG tablet Comments:   Reason for Stopping:         hydrALAZINE (APRESOLINE) 20 MG/ML injection Comments:   Reason for Stopping:         lactobacillus rhamnosus, GG, (CULTURELL KIDS) packet Comments:   Reason for Stopping:         midazolam (VERSED) drip - ADULT 100 mg/100 mL in NS PRE-MIX Comments:   Reason for Stopping:         multivitamin, therapeutic (THERA-VIT) TABS tablet Comments:   Reason for Stopping:         ondansetron (ZOFRAN) 2 MG/ML SOLN injection Comments:   Reason for Stopping:         oxyCODONE (ROXICODONE) 5 MG tablet Comments:   Reason for Stopping:         QUEtiapine (SEROQUEL) 25 MG tablet Comments:   Reason for Stopping:         sodium chloride 0.9% SOLN BOLUS Comments:   Reason for Stopping:         sterile water 25 mL with epoprostenol 500.1 mcg inhalation solution Comments:   Reason for Stopping:                 DISCHARGE INSTRUCTIONS AND FOLLOW UP  Discharge Procedure Orders   Home Care OT Referral for Hospital Discharge   Referral Priority: Routine   Number of Visits Requested: 1     Reason for your hospital stay   Order Comments: Debility post Covid     Adult [facility] Follow-up and recommended labs and tests   Order Comments: Follow up with primary care provider, Physician No Ref-Primary, within 7 days for hospital follow- up.    Appointments on [place] and/or [facility] (with [facility] or [facility] provider or service). Call [phone] if you haven't heard regarding these appointments within 7 days of discharge.     Activity   Order Comments: Your activity upon discharge: activity as tolerated, no driving     Order Specific Question Answer Comments   Is discharge order? Yes      MD face to face encounter   Order Comments: Documentation of  Face to Face and Certification for Home Health Services    I certify that patient: Tobias Palmer is under my care and that I, or a nurse practitioner or physician's assistant working with me, had a face-to-face encounter that meets the physician face-to-face encounter requirements with this patient on: 8/17/2020.    This encounter with the patient was in whole, or in part, for the following medical condition, which is the primary reason for home health care: debility post Covid.    I certify that, based on my findings, the following services are medically necessary home health services: Occupational Therapy.    My clinical findings support the need for the above services because: Occupational Therapy Services are needed to assess and treat cognitive ability and address ADL safety due to impairment in debility post Covid.    Further, I certify that my clinical findings support that this patient is homebound (i.e. absences from home require considerable and taxing effort and are for medical reasons or Hindu services or infrequently or of short duration when for other reasons) because: debility post Covid.    Based on the above findings. I certify that this patient is confined to the home and needs intermittent skilled nursing care, physical therapy and/or speech therapy.  The patient is under my care, and I have initiated the establishment of the plan of care.  This patient will be followed by a physician who will periodically review the plan of care.  Physician/Provider to provide follow up care: No Ref-Primary, Physician    Attending hospital physician (the [facility] Raphael Lind DO  Physician Signature: See electronic signature associated with these discharge orders.  Date: 8/17/2020     Diet   Order Comments: Follow this diet upon discharge: Orders Placed This Encounter      Room Service      Combination Diet Regular Diet; Low Lactose Diet     Order Specific Question  Answer Comments   Is discharge order? Yes           physical examination    Most recent Vital Signs:   Vitals:    08/17/20 1710 08/17/20 2143 08/18/20 0557 08/18/20 0811   BP: 109/77 131/76  120/51   BP Location: Left arm   Right arm   Pulse: 87 89  90   Resp: 16   16   Temp: 98.6  F (37  C)   97.9  F (36.6  C)   TempSrc: Oral   Oral   SpO2: 96%   95%   Weight:   107.7 kg (237 lb 6.4 oz)    Height:           Gen: awake alert, NAD  HEENT: NCAT, EOMI  Pulm: non labored to RA, no distress  Abd: soft, obese  Ext: warm dry without significant edema, no calf tenderness  Neuro/MSK: alert speech clear. LLE HF 4/5, ke/kf 4/5, df/pf 4/5, LUE 4/5 throughout.  RUE SF 4-/5, ef 4-/5, ee 4-/5.  4- LUE 4/5 throughout. Has vast improvmetn in R shoulder ROM, still with R hand weakness.           Discharge summary was forwarded to No Ref-Primary, Physician (PCP) at the time of discharge, so as to bridge from hospital to outpatient care.     It was our pleasure to care for Tobias Palmer during this hospitalization. Please do not hesitate to contact me should there be questions regarding the hospital course or discharge plan.        Raphael Lind DO  Physical Medicine & Rehabilitation      I, Raphael Lind DO, saw and evaluated this patient prior to discharge. 35 minutes spent in discharge, including >50% in counseling and coordination of care, medication review and plan of care recommended on follow up.

## 2020-08-20 ENCOUNTER — TELEPHONE (OUTPATIENT)
Dept: PHYSICAL MEDICINE AND REHAB | Facility: CLINIC | Age: 55
End: 2020-08-20

## 2020-08-20 NOTE — TELEPHONE ENCOUNTER
[facility] requesting orders for Nursing, OT and PT. Reviewed Discharge MD Face to Face encounter; Fax information given for them to send orders to be signed,  Documentation of Face to Face and Certification for Home Health Services     I certify that patient: Tobias Palmer is under my care and that I, or a nurse practitioner or physician's assistant working with me, had a face-to-face encounter that meets the physician face-to-face encounter requirements with this patient on: 8/17/2020.     This encounter with the patient was in whole, or in part, for the following medical condition, which is the primary reason for home health care: debility post Covid.     I certify that, based on my findings, the following services are medically necessary home health services: Occupational Therapy.     My clinical findings support the need for the above services because: Occupational Therapy Services are needed to assess and treat cognitive ability and address ADL safety due to impairment in debility post Covid.     Further, I certify that my clinical findings support that this patient is homebound (i.e. absences from home require considerable and taxing effort and are for medical reasons or [facility] services or infrequently or of short duration when for other reasons) because: debility post Covid.     Based on the above findings. I certify that this patient is confined to the home and needs intermittent skilled nursing care, physical therapy and/or speech therapy.  The patient is under my care, and I have initiated the establishment of the plan of care.  This patient will be followed by a physician who will periodically review the plan of care.  Physician/Provider to provide follow up care: No Ref-Primary, Physician     Attending hospital physician (the Medicare certified RUSSEL provider): Raphael Lind DO  Physician Signature: See electronic signature associated with these discharge orders.  Date: 8/17/2020

## 2020-08-21 ENCOUNTER — TELEPHONE (OUTPATIENT)
Dept: PHYSICAL MEDICINE AND REHAB | Facility: CLINIC | Age: 55
End: 2020-08-21

## 2020-08-31 ENCOUNTER — VIRTUAL VISIT (OUTPATIENT)
Dept: INTERNAL MEDICINE | Facility: CLINIC | Age: 55
End: 2020-08-31
Payer: MEDICAID

## 2020-08-31 VITALS — WEIGHT: 237 LBS | BODY MASS INDEX: 43.35 KG/M2

## 2020-08-31 DIAGNOSIS — Z86.16 HISTORY OF 2019 NOVEL CORONAVIRUS DISEASE (COVID-19): ICD-10-CM

## 2020-08-31 DIAGNOSIS — Z09 HOSPITAL DISCHARGE FOLLOW-UP: Primary | ICD-10-CM

## 2020-08-31 DIAGNOSIS — J96.01 ACUTE RESPIRATORY FAILURE WITH HYPOXIA (H): ICD-10-CM

## 2020-08-31 DIAGNOSIS — R53.81 PHYSICAL DECONDITIONING: ICD-10-CM

## 2020-08-31 DIAGNOSIS — E66.01 MORBID OBESITY (H): ICD-10-CM

## 2020-08-31 DIAGNOSIS — G47.34 NOCTURNAL OXYGEN DESATURATION: ICD-10-CM

## 2020-08-31 PROCEDURE — 99203 OFFICE O/P NEW LOW 30 MIN: CPT | Mod: 95 | Performed by: INTERNAL MEDICINE

## 2020-08-31 RX ORDER — TRAZODONE HYDROCHLORIDE 50 MG/1
25 TABLET, FILM COATED ORAL
Qty: 30 TABLET | Refills: 0 | COMMUNITY
Start: 2020-08-31

## 2020-08-31 NOTE — PROGRESS NOTES
"Tobias Palmer is a 54 year old male who is being evaluated via a billable telephone visit.      The patient has been notified of following:     \"This telephone visit will be conducted via a call between you and your physician/provider. We have found that certain health care needs can be provided without the need for a physical exam.  This service lets us provide the care you need with a short phone conversation.  If a prescription is necessary we can send it directly to your pharmacy.  If lab work is needed we can place an order for that and you can then stop by our lab to have the test done at a later time.    Telephone visits are billed at different rates depending on your insurance coverage. During this emergency period, for some insurers they may be billed the same as an in-person visit.  Please reach out to your insurance provider with any questions.    If during the course of the call the physician/provider feels a telephone visit is not appropriate, you will not be charged for this service.\"    Patient has given verbal consent for Telephone visit?  Yes    What phone number would you like to be contacted at? [phone]    How would you like to obtain your AVS? MyChart     TELEPHONE VISIT                                                      SUBJECTIVE                                                      HPI: Tobias Palmer is a very pleasant 54 year old male who requested a telephone visit to discuss his recent hospitalization:    Patient presented to [facility] 6/21/2020 with fever, cough, and dyspnea. Patient was intubated due to acute hypoxic respiratory failure and transferred to [facility] 6/22/2020 for further care of confirmed COVID-19. CXR demonstrated diffuse bilateral infiltrates  Patient remained intubated, and requiring Flolan, until 7/26/2020.  After extubation, patient remained stable on room air with exception of some desaturation while sleeping, consistent with " JEROMY.    Hospitalization significant for multiple bacterial infections and septic shock including MSSA, Serratia pneumonia, and Serratia UTI, ICU delirium, afib with RVR (treated with amiodarone), malnutrition (requiring tube feeds), and scrotal edema.    Research study protocol/COVID medications included convalescent plasma, remdesivir, and tocilizumab.    Patient was discharged to acute rehab 8/4/2020. Upon discharge, patient was able to ambulate safely without assistance, but needed prolonged rest break to recover. Patient was also noted to have possible right upper extremity weakness possibly related to positioning while intubated.    Patient is at home now, receiving home care services. He is scheduled to meet with a sleep specialist later this week for evaluation of possible JEROMY. He continues to be easily fatigued with activity, but this is improving over time. He continues to have some right arm weakness and some left anterior thigh numbness, but these are mild.  He is eating, drinking, and sleeping normally-only using half tab of trazodone as needed.    ASSESSMENT/PLAN                                                      (Z09) Hospital discharge follow-up  (primary encounter diagnosis)  (Z86.19) History of 2019 novel coronavirus disease (COVID-19)  (J96.01) Acute respiratory failure with hypoxia (H)  Comment: clinically doing well considering prolonged hospitalization and severity of illness.  Plan: continue home care services; follow-up in ~2 months (earlier as needed).     (G47.34) Nocturnal oxygen desaturation  (E66.01) Morbid obesity (H)  Plan: patient meeting with sleep specialist later this week for further evaluation of suspected JEROMY.    (R53.81) Physical deconditioning  Plan: continue home care services.    Total time of call between patient and provider was 6 minutes.    Stephani Steele MD   [facility] [address] [place] [address]  T: [phone], F:  [phone]    (Note was completed, in part, with Emailage voice-recognition software. Documentation reviewed, but some grammatical, spelling, and word errors may remain.)

## 2020-10-22 ENCOUNTER — DOCUMENTATION ONLY (OUTPATIENT)
Dept: CARE COORDINATION | Facility: CLINIC | Age: 55
End: 2020-10-22

## 2020-10-28 ENCOUNTER — TELEPHONE (OUTPATIENT)
Dept: PHYSICAL MEDICINE AND REHAB | Facility: CLINIC | Age: 55
End: 2020-10-28

## 2020-10-28 NOTE — TELEPHONE ENCOUNTER
Patient applying for insurance and does not want appointment until Insurance is reinstated. Contact information provided for patient to reschedule appointment.

## 2021-06-04 VITALS — HEIGHT: 65 IN | BODY MASS INDEX: 37.36 KG/M2 | WEIGHT: 224.2 LBS

## 2021-06-09 NOTE — PROGRESS NOTES
06/25/20 1623   Patient Data   PIP Observed (cm H2O) 32 cm H2O   MAP (cm H2O) 23   Auto/Intrinsic PEEP Observed (cm H2O) 0.6 cm H2O   Plateau Pressure (cm H2O) 30 cm H2O

## 2021-06-09 NOTE — PLAN OF CARE
Patient remains critically stable on current ventilator support. Patient sedated and paralyzed and prone, tolerating regular head turns. Plan to maintain current ventilator support and titrate as needed.

## 2021-06-09 NOTE — PROGRESS NOTES
"Pharmacy Consult: Vancomycin Dosing    Pharmacist consulted to dose vancomycin for Tobias Palmer, a 54 y.o. male.    Ordering provider: Angela Olson CNP    Indication for vancomycin therapy: Sepsis    Goal Trough Range:  15-20 mcg/mL based on indication    Other current antimicrobials              cefepime 1 g in NaCl 0.9 % (MINI-BAG Plus) 50 mL (MAXIPIME)  Every 12 hours          vancomycin 1,750 mg in sodium chloride 0.9% 500 mL (VANCOCIN)  Every 12 hours                   Subjective/Objective:    Patient was admitted for COVID-19 on 6/22/2020    Height: 5' 5\" (1.651 m)    Actual Body Weight (ABW): (!) 115.5 kg (254 lb 10.1 oz)    Ideal body weight: 61.5 kg (135 lb 9.3 oz)  Adjusted ideal body weight: 83.1 kg (183 lb 3.2 oz)    BMI: Body mass index is 42.37 kg/m .    No Known Allergies    Patient Active Problem List   Diagnosis     COVID-19     Shock (H)     Acute and chronic respiratory failure with hypoxia (H)     On mechanically assisted ventilation (H)     ARDS (adult respiratory distress syndrome) (H)    No past medical history on file.     Temp Readings from Current Encounter:     06/28/20 0400 06/28/20 1200 06/28/20 1600   Temp: 98.8  F (37.1  C) (!) 101.1  F (38.4  C) (!) 100.9  F (38.3  C)     Net Intake/Output (last 24 hours):  I/O last 3 completed shifts:  In: 2599.8 [I.V.:386.8; NG/GT:1313; IV Piggyback:900]  Out: 2865 [Urine:2765; Stool:100]    Recent Labs     06/26/20  0422 06/27/20  0351 06/27/20  1108 06/27/20  1416 06/27/20  1738 06/27/20  2137 06/28/20  0412 06/28/20  1524   WBC 9.4 11.7*  --   --   --   --  12.6*  --    NEUTROABS  --  9.9*  --   --   --   --  10.8*  --    LACTICACID  --   --  3.3* 2.4* 2.8* 2.1  --  2.0   PROCAL  --   --  0.09  --   --   --   --   --    CRP 2.1*  --   --   --   --   --   --   --    BUN 47* 45*  --   --   --   --  35*  --    CREATININE 0.95 1.01  --   --   --   --  0.76  --      Estimated Creatinine Clearance: 127.6 mL/min (by C-G formula based on " SCr of 0.76 mg/dL).    Recent Labs     06/27/20  1344   CULTURE Usual Sheila  3+ Yeast*       Results for orders placed or performed during the hospital encounter of 06/22/20   Culture/Gram Stain: Sputum    Collection Time: 06/27/20  1:44 PM    Specimen: Endotracheal; Respiratory   Result Value Status    Culture Usual Sheila Preliminary    Culture 3+ Yeast (!) Preliminary   Sputum culture    Collection Time: 06/24/20  4:14 PM    Specimen: Endotracheal; Respiratory   Result Value Status    Culture Usual Sheila Final    Culture 2+ Yeast (!) Final       Recent labs: (last 7 days)     06/28/20  1709 06/28/20  2347   VANCOMYCIN 22.2 7.4       Vancomycin administrations: (last 120 hours)     Date/Time Action Medication Dose Rate    06/29/20 0052 New Bag    vancomycin 1,750 mg in sodium chloride 0.9% 500 mL (VANCOCIN) 1,750 mg 178.3 mL/hr    06/28/20 1200 New Bag    vancomycin 1,750 mg in sodium chloride 0.9% 500 mL (VANCOCIN) 1,750 mg 178.3 mL/hr    06/28/20 0001 New Bag    vancomycin 1,750 mg in sodium chloride 0.9% 500 mL (VANCOCIN) 1,750 mg 178.3 mL/hr    06/27/20 1240 New Bag    vancomycin 1,750 mg in sodium chloride 0.9% 500 mL (VANCOCIN) 1,750 mg 178.3 mL/hr          Assessment/Plan:    Pharmacist consulted to dose vancomycin for Sepsis, goal trough range 15-20 mcg/mL.  1. Change to vancomycin 1750 IV every 8 hours (15 mg/kg actual body weight).  2. Vancomycin trough level of 7.4 mcg/mL was below the goal trough range. This trough level was drawn at steady state.  3. Pharmacist will plan to check a vancomycin trough level prior to the 4th dose at new frequency.  4. Pharmacist will continue to follow.    Thank you for the consult.  Patricia Chappell 6/29/2020 1:06 AM

## 2021-06-09 NOTE — PROGRESS NOTES
07/13/20 1940   Patient Data   Plateau Pressure (cm H2O) 21 cm H2O   Dynamic Compliance (L/cm H2O) 48 L/cm H2O   Airway Resistance 10

## 2021-06-09 NOTE — PLAN OF CARE
Patient febrile entire shift, highest temp of 100.9 esophageal. Receiving scheduled tylenol and has cooling blanket. Sedation weaned prior to start of shift. Patient tachycardic and hypertensive with decreased sedation, requiring increases in continuous rates, required PRN medications and frequent boluses. Patient awake for majority of shift. Restraints necessary due to movement of all limbs. Family on iPad last night, iPad currently charging. WBC up to 15.3 from 8, MD notified, no new orders.     Problem: Pain  Goal: Patient's pain/discomfort is manageable  Outcome: Progressing     Problem: Safety  Goal: Patient will be injury free during hospitalization  Outcome: Progressing     Problem: Daily Care  Goal: Daily care needs are met  Outcome: Progressing     Problem: Psychosocial Needs  Goal: Demonstrates ability to cope with hospitalization/illness  Outcome: Progressing  Goal: Collaborate with patient/family/caregiver to identify patient specific goals for this hospitalization  Outcome: Progressing     Problem: Discharge Barriers  Goal: Patient's discharge needs are met  Outcome: Progressing     Problem: Knowledge Deficit  Goal: Patient/family/caregiver demonstrates understanding of disease process, treatment plan, medications, and discharge instructions  Outcome: Progressing     Problem: Potential for Compromised Skin Integrity  Goal: Skin integrity is maintained or improved  Outcome: Progressing  Goal: Nutritional status is improving  Outcome: Progressing     Problem: Urinary Incontinence  Goal: Perineal skin integrity is maintained or improved  Outcome: Progressing     Problem: Potential for Falls  Goal: Patient will remain free of falls  Outcome: Progressing     Problem: Risk of Injury Due to Unsafe Behavior  Goal: Patient will remain safe while in restraint; physical/psychological needs will be met  Outcome: Progressing  Goal: Alternatives to restraint will be continually assessed with use of least restrictive  device and discontinuation as soon as possible  Outcome: Progressing  Goal: Patient/Family will be able to communicate reason for restraint and steps for restraint application and removal  Outcome: Progressing     Problem: Inadequate Airway Clearance  Goal: Patient will maintain patent airway  Outcome: Progressing     Problem: Mechanical Ventilation  Goal: Patient will maintain patent airway  Outcome: Progressing  Goal: ET tube will be managed safely  Outcome: Progressing     Problem: Glucose Imbalance  Goal: Achieve optimal glucose control  Outcome: Progressing     Problem: Potential for transmission of organism by Contact, Enteric, Droplet and/or Airborne routes  Goal: Prevent transmission of organisms  Outcome: Progressing     Problem: Inadequate Gas Exchange  Goal: Patient is adequately oxygenated and ventilation is improved  Outcome: Progressing

## 2021-06-09 NOTE — PROGRESS NOTES
Patient came in on Vent- full support. Patient placed on our vent and stabilized. Vent settings are ACE 20, 450, +16, 100%.

## 2021-06-09 NOTE — PLAN OF CARE
With the assistance x 5, supine pt.  Pt appears to tolerate the positional change without difficulties.      Pulse ox is currently % on FiO2 70%.  Will continue to monitor pt.

## 2021-06-09 NOTE — PROGRESS NOTES
FERN COVID RT PROGRESS NOTE    DATA:    VENT DAY#  19    CURRENT SETTINGS:  VENT SETTINGS   AC 26/350/+14        FIO2:   60%    PATIENT PARAMETERS:    PIP 25  Pplat:  24  Pmean:  18  SBT: No  SECRETIONS:  Small to moderate pale yellow tan thick  02 SATS:  95%    ETT SIZE 8.0c  Secured at  24 cm at teeth    Respiratory Medications: None     AB.31/74/99/32   P:F 165    NOTE / PLAN:   Patient remains critically stable on current ventilator support. Patient sedated and paralyzed and prone, tolerating regular head turns. Rate increased due to elevated PCO2 74. Plan to repeat gas and titrate ventilator support as needed.

## 2021-06-09 NOTE — PROGRESS NOTES
07/12/20 0146   Patient Data   Vt (observed, mL) 417 mL   Minute Ventilation (L/min) 7.8 L/min   Total Resp Rate  22 BPM   PIP Observed (cm H2O) 25 cm H2O   MAP (cm H2O) 17   Auto/Intrinsic PEEP Observed (cm H2O) 1.3 cm H2O   Plateau Pressure (cm H2O) 22 cm H2O   SpO2 95 %   Heart Rate 76   Patient remains stable on current ventilator support with periodic agitation requiring sedation.

## 2021-06-09 NOTE — PROGRESS NOTES
Attempted to wean off levo, map below 65 , restarted levo at .012 mcg/.kg/min,  Started calcium gluconate, tube feeds remain at 20 ml/hr.  Redressed picc new hubs new tubing, propofol tubing changed at 12 noon..and shasta tubing and dressing changed.  abg 50% and peep 12

## 2021-06-09 NOTE — PROGRESS NOTES
07/05/20 2052   Vent Information   Interface Invasive   Patient Data   Vt Exp (mL) 401 mL   Minute Ventilation (L/min) 7.7 L/min   Total Resp Rate  18 BPM   PIP Observed (cm H2O) 29 cm H2O   MAP (cm H2O) 17   Auto/Intrinsic PEEP Observed (cm H2O) 0.9 cm H2O   Plateau Pressure (cm H2O) 25 cm H2O   Dynamic Compliance (L/cm H2O) 26.8 L/cm H2O   Airway Resistance 15.1

## 2021-06-09 NOTE — PROGRESS NOTES
FERN COVID RT PROGRESS NOTE    DATA:    VENT DAY 22    CURRENT SETTINGS: A/C   VENT SETTINGS   22/35/14        FIO2:   45    PATIENT PARAMETERS:    PIP 15  Pplat:    Pmean:  12  SBT: None   SECRETIONS:  Thick yellow large   02 SATS:  95    ETT SIZE 8  Secured at  24 cm at teeth    Respiratory Medications: None     ABG:    Ref. Range 7/13/2020 04:36   pH, Arterial Latest Ref Range: 7.37 - 7.44  7.45 (H)   pCO2, Arterial Latest Ref Range: 35 - 45 mm Hg 50 (H)   pO2, Arterial Latest Ref Range: 80 - 90 mm Hg 72 (L)   Bicarbonate, Arterial Calc Latest Ref Range: 23.0 - 29.0 mmol/L 32.6 (H)       NOTE / PLAN:   To remain on full ventilatory support.7/13/2020  .Wei Singh

## 2021-06-09 NOTE — PROGRESS NOTES
07/10/20 0212   Patient Data   Vt (observed, mL) 323 mL   Minute Ventilation (L/min) 7.5 L/min   Total Resp Rate  22 BPM   PIP Observed (cm H2O) 25 cm H2O   MAP (cm H2O) 18   Auto/Intrinsic PEEP Observed (cm H2O) 0.6 cm H2O   Plateau Pressure (cm H2O) 24 cm H2O   Dynamic Compliance (L/cm H2O) 37 L/cm H2O   Airway Resistance 11   SpO2 95 %   Heart Rate 94   Patient remains critically stable on current ventilator support. Patient sedated and paralyzed and prone, tolerating regular head turns. Plan to maintain current ventilator support and titrate as needed.

## 2021-06-09 NOTE — PROGRESS NOTES
06/30/20 0719   Vent Information   Vent Mode AC   Vent Settings   Resp Rate (Set) 18   FiO2 (%) 50 %   Insp Time (sec) 1 sec   Vt (Set, mL) 430 mL   PEEP/CPAP (cm H2O) 12 cm H2O   Trigger Sensitivity Flow (L/min) 2 L/min   I:E Ratio 1::2.3   Patient Data   Vt Exp (mL) 443 mL   Minute Ventilation (L/min) 9.4 L/min   Total Resp Rate  20 BPM   PIP Observed (cm H2O) 23 cm H2O   MAP (cm H2O) 14   Plateau Pressure (cm H2O) 22 cm H2O   Dynamic Compliance (L/cm H2O) 53 L/cm H2O   Airway Resistance 5.3

## 2021-06-09 NOTE — PROGRESS NOTES
Patient became agitated and desating with blood pressure in 200's systolic. Precedex increased to 1.5.

## 2021-06-09 NOTE — PROGRESS NOTES
Critical Care Progress Note  7/21/2020     Admit Date: 6/22/2020  ICU Day: 29  Code Status: full code    Assessment/plan:  Tobias Palmer is a 54 year old male with a history of obesity (BMI 45) who was admitted and intubated on 6/21 at [facility] with COVID-19 pneumonia, transferred to [place] ICU on 6/22. Course complicated by ARDS and shock, requiring proning and paralytics.     Problem List:   Principal Problem:    COVID-19  Active Problems:    Shock (H)    Acute and chronic respiratory failure with hypoxia (H)    On mechanically assisted ventilation (H)    ARDS (adult respiratory distress syndrome) (H)    Atrial fibrillation with rapid ventricular response (H)    Atrial fibrillation with rapid ventricular response (H)    Encephalopathy    Bacterial pneumonia      Major changes for today:    --Follow cultures closely   --Continue to titrate sedation down   -- PST if able       Plan by System:     Neuro/Psych:   #Encephalopathy 2/2 to prolonged critical illness  #Sedation for vent synchrony and comfort.    * Sedating with IV Fentanyl, Versed, Precedex - Titrate versed down first   * Off paralytic since 7/11   * Try to lighten sedation if safe to do so.    * Lorazepam 2mg Q4h with PRN dosing - Increased today to help come off drips   * Oxy 5mg Q4h    * Seroquel 50mg Q8h         Pulmonary:   #Acute hypoxic respiratory failure in setting of COVID19 infection.     Vent Mode: AC  FiO2 (%):  [40 %] 40 %  S RR:  [16] 16  S VT:  [450 mL] 450 mL  PEEP/CPAP (cm H2O):  [8 cm H2O-10 cm H2O] 8 cm H2O  Minute Ventilation (L/min):  [8.7 L/min-13.7 L/min] 8.7 L/min  PIP:  [12 cm H2O-22 cm H2O] 21 cm H2O  MAP (cm H2O):  [9.5-15] 12     CV:   #A.fib with RVR 6/28; resolved  #Septic shock, resolved    * On/off Norepi for MAP > 65   * Amio 200mg daily       GI/:   #Protein calorie malnutrition; enteral access established - not post pyloric.    * Tube feeding per nutrition.     Renal:   #Rhabdo - resolved  #Metabolic  acidosis - resolved.     * Daily assessment for diuretic need-- diuresed yesterday    ID:   #COVID19  #MSSA pneumonia; colonized  #Serratia pneumonia  #Urine culture with gram negative rods; waiting sensitivities   * S/P CCP and toci 6/25 and 6/27    * S/P Ivermectin x 2 doses.    * S/P Meropenem x 10 days    * S/P Remdesivir x 10 days   * Cefazolin for MSSA; 7/7 - 7/14   * 7/14 sputum culture with staph aureus and serratia, thought to be colonized   * COVID retest 7/16 positive; plan to test once a week   * Ceftriaxone started 7/20 for serratia    Heme/Coag:    #Coagulopathy in setting of COVID19     * Lovenox 60mg two times a day     Endocrine:   #Stress hypergylcemia   * SSI standard scale.       Family/Psychosocial:    * Palliative Care involved     * Calling wife Nathalie daily with  assistance.          Dispo: ICU    Farzana Roche NP-C    Total critical care time: 40  I have personally provided 40 minutes of critical care time exclusive of time spent on separately billable procedures for acute hypoxic respiratory failure. High risk for acute deterioration and death.    _______________________________________________________________    HPI: Tobias Palmer is a 54 year old male with a history of obesity (BMI 45) who was admitted and intubated on 6/21 at [facility] with COVID-19 pneumonia, transferred to [place] ICU on 6/22. Course complicated by ARDS and shock, requiring proning and paralytics.    ROS: NATIVIDAD intubated and sedated.     Events over last 24-hours:  No acute events overnight    Objective:     Vitals:    07/21/20 0645 07/21/20 0700 07/21/20 0715 07/21/20 0730   BP:       Patient Position:       Pulse: (!) 110 (!) 120 (!) 111 (!) 112   Resp: 27 28 25 25   Temp:       TempSrc:       SpO2: 94% 94% 94% 95%   Weight:       Height:           Vent:   Day of Intubation: 6/21  Ready to wean? No  Vent Mode: AC  FiO2 (%):  [40 %] 40 %  S RR:  [16] 16  S VT:  [450 mL] 450  mL  PEEP/CPAP (cm H2O):  [8 cm H2O-10 cm H2O] 8 cm H2O  Minute Ventilation (L/min):  [8.7 L/min-13.7 L/min] 8.7 L/min  PIP:  [12 cm H2O-22 cm H2O] 21 cm H2O  MAP (cm H2O):  [9.5-15] 12    Sedative Infusion: fentanyl and midazolam and precedex  Sedation vacation: No    I/O:     Intake/Output Summary (Last 24 hours) at 7/21/2020 0737  Last data filed at 7/21/2020 0634  Gross per 24 hour   Intake 3804.32 ml   Output 3880 ml   Net -75.68 ml     Wt Readings from Last 3 Encounters:   07/21/20 (!) 254 lb 10.1 oz (115.5 kg)      Weight change: -15 lb 11.8 oz (-7.138 kg)    Physical Exam:  Gen: sedated, moving extremities and lifting head  HEENMT: ETT in place  NEURO: sedated   CARDIOVASCULAR: S1, S2 normal, no murmur, rub or gallop, regular rate and rhythm  PULMONARY: unlabored on full vent; lungs are coarse and diminished bilaterally.   GASTROINTESTINAL: obese, soft, ntnd  INTEGUMENT: diaphoretic; generalized edema  PSYCH: sedated     Labs:   Results from last 7 days   Lab Units 07/21/20  0344   LN-SODIUM mmol/L 136   LN-POTASSIUM mmol/L 4.0   LN-CHLORIDE mmol/L 103   LN-CO2 mmol/L 23   LN-BLOOD UREA NITROGEN mg/dL 10   LN-CREATININE mg/dL 0.52*   LN-CALCIUM mg/dL 8.3*       Results from last 7 days   Lab Units 07/21/20  0344   LN-WHITE BLOOD CELL COUNT thou/uL 15.3*   LN-HEMOGLOBIN g/dL 10.5*   LN-HEMATOCRIT % 33.0*   LN-PLATELET COUNT thou/uL 250       Imaging: all imaging personalized reviewed     Farzana PRICE

## 2021-06-09 NOTE — PROGRESS NOTES
07/11/20 0714   Patient Data   Vt (observed, mL) 380 mL   Vt Exp (mL) 357 mL   Minute Ventilation (L/min) 9.2 L/min   Total Resp Rate  26 BPM   PIP Observed (cm H2O) 28 cm H2O   MAP (cm H2O) 18   Auto/Intrinsic PEEP Observed (cm H2O) 0.2 cm H2O   Plateau Pressure (cm H2O) 26 cm H2O   Dynamic Compliance (L/cm H2O) 32.4 L/cm H2O   Airway Resistance 12.3   SpO2 95 %   Heart Rate 96

## 2021-06-09 NOTE — PROGRESS NOTES
"Wound Ostomy  WOC Assessment         Allergies:  No Known Allergies    Diagnosis:   Patient Active Problem List    Diagnosis Date Noted     Atrial fibrillation with rapid ventricular response (H) 06/30/2020     Atrial fibrillation with rapid ventricular response (H)      On mechanically assisted ventilation (H)      ARDS (adult respiratory distress syndrome) (H)      COVID-19 06/22/2020     Shock (H)      Acute and chronic respiratory failure with hypoxia (H)        Height:  5' 5\" (1.651 m)    Weight:   (!) 254 lb 10.1 oz (115.5 kg)    Labs:  Recent Labs     07/01/20  0625   HGB 12.8*       Bayron:  Bayron Scale Score: 12    Specialty Bed:  Specialty Bed: [place]    Wound culture obtained: No    Edema:  Yes:  Localized    Anatomic Site/Laterality: Body    Reason for ongoing care:   Skin assessment and plan of care    Encounter Type:  Subsequent Encounter Skin integrity:   Patient with COVID-19, respiratory failure and morbid obesity.  Patient currently intubated and sedated.      Patient seen while supine. Anterior body appears intact. L cheek just superior to ETAD with small area of dried drainage measuring 0.3x0.3cm. appears like skin stretching/abrasion. Ok to leave open to air, cover with mepilex if patient prones again.     Right nare with 0.3x0.6cm area of dried drainage.  Nasal septum with 0.3x1.3cm area of dried drainage.  Both areas appear to be from friction with ETAD as patient was visualized today moving his head back and forth constantly.      RN reports no concerns with posterior body.    Prone Positioning recommendations:    Prior  Apply Mepilex sacral dressings for protection if pt has bony prominences (shoulders, iliac crest) - may leave in place when not proned.  Ensure tongue is in the mouth, remove bite block for pressure injury prevention.  Ensure no lines/tubes/drains (as much as able) will be under patient when prone.    Prone position  Use pillows or Z hernan pads to off load pressure to bony " prominences.  Position head so that ears are not folded and so that head/neck are aligned.  Reposition head every one hour.  Use swim position per protocol for positioning when supine.    Mepilex dressings to cheeks when in prone position - may leave in place when not proned. Change every 5 days or sooner if soiled.    Prevention measures for mucosal integrity:  1. No bite block when proning. (rationale - the hard plastic will leave the patient at greater risk for pressure injury)  2. Hourly head reposition and moisturizer to exposed portion of tongue (moisturizer which comes with the oral care kits). (rationale - remove pressure to tongue/lips and face and maintain mucosal integrity for the tongue)  3. Use tongue blade when repositioning ETT, so tongue doesn t move along with the tube. (rationale - to ensure pressure is not on the same area of the tongue, even though the ETT is technically repositioned)         Nursing care provided was coordinated care with RN.    Discussed plan of care with nurse and patient (as able).    Outcomes and treatment recommendations are to promote skin integrity and promote wound healing.    Actions taken by WOC RN: 2 minutes of education.    Planned Follow Up: Early next week.    Plan for next visit: Reassess skin integrity

## 2021-06-09 NOTE — PROGRESS NOTES
FERN COVID RT PROGRESS NOTE    DATA:    VENT DAY#  26    CURRENT SETTINGS:  VENT SETTINGS   AC 16/450/+8        FIO2:   40    PATIENT PARAMETERS:    PIP 30  Pplat:  22  Pmean:  14  SBT:   SECRETIONS:  Large thick yellow/tan secretions  02 SATS:  96    ETT SIZE 8.0  Secured at  24 cm at teeth    Respiratory Medications: Mucomyst /Duo q12      ABG: pending     NOTE / PLAN:   Pt suctioned for large thick secretions . Bs are coarse. ETT position changed per policy . Tongue has extensive wounds, would recommend Wound care RN consult.   No weaning this shift.

## 2021-06-09 NOTE — PROGRESS NOTES
06/25/20 0100   Patient Data   PIP Observed (cm H2O) 34 cm H2O   MAP (cm H2O) 24   Plateau Pressure (cm H2O) 30 cm H2O

## 2021-06-09 NOTE — PROGRESS NOTES
FERN COVID RT PROGRESS NOTE    DATA:    VENT DAY#  4    CURRENT SETTINGS:  VENT SETTINGS: A/C, 20, 450, +20        FIO2:   55%    PATIENT PARAMETERS:    PIP 31-33  Pplat:  29-32  Pmean:  24-27  SBT: N/A  SECRETIONS: x2 scant/white/thin  02 SATS:  93-97%    ETT SIZE 8.0  Secured at  23 cm at teeth    Respiratory Medications: VELETRI FULL STRENGTH     ABG: Results for RJ GUADALUPE (MRN [id number]) as of 6/24/2020 16:18   Ref. Range 6/24/2020 16:18   pH, Arterial Latest Ref Range: 7.37 - 7.44  7.38   pCO2, Arterial Latest Ref Range: 35 - 45 mm Hg 42   pO2, Arterial Latest Ref Range: 80 - 90 mm Hg 95 (H)   Bicarbonate, Arterial Calc Latest Ref Range: 23.0 - 29.0 mmol/L 24.5   O2 Sat, Arterial Latest Ref Range: 96.0 - 97.0 % 98.0 (H)   Oxyhemoglobin Latest Ref Range: 96.0 - 97.0 % 96.0   POC Base Excess Calc Latest Units: mmol/L -0.5   Peep Latest Units: cm H2O 20   Sample Stabilized Temperature Latest Units: degrees C 37.0   Rate Latest Units: rr/min 20   FIO2 Unknown 55.00       NOTE / PLAN:   Pt supinated @ 1322, no distress noted and EBBS present. Tube is secued @ the 23 @ the teeth. The goal is to keep pt supinated for the night and slowly wean FiO2 per NP. Sputum culture sent.    QA=[id number]

## 2021-06-09 NOTE — PROGRESS NOTES
06/24/20 0807   Patient Data   PIP Observed (cm H2O) 33 cm H2O   MAP (cm H2O) 27   Auto/Intrinsic PEEP Observed (cm H2O) 2 cm H2O   Plateau Pressure (cm H2O) 32 cm H2O

## 2021-06-09 NOTE — PLAN OF CARE
Problem: Potential for Compromised Skin Integrity  Goal: Nutritional status is improving  Outcome: Progressing   Working with interdisciplinary team to meet patient's nutrition needs. Patient at goal rate for tube feeds.  Problem: Mechanical Ventilation  Goal: Patient will maintain patent airway  Outcome: Progressing   Working with RT to maintain airway. Changed from tape to E Tab; secured in place. Assessments performed per protocol.  Problem: Glucose Imbalance  Goal: Achieve optimal glucose control  Outcome: Progressing   Blood glucose levels stable.

## 2021-06-09 NOTE — PLAN OF CARE
Problem: Mechanical Ventilation  Goal: Patient will maintain patent airway  Outcome: Progressing  Goal: ET tube will be managed safely  Outcome: Progressing

## 2021-06-09 NOTE — PROGRESS NOTES
06/23/20 0215   Patient Data   PIP Observed (cm H2O) 36 cm H2O   MAP (cm H2O) 26   Plateau Pressure (cm H2O) 33 cm H2O

## 2021-06-09 NOTE — PROGRESS NOTES
07/23/20 0847   Vent Information   Vent Mode CPAP/PSV   Patient Ventilator Status On   Respiratory Assessment   Respiratory Pattern   (switched to PS)   Chest Assessment Chest expansion symmetrical   Bilateral Breath Sounds Diminished   R Breath Sounds Coarse   L Breath Sounds Coarse   Vent Settings   FiO2 (%) 35 %   PEEP/CPAP (cm H2O) 8 cm H2O   Trigger Sensitivity Flow (L/min) 2 L/min   Humidification Heater   Heater Temperature 98.2  F (36.8  C)   Pressure Support (cm H2O) 5 cm H20   Patient Data   Vt Exp (mL) 616 mL   Minute Ventilation (L/min) 15.3 L/min   Total Resp Rate  22 BPM   PIP Observed (cm H2O) 20 cm H2O   MAP (cm H2O) 11   SpO2 94 %   Heart Rate 96

## 2021-06-09 NOTE — PROGRESS NOTES
FERN COVID RT PROGRESS NOTE    DATA:    VENT DAY#  2 DAY    CURRENT SETTINGS:  VENT SETTINGS  AC 20, 450, +16        FIO2:  100%    PATIENT PARAMETERS:    PIP 30  Pplat: 27  Pmean:  21  SBT:N/A  SECRETIONS:  Suctioned x3 for small to large tan/ pale yellow secretions  02 SATS: 89-95%    ETT SIZE 8 Secured at  22 cm at teeth    Respiratory Medications: Veletri  - full- strength.     ABG:Results for RJ GUADALUPE (MRN [id number]) as of 6/22/2020 18:07   Ref. Range 6/22/2020 15:42   pH, Arterial Latest Ref Range: 7.37 - 7.44  7.45 (H)   pCO2, Arterial Latest Ref Range: 35 - 45 mm Hg 46 (H)   pO2, Arterial Latest Ref Range: 80 - 90 mm Hg 74 (L)   Bicarbonate, Arterial Calc Latest Ref Range: 23.0 - 29.0 mmol/L 30.3 (H)   O2 Sat, Arterial Latest Ref Range: 96.0 - 97.0 % 96.3   Oxyhemoglobin Latest Ref Range: 96.0 - 97.0 % 94.6 (L)   POC Base Excess Calc Latest Units: mmol/L 7.0   Ventilation Mode Unknown AC   Peep Latest Units: cm H2O 16   Sample Stabilized Temperature Latest Units: degrees C 37.0   Rate Latest Units: rr/min 20   FIO2 Unknown 100.00       NOTE / PLAN:  Patient admitted today with vent on.  ABG was done.  Patient's doctor is preparing to prone this patient to recruit lungs and improve patient's hypoxemia. Patient is using Neb Veletri  - full- strength. Continue to monitor closely.

## 2021-06-09 NOTE — PROGRESS NOTES
CRITICAL CARE PROGRESS NOTE:    Assessment/Plan:  Tobias Palmer is a 54 year old male with a history of obesity (BMI 45) who was admitted and intubated on 6/21 at [facility] with COVID-19 pneumonia, transferred to [facility] on 6/22. Course complicated by ARDS and shock.     24hr events:  Remains supine, with reported thick, yellow/ white mucus.     Changes   -Lasix 20 this am, with additional dose most likely this afternoon   -Keep PEEP at 12 today, given thick secretions, moderately low PF   -Start Mucomyst, and Albuterol for clearance   -Cont to wean sedation as tolerated increasing precedex   -add scheduled lorazepam to facilitate weaning     NEURO:  Encephalopathy, requiring sedation/paralytic since admission     Continues to be off paralytic as off 7/11.     Cont Dex, fent, midaz for RASS -2 to -3    Cont two times a day seroquel.  Plan to wean Midaz first as tolerated while increasing precedex     RESP:  ARDS 2/2 severe covid-19 infection     Copious thick secretions noted today     Off paralytic, remains supine for now tolerating well     Vent: 22/350/12/45  Plt 24 PIP 26     PF borderline this am, presenting challenge of weaning further   Plan    Start Mucomyst and albuterol for clearance     Encourage good pulmonary hygiene     Titrate FiO2 for goal O2 sat 88-92%, PaO2 55 or greater    Pplat <30.     #Staph PNA   -+sputum cx multiple times with sensitivities to   -Increasing production of sputum   -Cefazolin 7/7-7/14 total of 7 day course   Plan   -Resend sputum cx today   -Cefazolin last day today     CV:  #Resolved Shock   No known cardiac hx. No echo on file. He had afib/RVR during admission now on po amio. Circulatory shock - resolved.   -s/p total of 40 yesterday with -1.5L neg   Plan    MAP >65, currently not on pressors.    Lasix 20mg x1 today may need repeate for goal of -1L/1.5     Cont po amio for now    GI:  No issues, yang TF. TG downtrending     Cont TF and advance as yang    Cont  bowel regimen    Cont PPI for stress ulcer ppx    RENAL:  No issues, normal renal function and tolerating daily loop diuretic.    Cont Lasix as above for goal of -500/-1L    Maintain barlow    Avoid nephrotoxins.     ID:  Severe covid-19 infection with cytokine release.   - Inflammatory markers significantly improved   -Improving leukocytosis to 9 today, decreasing fevers, however significant sputum production    Workup   -Quant gold, HBV, HCV testing negative.   -Col with yeast in sputum at risk for invasive candidiasis, +MSSA 7/2 presumed colonization   - Beta-D glucan negative 6/29, less likely. CRP, PCT negative.     S/p toci on 6/25 and 6/27    S/p CCP early in admission , ?6/25    S/p dexamethasone 6/22 - 6/29    S/p ivermectin x2 doses, strongy Ab was neg.     S/p meropenem x10 days    S/p remdisivir x10 days  Plan    Cont cefazolin for MSSA in sputum for  Final dose today 7/14 for 7 day total     Follow up sputum cx     ID following, appreciate input    HEMATOLOGIC:  # Stable Thromcbocytopenia   -Improving slightly today to 94  - No clots. Doubt HIT as had been stable for many days prior to recently.     Trend plt count    Check peripheral blood smear    OK to cont LMWH, but will need to stop if plt count gets below 70K.     hgb >7 transfuse prn    ENDOCRINE:  No issues    DC SSI as has not required any     ICU PROPHYLAXIS:    Full code    DISPO/CODE STATUS: will update his wife later today. She has been getting daily updates.  May need to address trach soon.     FAMILY COMMUNICATION: Dicussed with child today     Lines/Drains/Tubes:  Barlow  ETT 8.0 day 21  A-line right 6/22  PICC 6/26  NG tube  Rectal tube    Restraints  Progress Note  Restraint Application    I recognize that restraints are physical and/or chemical interventions intended to restrict a person's movements. Restraints are currently needed to ensure the safety of this patient and/or others. My clinical rationale appears below.    Category/Type  "of Restraint     Non Violent:  Soft limb restraint x2  --  Behavior  Pulling at tubes/lines  --  Root Cause of the Behavior  Sedation/intubation  --  Less-Restrictive Measures that Failed  Non Violent Measures:  Close Observation  --  Response to the Restraint  Patient unable to pull at tubes/lines  --  Criteria for Release from the Restraint  Patient calm and off sedation    Elsa Tan MICU [place]   Discussed with Dr. Howard     Critical Care time spent >40min     Objective:  Physical Exam:  Vent settings for last 24 hours:  Vent Mode: AC  FiO2 (%):  [45 %] 45 %  S RR:  [16] 16  S VT:  [350 mL] 350 mL  PEEP/CPAP (cm H2O):  [12 cm H2O] 12 cm H2O  Minute Ventilation (L/min):  [7.2 L/min-13.6 L/min] 7.2 L/min  PIP:  [13 cm H2O-14 cm H2O] 13 cm H2O  MAP (cm H2O):  [12-13] 13    /54 (Patient Position: Lying)   Pulse 78   Temp 99.2  F (37.3  C) (Oral)   Resp 24   Ht 5' 5\" (1.651 m)   Wt (!) 272 lb 11.3 oz (123.7 kg)   SpO2 95%   BMI 45.38 kg/m      Intake/Output last 3 shifts:  I/O last 3 completed shifts:  In: 1632 [I.V.:706; NG/GT:826; IV Piggyback:100]  Out: 4175 [Urine:3375; Stool:800]  Intake/Output this shift:  I/O this shift:  In: 61.4 [I.V.:61.4]  Out: -     Physical Exam  Gen: intubated, sedated, not parlayzed; eyes opening, not following commands.   HEENT: no OP lesions, no DIANN  CV: RRR, no m/g/r  Resp: coarse, thick yellow sputum present   Abd: soft, nontender, BS+  Neuro: PERRL, nonfocal  Ext: no edema    LAB:  Results from last 7 days   Lab Units 07/14/20  0402   LN-WHITE BLOOD CELL COUNT thou/uL 9.3   LN-HEMOGLOBIN g/dL 11.3*   LN-HEMATOCRIT % 35.9*   LN-PLATELET COUNT thou/uL 94*     Results from last 7 days   Lab Units 07/14/20  0402 07/13/20  0437 07/12/20  0402   LN-SODIUM mmol/L 142 141 141   LN-POTASSIUM mmol/L 3.7 3.9 4.0   LN-CHLORIDE mmol/L 104 103 104   LN-CO2 mmol/L 31 32* 34*   LN-BLOOD UREA NITROGEN mg/dL 21 21 24*   LN-CREATININE mg/dL 0.57* 0.54* 0.57*   LN-CALCIUM mg/dL 8.3* " 8.0* 7.9*       Micro  PCT neg since 6/22  CRP neg, down from 12.6 on 6/22  Ferritin 642 on 6/26, down from 795 on 6/22   on 6/26, down from 722 on 6/22  D-dimer 0.82 on 6/26, up from 0.46 on 6/22  Fibrinogen 505 on 6/26  IL-6 42 on 6/22, not rechecked.    Sputum 7/4 MSSA  Sputum 6/24 yeast  Sputum 7/14 Pending   Blood neg  Fungal cx pending  Urine NG    Current Facility-Administered Medications   Medication Dose Route Frequency Provider Last Rate Last Dose     acetaminophen 160 mg/5 mL solution 1,000 mg (TYLENOL)  1,000 mg Oral Q6H PRN Татьяна Carey MD   1,000 mg at 07/14/20 0900     acetylcysteine 200 mg/mL (20 %) solution 4 mL (MUCOMYST)  4 mL Inhalation Q6H - RT Elsa Tan CNP   4 mL at 07/14/20 1339     albuterol nebulizer solution 2.5 mg  2.5 mg Nebulization Q6H - RT Elsa Tan CNP   2.5 mg at 07/14/20 1339     amino acids-protein hydrolys 15-60 gram-kcal/30 mL packet 1 packet (ProSource No Carb)  1 packet Enteral Tube TID Joel Howard DO   1 packet at 07/14/20 1551     amiodarone tablet 200 mg (PACERONE)  200 mg Enteral Tube DAILY Shikha Brady CNP   200 mg at 07/14/20 0903     benzocaine-menthoL lozenge 1 lozenge (CEPACOL)  1 lozenge Oral Q1H PRN Elsa Tan CNP         bisacodyL suppository 10 mg (DULCOLAX)  10 mg Rectal Daily PRN Elsa Tan CNP   10 mg at 06/24/20 1608     ceFAZolin 2 g in 100 mL in D5W (ANCEF)  2 g Intravenous Q8H Herb Sweet MD   2 g at 07/14/20 0924     chlorhexidine 0.12 % solution 15 mL (PERIDEX)  15 mL Topical Q12H Elsa Tan CNP   15 mL at 07/14/20 0900     dexmedetomidine (PRECEDEX) 400 mcg/100 mL in NS (4 mcg/mL) infusion  0.1-1.5 mcg/kg/hr Intravenous Continuous Niko Saldana MD 15.7 mL/hr at 07/14/20 1600 0.5 mcg/kg/hr at 07/14/20 1600     dextrose 50 % (D50W) syringe 20-50 mL  20-50 mL Intravenous Q15 Min PRN Elsa Tan, VIRIDIANA         enoxaparin ANTICOAGULANT syringe 60 mg (LOVENOX)  60 mg Subcutaneous BID Britney  Elsa, CNP   60 mg at 07/14/20 0900     fentaNYL - BOLUS DOSE from infusion  mcg   mcg Intravenous Q2H PRN Joel Howard DO         fentaNYL 2500 mcg/50 mL CADD infusion (50 mcg/mL)   mcg/hr Intravenous Continuous Elsa Tan CNP 5 mL/hr at 07/14/20 1600 250 mcg/hr at 07/14/20 1600     glucagon (human recombinant) injection 1 mg  1 mg Subcutaneous Q15 Min PRN Elsa Tan CNP         labetaloL injection 10-20 mg (NORMODYNE,TRANDATE)  10-20 mg Intravenous Q2H PRN Esmer Willis CNP   20 mg at 07/12/20 1009     Lactobacillus rhamnosus GG 15 Billion cell 1 capsule (CULTURELLE)  1 capsule Enteral Tube BID Niko Saldana MD   1 capsule at 07/14/20 0903     lipase-protease-amylase 5,000-17,000- 24,000 unit capsule 2 capsule (ZENPEP)  2 capsule Enteral Tube PRN Leventhal, Thomas Michael, MD        And     sodium bicarbonate tablet 325 mg  325 mg Enteral Tube PRN Leventhal, Thomas Michael, MD         LORazepam 0.5-1 mg injection (diluted concentration)  0.5-1 mg Intravenous Q8H PRN Shikha Brady CNP         magnesium hydroxide suspension 30 mL (MILK OF MAG)  30 mL Oral Daily PRN Elsa Tan CNP   30 mL at 06/24/20 1608     metoprolol tartrate injection 5 mg (LOPRESSOR)  5 mg Intravenous Q4H PRN Perlman, David M, MD   5 mg at 06/29/20 0425     midazolam (VERSED) - BOLUS DOSE from infusion 2 mg  2 mg Intravenous Q1H PRN Joel Howard DO         midazolam 100 mg/100 mL (1 mg/mL) infusion (VERSED)  0.25-15 mg/hr Intravenous Continuous Esmer Willis CNP 10 mL/hr at 07/14/20 1600 10 mg/hr at 07/14/20 1600     multivit-min-ferrous gluconate 9 mg iron/15 mL Liqd 9 mg of iron  15 mL Enteral Tube DAILY Leventhal, Thomas Michael, MD   9 mg of iron at 07/14/20 0900     naloxone injection 0.2-0.4 mg (NARCAN)  0.2-0.4 mg Intravenous PRN Elsa Tan, VIRIDIANA        Or     naloxone injection 0.2-0.4 mg (NARCAN)  0.2-0.4 mg Intramuscular PRN Elsa Tan, CNP          omeprazole capsule 20 mg (PriLOSEC)  20 mg Oral QAM AC Elsa Tan CNP   20 mg at 07/05/20 0839    Or     omeprazole suspension 20 mg (PriLOSEC)  20 mg Enteral Tube QAM AC Elsa Tan CNP   20 mg at 07/14/20 0900    Or     pantoprazole 40 mg injection  40 mg Intravenous QAM AC Elsa Tan CNP         ondansetron injection 4 mg (ZOFRAN)  4 mg Intravenous Q4H PRN Elsa Tan CNP        Or     ondansetron tablet 8 mg (ZOFRAN)  8 mg Oral Q8H PRN Elsa Tan CNP         oxyCODONE immediate release tablet 10 mg (ROXICODONE)  10 mg Enteral Tube Q4H Esmer Willis CNP   10 mg at 07/14/20 1552     oxyCODONE immediate release tablet 5-10 mg (ROXICODONE)  5-10 mg Oral Q4H PRN Angela Olson CNP   10 mg at 07/05/20 1757     polyvinyl alcohol 1.4 % ophthalmic solution 1-2 drop (LIQUIFILM TEARS)  1-2 drop Both Eyes Q1H PRN Elsa Tan CNP         QUEtiapine tablet 50 mg (SEROquel)  50 mg Oral Q8H Esmer Willis CNP   50 mg at 07/14/20 0903     sodium chloride 0.9%  10 mL/hr Intravenous Continuous de La MaterAngela CNP 10 mL/hr at 07/14/20 1200 10 mL/hr at 07/14/20 1200     sodium chloride bacteriostatic 0.9 % injection 1-5 mL  1-5 mL Intradermal Once PRN Juan F Max PA-C         sodium chloride flush 10-20 mL (NS)  10-20 mL Intravenous PRN Burak Moss MD   10 mL at 06/30/20 1614     sodium chloride flush 10-30 mL (NS)  10-30 mL Intravenous PRN Burak Moss MD         sodium chloride flush 10-30 mL (NS)  10-30 mL Intravenous Q8H FIXED TIMES Burak Moss MD   10 mL at 07/13/20 0021     sodium chloride flush 20 mL (NS)  20 mL Intravenous PRN Ajay, Peter F, MD         white petrolatum-mineral oiL 83-15 % ophthalmic ointment 1 application (LUBRIFRESH PM)  1 application Both Eyes Q8H Shikha Brady, CNP   1 application at 07/14/20 8101

## 2021-06-09 NOTE — PROGRESS NOTES
FERN COVID RT PROGRESS NOTE     DATA:     VENT DAY#  30    VENT SETTINGS   AC  16/450/+8              FIO2:   40     PATIENT PARAMETERS:     PIP 16-26  Pplat:  19-25  Pmean:  12-16  SBT: no  SECRETIONS:  moderate tan thick (patients frequently coughs secretions up ETT.)  02 SATS:  94     ETT SIZE 8.0  Secured at  24 cm at teeth     Respiratory Medications: mucomyst/albuterol BID     ABG: Results for RJ GUADALUPE (MRN [id number]) as of 7/21/2020 04:41   Ref. Range 7/21/2020 03:44   pH, Arterial Latest Ref Range: 7.37 - 7.44  7.44   pCO2, Arterial Latest Ref Range: 35 - 45 mm Hg 37   pO2, Arterial Latest Ref Range: 80 - 90 mm Hg 82   Bicarbonate, Arterial Calc Latest Ref Range: 23.0 - 29.0 mmol/L 25.7   O2 Sat, Arterial Latest Ref Range: 96.0 - 97.0 % 97.5 (H)   Oxyhemoglobin Latest Ref Range: 96.0 - 97.0 % 94.7 (L)   POC Base Excess Calc Latest Units: mmol/L 1.1   Ventilation Mode Unknown AC   Peep Latest Units: cm H2O 8   Sample Stabilized Temperature Latest Units: degrees C 37.0   Rate Latest Units: rr/min 16   FIO2 Unknown 40.00     P/F 205      No significant medical history in chart.     NOTE / PLAN:   No changes in vent settings.  Patient was agitated and bit Right side of tongue. There is a sore on the left side of tongue as well. Dr aware.  Copious secretions coughed into ETT.  Will continue to monitor and make changes as needed.  DEBORAH DegrootT

## 2021-06-09 NOTE — PROGRESS NOTES
FERN COVID RT PROGRESS NOTE    DATA:    VENT DAY 19      Vent Mode: AC  FiO2 (%):  [50 %-60 %] 60 %  S RR:  [22] 22  S VT:  [380 mL-430 mL] 382 mL  PEEP/CPAP (cm H2O):  [14 cm H2O] 14 cm H2O  Minute Ventilation (L/min):  [8.4 L/min-9.5 L/min] 8.4 L/min  PIP:  [29 cm H2O-33 cm H2O] 33 cm H2O  MAP (cm H2O):  [19-20] 20   Plateau pre- 30        SBT: On prone position   SECRETIONS: large tan thick   02 SATS:  96    ETT SIZE 8  Secured at  24 cm at teeth    Respiratory Medications: None     ABG:    Ref. Range 7/9/2020 16:08   pH, Arterial Latest Ref Range: 7.37 - 7.44  7.36 (L)   pCO2, Arterial Latest Ref Range: 35 - 45 mm Hg 62 (H)   pO2, Arterial Latest Ref Range: 80 - 90 mm Hg 100 (H)   Bicarbonate, Arterial Calc Latest Ref Range: 23.0 - 29.0 mmol/L 31.5 (H)       NOTE / PLAN:   To remain on prone and intubated overnight.7/9/2020  .Wei Patel

## 2021-06-09 NOTE — PROGRESS NOTES
FERN COVID RT PROGRESS NOTE          DATA:     VENT DAY 21     CURRENT SETTINGS: A/C   VENT SETTINGS   22/350/14              FIO2:   45     PATIENT PARAMETERS:     PIP 17  Pplat:  15  Pmean:  16  SBT: None   SECRETIONS:  Large thick tan  02 SATS:  96     ETT SIZE 8  Secured at  24 cm at teeth.     ABG.   Ref. Range 7/12/2020 04:02   pH, Arterial Latest Ref Range: 7.37 - 7.44  7.44   pCO2, Arterial Latest Ref Range: 35 - 45 mm Hg 53 (H)   pO2, Arterial Latest Ref Range: 80 - 90 mm Hg 77 (L)   Bicarbonate, Arterial Calc Latest Ref Range: 23.0 - 29.0 mmol/L 32.9 (H)        Respiratory Medications: None     Notes- Pt  opens his eyes on stimullation, and breathes above the vent, and in no distress. BBS coarse, and needs lavage suction. Large amount of secretion with thick yellow. Overall, pt is making some improvement. Will follow and assess..7/12/2020  .Wei Patel

## 2021-06-09 NOTE — PROGRESS NOTES
FERN COVID RT PROGRESS NOTE    DATA:    VENT DAY#  4    CURRENT SETTINGS: AC   VENT SETTINGS   450/20/+20        FIO2:   55%    PATIENT PARAMETERS:    PIP 33   Pplat:  32  Pmean:  26  SBT: n/a  SECRETIONS:  none  02 SATS:  96%    ETT SIZE 8.0  Secured at  23 cm at teeth    Respiratory Medications: Veletri     ABG: Results for RJ GUADALUPE (MRN [id number]) as of 6/24/2020 05:54   Ref. Range 6/24/2020 05:25   pH, Arterial Latest Ref Range: 7.37 - 7.44  7.36 (L)   pCO2, Arterial Latest Ref Range: 35 - 45 mm Hg 48 (H)   pO2, Arterial Latest Ref Range: 80 - 90 mm Hg 131 (H)   Bicarbonate, Arterial Calc Latest Ref Range: 23.0 - 29.0 mmol/L 25.6   O2 Sat, Arterial Latest Ref Range: 96.0 - 97.0 % 98.8 (H)   Oxyhemoglobin Latest Ref Range: 96.0 - 97.0 % 96.7       NOTE / PLAN:   Patient proning with Q2h head turns. Patient on current vent settings tolerating well. RT to continue monitoring and titrate patient as tolerated

## 2021-06-09 NOTE — PLAN OF CARE
D: Ventilation changes this shift with reduction in PEEP. Goal is to decrease to 10.   I: RT to manage the changes. No other clinical changes for RN care other than manage sedation and medications as needed.   A: Tolerated changes.  P: Anticipate sedation weaning for further gas exchange improvement and lessen ventilation needs.   Problem: Pain  Goal: Patient's pain/discomfort is manageable  Outcome: Progressing     Problem: Safety  Goal: Patient will be injury free during hospitalization  Outcome: Progressing     Problem: Daily Care  Goal: Daily care needs are met  Outcome: Progressing     Problem: Psychosocial Needs  Goal: Demonstrates ability to cope with hospitalization/illness  Outcome: Progressing  Goal: Collaborate with patient/family/caregiver to identify patient specific goals for this hospitalization  Outcome: Progressing     Problem: Potential for Compromised Skin Integrity  Goal: Skin integrity is maintained or improved  Outcome: Progressing  Goal: Nutritional status is improving  Outcome: Progressing     Problem: Urinary Incontinence  Goal: Perineal skin integrity is maintained or improved  Outcome: Progressing     Problem: Potential for Falls  Goal: Patient will remain free of falls  Outcome: Progressing     Problem: Risk of Injury Due to Unsafe Behavior  Goal: Patient will remain safe while in restraint; physical/psychological needs will be met  Outcome: Progressing  Goal: Alternatives to restraint will be continually assessed with use of least restrictive device and discontinuation as soon as possible  Outcome: Progressing  Goal: Patient/Family will be able to communicate reason for restraint and steps for restraint application and removal  Outcome: Progressing     Problem: Inadequate Airway Clearance  Goal: Patient will maintain patent airway  Outcome: Progressing     Problem: Mechanical Ventilation  Goal: Patient will maintain patent airway  Outcome: Progressing  Goal: ET tube will be managed  safely  Outcome: Progressing     Problem: Glucose Imbalance  Goal: Achieve optimal glucose control  Outcome: Progressing     Problem: Potential for transmission of organism by Contact, Enteric, Droplet and/or Airborne routes  Goal: Prevent transmission of organisms  Outcome: Progressing     Problem: Inadequate Gas Exchange  Goal: Patient is adequately oxygenated and ventilation is improved  Outcome: Progressing

## 2021-06-09 NOTE — PROGRESS NOTES
07/06/20 1644   Patient Data   PIP Observed (cm H2O) 35 cm H2O   MAP (cm H2O) 18   Plateau Pressure (cm H2O) 26 cm H2O

## 2021-06-09 NOTE — PROGRESS NOTES
RT at bedside changing foam and commercial ett gore, pt now at 24 cm at teeth, suctioned, skin red but intact under foams.  Emptied barlow, flushed fms, repositioned to right side, pt following commands and nodding appropriately but still reaches up towards ett with coughing.

## 2021-06-09 NOTE — PROGRESS NOTES
Critical Care Progress Note  7/10/2020   8:33 AM     Admit Date: 6/22/2020  ICU Day: 18  Code Status: full code      Problem List:   Principal Problem:    COVID-19  Active Problems:    Shock (H)    Acute and chronic respiratory failure with hypoxia (H)    On mechanically assisted ventilation (H)    ARDS (adult respiratory distress syndrome) (H)    Atrial fibrillation with rapid ventricular response (H)    Atrial fibrillation with rapid ventricular response (H)      Major changes for today:    * Supinate this morning and recheck ABG around noon.     * Repeat lasix this morning.      Plan by System:     Neuro/Psych: Encephalopathy secondary to prolonged critical illness; Sedation for vent synchrony and comfort.    * Sedating with IV Fentanyl, Versed, propofol.    * Paralyzing with Vecuronium.        Pulmonary: Acute hypoxic respiratory failure in setting of COVID19 infection.     * this morning     * PIPs/Pplats improved with lower tidal volumes (~5.5 mL/kg). Both < 30 this morning.    * Vent Mode: AC  FiO2 (%):  [50 %-60 %] 60 %  S RR:  [22-26] 26  S VT:  [350 mL-380 mL] 350 mL  PEEP/CPAP (cm H2O):  [14 cm H2O] 14 cm H2O  Minute Ventilation (L/min):  [7.4 L/min-9.1 L/min] 9.1 L/min  PIP:  [25 cm H2O-34 cm H2O] 25 cm H2O  MAP (cm H2O):  [18-21] 18        CV: A.fib with RVR 6/28; Shock - septic.    * On/off levophed for MAP > 65   * Amio 200mg daily    * Occasionally hypertensive requiring bolus dosing of sedatives and PRN doses of labetalol.     GI/: Protein calorie malnutrition; enteral access established - not post pyloric.    * Tube feeding per RD.     Renal: Rhabdo - improved; metabolic acidosis - resolved.     * Daily assessment for diuretic need. Repeat 40mg IV Lasix     ID: COVID19; MSSA pneumonia   * S/P CCP and toci 6/25 and 6/27    * S/P Ivermectin x 2 doses.    * Meropenem x 10 days    * Remdesivir x 10 days   * Cefazolin for MSSA; ID following.     Heme/Coag:  Coagulopathy in setting of COVID19      * Lovenox 60mg two times a day     Endocrine: Stress hypergylcemia   * Improved sugars with lantus d/c      Family/Psychosocial:    * Palliative Care involved     * Calling wife Nathalie daily with  assistance.        Dispo: ICU    Esmer Willis, CNP  [facility]/Critical Care    Total critical care time: 35  I have personally provided 35 minutes of critical care time exclusive of time spent on separately billable procedures for acute hypoxic respiratory failure. High risk for acute deterioration and death.    _______________________________________________________________    HPI: Tobias Palmer is a 54 year old male with a history of obesity (BMI 45) who was admitted and intubated on 6/21 at [facility] with COVID-19 pneumonia, transferred to [place] ICU on 6/22. Course complicated by ARDS and shock, requiring proning and paralytics.    ROS: NATIVIDAD intubated and sedated.     Events over last 24-hours:  No acute change overnight      Objective:     Vitals:    07/10/20 0630 07/10/20 0700 07/10/20 0734 07/10/20 0750   BP:       Pulse: 91 89 86 85   Resp: 26 26  17   Temp: 99.9  F (37.7  C)  99.5  F (37.5  C)    TempSrc: Axillary  Axillary    SpO2: 96% 96% 97% 96%   Weight:       Height:           Vent:   Day of Intubation: 6/21  Ready to wean? No  Vent Mode: AC  FiO2 (%):  [50 %-60 %] 60 %  S RR:  [22-26] 26  S VT:  [350 mL-380 mL] 350 mL  PEEP/CPAP (cm H2O):  [14 cm H2O] 14 cm H2O  Minute Ventilation (L/min):  [7.4 L/min-9.1 L/min] 9.1 L/min  PIP:  [25 cm H2O-34 cm H2O] 25 cm H2O  MAP (cm H2O):  [18-21] 18    Sedative Infusion: fentanyl and midazolam  Sedation vacation: No    I/O:     Intake/Output Summary (Last 24 hours) at 7/10/2020 0833  Last data filed at 7/10/2020 0750  Gross per 24 hour   Intake 2464.42 ml   Output 4085 ml   Net -1620.58 ml     Wt Readings from Last 3 Encounters:   07/10/20 (!) 276 lb 10.8 oz (125.5 kg)      Weight change: 1 lb 5.2 oz (0.6  kg)    Physical Exam:  Gen: sedated  HEENMT: facial and tongue swelling worse in prone position.    NEURO: sedated and paralyzed.   CARDIOVASCULAR: S1, S2 normal, no murmur, rub or gallop, regular rate and rhythm  PULMONARY: unlabored on full vent; lungs are clear today but diminished bilaterally.   GASTROINTESTINAL: NATIVIDAD - prone position  INTEGUMENT: as above; gen'l edema   PSYCH: sedated     Labs:   Results from last 7 days   Lab Units 07/10/20  0333  07/06/20  0352   LN-SODIUM mmol/L 141   < > 144   LN-POTASSIUM mmol/L 4.8   < > 3.7   LN-CHLORIDE mmol/L 102   < > 111*   LN-CO2 mmol/L 35*   < > 28   LN-BLOOD UREA NITROGEN mg/dL 21   < > 16   LN-CREATININE mg/dL 0.54*   < > 0.50*   LN-CALCIUM mg/dL 7.9*   < > 7.4*   LN-PROTEIN TOTAL g/dL  --   --  4.5*   LN-BILIRUBIN TOTAL mg/dL  --   --  0.3   LN-ALKALINE PHOSPHATASE U/L  --   --  44*   LN-ALT (SGPT) U/L  --   --  27   LN-AST (SGOT) U/L  --   --  30    < > = values in this interval not displayed.       Results from last 7 days   Lab Units 07/10/20  0333   LN-WHITE BLOOD CELL COUNT thou/uL 10.3   LN-HEMOGLOBIN g/dL 11.4*   LN-HEMATOCRIT % 38.1*   LN-PLATELET COUNT thou/uL 125*       Micro:   7/4 Sputum - 3+ Staph (MSSA)    Imaging: all imaging personalized reviewed   7/5 CXR - Endotracheal tube tip mid way between clavicles and melissa. Right PICC tip projects over the mid SVC. Feeding tube tip below the film.     Limited inspiratory volume. Normal heart size. Unchanged bilateral diffuse lung infiltrates      Esmer Willis, [facility] Pulmonary/Critical Care

## 2021-06-09 NOTE — PROGRESS NOTES
Infectious Disease Progress Note    Assessment/Plan  Impression:  COVID-19 pneumonia  No other infection identified.  Received CCP. Started on dexamethasone and remdesivir     Mixed picture for CRS--IL-6 and CRP are declining, ferritin also declining.    Received 400 mg tocilizumab on 6/25.     Recommend:  Check strongyloides antibody and dose ivermectin.  Tocilizumab x 1.  May repeat tomorrow if not improving.      Principal Problem:    COVID-19  Active Problems:    Shock (H)    Acute and chronic respiratory failure with hypoxia (H)    On mechanically assisted ventilation (H)    ARDS (adult respiratory distress syndrome) (H)      Burak Moss MD  [phone]      Subjective  Pt intubated and sedated in ICU.    Objective    Vital signs in last 24 hours  Temp:  [102.2  F (39  C)-102.7  F (39.3  C)] 102.7  F (39.3  C)  Heart Rate:  [65-79] 72  Resp:  [18-24] 21  BP: (114-130)/(70-80) 130/80  Arterial Line BP: ()/(54-80) 98/54  FiO2 (%):  [60 %-95 %] 60 %  Weight:   (!) 263 lb 0.1 oz (119.3 kg)    Intake/Output last 3 shifts  I/O last 3 completed shifts:  In: 3058.7 [I.V.:633.7; NG/GT:2425]  Out: 4290 [Urine:3980; Stool:310]  Intake/Output this shift:  I/O this shift:  In: 918.1 [I.V.:128.1; NG/GT:790]  Out: 680 [Urine:650; Stool:30]    Review of Systems   Review of systems not obtained due to inability to communicate with the patient. , otherwise negative.    Physical Exam--  Pt regarded from doorway.   General appearance: appears stated age, morbidly obese and critically ill in ICU  Head: Normocephalic, without obvious abnormality, atraumatic  Eyes: eyes closed.  Throat: oral ett inplace.  Neck: short neck-difficult to examine    Abdomen: not distended.  Extremities: extremities normal, atraumatic, no cyanosis or edema  Skin: Skin color, texture, turgor normal. No rashes or lesions  Neurologic: Mental status: sedated    Pertinent Labs   Lab Results: personally reviewed.     Results from last 7 days   Lab  Units 06/26/20  0422 06/25/20  0440 06/24/20  0525   LN-WHITE BLOOD CELL COUNT thou/uL 9.4 9.2 8.0   LN-HEMOGLOBIN g/dL 14.0 13.6* 14.0   LN-HEMATOCRIT % 45.9 44.3 45.8   LN-PLATELET COUNT thou/uL 220 199 190        Results from last 7 days   Lab Units 06/26/20  0422 06/25/20  0440 06/24/20  0524   LN-SODIUM mmol/L 145 147* 145   LN-POTASSIUM mmol/L 4.3 3.8 4.7   LN-CHLORIDE mmol/L 109* 114* 111*   LN-CO2 mmol/L 28 24 27   LN-BLOOD UREA NITROGEN mg/dL 47* 33* 22   LN-CREATININE mg/dL 0.95 0.78 0.74   LN-CALCIUM mg/dL 7.9* 7.8* 7.9*     Lab Results   Component Value Date    ALT 45 06/22/2020    ALT 45 06/22/2020    AST 69 (H) 06/22/2020    AST 69 (H) 06/22/2020    ALKPHOS 49 06/22/2020    ALKPHOS 49 06/22/2020    BILITOT 0.4 06/22/2020    BILITOT 0.4 06/22/2020       Results for RJ GUADALUPE (MRN [id number]) as of 6/26/2020 11:04   Ref. Range 6/24/2020 16:46   Hepatitis B Surface Ag Latest Ref Range: Negative  Negative   Hepatitis C Ab Latest Ref Range: Negative  Negative   Results for RJ GUADALUPE (MRN [id number]) as of 6/26/2020 11:04   Ref. Range 6/23/2020 04:26 6/24/2020 05:24 6/24/2020 16:46 6/25/2020 04:40 6/26/2020 04:22   CRP Latest Ref Range: 0.0 - 0.8 mg/dL 12.0 (H) 10.0 (H)  4.2 (H) 2.1 (H)   LD (LDH) Latest Ref Range: 125 - 220 U/L 480 (H) 477 (H)  434 (H) 452 (H)     Pertinent Radiology   Radiology Results: Personally reviewed image/s and Personally reviewed impression/s    No results found.

## 2021-06-09 NOTE — PROGRESS NOTES
07/04/20 1859   Patient Data   PIP Observed (cm H2O) 28 cm H2O   MAP (cm H2O) 17   Plateau Pressure (cm H2O) 24 cm H2O

## 2021-06-09 NOTE — PROGRESS NOTES
Clinical Nutrition TF Note    Current Nutrition Prescription:   Diet: TF: Isosource 1.5 @ 35 ml/hr   Supplement/modulars: no carb prosource 2 pkt TID  Flush order: water 150 ml q 4 hrs    IV dextrose or Fluids:dexmedetomidine infusion orderable (PRECEDEX)  fentaNYL citrate (PF), Last Rate: 225 mcg/hr (07/03/20 1000)  insulin infusion (1 unit/mL), Last Rate: Stopped (07/01/20 0003)  midazolam, Last Rate: 7 mg/hr (07/03/20 1000)  norepinephrine, Last Rate: Stopped (07/03/20 0800)  sodium chloride 0.9%, Last Rate: 10 mL/hr (07/03/20 0600)      Current Nutrition Intake:  Enteral nutrition access is a nasal gastric tube, with a placement date of 6/22/2020 & verified in place per x-ray on 6/23.   TF as prescribe provides: 840 ml ml,1620 kcal, 147 grams protein, 13 grams fiber, 148 grams CHO, 638 ml free water + flush 900 ml which will provide 1538 ml total (TF & flushes).    IVF provided 674.1 ml yesterday  Meeting needs with TF prescribed    Anthropometrics:  Height: 5'5  Admit wt: 274 lb 4 oz,6/22/20  Weight: (!) 255 lb (115.7 kg),7/3/20  BMI:42.43  BMI indication: >40 extreme obesity (class 3)  Ideal body weight: 136 lb(+or-10%)  Usual body weight: unable to obtain currently  Weight History:268 lb(6/22/20)  Recent wt's:263 lb(6/25), 264 lb(6/28),254 lb(6/29)    GI Status/Output:   GI symptoms include:   Bowel sounds active  Rectal tube:300 ml/24 hrs. Prior was constipated    Medications:  Medications reviewed.    Labs:  Labs reviewed  Lab Results   Component Value Date/Time    ALBUMIN 2.4 (L) 07/03/2020 03:15 AM     (H) 07/03/2020 03:15 AM    K 3.9 07/03/2020 03:15 AM    BUN 30 (H) 07/03/2020 03:15 AM    CREATININE 0.66 (L) 07/03/2020 03:15 AM     (H) 07/03/2020 03:15 AM   FSBG 110-157 mg/dL in the past 24 hrs (within goal range)    Estimated Nutrition Needs:  Assessment weight is 121.8 kg, estimated weight    Energy Needs: 3428-2395 kcals daily, 11-14 kcal/kg  Protein Needs: 124-155 g daily, 2-2.5  g/kg.(dose: 62 kg IBW)  Fluid Needs: 8496-4925 mls daily, 25-35 mls/kg(dose wt: 77 kg adj.wt)    Plan:   Continue current TF support & writer did report off hypernatremia which holding lasix today noted.  Continue current bowel regimen at this time per provider.   Please see RD note from yesterday for further details

## 2021-06-09 NOTE — PLAN OF CARE
Problem: Pain  Goal: Patient's pain/discomfort is manageable  Outcome: Progressing     Problem: Safety  Goal: Patient will be injury free during hospitalization  Outcome: Progressing     Problem: Daily Care  Goal: Daily care needs are met  Outcome: Progressing     Problem: Psychosocial Needs  Goal: Demonstrates ability to cope with hospitalization/illness  Outcome: Progressing  Goal: Collaborate with patient/family/caregiver to identify patient specific goals for this hospitalization  Outcome: Progressing     Problem: Discharge Barriers  Goal: Patient's discharge needs are met  Outcome: Progressing     Problem: Knowledge Deficit  Goal: Patient/family/caregiver demonstrates understanding of disease process, treatment plan, medications, and discharge instructions  Outcome: Progressing     Problem: Potential for Compromised Skin Integrity  Goal: Skin integrity is maintained or improved  Outcome: Progressing  Goal: Nutritional status is improving  Outcome: Progressing     Problem: Urinary Incontinence  Goal: Perineal skin integrity is maintained or improved  Outcome: Progressing     Problem: Potential for Falls  Goal: Patient will remain free of falls  Outcome: Progressing     Problem: Risk of Injury Due to Unsafe Behavior  Goal: Patient will remain safe while in restraint; physical/psychological needs will be met  Outcome: Progressing  Goal: Alternatives to restraint will be continually assessed with use of least restrictive device and discontinuation as soon as possible  Outcome: Progressing  Goal: Patient/Family will be able to communicate reason for restraint and steps for restraint application and removal  Outcome: Progressing     Problem: Inadequate Airway Clearance  Goal: Patient will maintain patent airway  Outcome: Progressing     Problem: Mechanical Ventilation  Goal: Patient will maintain patent airway  Outcome: Progressing  Goal: ET tube will be managed safely  Outcome: Progressing     Problem: Glucose  Imbalance  Goal: Achieve optimal glucose control  Outcome: Progressing     Problem: Potential for transmission of organism by Contact, Enteric, Droplet and/or Airborne routes  Goal: Prevent transmission of organisms  Outcome: Progressing     Problem: Inadequate Gas Exchange  Goal: Patient is adequately oxygenated and ventilation is improved  Outcome: Progressing  Goal: Patient will achieve/maintain normal respiratory rate/effort  Outcome: Progressing    CV: No current changes. Diuresis as expected    Neuro: awake and responsive requiring PRN for comfort and agitation.    GI/: Se previous note, tolerating feeds    Resp: no changes, int. Tachypnea with agitation

## 2021-06-09 NOTE — PLAN OF CARE
Problem: Inadequate Airway Clearance  Goal: Patient will maintain patent airway  Outcome: Progressing     Problem: Inadequate Gas Exchange  Goal: Patient is adequately oxygenated and ventilation is improved  Outcome: Progressing

## 2021-06-09 NOTE — PROGRESS NOTES
07/15/20 1937   Vent Settings   Resp Rate (Set) 16   FiO2 (%) 60 %   Insp Time (sec) 0.85 sec   Vt (Set, mL) 350 mL   PEEP/CPAP (cm H2O) 14 cm H2O   Trigger Sensitivity Flow (L/min) 2 L/min   I:E Ratio 1.0:2.5   Humidification Heater   Heater Temperature 95.9  F (35.5  C)   Patient Data   Vt (observed, mL) 485 mL   Vt Exp (mL) 415 mL   Minute Ventilation (L/min) 8.9 L/min   Total Resp Rate  20 BPM   PIP Observed (cm H2O) 18 cm H2O   MAP (cm H2O) 14   Plateau Pressure (cm H2O)   (No Value)

## 2021-06-09 NOTE — PROGRESS NOTES
Palliative Care Progress Note  NAME: Tobias Palmer, : 1965,   MRN: [id number] Date: 7/10/2020  Problem List:  Active Problems   Principal Problem:    COVID-19  Active Problems:    Shock (H)    Acute and chronic respiratory failure with hypoxia (H)    On mechanically assisted ventilation (H)    ARDS (adult respiratory distress syndrome) (H)    Atrial fibrillation with rapid ventricular response (H)    Atrial fibrillation with rapid ventricular response (H)      Assessment:   Tobias Palmer is a 54 y.o. male who is being evaluated via a billable video visit, they have a medical history of obesity. Presented to ED on  with 3 days history of fever, cough, dyspnea. Intubated in ED and admitted for [facility] on . Found to be COVID-19+ and transferred to [place] on 2020. Course complicated by ARDS and shock, requiring proning and paralytics. S/p CCP. Started on dexamethasone - and remdesivir for 10 day course. Received tocilizumab 400 mg on ,  for suspected cytokine release syndrome. On Cefazolin for MSSA pneumonia. Continues to be intubated/sedated/paralyzed.    Recommendations:   Shortness of breath: due to ARDS from COVID-19 infection. Currently intubated, sedated, paralyzed for continued proning. Received CCP. Started on dexamethasone (-) and remdesivir for 10 day course. Received tocilizumab on ,  for suspected cytokine release syndrome. On cefazolin for MSSA pneumonia.  - Ventilator management per ICU/Pulmonology   - Weaning trials per ICU/RT     Weakness: due to acute illness from COVID-19 infection. Remains intubated, sedated, paralyzed.     Decision making capacity of patient: Not decisional    Medical Surrogate:  Nathalie (wife) Phone number: [phone]  Alternate Surrogate:  Carlton (son) Phone number: [phone]    Goals of Care: Restorative (See Full discussion below for further details).  - Prior documentation available: N  -Wife states as no  "prior serious illness, no HCD.     Code status:FULL    Palliative care will continue to follow.  =========================================================  Current Medical Status:  No acute events overnight. Plan to supinate this morning and recheck ABGs. Continues to be diuresed.    Symptom-Focused ROS:  Unable to obtain as patient intubated/sedated/paralyzed.    Goals of care: Spoke with wife Nathalie on telephone with . Updated her on her 's medical condition.  making slow progress with ventilator, but team feels he will likely need trach/PEG. Discussed what those procedures would entail, and that recovery may be slow and require a long recovery in an LTAC. She wants for the team to save her  and to do whatever is necessary. Whether he himself would be ok with this or not she does not know. Wife became very tearful during this conversation, mostly because of the stress she has been under emotionally and financially.  Lots of bills are coming, she does not have money or food to eat. Her son is helping her but he was only able to give her $100. She has not been able to see her  via videoconferencing, as she said the nurse never called her back to let her know about the iPad. Checked and iPad is currently on in patient's room. She has instructions and will have her daughter help her log onto the video conference. Reassured her that overall the team feels her  is making progress and that we would continue to follow along and support her.     Palliative Care Focused Medications:  See MAR    PERTINENT PHYSICAL EXAMINATION:  Vital Signs: Blood pressure 114/61, pulse 77, temperature 99.5  F (37.5  C), temperature source Axillary, resp. rate 26, height 5' 5\" (1.651 m), weight (!) 276 lb 10.8 oz (125.5 kg), SpO2 97 %.   GENERAL: intubated, sedated, supinated  Due to COVID-19 restrictions and PPE conservation, did not enter room or examine patient. Viewed through room " window.    I have reviewed labs and imaging in EPIC     ----------------------------------------------------  TT: I have personally spent a total of 35 minutes  today on the unit in review of medical record, consultation with the medical providers and assessment of patient today, with more than 50% of this time spent in counseling, coordination of care, and discussion re:diagnostic results, prognosis, symptom management, risks and benefits of management options, and development of plan of care.    Renita Lindsay PA-C  [facility]  Palliative Medicine   Office: [phone]

## 2021-06-09 NOTE — PROGRESS NOTES
07/22/20 0844   Vent Settings   FiO2 (%) 35 %   PEEP/CPAP (cm H2O) 8 cm H2O   Pressure Support (cm H2O) 8 cm H20   patient dropped SpO2 so increase PS/ PEEP and FIO2

## 2021-06-09 NOTE — PROGRESS NOTES
07/19/20 0834   Patient Data   Total Resp Rate  35 BPM   PIP Observed (cm H2O) 16 cm H2O   MAP (cm H2O) 13   Auto/Intrinsic PEEP Observed (cm H2O) 1.9 cm H2O   Plateau Pressure (cm H2O)   (unable to obtain (breathing over vent))

## 2021-06-09 NOTE — PROGRESS NOTES
Clinical Nutrition Follow Up Note    Current Nutrition Prescription:   Diet: TF: Isosource 1.5 @ 30 ml/hr   Supplement/modulars: no carb prosource 2 pkt TID  Flush order: water 60 ml q 4 hrs    IV dextrose or Fluids:fentaNYL citrate (PF), Last Rate: 300 mcg/hr (07/11/20 0757)  midazolam, Last Rate: 10 mg/hr (07/11/20 0757)  norepinephrine, Last Rate: Stopped (07/07/20 2020)  sodium chloride 0.9%, Last Rate: 10 mL/hr (07/11/20 0757)  vecuronium, Last Rate: 0.2 mcg/kg/min (07/11/20 0757)      Current Nutrition Intake:  Enteral nutrition access is a nasal duodenal tube, with a placement date of 7/10/20 & verified in place per x-ray. (prior had NG)  TF as prescribed provides: 720 ml, 1440 kcal, 139 g protein, 127 g cho, 11 g fiber, 547 ml free water + 60 ml H20 flush q 4 hours to prevent FT clogging = 907 ml total H20.  IV drips 985.5 ml yesterday  Meeting estimated needs including all sources    Anthropometrics:  Height: 5'5  Admit wt: 274 lb 4 oz,6/22/20  Weight: (!) 276 lb 14.4 oz (125.6 kg),7/11/20-+1 edema in all extremities noted  BMI:46.08  BMI indication: >40 extreme obesity (class 3)  Ideal body weight: 136 lb(+or-10%)  Usual body weight: unable to obtain currently  Weight History:268 lb(6/22/20)  Recent wt's:263 lb(6/25), 264 lb(6/28),254 lb(6/29),275 lb(7/9)    GI Status/Output:   GI symptoms include:   Rectal tube:390-400 ml/24 hrs noted past couple days    Skin/Wound:  Bayron score Bayron Scale Score: 11  WOC note reviewed 7/10    Medications:  Medications reviewed.  Note:ancef-IV,SSI,prilosec,oxycodne,pantoprazole  MVI w/minerals    Labs:  Labs reviewed  Lab Results   Component Value Date/Time    ALBUMIN 2.1 (L) 07/06/2020 03:52 AM     07/11/2020 05:27 AM    K 3.8 07/11/2020 05:27 AM    BUN 25 (H) 07/11/2020 05:27 AM    CREATININE 0.58 (L) 07/11/2020 05:27 AM     07/11/2020 05:27 AM    PHOS 3.1 07/11/2020 05:27 AM    MG 2.2 07/11/2020 05:27 AM    CRP <0.1 07/11/2020 05:27 AM   FSBG 110-124  mg/dL in the past 24 hrs. Within goal range.    Estimated Nutrition Needs:  Assessment weight is 121.8 kg, estimated weight    Energy Needs: 2555-9717 kcals daily, 11-14 kcal/kg  Protein Needs: 124-155 g daily, 2-2.5 g/kg.(dose: 62 kg IBW)  Fluid Needs: 6521-2052 mls daily, 25-35 mls/kg(dose wt: 77 kg adj.wt)    Malnutrition: protein calorie malnutrition noted per doctor     Nutrition Risk Level: high risk     Nutrition dx:   Swallowing difficulty r/t respiratory failure as evidenced by intubated,NPO.      Goals:   Meet estimated nutrition needs and Tolerate tube feeding-progressing  BG  - progressing  D/c RT; <3 loose stools/day; more formed BM's-slowly progressing (on antibiotic)      Plan:   Continue current TF.   Add Culturelle 1 pkt BID to promote good bacteria to gut x 14 days due to long term use of antibiotics & with diarrhea.     Monitoring:  Labs, wt, TF tolerance/need for peptide based TF, & skin integrity.

## 2021-06-09 NOTE — PLAN OF CARE
Patient remains stable on current ventilator support tolerating weans to FIO2 during the day. Patient is sedated and tolerating supine positioning. Plan to obtain morning arterial blood gas and wean ventilator, specifically PEEP, as tolerated.

## 2021-06-09 NOTE — PROGRESS NOTES
06/29/20 0157   Patient Data   Vt Exp (mL) 418 mL   Minute Ventilation (L/min) 9.3 L/min   Total Resp Rate  22 BPM   PIP Observed (cm H2O) 14 cm H2O   MAP (cm H2O) 12   Auto/Intrinsic PEEP Observed (cm H2O) 0 cm H2O   Plateau Pressure (cm H2O)   (Unable to obtain)   Dynamic Compliance (L/cm H2O) 37.3 L/cm H2O   SpO2 93 %

## 2021-06-09 NOTE — PROGRESS NOTES
Patient is sedated on 250mcg/hr of fentanyl and 7mg/hr of versed to maintain RASS -3 - -4.  Vent AC 55%, Rate 18, vT 430, Peep 8.   NSR,  BP WNL  Hillman in place. Urine output WNL.  Report given to oncoming nurse. Will continue to monitor.

## 2021-06-09 NOTE — PROGRESS NOTES
[facility] COVID RT PROGRESS NOTE    DATA:    VENT DAY#  28    CURRENT SETTINGS:  VENT SETTINGS   AC  16/370/+12        FIO2:   45    PATIENT PARAMETERS:    PIP 16  Pplat:  19  Pmean:  12  SBT: no  SECRETIONS:  Small tan thick  02 SATS:  94    ETT SIZE 8.0  Secured at  24 cm at teeth    Respiratory Medications: mucomyst/albuterol BID     ABG: Results for RJ GUADALUPE (MRN [id number]) as of 7/19/2020 05:51   Ref. Range 7/19/2020 05:05   pH, Arterial Latest Ref Range: 7.37 - 7.44  7.50 (H)   pCO2, Arterial Latest Ref Range: 35 - 45 mm Hg 37   pO2, Arterial Latest Ref Range: 80 - 90 mm Hg 71 (L)   Bicarbonate, Arterial Calc Latest Ref Range: 23.0 - 29.0 mmol/L 29.1 (H)   O2 Sat, Arterial Latest Ref Range: 96.0 - 97.0 % 95.9 (L)   Oxyhemoglobin Latest Ref Range: 96.0 - 97.0 % 92.9 (L)   POC Base Excess Calc Latest Units: mmol/L 5.5   Sample Stabilized Temperature Latest Units: degrees C 37.0       NOTE / PLAN:   Patient had a stable night with no changes in vent settings. Will continue to monitor and make changes as needed.  DEZ Degroot

## 2021-06-09 NOTE — PROGRESS NOTES
06/27/20 0143   Patient Data   Total Resp Rate  21 BPM   PIP Observed (cm H2O) 27 cm H2O   MAP (cm H2O) 20   Auto/Intrinsic PEEP Observed (cm H2O) 1.6 cm H2O   Plateau Pressure (cm H2O) 26 cm H2O

## 2021-06-09 NOTE — PLAN OF CARE
Problem: Pain  Goal: Patient's pain/discomfort is manageable  Outcome: Not Progressing     Problem: Safety  Goal: Patient will be injury free during hospitalization  Outcome: Not Progressing     Problem: Daily Care  Goal: Daily care needs are met  Outcome: Not Progressing     Problem: Psychosocial Needs  Goal: Demonstrates ability to cope with hospitalization/illness  Outcome: Not Progressing  Goal: Collaborate with patient/family/caregiver to identify patient specific goals for this hospitalization  Outcome: Not Progressing     Problem: Discharge Barriers  Goal: Patient's discharge needs are met  Outcome: Not Progressing     Problem: Knowledge Deficit  Goal: Patient/family/caregiver demonstrates understanding of disease process, treatment plan, medications, and discharge instructions  Outcome: Not Progressing     Problem: Potential for Compromised Skin Integrity  Goal: Skin integrity is maintained or improved  Outcome: Not Progressing  Goal: Nutritional status is improving  Outcome: Not Progressing     Problem: Urinary Incontinence  Goal: Perineal skin integrity is maintained or improved  Outcome: Not Progressing     Problem: Potential for Falls  Goal: Patient will remain free of falls  Outcome: Not Progressing     Problem: Risk of Injury Due to Unsafe Behavior  Goal: Patient will remain safe while in restraint; physical/psychological needs will be met  Outcome: Not Progressing  Goal: Alternatives to restraint will be continually assessed with use of least restrictive device and discontinuation as soon as possible  Outcome: Not Progressing  Goal: Patient/Family will be able to communicate reason for restraint and steps for restraint application and removal  Outcome: Not Progressing     Problem: Inadequate Airway Clearance  Goal: Patient will maintain patent airway  Outcome: Not Progressing     Problem: Mechanical Ventilation  Goal: Patient will maintain patent airway  Outcome: Not Progressing  Goal: ET tube will  be managed safely  Outcome: Not Progressing     Problem: Glucose Imbalance  Goal: Achieve optimal glucose control  Outcome: Not Progressing     Problem: Potential for transmission of organism by Contact, Enteric, Droplet and/or Airborne routes  Goal: Prevent transmission of organisms  Outcome: Not Progressing     Problem: Inadequate Gas Exchange  Goal: Patient is adequately oxygenated and ventilation is improved  Outcome: Not Progressing

## 2021-06-09 NOTE — PROGRESS NOTES
06/26/20 1940   Patient Data   Total Resp Rate  19 BPM   PIP Observed (cm H2O) 28 cm H2O   MAP (cm H2O) 21   Auto/Intrinsic PEEP Observed (cm H2O) 0.3 cm H2O   Plateau Pressure (cm H2O) 25 cm H2O

## 2021-06-09 NOTE — PROGRESS NOTES
07/14/20 0013   Patient Data   Plateau Pressure (cm H2O) 10 cm H2O   Static Compliance (L/cm H2O) 34   Dynamic Compliance (L/cm H2O) 28 L/cm H2O   Airway Resistance 9

## 2021-06-09 NOTE — PROGRESS NOTES
[facility] COVID RT PROGRESS NOTE    DATA:    VENT DAY# 11    CURRENT SETTINGS: AC  VENT SETTINGS 18/430/+8/        FIO2:  55%    PATIENT PARAMETERS:    PIP 36  Pplat: 18  Pmean: 11  SBT: NA  SECRETIONS:  Moderate Tan thick secretions  02 SATS: 96%                                       ETT SIZE 8.0  Secured at  24 cm at teeth    Respiratory Medications: None    AB.40/48/69/27.9/95.4/4.0 P/F [id number]=762    NOTE / PLAN:   Pt sedated and on full vent support. Pt stable on current vent setting with thick tan secretion. Pt's PEEP was wean from 10 to 8 yesterday and a jessie drop on Pao2 but increased pt's Fio2 TO 55% post AM ABG. Can consider increasing pt's PEEP or maintain current setting as we continue to monitor pt

## 2021-06-09 NOTE — PROGRESS NOTES
[place] COVID19  CRITICAL CARE  PROGRESS NOTE:    ICU PROGRESS NOTE:  DOS:  07/05/2020    Patient Summary:   Tobias Palmer is a 54 year old male with a history of obesity (BMI 45) who was admitted and intubated on 6/21 at [facility] with COVID-19 pneumonia, transferred to [facility] ICU on 6/22. Course complicated by ARDS and shock, requiring proning and paralytics.     Last 24 hours  - Worsening hypoxia last 24 hours, PEEP increased, paraltyci started and proned. Stopped Precedex and started Versed for Vent synchrony.     Major Changes for Today    - Supinate briefly for repeat CXR and to secure ETT.   - Re-prone this afternoon  - ABG after supination  - Calcium gluconate 1 g once now.   - Add on ical.  - Diurese, gentle given hypotension. 10 mg IV lasix once now, likely repeat this afternoon.  - Wean Levo as able.  - Consider decrease PEEP to 12 if remains stable today.     Assessment/Plan:  # Acute Pain  # Agitation/Anxiety  # Sedation  # Hx of polysubstance abuse  - Continue fentanyl.   - Requiring very high doses of analgesia/sedation.  - Rass 0 to -1  - continue scheduled oxycodone + PRN  - continue Seroquel     CVS:  #Atrial fibrillation with RVR 6/28 in the setting of critical illness.    # Shock, septic- intermittently requiring vasopressors  - Was initially on Amio gtt, transitioned to PO. Started on PO metop, held currently in the setting of shock  - Continue Amio PO for now, especially given holding BB in the setting of shock.   - Low chads. On Lovenox two times a day currently for coagulopathy 2/2 Covid.  - Could consider echo if afib recurrs     RESP:  # Acute hypoxic respiratory failure in the setting of COVID-19 infection.  - Wean vent as able.   - Daily abg  - Repeat CXR today when supine.  - Re-prone this afternoon.     GI/FEN:  # Protein calorie malnutrition  # Hypoalbuminemia  - Tube feeds @ goal. Not post pyloric but near pylorus.  - PPI  - Bowel regimen     RENAL  ":  # Rhabdomyolysis, resolved.   #Lactic acidosis, resolved  # Hypernatremia- resolved.  - CK peaked to 1864 at [facility];  6/25 on . No further trending necessary  - Creatinine normalized   - Diuresis today, gentle. 10 mg IV lasix x 1 now. Likely repeat this afternoon.    ID:  # COVID-19 infection  #Persistent fevers   # Leukocytosis  - Tmax improved today  - Remdesivir- 10 day course  - s/p CCP and tocilizumab 6/25 and 6/27  - S/p Ivermectin x2 doses. Stronglyloides antibody negative.   - Continue Jessica for 10 day course    HEMATOLOGIC:  # Anemia of critical illness  # Coagulopathy due to COVID-19 infection  - Hgb stable   - No e/o acute bleeding  - Lovenox 60 mg BID   - US BUE and BLE given persistent fevers, negative for DVT.      ENDOCRINE:  # Stress hyperglycemia  # Steroid induced hyperglycemia  - Keep BG< 180 for optimal healing  - Insulin gtt DC'  - Lantus  - SSI   - Dexamethasone done-completed 7 days     DISPO: ICU     GENERAL CARES:  DVT proph: Yes.  GI proph: Yes.  Requires barlow for strict I&O: Yes  Restraints required for safety: Yes.    Objective:  Physical Exam:  Vent settings for last 24 hours:  Vent Mode: AC  FiO2 (%):  [50 %-100 %] 50 %  S RR:  [18] 18  S VT:  [430 mL] 430 mL  PEEP/CPAP (cm H2O):  [12 cm H2O-14 cm H2O] 14 cm H2O  Minute Ventilation (L/min):  [7.6 L/min-9.9 L/min] 7.6 L/min  PIP:  [24 cm H2O-29 cm H2O] 28 cm H2O  MAP (cm H2O):  [13-18] 18    /74   Pulse 65   Temp 98.3  F (36.8  C) (Bladder)   Resp 18   Ht 5' 5\" (1.651 m)   Wt (!) 255 lb (115.7 kg)   SpO2 97%   BMI 42.43 kg/m      Intake/Output last 3 shifts:  I/O last 3 completed shifts:  In: 3661.1 [I.V.:2041.1; NG/GT:960; IV Piggyback:660]  Out: 2320 [Urine:1220; Stool:1100]  Intake/Output this shift:  I/O this shift:  In: 863.6 [I.V.:813.6; IV Piggyback:50]  Out: 1215 [Urine:315; Stool:900]    Physical Exam  Neuro: sedated, prone  HEENT: unable to assess due to prone  RESP: diminished bases  CV: RRR  GI: obese, soft "  From side. Unable to auscultate bs given prone  Integ: no rash    LAB:  Results from last 7 days   Lab Units 07/05/20  0440   LN-WHITE BLOOD CELL COUNT thou/uL 12.8*   LN-HEMOGLOBIN g/dL 12.3*   LN-HEMATOCRIT % 40.4   LN-PLATELET COUNT thou/uL 179     Results from last 7 days   Lab Units 07/05/20  0440 07/04/20  0515 07/03/20  0315   LN-SODIUM mmol/L 142 142 146*   LN-POTASSIUM mmol/L 3.8 4.2 3.9   LN-CHLORIDE mmol/L 109* 109* 111*   LN-CO2 mmol/L 27 29 30   LN-BLOOD UREA NITROGEN mg/dL 18 21 30*   LN-CREATININE mg/dL 0.60* 0.63* 0.66*   LN-CALCIUM mg/dL 7.4* 7.3* 7.2*   LN-PROTEIN TOTAL g/dL  --   --  5.2*   LN-BILIRUBIN TOTAL mg/dL  --   --  0.4   LN-ALKALINE PHOSPHATASE U/L  --   --  45   LN-ALT (SGPT) U/L  --   --  41   LN-AST (SGOT) U/L  --   --  33     Staffed and discussed with Dr. Montes    Updated patients daughter.   Shikha Brady, VIRIDIANA     Total time 40  mins

## 2021-06-09 NOTE — PROGRESS NOTES
Palliative Spiritual Care Note    Spiritual Assessment: Family is Oriental orthodox. They do not have a parish or  with whom they connect. Spouse is prayerful and somewhat anxious concerning pt's recovery. Spouse appreciates daily reports from doctor or nurse. Pt's stepdaughter, Brianda, also on part of the call.    Care Provided: Support and encouragement through conversation, affirmation of nani and prayer. Spouse expressed gratitude for visit and would like future reminders of God's presence with their family.    Plan of Care: Will call spouse weekly to provide spiritual support.    GAURI Raphael.

## 2021-06-09 NOTE — PROGRESS NOTES
CRITICAL CARE PROGRESS NOTE:    Assessment/Plan:  Tobias Palmer is a 54 year old male with a history of obesity (BMI 45) who was admitted and intubated on 6/21 at [facility] with COVID-19 pneumonia, transferred to [place] ICU on 6/22. Course complicated by ARDS and shock, requiring proning and paralytics.    RESP:  ARDS 2/2 severe covid-19 infection. Intubated on 6/21    Cont LPV Vt is 350cc, which is slightly below 6cc/kg, gas exchange acceptable on this. Cont RR 22 as pH increased with higher rates.     Titrate FiO2 for goal O2 sat 88-92%, PaO2 55 or greater    Leave PEEP at 14 today, wean FiO2 if able, last p:f was 154 this AM, steady the last few days.     Pplat <30, last was 27, DP 13 at goal    Daily ABG    CXR 7/11unchanged    Off paralytic as of 7/11    Not requiring inh epo at this time    Will need to discuss trach with wife at some point, if unable to make progress by next week.     Will leave supine for now since he is far out from initial ARDS onset; unclear benefit to additional proning 2 weeks out and clinically seems to be improved the last few days.     CV:  No known cardiac hx. No echo on file. He had afib/RVR during admission now on po amio. Circulatory shock - resolved.     MAP >65, currently not on pressors.    Tele monitoring    Lactate normal on 7/6, no need to trend further    Lasix 20mg IV two times a day to keep on dry side, watch bicarb rising    Cont po amio for now    NEURO:  Encephalopathy, requiring sedation/paralytic since admission     Off paralytic as off 7/11.     Dex added 7/11 but stopped overnight due to hypotension? - > hold for now.     Cont fent, midaz for RASS -2 to -3    Cont two times a day seroquel.    GI:  No issues, yang TF. TG down to 195 today from 750 on 7/7    Cont TF and advance as yang    Cont bowel regimen    Cont PPI for stress ulcer ppx    RENAL:  No issues, normal renal function and tolerating daily loop diuretic. Had mild rhabdo earlier this  month, ?from prop     Cont Lasix as above    Maintain barlow    Avoid nephrotoxins.     ID:  Severe covid-19 infection with cytokine release. Inflammatory markers mildly elevated and/or improved. Last ID note 7/10 reviewed. Quant gold, HBV, HCV testing negative. Col with yeast in sputum at risk for invasive candidiasis. Beta-D glucan negative 6/29, less likely. CRP, PCT negative.     S/p toci on 6/25 and 6/27    S/p CCP early in admission , ?6/25    S/p dexamethasone 6/22 - 6/29    S/p ivermectin x2 doses, strongy Ab was neg.     S/p meropenem x10 days    S/p remdisivir x10 days    Cont cefazolin for MSSA in sputum, 10 day course.    ID following, appreciate input    HEMATOLOGIC:  Thromcbocytopenia, continues to worsen. No clots. Doubt HIT as had been stable for many days prior to recently.     Trend plt count    Check peripheral blood smear    OK to cont LMWH, but will need to stop if plt count gets below 70K.     hgb >7 transfuse prn    ENDOCRINE:  No issues    FBSG checks, insulin SS/drip per ICU protocol    ICU PROPHYLAXIS:    Full code    DISPO/CODE STATUS: will update his wife later today. She has been getting daily updates.  May need to address trach soon.     FAMILY COMMUNICATION: as above    Lines/Drains/Tubes:  Barlow  ETT 8.0 day 21  A-line right 6/22  PICC 6/26  NG tube  Rectal tube    Restraints  Progress Note  Restraint Application    I recognize that restraints are physical and/or chemical interventions intended to restrict a person's movements. Restraints are currently needed to ensure the safety of this patient and/or others. My clinical rationale appears below.    Category/Type of Restraint     Non Violent:  Soft limb restraint x2  --  Behavior  Pulling at tubes/lines  --  Root Cause of the Behavior  Sedation/intubation  --  Less-Restrictive Measures that Failed  Non Violent Measures:  Close Observation  --  Response to the Restraint  Patient unable to pull at tubes/lines  --  Criteria for Release  "from the Restraint  Patient calm and off sedation    Niko Saldana MD (Avi)  [facility] Pulmonary & Critical Care  Pager [phone]  Clinic [phone]      Overnight events:  No major events overnight.  Remains off paralytic.  P:f holding steady at 154, remains supine, PEEP/FiO2 stable.   Eyes open this AM but not following commands.   Dex was stopped overnight due to some hypotension.    Good UOP with Lasix, bicarb slightly up.     Subjective:  Unable to assess    Objective:  Physical Exam:  Vent settings for last 24 hours:  Vent Mode: AC  FiO2 (%):  [50 %] 50 %  S RR:  [22] 22  S VT:  [350 mL] 350 mL  PEEP/CPAP (cm H2O):  [14 cm H2O] 14 cm H2O  Minute Ventilation (L/min):  [7.8 L/min-9.3 L/min] 8.4 L/min  PIP:  [24 cm H2O-26 cm H2O] 26 cm H2O  MAP (cm H2O):  [17-18] 18    /55   Pulse 75   Temp 98.9  F (37.2  C) (Axillary)   Resp 22   Ht 5' 5\" (1.651 m)   Wt (!) 270 lb 15.1 oz (122.9 kg)   SpO2 94%   BMI 45.09 kg/m      Intake/Output last 3 shifts:  I/O last 3 completed shifts:  In: 2914 [I.V.:724; NG/GT:1890; IV Piggyback:300]  Out: 4120 [Urine:3280; Stool:840]  Intake/Output this shift:  I/O this shift:  In: 760 [I.V.:115; NG/GT:545; IV Piggyback:100]  Out: 565 [Urine:455; Stool:110]    Physical Exam  Gen: intubated, sedated, not parlayzed; eyes open staring up but not responding or following commands.   HEENT: no OP lesions, no DIANN  CV: RRR, no m/g/r  Resp: coarse ant no wheezing.   Abd: soft, nontender, BS+  Neuro: PERRL, nonfocal  Ext: no edema    LAB:  Results from last 7 days   Lab Units 07/12/20  0402   LN-WHITE BLOOD CELL COUNT thou/uL 8.8   LN-HEMOGLOBIN g/dL 10.3*   LN-HEMATOCRIT % 33.3*   LN-PLATELET COUNT thou/uL 92*     Results from last 7 days   Lab Units 07/12/20  0402 07/11/20  0527 07/10/20  0333  07/06/20  0352   LN-SODIUM mmol/L 141 142 141   < > 144   LN-POTASSIUM mmol/L 4.0 3.8 4.8   < > 3.7   LN-CHLORIDE mmol/L 104 102 102   < > 111*   LN-CO2 mmol/L 34* " 36* 35*   < > 28   LN-BLOOD UREA NITROGEN mg/dL 24* 25* 21   < > 16   LN-CREATININE mg/dL 0.57* 0.58* 0.54*   < > 0.50*   LN-CALCIUM mg/dL 7.9* 8.0* 7.9*   < > 7.4*   LN-PROTEIN TOTAL g/dL  --   --   --   --  4.5*   LN-BILIRUBIN TOTAL mg/dL  --   --   --   --  0.3   LN-ALKALINE PHOSPHATASE U/L  --   --   --   --  44*   LN-ALT (SGPT) U/L  --   --   --   --  27   LN-AST (SGOT) U/L  --   --   --   --  30    < > = values in this interval not displayed.       Micro  PCT neg since 6/22  CRP neg, down from 12.6 on 6/22  Ferritin 642 on 6/26, down from 795 on 6/22   on 6/26, down from 722 on 6/22  D-dimer 0.82 on 6/26, up from 0.46 on 6/22  Fibrinogen 505 on 6/26  IL-6 42 on 6/22, not rechecked.    Sputum 7/4 MSSA  Sputum 6/24 yeast  Blood neg  Fungal cx pending  Urine NG      Current Facility-Administered Medications   Medication Dose Route Frequency Provider Last Rate Last Dose     acetaminophen 160 mg/5 mL solution 1,000 mg (TYLENOL)  1,000 mg Oral Q6H PRN Татьяна Carey MD   1,000 mg at 07/10/20 0445     amino acids-protein hydrolys 15-60 gram-kcal/30 mL packet 2 packet (ProSource No Carb)  2 packet Enteral Tube TID Татьяна Carey MD   2 packet at 07/12/20 0919     amiodarone tablet 200 mg (PACERONE)  200 mg Enteral Tube DAILY Shikha Brady CNP   200 mg at 07/12/20 0919     benzocaine-menthoL lozenge 1 lozenge (CEPACOL)  1 lozenge Oral Q1H PRN Elsa Tan CNP         bisacodyL suppository 10 mg (DULCOLAX)  10 mg Rectal Daily PRN Elsa Tan CNP   10 mg at 06/24/20 1608     ceFAZolin 2 g in 100 mL in D5W (ANCEF)  2 g Intravenous Q8H Raphael Mantilla MD   2 g at 07/12/20 0939     chlorhexidine 0.12 % solution 15 mL (PERIDEX)  15 mL Topical Q12H Elsa Tan CNP   15 mL at 07/12/20 0835     dexmedetomidine (PRECEDEX) 400 mcg/100 mL in NS (4 mcg/mL) infusion  0.1-1.5 mcg/kg/hr Intravenous Continuous Niko Saldana MD   Stopped at 07/12/20 0415     dextrose 50 % (D50W) syringe 20-50 mL   20-50 mL Intravenous Q15 Min PRN Elsa Tan CNP         enoxaparin ANTICOAGULANT syringe 60 mg (LOVENOX)  60 mg Subcutaneous BID Elsa Tan CNP   60 mg at 07/12/20 0919     fentaNYL 2500 mcg/50 mL CADD infusion (50 mcg/mL)   mcg/hr Intravenous Continuous Elsa Tan CNP 6 mL/hr at 07/12/20 1100 300 mcg/hr at 07/12/20 1100     fentaNYL pf injection  mcg (SUBLIMAZE)   mcg Intravenous Q2H PRN Hua Robert MD   50 mcg at 07/12/20 0615     furosemide injection 20 mg (LASIX)  20 mg Intravenous BID - diuretic Niko Saldana MD   20 mg at 07/12/20 0939     glucagon (human recombinant) injection 1 mg  1 mg Subcutaneous Q15 Min PRN Elsa Tan CNP         insulin aspart U-100 injection (NovoLOG)   Subcutaneous Q6H FIXED TIMES Esmer Willis CNP         labetaloL injection 10-20 mg (NORMODYNE,TRANDATE)  10-20 mg Intravenous Q2H PRN Esmer Willis CNP   20 mg at 07/12/20 1009     Lactobacillus rhamnosus GG 15 Billion cell 1 capsule (CULTURELLE)  1 capsule Enteral Tube BID Niko Saldana MD   1 capsule at 07/12/20 0919     lipase-protease-amylase 5,000-17,000- 24,000 unit capsule 2 capsule (ZENPEP)  2 capsule Enteral Tube PRN Leventhal, Thomas Michael, MD        And     sodium bicarbonate tablet 325 mg  325 mg Enteral Tube PRN Leventhal, Thomas Michael, MD         LORazepam 0.5-1 mg injection (diluted concentration)  0.5-1 mg Intravenous Q8H PRN Shikha Brady CNP         magnesium hydroxide suspension 30 mL (MILK OF MAG)  30 mL Oral Daily PRN Elsa Tan CNP   30 mL at 06/24/20 1608     metoprolol tartrate injection 5 mg (LOPRESSOR)  5 mg Intravenous Q4H PRN Perlman, David M, MD   5 mg at 06/29/20 0425     midazolam (PF) injection 2 mg (VERSED)  2 mg Intravenous Q1H PRN Elsa Tan CNP   2 mg at 07/10/20 6279     midazolam 100 mg/100 mL (1 mg/mL) infusion (VERSED)  0.25-15 mg/hr Intravenous Continuous Esmer Willis, CNP 9 mL/hr at 07/12/20 1100  9 mg/hr at 07/12/20 1100     multivit-min-ferrous gluconate 9 mg iron/15 mL Liqd 9 mg of iron  15 mL Enteral Tube DAILY Leventhal, Thomas Michael, MD   9 mg of iron at 07/12/20 0919     naloxone injection 0.2-0.4 mg (NARCAN)  0.2-0.4 mg Intravenous PRN Elsa Tan CNP        Or     naloxone injection 0.2-0.4 mg (NARCAN)  0.2-0.4 mg Intramuscular PRN Elsa Tan CNP         norepinephrine 4 mg/250 mL in NS (16 mcg/mL)  0.01-0.4 mcg/kg/min Intravenous Continuous Татьяна Carey MD   Stopped at 07/07/20 2020     omeprazole capsule 20 mg (PriLOSEC)  20 mg Oral QAM AC Elsa Tan CNP   20 mg at 07/05/20 0839    Or     omeprazole suspension 20 mg (PriLOSEC)  20 mg Enteral Tube QAM AC Elsa Tan CNP   20 mg at 07/12/20 0558    Or     pantoprazole 40 mg injection  40 mg Intravenous QAM AC Elsa Tan CNP         ondansetron injection 4 mg (ZOFRAN)  4 mg Intravenous Q4H PRN Elsa Tan CNP        Or     ondansetron tablet 8 mg (ZOFRAN)  8 mg Oral Q8H PRN Elsa Tan CNP         oxyCODONE immediate release tablet 10 mg (ROXICODONE)  10 mg Enteral Tube Q4H Wieben, Esmer A, CNP   10 mg at 07/12/20 0835     oxyCODONE immediate release tablet 5-10 mg (ROXICODONE)  5-10 mg Oral Q4H PRN de La MaterAngela CNP   10 mg at 07/05/20 1757     polyvinyl alcohol 1.4 % ophthalmic solution 1-2 drop (LIQUIFILM TEARS)  1-2 drop Both Eyes Q1H PRN Elsa Tan CNP         QUEtiapine tablet 50 mg (SEROquel)  50 mg Oral Q8H Wieben, Esmer A, CNP   50 mg at 07/12/20 0920     sodium chloride 0.9%  10 mL/hr Intravenous Continuous de La Mater, Angela, CNP 10 mL/hr at 07/12/20 1100 10 mL/hr at 07/12/20 1100     sodium chloride bacteriostatic 0.9 % injection 1-5 mL  1-5 mL Intradermal Once PRN Juan F Max PA-C         sodium chloride flush 10-20 mL (NS)  10-20 mL Intravenous PRN Burak Moss MD   10 mL at 06/30/20 1614     sodium chloride flush 10-30 mL (NS)  10-30 mL Intravenous PRN Ajay,  Burak GUERRA MD         sodium chloride flush 10-30 mL (NS)  10-30 mL Intravenous Q8H FIXED TIMES Burak Moss MD   30 mL at 07/11/20 2245     sodium chloride flush 20 mL (NS)  20 mL Intravenous PRN Burak Moss MD         white petrolatum-mineral oiL 83-15 % ophthalmic ointment 1 application (LUBRIFRESH PM)  1 application Both Eyes Q8H Shikha Brady, CNP   1 application at 07/12/20 0835       Critical care attestation: 45 minutes spent managing the following issues: acute respiratory failure requiring intubation/IMV, severe covid-19 infection with ARDS. Encephalopathy, high doses of sedation and paralytics. High risk for organ deterioration and death requiring ICU level care.

## 2021-06-09 NOTE — PROGRESS NOTES
07/22/20 1645   Patient Data   Vt (observed, mL) 512 mL   Vt Exp (mL) 482 mL   Minute Ventilation (L/min) 12.2 L/min   Total Resp Rate  25 BPM   PIP Observed (cm H2O) 23 cm H2O   MAP (cm H2O) 11   Auto/Intrinsic PEEP Observed (cm H2O) 1.1 cm H2O   Plateau Pressure (cm H2O) 21 cm H2O

## 2021-06-09 NOTE — PROGRESS NOTES
07/25/20 1506   Vent Settings   FiO2 (%) 40 %   PEEP/CPAP (cm H2O) 5 cm H2O   I:E Ratio 1:1.9   Pressure Support (cm H2O) 5 cm H20   Patient Data   Vt Exp (mL) 546 mL   Minute Ventilation (L/min) 13 L/min   Total Resp Rate  27 BPM   PIP Observed (cm H2O) 14 cm H2O   MAP (cm H2O) 7.4   Weaning Assessment    Total RSBI 49

## 2021-06-09 NOTE — PROGRESS NOTES
07/06/20 0740   Patient Data   PIP Observed (cm H2O) 30 cm H2O   MAP (cm H2O) 17   Plateau Pressure (cm H2O) 26 cm H2O

## 2021-06-09 NOTE — PROGRESS NOTES
07/16/20 1958   Vent Settings   Resp Rate (Set) 16   FiO2 (%) 50 %   Insp Time (sec) 0.85 sec   Vt (Set, mL) 350 mL   PEEP/CPAP (cm H2O) 14 cm H2O   Trigger Sensitivity Flow (L/min) 2 L/min   I:E Ratio 1.0:3.4   Humidification Heater   Heater Temperature 100  F (37.8  C)   Patient Data   Vt (observed, mL) 373 mL   Vt Exp (mL) 419 mL   Minute Ventilation (L/min) 7.9 L/min   Total Resp Rate  17 BPM   PIP Observed (cm H2O) 15 cm H2O   MAP (cm H2O) 15   Plateau Pressure (cm H2O) 11 cm H2O

## 2021-06-09 NOTE — PROGRESS NOTES
RESPIRATORY CARE NOTE       Helped with placing  patient on prone position  at   about 5:40 pm. Preoxygenated prior and Cuff remain inflated post. Saturating well on 55%.                Meshlibertad DILL Kalie

## 2021-06-09 NOTE — PROGRESS NOTES
Critical Care Progress Note  7/7/2020   2:46 PM    Admit Date: 6/22/2020  ICU Day: 15  Code Status: full code      Problem List:   Principal Problem:    COVID-19  Active Problems:    Shock (H)    Acute and chronic respiratory failure with hypoxia (H)    On mechanically assisted ventilation (H)    ARDS (adult respiratory distress syndrome) (H)    Atrial fibrillation with rapid ventricular response (H)    Atrial fibrillation with rapid ventricular response (H)      Major changes for today:    * Supinated this morning; follow up ABG with P:F 125. Will plan on proning again this evening.    * Stop Lantus    * Increase oxycodone to 10mg Q4-h.    * Increase Seroquel to 50mg     Plan by System:     Neuro/Psych: Encephalopathy secondary to prolonged critical illness; Sedation for vent synchrony and comfort.    * Sedating with IV Fentanyl, Versed, propofol.    * Paralyzing with Vecuronium.    * Increase enteral sedation in effort to reduce IV Sedation.     Pulmonary: Acute hypoxic respiratory failure in setting of COVID19 infection.    * Supine ABG with P:F of 125. Will prone again this evening.    *Stable Fio2 needs today.     * Vent Mode: AC  FiO2 (%):  [50 %-100 %] 55 %  S RR:  [18-22] 22  S VT:  [430 mL] 430 mL  PEEP/CPAP (cm H2O):  [14 cm H2O] 14 cm H2O  Minute Ventilation (L/min):  [7.6 L/min-9.4 L/min] 9.4 L/min  PIP:  [34 cm H2O-50 cm H2O] 34 cm H2O  MAP (cm H2O):  [18-26] 20     CV: A.fib with RVR 6/28; Shock - septic.    * On/off levophed for MAP > 65   * Amio 200mg daily     GI/: Protein calorie malnutrition; enteral access established - not post pyloric.    * Tube feeding per RD.     Renal: Rhabdo - improved; metabolic acidosis - resolved.    * Try to stay off Propofol    * UOP drifting off; give one dose lasix today. Held yesterday due to lower BPs.     ID: COVID19   * S/P CCP and toci 6/25 and 6/27    * S/P Ivermectin x 2 doses.    * Meropenem x 10 days    * Remdesivir x 10 days    Heme/Coag:  Coagulopathy in  setting of COVID19     * Lovenox 60mg two times a day     Endocrine: Stress hypergylcemia   * Hypoglycemic with decreased tube feeding.    * Hold lantus for now.          Dispo: ICU    Esmer Willis, CNP  [facility] Pulmonary/Critical Care    Total critical care time: 45  I have personally provided 45 minutes of critical care time exclusive of time spent on separately billable procedures for acute hypoxic respiratory failure. High risk for acute deterioration and death.    _______________________________________________________________    HPI: Tobias Palmer is a 54 year old male with a history of obesity (BMI 45) who was admitted and intubated on 6/21 at [facility] with COVID-19 pneumonia, transferred to [place] ICU on 6/22. Course complicated by ARDS and shock, requiring proning and paralytics.    ROS: NATIVIDAD intubated and sedated.     Events over last 24-hours: no acute change.     Objective:     Vitals:    07/07/20 1245 07/07/20 1300 07/07/20 1315 07/07/20 1359   BP: 104/69 101/67 105/68    Patient Position:       Pulse: 71 69 76 76   Resp: 22 22 22 22   Temp:       TempSrc:       SpO2: 95% 95% 95% 96%   Weight:       Height:           Vent:   Day of Intubation: 6/21  Ready to wean? No  Vent Mode: AC  FiO2 (%):  [50 %-100 %] 55 %  S RR:  [18-22] 22  S VT:  [430 mL] 430 mL  PEEP/CPAP (cm H2O):  [12 cm H2O-14 cm H2O] 14 cm H2O  Minute Ventilation (L/min):  [7.6 L/min-9.4 L/min] 9.4 L/min  PIP:  [31 cm H2O-50 cm H2O] 34 cm H2O  MAP (cm H2O):  [17-26] 20    Sedative Infusion: fentanyl and midazolam  Sedation vacation: No    I/O:     Intake/Output Summary (Last 24 hours) at 7/7/2020 1446  Last data filed at 7/7/2020 1300  Gross per 24 hour   Intake 4203.45 ml   Output 4970 ml   Net -766.55 ml     Wt Readings from Last 3 Encounters:   07/05/20 (!) 284 lb 9.8 oz (129.1 kg)      Weight change:     Physical Exam:  Gen: sedated  HEENMT:swollen face; swollen tongue with breakdown.   NEURO: sedated and  paralyzed.   CARDIOVASCULAR: S1, S2 normal, no murmur, rub or gallop, regular rate and rhythm  PULMONARY: unlabored on full vent; lungs are diminished posteriorly. No wheeze.   GASTROINTESTINAL: NATIVIDAD - patient in prone position at time of exam.   INTEGUMENT: as above; gen'l edema   PSYCH: sedated     Labs:   Results from last 7 days   Lab Units 07/07/20  0332 07/06/20  0352   LN-SODIUM mmol/L 144 144   LN-POTASSIUM mmol/L 4.1 3.7   LN-CHLORIDE mmol/L 111* 111*   LN-CO2 mmol/L 25 28   LN-BLOOD UREA NITROGEN mg/dL 17 16   LN-CREATININE mg/dL 0.48* 0.50*   LN-CALCIUM mg/dL 7.4* 7.4*   LN-PROTEIN TOTAL g/dL  --  4.5*   LN-BILIRUBIN TOTAL mg/dL  --  0.3   LN-ALKALINE PHOSPHATASE U/L  --  44*   LN-ALT (SGPT) U/L  --  27   LN-AST (SGOT) U/L  --  30       Results from last 7 days   Lab Units 07/07/20  0332   LN-WHITE BLOOD CELL COUNT thou/uL 7.8   LN-HEMOGLOBIN g/dL 11.6*   LN-HEMATOCRIT % 37.6*   LN-PLATELET COUNT thou/uL 140       Micro:   7/4 Sputum - 3+ Staph (MSSA)    Imaging: all imaging personalized reviewed   7/5 CXR - Endotracheal tube tip mid way between clavicles and melissa. Right PICC tip projects over the mid SVC. Feeding tube tip below the film.     Limited inspiratory volume. Normal heart size. Unchanged bilateral diffuse lung infiltrates      Esmer Willis, [facility] Pulmonary/Critical Care

## 2021-06-09 NOTE — PROGRESS NOTES
FERN COVID RT PROGRESS NOTE    DATA:    VENT DAY#  10    CURRENT SETTINGS:  VENT SETTINGS   AC 18/430/+12        FIO2:   60%    PATIENT PARAMETERS:    PIP 14  Pplat:  Unable to obtain  Pmean:  12-13  SBT: Not indicated  SECRETIONS:  Yellow mucous plugs  02 SATS:  94-96%    ETT SIZE 8.0  Secured at  24 cm at teeth    Respiratory Medications: None     AB.43 / 45 / 146 / 4.8 / 28.6  P/F ratio 243    NOTE / PLAN:   Continue to monitor oxygenation / ventilation.  Suction as needed

## 2021-06-09 NOTE — PROGRESS NOTES
06/25/20 0726   Patient Data   PIP Observed (cm H2O) 30 cm H2O   MAP (cm H2O) 21   Auto/Intrinsic PEEP Observed (cm H2O) 1.3 cm H2O   Plateau Pressure (cm H2O) 29 cm H2O

## 2021-06-09 NOTE — PLAN OF CARE
Problem: Inadequate Airway Clearance  Goal: Patient will maintain patent airway  Outcome: Progressing     Problem: Mechanical Ventilation  Goal: ET tube will be managed safely  Outcome: Progressing     Problem: Inadequate Gas Exchange  Goal: Patient is adequately oxygenated and ventilation is improved  Outcome: Progressing

## 2021-06-09 NOTE — PROGRESS NOTES
FERN COVID RT PROGRESS NOTE    DATA:    VENT DAY#  17    CURRENT SETTINGS:  VENT SETTINGS   AC  RR 22, Vt 430, Peep 14        FIO2:   65%    PATIENT PARAMETERS:    PIP:  34  Pplat:  31  Pmean:  20  SBT: no  SECRETIONS:  Scant, White/tan, thin   02 SATS:  94%    ETT SIZE 8.2  Secured at  24 cm at teeth    Respiratory Medications: none     ABG: Results for RJ GUADALUPE (MRN [id number]) as of 7/7/2020 17:49   Ref. Range 7/7/2020 11:58   pH, Arterial Latest Ref Range: 7.37 - 7.44  7.42   pCO2, Arterial Latest Ref Range: 35 - 45 mm Hg 45   pO2, Arterial Latest Ref Range: 80 - 90 mm Hg 75 (L)   Bicarbonate, Arterial Calc Latest Ref Range: 23.0 - 29.0 mmol/L 28.0   O2 Sat, Arterial Latest Ref Range: 96.0 - 97.0 % 95.4 (L)   Oxyhemoglobin Latest Ref Range: 96.0 - 97.0 % 93.5 (L)   POC Base Excess Calc Latest Units: mmol/L 4.7   Ventilation Mode Unknown AC   Peep Latest Units: cm H2O 14   Sample Stabilized Temperature Latest Units: degrees C 37.0   Rate Latest Units: rr/min 22   FIO2 Unknown 0.65       NOTE / PLAN:     Patient turned supine this morning at about 10:30, retaped at this time.  Significant swelling noted on the tongue with breaks on the tongue from patient biting down on tongue.  Patient tolerated being on his back for the day, weaned FIO2 to 55%.  Reproned at around 1740.  Will continue to monitor with Q2 head turns.

## 2021-06-09 NOTE — PROGRESS NOTES
Infectious Disease Progress Note    Assessment/Plan  Impression:  COVID-19 pneumonia   Received CCP. Received dexamethasone (6/22-29) and remdesivir for 10 day course  Received 400 mg tocilizumab on 6/25, 6/27 for suspected cytokine release syndrome. Prolonged hospitalization.    Quantiferon gold negative  Hep B and C negative  Strongyloides antibody negative -- no need for further ivermectin    Ongoing fever and leukocytosis. Steroid may contribute to leukocytosis--> now normalized. Continues to have fevers, on cooling blanket.     Colonized with yeast in sputum. Risk for invasive candidiasis -- broad spectrum antibiotics, central line, candida colonization. Beta-D-glucan negative 6/29 -- less likely therefore that he has invasive candidiasis.     Now with MSSA in sputum as of 7/2, 7/4 -- could be the cause of pneumonia. Completed 7-days of cefazolin. Recent sputum again shows MSSA, which represents colonization at this point. GNR also seen. No treatment at this point, but will await identification. Has been having heavy secretions. Recent procalcitonin normal.     Recommend:    Hold antibiotics for now  -await ID of gram negatives in sputum  R/out DVT in upper and lower ext  F/up on repeat blood cultures  Consider exchange of long-term lines (a-line and PICC)      Principal Problem:    COVID-19  Active Problems:    Shock (H)    Acute and chronic respiratory failure with hypoxia (H)    On mechanically assisted ventilation (H)    ARDS (adult respiratory distress syndrome) (H)    Atrial fibrillation with rapid ventricular response (H)    Atrial fibrillation with rapid ventricular response (H)    Encephalopathy    Herb Sweet MD  [facility]  Direct messaging: [facility] Paging  On-Call ID provider: [phone], option: 9    ______________________________________________________________________       Subjective  Intubated and sedated. No pronation today. Low-grade fevers through the night.  On a cooling blanket.    Objective    Anti-infectives:  Cefazolin 7/7-7/14  Meropenem 7/2-7  Ceftriaxone 6/22  Ivermectin 6/25-26  Cefepime 6/27-7/2  Vancomycin 6/27-7    Vital signs in last 24 hours  Temp:  [100  F (37.8  C)-101.1  F (38.4  C)] 100  F (37.8  C)  Heart Rate:  [58-80] 60  Resp:  [14-26] 16  BP: ()/(44-59) 110/44  Arterial Line BP: ()/(31-59) 103/51  FiO2 (%):  [50 %-60 %] 50 %  Weight:   (!) 265 lb 10.5 oz (120.5 kg)    Intake/Output last 3 shifts  I/O last 3 completed shifts:  In: 2208.1 [I.V.:864.1; NG/GT:1344]  Out: 3000 [Urine:2950; Stool:50]  Intake/Output this shift:  I/O this shift:  In: 738.5 [I.V.:268.5; NG/GT:470]  Out: 450 [Urine:450]    Review of Systems   Review of systems not obtained due to inability to communicate with the patient.     Physical Exam--  Limited exam to conserve PPE. Patient seen from window. See intensivist note for full exam.  Lying in bed, supine, no distress. intubated     Pertinent Labs   Lab Results: personally reviewed.     Results from last 7 days   Lab Units 07/16/20  0547 07/15/20  0343 07/14/20  0402   LN-WHITE BLOOD CELL COUNT thou/uL 7.7 8.8 9.3   LN-HEMOGLOBIN g/dL 11.0* 11.4* 11.3*   LN-HEMATOCRIT % 35.2* 36.2* 35.9*   LN-PLATELET COUNT thou/uL 139* 115* 94*        Results from last 7 days   Lab Units 07/16/20  0547 07/15/20  0343 07/14/20 0402   LN-SODIUM mmol/L 139 141 142   LN-POTASSIUM mmol/L 4.3 4.3 3.7   LN-CHLORIDE mmol/L 104 104 104   LN-CO2 mmol/L 30 29 31   LN-BLOOD UREA NITROGEN mg/dL 23* 20 21   LN-CREATININE mg/dL 0.54* 0.58* 0.57*   LN-CALCIUM mg/dL 8.1* 8.4* 8.3*     Lab Results   Component Value Date    ALT 27 07/06/2020    AST 30 07/06/2020    ALKPHOS 44 (L) 07/06/2020    BILITOT 0.3 07/06/2020     Micro:  6/22 blood negative  6/24 sputum usual bruno and yeast  6/27 blood including fungal isolate no growth to date   6/27 sputum gram stain with yeast; culture usual bruno  7/2 sputum MSSA  7/2 blood culture negative to  date  7/4 sputum MSSA    Results for RJ GUADALUPE (MRN [id number]) as of 6/26/2020 11:04   Ref. Range 6/24/2020 16:46   Hepatitis B Surface Ag Latest Ref Range: Negative  Negative   Hepatitis C Ab Latest Ref Range: Negative  Negative   Results for RJ GUADALUPE (MRN [id number]) as of 6/26/2020 11:04   Ref. Range 6/23/2020 04:26 6/24/2020 05:24 6/24/2020 16:46 6/25/2020 04:40 6/26/2020 04:22   CRP Latest Ref Range: 0.0 - 0.8 mg/dL 12.0 (H) 10.0 (H)  4.2 (H) 2.1 (H)   LD (LDH) Latest Ref Range: 125 - 220 U/L 480 (H) 477 (H)  434 (H) 452 (H)     Pertinent Radiology   Radiology Results: Personally reviewed image/s and Personally reviewed impression/s    No results found.

## 2021-06-09 NOTE — PROGRESS NOTES
"Clinical Nutrition Follow Up Note    Current Nutrition Prescription:   Diet: TF:  Peptamen AF at 45 ml/hr (formula changed, 7/14 due to persistent diarrhea)   Supplement/modulars: no carb prosource 1 pkt TID  Flush order: water 100 ml q 4 hrs    IV dextrose or Fluids:dexmedetomidine infusion orderable (PRECEDEX), Last Rate: 1.5 mcg/kg/hr (07/20/20 1200)  fentaNYL citrate (PF), Last Rate: 100 mcg/hr (07/20/20 1200)  midazolam, Last Rate: 3 mg/hr (07/20/20 1200)  norepinephrine, Last Rate: Stopped (07/20/20 1215)  sodium chloride 0.9%, Last Rate: 10 mL/hr (07/14/20 1200)      Current Nutrition Intake:  Enteral nutrition access is a nasal duodenal tube, with a placement date of 7/10/20 & verified in place per x-ray.   TF as prescribed provides:1080 ml, 1476 kcal, 127 gm pro, 121 gm CHO, 6 gm fiber, 873 ml free water,600 ml water flush, total free water is 1473 ml.  IV drips 1532.3 ml yesterday  Meeting estimated needs including all sources on lower end    Anthropometrics:  Height: 5'5\"  Admit wt: 274 lb 4 oz,6/22/20  Weight: (!) 270 lb 5.9 oz (122.6 kg),7/20/20-+1-2 edema noted in all extremities  BMI:42.4 (dry wt)  BMI indication: >40 extreme obesity (class 3)  Ideal body weight: 136 lb(+or-10%)  Usual body weight: unable to obtain currently  Weight History:268 lb(6/22/20).   Recent wt's: 270 lb(7/12),265 lb(7/16),267 lb(7/19)  Wt range since admit: 254-284 lb    GI Status/Output:   Rectal tube reinserted on 7/18  Rectal tube: 350 ml/24 hrs    Skin/Wound:  Bayron score Bayron Scale Score: 13  WOC note reviewed 7/17    Medications:  Medications reviewed.  Note:SSI,culturelle,prilosec,oxycodone,pantoprazole  MVI w/minerals    Labs:  Labs reviewed  Lab Results   Component Value Date/Time    ALBUMIN 2.1 (L) 07/06/2020 03:52 AM     07/20/2020 04:21 AM    K 3.7 07/20/2020 04:21 AM    BUN 11 07/20/2020 04:21 AM    CREATININE 0.46 (L) 07/20/2020 04:21 AM     (H) 07/20/2020 04:21 AM    PHOS 3.2 07/20/2020 04:21 AM "    MG 1.8 07/20/2020 04:21 AM    CRP <0.1 07/11/2020 05:27 AM   FSBG 116-148 mg/dL in the past 24 hrs. (within goal range)    Estimated Nutrition Needs:  Assessment weight is 121.8 kg, estimated weight    Energy Needs: 0747-6009 kcals daily, 11-17 kcal/kg  Protein Needs: 124-155 g daily, 2-2.5 g/kg.(dose: 62 kg IBW)  Fluid Needs: 9327-4035 mls daily, 25-35 mls/kg(dose wt: 77 kg adj.wt)    Malnutrition: protein calorie malnutrition noted per doctor     Nutrition Risk Level: high risk     Nutrition dx:   Swallowing difficulty r/t respiratory failure as evidenced by intubated,NPO.     Goals:   Meet estimated nutrition needs and Tolerate tube feeding-progressing  D/c RT; <3 loose stools/day; more formed BM's- progressing       Plan:   Increase no carb prosource to 2 pkt BID to better meet needs.  Enteral nutrition provides: 1536 kcal, 142 grams protein    Monitoring:  Labs, wt, TF tolerance, & skin integrity.

## 2021-06-09 NOTE — PROGRESS NOTES
Critical Care Progress Note  7/17/2020   11:48 AM     Admit Date: 6/22/2020  ICU Day: 25  Code Status: full code      Problem List:   Principal Problem:    COVID-19  Active Problems:    Shock (H)    Acute and chronic respiratory failure with hypoxia (H)    On mechanically assisted ventilation (H)    ARDS (adult respiratory distress syndrome) (H)    Atrial fibrillation with rapid ventricular response (H)    Atrial fibrillation with rapid ventricular response (H)    Encephalopathy      Major changes for today:    * Supinate this morning and recheck ABG this afternoon.     * Replace arterial line.      Plan by System:     Neuro/Psych: Encephalopathy secondary to prolonged critical illness; Sedation for vent synchrony and comfort.    * Sedating with IV Fentanyl, Versed, propofol.    * Off paralytic since 7/11   * Try to lighten sedation if safe to do so.    * Continue 2mg enteral lorazepam Q6h    * Oxy 10mg Q4h   * Seroquel 50mg Q8h        Pulmonary: Acute hypoxic respiratory failure in setting of COVID19 infection.     *  this morning     * PIP 17 Pplat 11    * Vent Mode: AC  FiO2 (%):  [50 %] 50 %  S RR:  [16] 16  S VT:  [350 mL] 350 mL  PEEP/CPAP (cm H2O):  [14 cm H2O] 14 cm H2O  Minute Ventilation (L/min):  [7.6 L/min-9 L/min] 8.5 L/min  PIP:  [15 cm H2O-19 cm H2O] 15 cm H2O  MAP (cm H2O):  [14-15] 15    * Recheck ABG this afternoon; possibly leave supine tonight if acceptable numbers.        CV: A.fib with RVR 6/28; Shock - septic.    * On/off levophed for MAP > 65   * Amio 200mg daily         GI/: Protein calorie malnutrition; enteral access established - not post pyloric.    * Tube feeding per RD.     Renal: Rhabdo - improved; metabolic acidosis - resolved.     * Daily assessment for diuretic need.  Hold today.     ID: COVID19; MSSA pneumonia   * S/P CCP and toci 6/25 and 6/27    * S/P Ivermectin x 2 doses.    * Meropenem x 10 days    * Remdesivir x 10 days   * Cefazolin for MSSA; 7/7 - 7/14    Heme/Coag:   Coagulopathy in setting of COVID19     * Lovenox 60mg two times a day     Endocrine: Stress hypergylcemia   * Resume SSI standard scale.       Family/Psychosocial:    * Palliative Care involved     * Calling wife Nathalie daily with  assistance. Addendum: Wife updated at 1:16 PM with .        Dispo: ICU    Esmer Willis, CNP  [facility] [place] [facility]/Critical [facility]    Total critical care time: 45  I have personally provided 45 minutes of critical care time exclusive of time spent on separately billable procedures for acute hypoxic respiratory failure. High risk for acute deterioration and death.    _______________________________________________________________    HPI: Tobias Palmer is a 54 year old male with a history of obesity (BMI 45) who was admitted and intubated on 6/21 at [facility] with COVID-19 pneumonia, transferred to [place] ICU on 6/22. Course complicated by ARDS and shock, requiring proning and paralytics.    ROS: NATIVIDAD intubated and sedated.     Events over last 24-hours:  Proned overnight; no acute events.     Objective:     Vitals:    07/17/20 0900 07/17/20 0915 07/17/20 0930 07/17/20 0945   BP:       Patient Position:       Pulse: 68 66 65 64   Resp: (!) 0 (!) 3 (!) 7 (!) 7   Temp:       TempSrc:       SpO2: 94% 96% 96% 95%   Weight:       Height:           Vent:   Day of Intubation: 6/21  Ready to wean? No  Vent Mode: AC  FiO2 (%):  [50 %] 50 %  S RR:  [16] 16  S VT:  [350 mL] 350 mL  PEEP/CPAP (cm H2O):  [14 cm H2O] 14 cm H2O  Minute Ventilation (L/min):  [7.6 L/min-9 L/min] 8.5 L/min  PIP:  [15 cm H2O-19 cm H2O] 15 cm H2O  MAP (cm H2O):  [14-15] 15    Sedative Infusion: fentanyl and midazolam and precedex  Sedation vacation: No    I/O:     Intake/Output Summary (Last 24 hours) at 7/17/2020 1133  Last data filed at 7/17/2020 0800  Gross per 24 hour   Intake 2045.36 ml   Output 2270 ml   Net -224.64 ml     Wt Readings from Last 3  Encounters:   07/17/20 (!) 266 lb 3.2 oz (120.7 kg)      Weight change: 8.7 oz (0.247 kg)    Physical Exam:  Gen: sedated  HEENMT: facial and tongue swelling noted; some breakdown noted in mouth.   NEURO: sedated .   CARDIOVASCULAR: S1, S2 normal, no murmur, rub or gallop, regular rate and rhythm  PULMONARY: unlabored on full vent; lungs are clear but diminished bilaterally.   GASTROINTESTINAL: obese, soft, ntnd  INTEGUMENT: as above; gen'l edema - seems a bit better than last week.    PSYCH: sedated     Labs:   Results from last 7 days   Lab Units 07/17/20  0523   LN-SODIUM mmol/L 140   LN-POTASSIUM mmol/L 4.4   LN-CHLORIDE mmol/L 103   LN-CO2 mmol/L 29   LN-BLOOD UREA NITROGEN mg/dL 20   LN-CREATININE mg/dL 0.48*   LN-CALCIUM mg/dL 8.3*       Results from last 7 days   Lab Units 07/17/20  0523   LN-WHITE BLOOD CELL COUNT thou/uL 6.9   LN-HEMOGLOBIN g/dL 11.2*   LN-HEMATOCRIT % 36.2*   LN-PLATELET COUNT thou/uL 163       Micro:   7/14 Sputum - 4+ Staph (MSSA)  7/4 Sputum - 3+ Staph (MSSA)    Imaging: all imaging personalized reviewed     7/16 US Doppler BUE - negative DVT    7/11 CXR - Diffuse left lung opacities and consolidation appear stable to slightly improved. More patchy right lung opacities appear marginally increased, though could be exaggerated by differences in exams.     No significant pleural effusion. No pneumothorax. Stable cardiomediastinal silhouette.     ET tube tip 2.1 cm above the melissa. Feeding tube courses into the stomach. Right PICC tip near the cavoatrial junction    7/5 CXR - Endotracheal tube tip mid way between clavicles and melissa. Right PICC tip projects over the mid SVC. Feeding tube tip below the film.     Limited inspiratory volume. Normal heart size. Unchanged bilateral diffuse lung infiltrates      Esmer Willis, [facility] Pulmonary/Critical Care

## 2021-06-09 NOTE — PROGRESS NOTES
07/03/20 0733   Vent Settings   Resp Rate (Set) 18   FiO2 (%) 70 %   Vt (Set, mL) 430 mL   PEEP/CPAP (cm H2O) 8 cm H2O   I:E Ratio 1:2.7   Patient Data   Vt Exp (mL) 403 mL   Minute Ventilation (L/min) 7.7 L/min   Total Resp Rate  18 BPM   PIP Observed (cm H2O) 45 cm H2O   MAP (cm H2O) 14   Plateau Pressure (cm H2O) 22 cm H2O   SAT 96%

## 2021-06-09 NOTE — PROGRESS NOTES
07/19/20 0058   Patient Data   Vt (observed, mL) 384 mL   Minute Ventilation (L/min) 12.2 L/min   Total Resp Rate  30 BPM   PIP Observed (cm H2O) 16 cm H2O   MAP (cm H2O) 12   Plateau Pressure (cm H2O) 19 cm H2O   SpO2 95 %   Heart Rate 82

## 2021-06-09 NOTE — PROGRESS NOTES
Clinical Nutrition Follow Up Note    Current Nutrition Prescription:   Diet: TF: Isosource 1.5 @ 35 ml/hr   Supplement/modulars: no carb prosource 2 pkt TID  Flush order: water 150 ml q 4 hrs    IV dextrose or Fluids:amiodarone 900 mg/500 ml standard 24 hour infusion, Last Rate: 0.5 mg/min (06/30/20 0800)  fentaNYL citrate (PF), Last Rate: 75 mcg/hr (06/30/20 0800)  insulin infusion (1 unit/mL), Last Rate: 4 Units/hr (06/30/20 0800)  midazolam, Last Rate: 4 mg/hr (06/30/20 0800)  sodium chloride 0.9%, Last Rate: 10 mL/hr (06/30/20 0800)      Current Nutrition Intake:  Enteral nutrition access is a nasal gastric tube, with a placement date of 6/22/2020 & verified in place per x-ray on 6/23.   TF as prescribe provides: 840 ml ml,1620 kcal, 147 grams protein, 13 grams fiber, 148 grams CHO, 638 ml free water + flush 900 ml which will provide 1538 ml total (TF & flushes).    IVF provided 1350 ml yesterday  Meeting needs with TF prescribed    Anthropometrics:  Height: 5'5  Admit wt: 274 lb 4 oz  Weight: (!) 254 lb 10.1 oz (115.5 kg),6/29/20. Net neg I/O.  BMI:42.4  BMI indication: >40 extreme obesity (class 3)  Ideal body weight: 136 lb(+or-10%)  Usual body weight: unable to obtain currently  Weight History:268 lb(6/22/20)  Wt decreased 14 lb (5%) from baseline.    GI Status/Output:   GI symptoms include:  Rectal tube: 200 ml/24 hrs noted past couple days  Gastric residuals:0-150 ml noted in the past 24 hrs    Skin/Wound:  Bayron score Bayron Scale Score: 11    Medications:  Medications reviewed.  Note IV abx, Lantus, MVI w/minerals, miralax, senna-doc, Ins GTT  Off steroid    Labs:  Labs reviewed  Lab Results   Component Value Date/Time    ALBUMIN 2.4 (L) 06/27/2020 11:08 AM     06/30/2020 04:07 AM    K 4.0 06/30/2020 04:07 AM    BUN 38 (H) 06/30/2020 04:07 AM    CREATININE 0.71 06/30/2020 04:07 AM     (H) 06/30/2020 04:07 AM   FSBG 133-158 mg/dL in the past 12 hrs. Controlled with Ins GTT  No Hx DM. Stress  & steroid induced hyperglycemia.    Estimated Nutrition Needs:  Assessment weight is 121.8 kg, estimated weight    Energy Needs: 6573-8464 kcals daily, 11-14 kcal/kg  Protein Needs: 124-155 g daily, 2-2.5 g/kg.(dose: 62 kg IBW)  Fluid Needs: 2256-6058 mls daily, 25-35 mls/kg(dose wt: 77 kg adj.wt)    Malnutrition: protein calorie malnutrition noted per doctor     Nutrition Risk Level: high risk     Nutrition dx:   Swallowing difficulty r/t respiratory failure as evidenced by intubated,NPO.   Altered nutrition related labs r/t stress/steroid evidenced by hyperglycemia.     Goals:   Meet estimated nutrition needs and Tolerate tube feeding-progressing  BG  - progressing     Intervention:   Continue current TF support     Monitoring/Evaluation:   Labs, wt, & TF

## 2021-06-09 NOTE — PLAN OF CARE
Problem: Inadequate Airway Clearance  Goal: Patient will maintain patent airway  Outcome: Progressing     Problem: Mechanical Ventilation  Goal: Patient will maintain patent airway  Outcome: Progressing  Goal: ET tube will be managed safely  Outcome: Progressing     Problem: Inadequate Gas Exchange  Goal: Patient is adequately oxygenated and ventilation is improved  Outcome: Progressing

## 2021-06-09 NOTE — PROGRESS NOTES
Critical Care Progress Note  7/18/2020   12:00 PM     Admit Date: 6/22/2020  ICU Day: 26  Code Status: full code      Problem List:   Principal Problem:    COVID-19  Active Problems:    Shock (H)    Acute and chronic respiratory failure with hypoxia (H)    On mechanically assisted ventilation (H)    ARDS (adult respiratory distress syndrome) (H)    Atrial fibrillation with rapid ventricular response (H)    Atrial fibrillation with rapid ventricular response (H)    Encephalopathy      Major changes for today:    * Increase Vt to 6mL/kg (370mL)   * Decrease PEEP to +12     Plan by System:     Neuro/Psych: Encephalopathy secondary to prolonged critical illness; Sedation for vent synchrony and comfort.    * Sedating with IV Fentanyl, Versed, propofol - coming down nicely   * Off paralytic since 7/11   * Try to lighten sedation if safe to do so.    * Continue 2mg enteral lorazepam Q6h    * Oxy 10mg Q4h   * Seroquel 50mg Q8h    * Wait until down/off IV Sedation before we start decreasing enteral meds.        Pulmonary: Acute hypoxic respiratory failure in setting of COVID19 infection.     *  this morning!     * Vent Mode: AC  FiO2 (%):  [45 %] 45 %  S RR:  [16] 16  S VT:  [350 mL-370 mL] 370 mL  PEEP/CPAP (cm H2O):  [12 cm H2O-14 cm H2O] 12 cm H2O  Minute Ventilation (L/min):  [9 L/min-14 L/min] 14 L/min  PIP:  [16 cm H2O-23 cm H2O] 18 cm H2O  MAP (cm H2O):  [14-16] 14    * Recheck ABG this afternoon        CV: A.fib with RVR 6/28; Shock - septic.    * On/off levophed for MAP > 65   * Amio 200mg daily         GI/: Protein calorie malnutrition; enteral access established - not post pyloric.    * Tube feeding per RD.     Renal: Rhabdo - improved; metabolic acidosis - resolved.     * Daily assessment for diuretic need.  Hold again today.     ID: COVID19; MSSA pneumonia   * S/P CCP and toci 6/25 and 6/27    * S/P Ivermectin x 2 doses.    * Meropenem x 10 days    * Remdesivir x 10 days   * Cefazolin for MSSA; 7/7 -  7/14    Heme/Coag:  Coagulopathy in setting of COVID19     * Lovenox 60mg two times a day     Endocrine: Stress hypergylcemia   * Resume SSI standard scale.       Family/Psychosocial:    * Palliative Care involved     * Calling wife Nathalie daily with  assistance.     *Addendum: Wife updated over the phone this afternoon at 3:50 PM with *        Dispo: ICU    Esmer Willis, CNP  [facility] Pulmonary/Critical Care    Total critical care time: 40  I have personally provided 40 minutes of critical care time exclusive of time spent on separately billable procedures for acute hypoxic respiratory failure. High risk for acute deterioration and death.    _______________________________________________________________    HPI: Tobias Palmer is a 54 year old male with a history of obesity (BMI 45) who was admitted and intubated on 6/21 at [facility] with COVID-19 pneumonia, transferred to [place] ICU on 6/22. Course complicated by ARDS and shock, requiring proning and paralytics.    ROS: NATIVIDAD intubated and sedated.     Events over last 24-hours:  No acute events overnight    Objective:     Vitals:    07/18/20 1100 07/18/20 1115 07/18/20 1130 07/18/20 1145   BP: 101/58 102/57 121/72 125/77   Patient Position:       Pulse: 63 61 72 66   Resp: 24 23 (!) 34 26   Temp:       TempSrc:       SpO2: 99% 99% 100% 99%   Weight:       Height:           Vent:   Day of Intubation: 6/21  Ready to wean? No  Vent Mode: AC  FiO2 (%):  [45 %] 45 %  S RR:  [16] 16  S VT:  [350 mL-370 mL] 370 mL  PEEP/CPAP (cm H2O):  [12 cm H2O-14 cm H2O] 12 cm H2O  Minute Ventilation (L/min):  [9 L/min-14 L/min] 14 L/min  PIP:  [16 cm H2O-23 cm H2O] 18 cm H2O  MAP (cm H2O):  [14-16] 14    Sedative Infusion: fentanyl and midazolam and precedex  Sedation vacation: No    I/O:     Intake/Output Summary (Last 24 hours) at 7/18/2020 1200  Last data filed at 7/18/2020 1000  Gross per 24 hour   Intake  3079.02 ml   Output 1825 ml   Net 1254.02 ml     Wt Readings from Last 3 Encounters:   07/18/20 (!) 266 lb 12.1 oz (121 kg)      Weight change: 8.9 oz (0.253 kg)    Physical Exam:  Gen: sedated but moving more today  HEENMT: facial and tongue swelling noted; some breakdown noted in mouth.   NEURO: sedated but is moving around today. Difficult to tell if he is able to follow commands due to language barrier.   CARDIOVASCULAR: S1, S2 normal, no murmur, rub or gallop, regular rate and rhythm  PULMONARY: unlabored on full vent; lungs are clear but diminished bilaterally.   GASTROINTESTINAL: obese, soft, ntnd  INTEGUMENT: as above; gen'l edema - seems a bit better than last week.    PSYCH: sedated     Labs:   Results from last 7 days   Lab Units 07/18/20  0441   LN-SODIUM mmol/L 138   LN-POTASSIUM mmol/L 3.7   LN-CHLORIDE mmol/L 104   LN-CO2 mmol/L 27   LN-BLOOD UREA NITROGEN mg/dL 13   LN-CREATININE mg/dL 0.48*   LN-CALCIUM mg/dL 8.0*       Results from last 7 days   Lab Units 07/18/20  0442   LN-WHITE BLOOD CELL COUNT thou/uL 6.9   LN-HEMOGLOBIN g/dL 11.1*   LN-HEMATOCRIT % 35.6*   LN-PLATELET COUNT thou/uL 174       Micro:   7/14 Sputum - 4+ Staph (MSSA)  7/4 Sputum - 3+ Staph (MSSA)    Imaging: all imaging personalized reviewed     7/16 US Doppler BUE - negative DVT    7/11 CXR - Diffuse left lung opacities and consolidation appear stable to slightly improved. More patchy right lung opacities appear marginally increased, though could be exaggerated by differences in exams.     No significant pleural effusion. No pneumothorax. Stable cardiomediastinal silhouette.     ET tube tip 2.1 cm above the melissa. Feeding tube courses into the stomach. Right PICC tip near the cavoatrial junction    7/5 CXR - Endotracheal tube tip mid way between clavicles and melissa. Right PICC tip projects over the mid SVC. Feeding tube tip below the film.     Limited inspiratory volume. Normal heart size. Unchanged bilateral diffuse lung  infiltrates      Esmer Willis, [facility] Pulmonary/Critical Care

## 2021-06-09 NOTE — PROGRESS NOTES
07/22/20 1927   Patient Data   Total Resp Rate  25 BPM   PIP Observed (cm H2O) 16 cm H2O   MAP (cm H2O) 11   Auto/Intrinsic PEEP Observed (cm H2O) 2 cm H2O   Plateau Pressure (cm H2O) 15 cm H2O   Dynamic Compliance (L/cm H2O) 38 L/cm H2O   Airway Resistance 2.7   SpO2 97 %

## 2021-06-09 NOTE — PROGRESS NOTES
"Wound Ostomy  WOC Assessment         Allergies:  No Known Allergies    Diagnosis:   Patient Active Problem List    Diagnosis Date Noted     Encephalopathy      Atrial fibrillation with rapid ventricular response (H) 06/30/2020     Atrial fibrillation with rapid ventricular response (H)      On mechanically assisted ventilation (H)      ARDS (adult respiratory distress syndrome) (H)      COVID-19 06/22/2020     Shock (H)      Acute and chronic respiratory failure with hypoxia (H)        Height:  5' 5\" (1.651 m)    Weight:   (!) 262 lb 5.6 oz (119 kg)    Labs:  Recent Labs     07/15/20  0343   HGB 11.4*       Bayron:  Bayron Scale Score: 11    Specialty Bed:  Specialty Bed: [place]    Wound culture obtained: No    Edema:  Yes:  Localized    Anatomic Site/Laterality: Body    Reason for ongoing care:   Skin assessment and plan of care    Encounter Type:  Subsequent Encounter Skin integrity:   Patient with COVID-19, respiratory failure and morbid obesity.  Patient currently intubated and sedated.      Patient seen while supine. Anterior body appears intact. L cheek remains healed. Ok to leave open to air, cover with mepilex if patient prones again.     Right nare remains healed.  Nasal septum with 0.3x1cm area of 50% dried drainage and 50% agranular.  Areas appear to be from friction with ETAD as patient was visualized previously moving his head back and forth frequently.  Leave open to air.      RN reports no known skin concerns on posterior body.    Prone Positioning recommendations:    Prior  Apply Mepilex sacral dressings for protection if pt has bony prominences (shoulders, iliac crest) - may leave in place when not proned.  Ensure tongue is in the mouth, remove bite block for pressure injury prevention.  Ensure no lines/tubes/drains (as much as able) will be under patient when prone.    Prone position  Use pillows or Z hernan pads to off load pressure to bony prominences.  Position head so that ears are not folded and " so that head/neck are aligned.  Reposition head every one hour.  Use swim position per protocol for positioning when supine.    Mepilex dressings to cheeks when in prone position - may leave in place when not proned. Change every 5 days or sooner if soiled.    Prevention measures for mucosal integrity:  1. No bite block when proning. (rationale - the hard plastic will leave the patient at greater risk for pressure injury)  2. Hourly head reposition and moisturizer to exposed portion of tongue (moisturizer which comes with the oral care kits). (rationale - remove pressure to tongue/lips and face and maintain mucosal integrity for the tongue)  3. Use tongue blade when repositioning ETT, so tongue doesn t move along with the tube. (rationale - to ensure pressure is not on the same area of the tongue, even though the ETT is technically repositioned)         Nursing care provided was coordinated care with RN.    Discussed plan of care with nurse and patient (as able).    Outcomes and treatment recommendations are to promote skin integrity and promote wound healing.    Actions taken by WOC RN: 2 minutes of education.    Planned Follow Up: Later this week or Early next week.    Plan for next visit: Reassess wound(s) and Reassess skin integrity

## 2021-06-09 NOTE — PROGRESS NOTES
This patient received COVID Convalescent Plasma, 2 units, while an inpatient at [facility].  I am entering this note as that information is in a different medical record.

## 2021-06-09 NOTE — PROGRESS NOTES
06/28/20 1930   Patient Data   Vt Exp (mL) 451 mL   Minute Ventilation (L/min) 12.3 L/min   Total Resp Rate  27 BPM   PIP Observed (cm H2O) 16 cm H2O   MAP (cm H2O) 13   Auto/Intrinsic PEEP Observed (cm H2O) 1.2 cm H2O   Plateau Pressure (cm H2O)   (unable to obtain)   Dynamic Compliance (L/cm H2O) 112 L/cm H2O   SpO2 96 %

## 2021-06-09 NOTE — PLAN OF CARE
Care Plan per Care Management      Care Management Goals of the Day: Care progression, discharge planning.    Care Progression Reviewed With: Chart review.    Barriers to Discharge: ETT/vent. Infusions: fentanyl, versed. Restraints. Nasal FT. Fever.    Discharge Disposition: TBD pending clinical needs and therapy recommendations.    Expected Discharge Date: TBD    Transportation:  TBD    Care Coordination Narrative: Pt continues on full vent support without weaning trials. Treated for CRS 6/25, 6/27. Epoprostenol stopped 6/26. No proning at present.        FSW already involved per 6/23 CM note.       CM will continue to follow.                 Idalia Maki RN  [place] [address]  [email or web address]  Office: [phone]  Fax: [phone]     CONFIDENTIAL Protected under [place] Statute  145.61 et seq

## 2021-06-09 NOTE — PROGRESS NOTES
FERN COVID RT PROGRESS NOTE    DATA:7/4/20    VENT DAY#  14    CURRENT SETTINGS:  VENT SETTINGS Vent Mode: AC  FiO2 (%):  [55 %-100 %] 60 %  S RR:  [18] 18  S VT:  [430 mL] 430 mL  PEEP/CPAP (cm H2O):  [8 cm H2O-12 cm H2O] 10 cm H2O  Minute Ventilation (L/min):  [7.7 L/min-8.4 L/min] 8.4 L/min  PIP:  [27 cm H2O-45 cm H2O] 27 cm H2O  MAP (cm H2O):  [13-15] 15        FIO2:   60    PATIENT PARAMETERS:    PIP 27  Pplat:  22  Pmean:  15  SBT: NA  SECRETIONS:  Large tan blood tinged thick  02 SATS:  98    ETT SIZE 8.0  Secured at  24 cm at teeth    Respiratory Medications: NA     ABG: Results for RJ GUADALUPE (MRN [id number]) as of 7/4/2020 05:35   Ref. Range 7/4/2020 05:15   pH, Arterial Latest Ref Range: 7.37 - 7.44  7.42   pCO2, Arterial Latest Ref Range: 35 - 45 mm Hg 46 (H)   pO2, Arterial Latest Ref Range: 80 - 90 mm Hg 64 (L)   Bicarbonate, Arterial Calc Latest Ref Range: 23.0 - 29.0 mmol/L 28.3   O2 Sat, Arterial Latest Ref Range: 96.0 - 97.0 % 94.8 (L)   Oxyhemoglobin Latest Ref Range: 96.0 - 97.0 % 92.8 (L)   POC Base Excess Calc Latest Units: mmol/L 4.5       NOTE / PLAN:   Pt is still on full ventilatory support; pt has a lot of secretions that was suctioned; pt FiO2 and PEEP was increased during shift but pulmonary hygiene was used to help with it; pt is being titrated back down; pt is supine; oxygenation status will be monitored

## 2021-06-09 NOTE — PROGRESS NOTES
FERN COVID RT PROGRESS NOTE    DATA:    VENT DAY#  6    CURRENT SETTINGS:  VENT SETTINGS   VC/AC RR 20, Vt 450, PEEP +20        FIO2:   75%    PATIENT PARAMETERS:    PIP 33  Pplat:  30  Pmean:  24  SBT: N/A  SECRETIONS:  Suctioning scant secretions from ETT  02 SATS:  95-97%    ETT SIZE 8.0  Secured at  23 cm at teeth    Respiratory Medications: Veletri 1/2 strength     AB.42/43/191/27.2    NOTE / PLAN:   Patient remains on full vent support, will wean ventilator settings as able. Patient proned at 13:30 on  with head turns Q2 hours. Patient is on 1/2 strngth Veletri, last syringe change at 1:20 am. Will continue to monitor.

## 2021-06-09 NOTE — PROGRESS NOTES
FERN COVID RT PROGRESS NOTE    DATA:    VENT DAY 23    CURRENT SETTINGS: A/C   VENT SETTINGS   16/350/12        FIO2:   45    PATIENT PARAMETERS:    PIP 13  Pplat:  11  Pmean:  13  SBT: None   SECRETIONS:  Christiano thick copious   02 SATS:  93    ETT SIZE 8  Secured at  24 cm at teeth    Respiratory Medications: Albuterol/MM     ABG:    Ref. Range 7/14/2020 04:02   pH, Arterial Latest Ref Range: 7.37 - 7.44  7.44   pCO2, Arterial Latest Ref Range: 35 - 45 mm Hg 51 (H)   pO2, Arterial Latest Ref Range: 80 - 90 mm Hg 63 (L)   Bicarbonate, Arterial Calc Latest Ref Range: 23.0 - 29.0 mmol/L 32.5 (H)       NOTE / PLAN:   To remain on ventilatory and assess for P/S tomorrow morning.7/14/2020  .Wei Patel

## 2021-06-09 NOTE — PROGRESS NOTES
Clinical Nutrition Follow Up Note    Current Nutrition Prescription:   Diet: TF: Isosource 1.5 @ 35 ml/hr   Supplement/modulars: no carb prosource 2 pkt TID  Flush order: water 150 ml q 4 hrs    IV dextrose or Fluids:fentaNYL citrate (PF), Last Rate: 50 mcg/hr (07/02/20 0400)  insulin infusion (1 unit/mL), Last Rate: Stopped (07/01/20 0003)  midazolam, Last Rate: 2 mg/hr (07/02/20 0400)  sodium chloride 0.9%, Last Rate: 10 mL/hr (07/02/20 0400)      Current Nutrition Intake:  Enteral nutrition access is a nasal gastric tube, with a placement date of 6/22/2020 & verified in place per x-ray on 6/23.   TF as prescribe provides: 840 ml ml,1620 kcal, 147 grams protein, 13 grams fiber, 148 grams CHO, 638 ml free water + flush 900 ml which will provide 1538 ml total (TF & flushes).    IVF provided 1696.6 ml yesterday  Meeting needs with TF prescribed    Anthropometrics:  Height: 5'5  Admit wt: 274 lb 4 oz,6/22/20  Weight: (!) 254 lb 10.1 oz (115.5 kg),6/29/20-+1 edema noted in all extremities  BMI:42.37  BMI indication: >40 extreme obesity (class 3)  Ideal body weight: 136 lb(+or-10%)  Usual body weight: unable to obtain currently  Weight History:268 lb(6/22/20)  Recent wt's:263 lb(6/25), 264 lb(6/28)  Noted 10 lb wt difference in a day??    GI Status/Output:   GI symptoms include:  Last BM 6/29 noted  Bowel sounds hypoactive per nurse on night shift    Skin/Wound:  Bayron score Bayron Scale Score: 11    Medications:  Medications reviewed.  Note: maxipime-IV,lasix, SSI,lantus,prilosec,oxycodone,pantoprazole,miralax,pericolace,senna  MVI w/minerals    Labs:  Labs reviewed  Lab Results   Component Value Date/Time    ALBUMIN 2.4 (L) 06/27/2020 11:08 AM     07/02/2020 05:02 AM    K 4.3 07/02/2020 05:02 AM    BUN 32 (H) 07/02/2020 05:02 AM    CREATININE 0.69 (L) 07/02/2020 05:02 AM     07/02/2020 05:02 AM   FSBG 112-132 mg/dL in the past 24 hrs-in good range    Estimated Nutrition Needs:  Assessment weight is 121.8  kg, estimated weight    Energy Needs: 3066-5988 kcals daily, 11-14 kcal/kg  Protein Needs: 124-155 g daily, 2-2.5 g/kg.(dose: 62 kg IBW)  Fluid Needs: 3165-7772 mls daily, 25-35 mls/kg(dose wt: 77 kg adj.wt)    Malnutrition: protein calorie malnutrition noted per doctor     Nutrition Risk Level: high risk     Nutrition dx:   Swallowing difficulty r/t respiratory failure as evidenced by intubated,NPO.      Goals:   Meet estimated nutrition needs and Tolerate tube feeding-progressing  BG  - goal met     Intervention:   Continue current TF support     Monitoring/Evaluation:   Labs, wt's closely, & TF

## 2021-06-09 NOTE — PROGRESS NOTES
07/01/20 0114   Patient Data   PIP Observed (cm H2O) 20 cm H2O   MAP (cm H2O) 13   Auto/Intrinsic PEEP Observed (cm H2O) 1.6 cm H2O   Plateau Pressure (cm H2O) 18 cm H2O   Dynamic Compliance (L/cm H2O) 66 L/cm H2O   Airway Resistance 3.1   SpO2 98 %

## 2021-06-09 NOTE — PROGRESS NOTES
06/27/20 2004   Patient Data   Vt Exp (mL) 445 mL   Minute Ventilation (L/min) 8.3 L/min   Total Resp Rate  21 BPM   PIP Observed (cm H2O) 15 cm H2O   MAP (cm H2O) 13   Auto/Intrinsic PEEP Observed (cm H2O) 0 cm H2O   Plateau Pressure (cm H2O)   (Unable to obtain)   Dynamic Compliance (L/cm H2O) 148 L/cm H2O   SpO2 94 %

## 2021-06-09 NOTE — PLAN OF CARE
Patient remains critically stable on current ventilator support tolerating supine positioning. Though patient is still on IV neuromuscular blockade, patient will cough and grimace with suctioning and periodically breath over the vent rate and has periodic hypertension associated with agitation. RN aware and administered PRN sedation. Plan to continue current positioning and ventilator support and titrate as needed.

## 2021-06-09 NOTE — PROGRESS NOTES
Clinical Nutrition Follow Up Note    Current Nutrition Prescription:   Diet: TF: Isosource 1.5 @ 30 ml/hr   Supplement/modulars: no carb prosource 2 pkt TID  Flush order: water 60 ml q 4 hrs    IV dextrose or Fluids:dexmedetomidine infusion orderable (PRECEDEX), Last Rate: 0.2 mcg/kg/hr (07/14/20 0532)  fentaNYL citrate (PF), Last Rate: 300 mcg/hr (07/14/20 0532)  midazolam, Last Rate: 10 mg/hr (07/14/20 0532)  norepinephrine, Last Rate: Stopped (07/07/20 2020)  sodium chloride 0.9%, Last Rate: 10 mL/hr (07/13/20 1200)      Current Nutrition Intake:  Enteral nutrition access is a nasal duodenal tube, with a placement date of 7/10/20 & verified in place per x-ray. (prior had NG)  TF as prescribed provides: 720 ml, 1440 kcal, 139 g protein, 127 g cho, 11 g fiber, 547 ml free water + 60 ml H20 flush q 4 hours to prevent FT clogging = 907 ml total H20.  IV drips 672 ml yesterday  Meeting estimated needs including all sources    Anthropometrics:  Height: 5'5  Admit wt: 274 lb 4 oz,6/22/20  Weight: (!) 272 lb 11.3 oz (123.7 kg),7/11/20-+1 edema in all extremities noted  BMI:46.08  BMI indication: >40 extreme obesity (class 3)  Ideal body weight: 136 lb(+or-10%)  Usual body weight: unable to obtain currently  Weight History:268 lb(6/22/20)  Recent wt's:263 lb(6/25), 264 lb(6/28),254 lb(6/29),275 lb(7/9)    GI Status/Output:   Active bowel sounds per RN  Rectal tube: 490-800 ml/24 hrs noted past couple days    Skin/Wound:  Bayron score Bayron Scale Score: 11  WOC note reviewed 7/10    Medications:  Medications reviewed.  Note:ancef-IV,SSI,prilosec,oxycodone q 4 hrs, pantoprazole, IV lasix  MVI w/minerals, Culturelle    Labs:  Labs reviewed  Lab Results   Component Value Date/Time    ALBUMIN 2.1 (L) 07/06/2020 03:52 AM     07/14/2020 04:02 AM    K 3.7 07/14/2020 04:02 AM    BUN 21 07/14/2020 04:02 AM    CREATININE 0.57 (L) 07/14/2020 04:02 AM     07/14/2020 04:02 AM    PHOS 3.5 07/14/2020 04:02 AM    MG 2.1  07/14/2020 04:02 AM    CRP <0.1 07/11/2020 05:27 AM   FSBG 111-123 mg/dL in the past 24 hrs. Within goal range.    Estimated Nutrition Needs:  Assessment weight is 121.8 kg, estimated weight    Energy Needs: 6583-4861 kcals daily, 11-14 kcal/kg  Protein Needs: 124-155 g daily, 2-2.5 g/kg.(dose: 62 kg IBW)  Fluid Needs: 7451-5019 mls daily, 25-35 mls/kg(dose wt: 77 kg adj.wt)    Malnutrition: protein calorie malnutrition noted per doctor     Nutrition Risk Level: high risk     Nutrition dx:   Swallowing difficulty r/t respiratory failure as evidenced by intubated,NPO.   Altered GI function r/t critical illness & antibiotics evidenced by rectal tube with stool volumes >500 ml.     Goals:   Meet estimated nutrition needs and Tolerate tube feeding-progressing  BG  - progressing  D/c RT; <3 loose stools/day; more formed BM's- not progressing       Plan:   Change TF formula to semi-elemental due to persistent, high volume liquid stools.  Peptamen AF at 45 ml/hr with Prosource 1 pkt tid providing 1476 kcal, 127 gm pro, 121 gm CHO, 6 gm fiber  H2O flushes to 100 ml q 4 hrs providing total 1475 ml free H2O      Monitoring:  Labs, wt, TF toleranc, & skin integrity.

## 2021-06-09 NOTE — PROGRESS NOTES
Clinical Nutrition Therapy Reassessment Note    Writer did review TF orders & when no longer having to prone patient to run TF rate at 35 ml/hr as this is under comment section.  Patient is still on prone therapy which confirmed with nurse.    Current Nutrition Prescription:   Diet: TF:Isosource 1.5 @15 ml/hr when in prone position & TF @80 ml/hr when in supine position  When no longer having to prone patient run rate @35 ml/hr  Supplement/modulars: no carb prosource 2 pkt TID  Flush order: water 175 ml q 4 hrs  IV dextrose or Fluids:dexmedetomidine infusion orderable (PRECEDEX), Last Rate: 0.5 mcg/kg/hr (06/25/20 1000)  epoprostenol, Last Rate: 8 mL/hr at 06/24/20 1524  fentaNYL citrate (PF), Last Rate: 200 mcg/hr (06/25/20 1000)  midazolam, Last Rate: 8 mg/hr (06/25/20 1000)      Current Nutrition Intake:  Enteral nutrition access is a nasal gastric tube, with a placement date of 6/22/2020 & verified in place per x-ray on 6/22 and 6/23.   Yesterday received 1280 ml of TF noted since wasn't in prone position for 16 hr. So with modulars got ~2280 kcal,177 grams protein which is exceeded needs yesterday  TF as prescribe provides:~785 ml,1537 kcal,143 grams protein,12 grams fiber,138 grams CHO,600 ml free water. + water 175 ml q 4 hrs which will provide 1650 ml total (TF & flushes).  IV drips:872.6 ml yesterday  Meeting needs with TF prescribed    Anthropometrics:  Height: 5'5  Admit wt: 274 lb 4 oz  Weight: (!) 263 lb 0.1 oz (119.3 kg),6/25/20  BMI:43.77  BMI indication: >40 extreme obesity (class 3)  Ideal body weight: 136 lb(+or-10%)  Usual body weight: unable to obtain today  Weight History:268 lb(6/22/20)  Fluid wt loss mainly with diuresing on lasix    GI Status/Output:   GI symptoms include:abdominal distended,active bowel sounds noted per nurse  Rectal tube:200 ml/24 hrs  Gastric residuals:150-300 ml noted    Skin/Wound:  Bayron score Bayron Scale Score: 12    Medications:  Medications  reviewed.  Note:decadron-IV,lasix-IV,SSI,prilose,pantoprazole,miralax,phos-nak,remdesivir,pericolace,senna  MVI w/minerals    Labs:  Labs reviewed  Lab Results   Component Value Date/Time    ALBUMIN 2.7 (L) 06/22/2020 12:31 PM    ALBUMIN 2.7 (L) 06/22/2020 12:31 PM     (H) 06/25/2020 04:40 AM    K 3.8 06/25/2020 04:40 AM    BUN 33 (H) 06/25/2020 04:40 AM    CREATININE 0.78 06/25/2020 04:40 AM     (H) 06/25/2020 04:40 AM   Phos:2.0(6/25/20)-low  CRP:4.2(6/25/20)-high, however down from12(6/23/20)-high  Trig's: 138(6/22/20)-WNL  On Mg,phos, & K protocol  FSBG 162-215 mg/dL in the past 24 hrs,most <180    Estimated Nutrition Needs:  Assessment weight is 121.8 kg, estimated weight    Energy Needs: 9366-2092 kcals daily, 11-14 kcal/kg  Protein Needs: 124-155 g daily, 2-2.5 g/kg.(dose: 62 kg IBW)  Fluid Needs: 9914-1913 mls daily, 25-35 mls/kg(dose wt: 77 kg adj.wt)    Malnutrition: protein calorie malnutrition noted per doctor    Nutrition Risk Level: high risk    Nutrition dx:   Swallowing difficulty r/t respiratory failure as evidenced by intubated,NPO.     Goals:   Meet estimated nutrition needs and Tolerate tube feeding-progressing    Intervention:   Continue current TF & recommend doctor to adjust flushes since getting diuresed    Monitoring/Evaluation:   Labs, wt, & TF tolerance/prone therapy    Electronically signed by:  Chantel Riley RD

## 2021-06-09 NOTE — PLAN OF CARE
Problem: Pain  Goal: Patient's pain/discomfort is manageable  Outcome: Progressing     Problem: Safety  Goal: Patient will be injury free during hospitalization  Outcome: Progressing     Problem: Daily Care  Goal: Daily care needs are met  Outcome: Progressing     Problem: Psychosocial Needs  Goal: Demonstrates ability to cope with hospitalization/illness  Outcome: Progressing  Goal: Collaborate with patient/family/caregiver to identify patient specific goals for this hospitalization  Outcome: Progressing     Problem: Discharge Barriers  Goal: Patient's discharge needs are met  Outcome: Progressing     Problem: Knowledge Deficit  Goal: Patient/family/caregiver demonstrates understanding of disease process, treatment plan, medications, and discharge instructions  Outcome: Progressing     Problem: Potential for Compromised Skin Integrity  Goal: Skin integrity is maintained or improved  Outcome: Progressing  Goal: Nutritional status is improving  Outcome: Progressing     Problem: Urinary Incontinence  Goal: Perineal skin integrity is maintained or improved  Outcome: Progressing     Problem: Potential for Falls  Goal: Patient will remain free of falls  Outcome: Progressing     Problem: Risk of Injury Due to Unsafe Behavior  Goal: Patient will remain safe while in restraint; physical/psychological needs will be met  Outcome: Progressing  Goal: Alternatives to restraint will be continually assessed with use of least restrictive device and discontinuation as soon as possible  Outcome: Progressing  Goal: Patient/Family will be able to communicate reason for restraint and steps for restraint application and removal  Outcome: Progressing     Problem: Inadequate Airway Clearance  Goal: Patient will maintain patent airway  Outcome: Progressing     Problem: Mechanical Ventilation  Goal: Patient will maintain patent airway  Outcome: Progressing     Problem: Glucose Imbalance  Goal: Achieve optimal glucose control  Outcome:  Progressing    Neuro: Patient opens eyes to pain and moves extremities horizontally. Increase in sedation needed based on increased HR and BP    Resp: No oxygen desaturations or vent changes during the shift.    CV: Patient was in A-fib RVR without the presence of HTN. 3 PRN doses of metoprolol given plus 1 x amio bolus in addition to an amiodarone drip. Dr. Perlman notified and at bedside for increased HR and arrhythmia     GI/: Dr. Perlman stated to increase to 80ml/hr as tolerated. Adequate urine output.        Problem: Potential for transmission of organism by Contact, Enteric, Droplet and/or Airborne routes  Goal: Prevent transmission of organisms  Outcome: Progressing

## 2021-06-09 NOTE — PROGRESS NOTES
07/05/20 0751   Patient Data   PIP Observed (cm H2O) 28 cm H2O   MAP (cm H2O) 18   Plateau Pressure (cm H2O) 27 cm H2O

## 2021-06-09 NOTE — PROGRESS NOTES
Infectious Disease Progress Note    Assessment/Plan  Impression:  COVID-19 pneumonia   Received CCP. Started on dexamethasone (6/22-29) and remdesivir for 10 day course  Received 400 mg tocilizumab on 6/25, 6/27 for suspected cytokine release syndrome.     Quantiferon gold negative  Hep B and C negative  Strongyloides antibody negative -- no need for further ivermectin    Ongoing fever and leukocytosis. Steroid may contribute to leukocytosis--> now normalized. No fever for about 2 days. Still having intermittent low grade temps    Colonized with yeast in sputum. Risk for invasive candidiasis -- broad spectrum antibiotics, central line, candida colonization. Beta-D-glucan negative 6/29 -- less likely therefore that he has invasive candidiasis.     Now with MSSA in sputum as of 7/2, 7/4 -- could be the cause of pneumonia. On coverage since 6/27, now focused to cefazolin.  Ongoing fevers despite good antibiotic coverage suggests fevers are due to another cause.     Recommend:    Cefazolin until tomorrow  Repeat blood cultures if fever >101        Principal Problem:    COVID-19  Active Problems:    Shock (H)    Acute and chronic respiratory failure with hypoxia (H)    On mechanically assisted ventilation (H)    ARDS (adult respiratory distress syndrome) (H)    Atrial fibrillation with rapid ventricular response (H)    Atrial fibrillation with rapid ventricular response (H)    Encephalopathy    Herb Sweet MD  [facility]  Direct messaging: [employer] Paging  On-Call ID provider: [phone], option: 9    ______________________________________________________________________       Subjective  First visit by me. Remains intubated and sedated. No events over the weekend. Diaphoretic.       Objective    Anti-infectives:  Cefazolin 7/7-  Meropenem 7/2-7  Ceftriaxone 6/22  Ivermectin 6/25-26  Cefepime 6/27-7/2  Vancomycin 6/27-7    Vital signs in last 24 hours  Temp:  [99.4  F (37.4  C)-100.4  F  (38  C)] 99.4  F (37.4  C)  Heart Rate:  [68-98] 90  Resp:  [19-25] 22  BP: ()/(50-80) 121/61  Arterial Line BP: ()/(46-80) 144/64  FiO2 (%):  [45 %] 45 %  Weight:   (!) 270 lb 15.1 oz (122.9 kg)    Intake/Output last 3 shifts  I/O last 3 completed shifts:  In: 2777.3 [I.V.:632.3; NG/GT:1845; IV Piggyback:300]  Out: 3440 [Urine:2950; Stool:490]  Intake/Output this shift:  I/O this shift:  In: 524.9 [I.V.:154.9; NG/GT:270; IV Piggyback:100]  Out: 650 [Urine:650]    Review of Systems   Review of systems not obtained due to inability to communicate with the patient.     Physical Exam--  General appearance: critically ill in ICU, on vent,   Eyes: opens eyes to voice   Throat: oral ett inplace.  Neck: short neck-difficult to examine  CV: regular, no murmurs  Lungs: coarse on the right side   Abdomen: not distended.  Extremities: scrotal edema   Skin: Skin color, texture, turgor normal. No rashes or lesions  Neurologic: Mental status: sedated  PICC R UE  Hillman  Rectal tube     Pertinent Labs   Lab Results: personally reviewed.     Results from last 7 days   Lab Units 07/13/20 0437 07/12/20  1225 07/12/20  0402   LN-WHITE BLOOD CELL COUNT thou/uL 10.8 10.2 8.8   LN-HEMOGLOBIN g/dL 10.8* 10.5* 10.3*   LN-HEMATOCRIT % 35.0* 34.1* 33.3*   LN-PLATELET COUNT thou/uL 87* 98* 92*        Results from last 7 days   Lab Units 07/13/20 0437 07/12/20  0402 07/11/20  0527   LN-SODIUM mmol/L 141 141 142   LN-POTASSIUM mmol/L 3.9 4.0 3.8   LN-CHLORIDE mmol/L 103 104 102   LN-CO2 mmol/L 32* 34* 36*   LN-BLOOD UREA NITROGEN mg/dL 21 24* 25*   LN-CREATININE mg/dL 0.54* 0.57* 0.58*   LN-CALCIUM mg/dL 8.0* 7.9* 8.0*     Lab Results   Component Value Date    ALT 27 07/06/2020    AST 30 07/06/2020    ALKPHOS 44 (L) 07/06/2020    BILITOT 0.3 07/06/2020     Micro:  6/22 blood negative  6/24 sputum usual bruno and yeast  6/27 blood including fungal isolate no growth to date   6/27 sputum gram stain with yeast; culture usual  bruno  7/2 sputum MSSA  7/2 blood culture negative to date  7/4 sputum MSSA    Results for RJ GUADALUPE (MRN [id number]) as of 6/26/2020 11:04   Ref. Range 6/24/2020 16:46   Hepatitis B Surface Ag Latest Ref Range: Negative  Negative   Hepatitis C Ab Latest Ref Range: Negative  Negative   Results for RJ GUADALUPE (MRN [id number]) as of 6/26/2020 11:04   Ref. Range 6/23/2020 04:26 6/24/2020 05:24 6/24/2020 16:46 6/25/2020 04:40 6/26/2020 04:22   CRP Latest Ref Range: 0.0 - 0.8 mg/dL 12.0 (H) 10.0 (H)  4.2 (H) 2.1 (H)   LD (LDH) Latest Ref Range: 125 - 220 U/L 480 (H) 477 (H)  434 (H) 452 (H)     Pertinent Radiology   Radiology Results: Personally reviewed image/s and Personally reviewed impression/s    No results found.

## 2021-06-09 NOTE — PLAN OF CARE
Problem: Pain  Goal: Patient's pain/discomfort is manageable  Outcome: Progressing     Problem: Safety  Goal: Patient will be injury free during hospitalization  Outcome: Progressing     Problem: Daily Care  Goal: Daily care needs are met  Outcome: Progressing     Problem: Psychosocial Needs  Goal: Demonstrates ability to cope with hospitalization/illness  Outcome: Progressing  Goal: Collaborate with patient/family/caregiver to identify patient specific goals for this hospitalization  Outcome: Progressing     Problem: Discharge Barriers  Goal: Patient's discharge needs are met  Outcome: Progressing     Problem: Knowledge Deficit  Goal: Patient/family/caregiver demonstrates understanding of disease process, treatment plan, medications, and discharge instructions  Outcome: Progressing     Problem: Potential for Compromised Skin Integrity  Goal: Skin integrity is maintained or improved  Outcome: Progressing  Goal: Nutritional status is improving  Outcome: Progressing     Problem: Urinary Incontinence  Goal: Perineal skin integrity is maintained or improved  Outcome: Progressing     Problem: Potential for Falls  Goal: Patient will remain free of falls  Outcome: Progressing     Problem: Risk of Injury Due to Unsafe Behavior  Goal: Patient will remain safe while in restraint; physical/psychological needs will be met  Outcome: Progressing  Goal: Alternatives to restraint will be continually assessed with use of least restrictive device and discontinuation as soon as possible  Outcome: Progressing  Goal: Patient/Family will be able to communicate reason for restraint and steps for restraint application and removal  Outcome: Progressing     Problem: Inadequate Airway Clearance  Goal: Patient will maintain patent airway  Outcome: Progressing     Problem: Mechanical Ventilation  Goal: Patient will maintain patent airway  Outcome: Progressing  Goal: ET tube will be managed safely  Outcome: Progressing     Problem: Inadequate  Gas Exchange  Goal: Patient is adequately oxygenated and ventilation is improved  Outcome: Progressing     Problem: Glucose Imbalance  Goal: Achieve optimal glucose control  Outcome: Progressing     Problem: Potential for transmission of organism by Contact, Enteric, Droplet and/or Airborne routes  Goal: Prevent transmission of organisms  Outcome: Progressing

## 2021-06-09 NOTE — PROGRESS NOTES
FERN COVID RT PROGRESS NOTE    DATA:    VENT DAY#  16    CURRENT SETTINGS:  VENT SETTINGS   Ac 18, 430, peep 12, 50%            PATIENT PARAMETERS:    PIP 35  Pplat:  26  Pmean:  18  SBT: n/a  SECRETIONS:  Moderate to large thick tan to pale yellow  02 SATS:  95%    ETT SIZE 8.0  Secured at  24 cm at teeth    Respiratory Medications: none     ABG: done at 14:32:  7.38/50/66/28/95%    NOTE / PLAN:   Pt placed prone at 16:40 after e-tab replaced with tape.

## 2021-06-09 NOTE — PROGRESS NOTES
07/22/20 0755   Weaning Assessment    Wean Start Time  0755   Total RSBI 47   RSBI <105 Y   NIF 50 cmH2O

## 2021-06-09 NOTE — PROGRESS NOTES
FERN COVID RT PROGRESS NOTE    DATA:    VENT DAY#  28    CURRENT SETTINGS:  VENT SETTINGS  AC/ Tidal volume 450, respiratory rate 16, peep 8        FIO2:  35%    PATIENT PARAMETERS:    PIP 23  Pplat:  21  Pmean:  11    SECRETIONS:  Large amounts of yellow white thick to thin2  02 SATS:  95%    ETT SIZE 8  Secured at  24 cm at teeth    Respiratory Medications:   Mucomyst every 12 hours  Duo Nebs every 12 hours    ABG:    Ref Range & Units  7/22/20 0555      pH, Arterial  7.37 - 7.44  7.39       pCO2, Arterial  35 - 45 mm Hg  45       pO2, Arterial  80 - 90 mm Hg  87       Bicarbonate, Arterial Calc  23.0 - 29.0 mmol/L  26.3       O2 Sat, Arterial  96.0 - 97.0 %  97.6High         Oxyhemoglobin  96.0 - 97.0 %  95.0Low         Base Excess, Arterial Calc  mmol/L  1.8          NOTE / PLAN:    Patient was trialed on PS/CPAP 5/5 FIO@=30% for 50 minutes then patient dropped SpO2.  Increased PS and Peep to 8/8 and FiO2 to 35%.  Patient tolerated trial for  530 minutes at which time trial was ended to rest patient for the next day.  Agitation and excessive cough at times. Patient awake and moving purposefully and following commands.  Patient primarily Georgian speaking

## 2021-06-09 NOTE — PROGRESS NOTES
07/17/20 0736   Vent Settings   Resp Rate (Set) 16   FiO2 (%) 50 %   Insp Time (sec) 0.85 sec   Vt (Set, mL) 350 mL   PEEP/CPAP (cm H2O) 14 cm H2O   Trigger Sensitivity Flow (L/min) 2 L/min   Humidification Heater   Heater Temperature 98.2  F (36.8  C)   Patient Data   Vt Exp (mL) 377 mL   Minute Ventilation (L/min) 8.5 L/min   Total Resp Rate  21 BPM   PIP Observed (cm H2O) 15 cm H2O   MAP (cm H2O) 15   Plateau Pressure (cm H2O)   (unable to obtain)   SpO2 96 %   Heart Rate 64

## 2021-06-09 NOTE — PROGRESS NOTES
07/22/20 0755   Patient Data   Vt (observed, mL) 570 mL   Vt Exp (mL) 567 mL   Minute Ventilation (L/min) 12 L/min   Total Resp Rate  24 BPM   PIP Observed (cm H2O) 18 cm H2O   MAP (cm H2O) 8.1   SpO2 95 %   Heart Rate 85

## 2021-06-09 NOTE — PROGRESS NOTES
07/09/20 1654   Patient Data   Vt (observed, mL) 359 mL   Vt Exp (mL) 405 mL   Minute Ventilation (L/min) 8.4 L/min   Total Resp Rate  20 BPM   PIP Observed (cm H2O) 33 cm H2O   MAP (cm H2O) 20   Auto/Intrinsic PEEP Observed (cm H2O) 0.5 cm H2O   Plateau Pressure (cm H2O) 30 cm H2O   Dynamic Compliance (L/cm H2O) 44 L/cm H2O   Airway Resistance 22   SpO2 96 %   Heart Rate 93

## 2021-06-09 NOTE — PLAN OF CARE
Problem: Daily Care  Goal: Daily care needs are met  7/4/2020 1955 by Stevan Dean, RN  Outcome: Not Progressing      Problem: Knowledge Deficit  Goal: Patient/family/caregiver demonstrates understanding of disease process, treatment plan, medications, and discharge instructions  Outcome: Not Progressing    Family updated by MD  Problem: Safety  Goal: Patient will be injury free during hospitalization  7/4/2020 1955 by Stevan Dean, RN  Outcome: Not Progressing  Pt not progressing on vent , becomes agitated and thrashes head, at risk for accidental extubation    Problem: Pain  Goal: Patient's pain/discomfort is manageable  7/4/2020 1955 by Stevan Dean, RN  Outcome: Not Progressing  Prepared to position pt prone , max fent gtt, max versed gtt, vecuronium and propofol added to regimen

## 2021-06-09 NOTE — PROGRESS NOTES
FERN COVID RT PROGRESS NOTE    DATA:    VENT DAY#  26    CURRENT SETTINGS:  VENT SETTINGS   VC-AC 16// +14        FIO2:   45%    PATIENT PARAMETERS:    PIP 19  Pplat:  Unable to obtain  Pmean:  16  SBT: Not indicated   SECRETIONS:  Moderate amount of thick white/pink tinged secretions  02 SATS:  92-98%    ETT SIZE 8.0  Secured at  24 cm at teeth    Respiratory Medications: Albuterol and MUCOMYST every 6 hours with diminished breath sounds before and after     AB/17/ at 1350- 7.41/50/94/29.5 on 45% with a P/F ratio 208    Patient was placed supine at 1020 this AM.    NOTE / PLAN:   Continue to wean patient as tolerated.

## 2021-06-09 NOTE — PROGRESS NOTES
"Clinical Nutrition Follow Up Note    Current Nutrition Prescription:   Diet: TF:  Peptamen AF at 45 ml/hr (formula changed, 7/14 due to persistent diarrhea)   Supplement/modulars: no carb prosource 1 pkt TID  Flush order: water 100 ml q 4 hrs    IV dextrose or Fluids:dexmedetomidine infusion orderable (PRECEDEX), Last Rate: 1 mcg/kg/hr (07/16/20 0600)  fentaNYL citrate (PF), Last Rate: 200 mcg/hr (07/16/20 0600)  midazolam, Last Rate: 11 mg/hr (07/16/20 0600)  sodium chloride 0.9%, Last Rate: 10 mL/hr (07/14/20 1200)      Current Nutrition Intake:  Enteral nutrition access is a nasal duodenal tube, with a placement date of 7/10/20 & verified in place per x-ray.   TF as prescribed provides:  1476 kcal, 127 gm pro, 121 gm CHO, 6 gm fiber, total 1475 ml free H2O (TF + flushes)  IV drips 864 ml yesterday  Meeting estimated needs including all sources    Anthropometrics:  Height: 5'5\"  Admit wt: 274 lb 4 oz,6/22/20  Weight: (!) 265 lb 10.5 oz (120.5 kg),7/16/20- +2 Bruce UE edema.  Wt range since admit: 254-284 lb  BMI:42.4 (dry wt)  BMI indication: >40 extreme obesity (class 3)  Ideal body weight: 136 lb(+or-10%)  Usual body weight: unable to obtain currently  Weight History:268 lb(6/22/20)    GI Status/Output:   Active bowel sounds per RN  Rectal tube: 0-50/ ml/day past 2 days. Significant decrease with TF change, off antibiotic & on probiotic. Plan to d/c RT today.    Skin/Wound:  Bayron score Bayron Scale Score: 11  WOC note reviewed 7/15    Medications:  Medications reviewed.  Note: SSI,prilosec,oxycodone q 4 hrs, pantoprazole, lasix,  MVI w/minerals, Culturelle    Labs:  Labs reviewed  Lab Results   Component Value Date/Time    ALBUMIN 2.1 (L) 07/06/2020 03:52 AM     07/16/2020 05:47 AM    K 4.3 07/16/2020 05:47 AM    BUN 23 (H) 07/16/2020 05:47 AM    CREATININE 0.54 (L) 07/16/2020 05:47 AM     (H) 07/16/2020 05:47 AM    PHOS 3.6 07/16/2020 05:47 AM    MG 2.1 07/16/2020 05:47 AM    CRP <0.1 07/11/2020 " 05:27 AM   FSBG 127-134 mg/dL in the past 24 hrs. Within goal range.    Estimated Nutrition Needs:  Assessment weight is 121.8 kg, estimated weight    Energy Needs: 5596-2943 kcals daily, 11-14 kcal/kg  Protein Needs: 124-155 g daily, 2-2.5 g/kg.(dose: 62 kg IBW)  Fluid Needs: 4920-1371 mls daily, 25-35 mls/kg(dose wt: 77 kg adj.wt)    Malnutrition: protein calorie malnutrition noted per doctor     Nutrition Risk Level: stable high risk     Nutrition dx:   Swallowing difficulty r/t respiratory failure as evidenced by intubated,NPO.   Altered GI function r/t critical illness & antibiotics evidenced by rectal tube with stool volumes >500 ml. Resolved       Goals:   Meet estimated nutrition needs and Tolerate tube feeding-progressing  BG  - met  D/c RT; <3 loose stools/day; more formed BM's- progressing       Plan:   Continue current TF support     Monitoring:  Labs, wt, TF tolerance, & skin integrity.

## 2021-06-09 NOTE — PROGRESS NOTES
[address]    ICU PROGRESS NOTE:  DOS:  06/28/2020    Patient Summary:   Tobias Palmer is a 54 year old male with a history of obesity (BMI 45) who was admitted and intubated on 6/21 at [facility] with COVID-19 pneumonia, transferred to [facility] ICU on 6/22. Course complicated by ARDS and shock, requiring proning and paralytics.     Major Changes for Today    - Start insulin gtt  - Lighten sedation RAAS 0 to -1    Assessment/Plan:  # Acute Pain  # Agitation/Anxiety  # Sedation  - Continue fentanyl, versed.  - Precedex off 6/27 as possible fever contributor  - Liberalize RAAS to 0 to -1. RAAS this am -3     CVS:  #Lactic acidosis- resolved  - Hemodynamically  Normal at this time  - Continue with continuous telemetry   - Lactic acidosis cleared.      RESP:  # Acute hypoxic respiratory failure in the setting of COVID-19 infection.  - Vent Mode: AC18/430/12/45%  - Veletri now off  - Follow ABG 7.46/47/64/31. Hold further PEEP wean for now.      GI/FEN:  # Protein calorie malnutrition  - Tube feeds @ rate of 35 ml/hr   - PPI ordered for stress ulcer prophylaxis     RENAL :  # Rhabdomyolysis, resolved.   #Lactic acidosis, resolved  - CK peaked to 1864 at [facility];  6/25 on . No further trending necessary  - Creatinine had been uptrending, now improved after fluid resuscitation.   - Lactic acidosis now cleared.     ID:  # COVID-19 infection  #Persistent fevers   - Remdesivir, started 6/22 for 10 day course  - s/p CCP and tocilizumab 6/25 and 6/27  - S/p Ivermectin x2 doses.   - Pancultured 6/27, NGTD  - Fever curve now improved after Tocilizumab dosing vs. Empiric antibiotics.    - Continue Vancomycin and Cefepime 6/27  - Strongyloides antibody pending    HEMATOLOGIC:  # Anemia of critical illness  - Hgb stable at 12.6, platelets normal.     # Coagulopathy due to COVID-19 infection  - Lovenox 60 mg BID   - US BUE and BLE given persistent fevers, negative for DVT.      ENDOCRINE:  # Stress  "hyperglycemia  #Steroid induced hyperglycemia  - Keep BG< 180 for optimal healing  - Start insulin given uncontrolled glucoses.   - Continue dexamethasone for COVID, started on 6/22     DISPO: ICU     GENERAL CARES:  DVT proph: Yes.  GI proph: Yes.  Requires barlow for strict I&O: Yes  Restraints required for safety: Yes.     Angela Lagos CNP   Total time 38 mins  Staffed with Dr. Garcia  Son updated     Overnight events:   Course reviewed. Fever curve improved. Very sedated, no eye opening unable to obtain ROS    Objective:  Physical Exam:  Vent settings for last 24 hours:  Vent Mode: AC  FiO2 (%):  [45 %] 45 %  S RR:  [18] 18  S VT:  [430 mL-450 mL] 430 mL  PEEP/CPAP (cm H2O):  [12 cm H2O-16 cm H2O] 12 cm H2O  Minute Ventilation (L/min):  [8.3 L/min-9.6 L/min] 9.1 L/min  PIP:  [15 cm H2O-26 cm H2O] 26 cm H2O  MAP (cm H2O):  [13-17] 17    /55   Pulse 85   Temp 98.8  F (37.1  C) (Esophageal)   Resp 22   Ht 5' 5\" (1.651 m)   Wt (!) 264 lb 8.8 oz (120 kg)   SpO2 92%   BMI 44.02 kg/m      Intake/Output last 3 shifts:  I/O last 3 completed shifts:  In: 3047.4 [I.V.:1957.4; NG/GT:840; IV Piggyback:250]  Out: 6250 [Urine:6150; Stool:100]  Intake/Output this shift:  No intake/output data recorded.    Physical Exam  Neuro: sedated, no eye opening to voice, minimal withdrawal of extremities. Pupils 3 mm brisk and round.   HEENT:  Midface and periorbital swelling. NJ present with feeds infusing.   RESP: BBS, lungs coarse, no wheezes, rales or rhonchi  CV: S1, S2  GI: abdomen soft and compressible. No masses  : voiding per barlow, urine clear and yellow   MSK: Extremities: calves soft and compressible. Pulses 2+.     LAB:  Results from last 7 days   Lab Units 06/28/20  0412   LN-WHITE BLOOD CELL COUNT thou/uL 12.6*   LN-HEMOGLOBIN g/dL 13.7*   LN-HEMATOCRIT % 44.6   LN-PLATELET COUNT thou/uL 198  196     Results from last 7 days   Lab Units 06/28/20  0412 06/27/20  1108 06/27/20  0351 06/26/20  0422  " 06/22/20  1231   LN-SODIUM mmol/L 145  --  145 145   < > 145   LN-POTASSIUM mmol/L 4.0  4.1  --  4.2 4.3   < > 3.6   LN-CHLORIDE mmol/L 107  --  108* 109*   < > 105   LN-CO2 mmol/L 30  --  27 28   < > 28   LN-BLOOD UREA NITROGEN mg/dL 35*  --  45* 47*   < > 14   LN-CREATININE mg/dL 0.76  --  1.01 0.95   < > 0.77   LN-CALCIUM mg/dL 7.8*  --  7.9* 7.9*   < > 7.7*   LN-PROTEIN TOTAL g/dL  --  6.5  --   --   --  6.5  6.5   LN-BILIRUBIN TOTAL mg/dL  --  0.4  --   --   --  0.4  0.4   LN-ALKALINE PHOSPHATASE U/L  --  50  --   --   --  49  49   LN-ALT (SGPT) U/L  --  53*  --   --   --  45  45   LN-AST (SGOT) U/L  --  54*  --   --   --  69*  69*    < > = values in this interval not displayed.

## 2021-06-09 NOTE — PROGRESS NOTES
FERN COVID RT PROGRESS NOTE    DATA:    VENT DAY#  8    CURRENT SETTINGS:  VENT SETTINGS   AC 18, 430, +12        FIO2:   45    PATIENT PARAMETERS:    PIP 26  Pplat:  26  Pmean:  17  SBT: N/A  SECRETIONS:  Small tenacious white    02 SATS:  92    ETT SIZE 8  Secured at  24 cm at teeth    Respiratory Medications: none     ABG:     Results for RJ GUADALUPE (MRN [id number]) as of 6/28/2020 11:56   Ref. Range 6/28/2020 04:12   pH, Arterial Latest Ref Range: 7.37 - 7.44  7.46 (H)   pCO2, Arterial Latest Ref Range: 35 - 45 mm Hg 47 (H)   pO2, Arterial Latest Ref Range: 80 - 90 mm Hg 64 (L)   Bicarbonate, Arterial Calc Latest Ref Range: 23.0 - 29.0 mmol/L 31.4 (H)   O2 Sat, Arterial Latest Ref Range: 96.0 - 97.0 % 93.1 (L)       NOTE / PLAN:   EKG done at 1800 for Tachycardia. Pt remains intubated with no weans at this time.

## 2021-06-09 NOTE — PROGRESS NOTES
Pt had an uneventful night, sedation was adjusted per parameters, Pt remained on vent per settings, pt remained hemodynamically stable. Pt has had increased secretions both oral and ETT. Will continue to monitor.

## 2021-06-09 NOTE — PROGRESS NOTES
07/16/20 0439   Vent Settings   Resp Rate (Set) 16   FiO2 (%) 60 %   Insp Time (sec) 0.85 sec   Vt (Set, mL) 350 mL   PEEP/CPAP (cm H2O) 14 cm H2O   Trigger Sensitivity Flow (L/min) 2 L/min   I:E Ratio 1.0:2.3   Humidification Heater   Heater Temperature 99.3  F (37.4  C)   Patient Data   Vt (observed, mL) 355 mL   Vt Exp (mL) 387 mL   Minute Ventilation (L/min) 7.7 L/min   Total Resp Rate  20 BPM   PIP Observed (cm H2O) 14 cm H2O   MAP (cm H2O) 15   Plateau Pressure (cm H2O)   (No Value)

## 2021-06-09 NOTE — ANESTHESIA PROCEDURE NOTES
Feeding Tube Insertion    Performing CRNA:  Bety Hinkle CRNA  Tube Type:  Nasojejunal and nasogastric  Placement/position confirmed:  X-ray  Number of Attempts:  1  Difficulty:  None

## 2021-06-09 NOTE — PROGRESS NOTES
07/13/20 0226   Patient Data   Vt (observed, mL) 353 mL   Minute Ventilation (L/min) 8 L/min   Total Resp Rate  22 BPM   PIP Observed (cm H2O) 27 cm H2O   MAP (cm H2O) 17   Auto/Intrinsic PEEP Observed (cm H2O) 1.2 cm H2O   Plateau Pressure (cm H2O) 24 cm H2O   SpO2 100 %   Heart Rate 72   Patient remains stable on current ventilator support. Plan to wean support as tolerated.

## 2021-06-09 NOTE — PROGRESS NOTES
07/04/20 0127   Patient Data   Vt (observed, mL) 414 mL   Minute Ventilation (L/min) 8.4 L/min   Total Resp Rate  19 BPM   PIP Observed (cm H2O) 27 cm H2O   MAP (cm H2O) 15   Auto/Intrinsic PEEP Observed (cm H2O) 0.7 cm H2O   Plateau Pressure (cm H2O) 22 cm H2O   Dynamic Compliance (L/cm H2O) 39 L/cm H2O   Airway Resistance 8

## 2021-06-09 NOTE — PROGRESS NOTES
PROVIDER RESTRAINT FOR NON-VIOLENT BEHAVIOR FACE TO FACE EVALUATION    Patient's Immediate Situation:  Patient demonstrated the following behaviors: Pulling/tugging at invasive lines or tubes and does not respond to verbal/non-verbal redirection    Patient's Reaction to the intervention:  Does patient understand the reason for restraint/seclusion? No    Medical Condition:  Is there any evidence of compromise of Skin integrity, Respiratory, Cardiovascular, Musculoskeletal, Hydration? No    Behavioral Condition:  In consultation with the RN, is there a need to continue this restraint or seclusion? Yes    See Restraint Flowsheet for complete restraint documentation and assessment.    Jesús Navarrete MD

## 2021-06-09 NOTE — CONSULTS
Palliative Care New COVID-19 Consult (Telephone Visit)  NAME: Tobias Palmer, : 1965,   MRN: [id number] Date: 2020    Palliative Medicine Consult Service:  Consulted by Esmer Willis CNP to assist with symptom management, clarification of goals of care, and development of plan of care.     Palliative Care Assessment and Plan:  Tobias Palmer is a 54 y.o. male who is being evaluated via a billable video visit, they have a medical history of obesity. Presented to ED on  with 3 days history of fever, cough, dyspnea. Intubated in ED and admitted for [facility] on . Found to be COVID-19+ and transferred to [place] on 2020. Course complicated by ARDS and shock, requiring proning and paralytics. S/p CCP. Started on dexamethasone - and remdesivir for 10 day course. Received tocilizumab 400 mg on ,  for suspected cytokine release syndrome. On Cefazolin for MSSA pneumonia. Continues to be intubated/sedated/paralyzed.    Prognosis: Based on clinical assessment at this current point in time with available clinical data, including pt's age, comorbidities, and prior functional status, patient is felt to be at medium/high risk of death. This was communicated to patient and/or their surrogate decision maker and their treatment preferences are discussed below.    Decision making capacity of patient: Not decisional    Medical Surrogate:  Nathalie (wife) Phone number: [phone]  Alternate Surrogate:  Carlton (son) Phone number: [phone]    Assessment of Understanding: Patient's son is able to express understanding about being admitted for COVID disease and associated rule out process.     Goals of Care: Restorative (See Full discussion below for further details)   - Prior documentation available: N  -Wife states as no prior serious illness, no HCD.     Symptoms:   Shortness of breath: due to ARDS from COVID-19 infection. Currently intubated, sedated, paralyzed for continued  "proning. Received CCP. Started on dexamethasone (6/22-6/29) and remdesivir for 10 day course. Received tocilizumab on 6/25, 6/27 for suspected cytokine release syndrome. On cefazolin for MSSA pneumonia.  - Ventilator management per ICU/Pulmonology   - Weaning trials per ICU/RT    Weakness: due to acute illness from COVID-19 infection. Remains intubated, sedated, paralyzed.    Psychosocial issues: Unable to assess as patient intubated/sedated    Spiritual Issues: Unable to assess as patient intubated/sedated    Current CODE status: FULL code. Discussed with son.  ==============================================================================  HPI  Tobias Palmer, 54 y.o., male with a medical history as noted above admitted on 6/22/2020. Currently is intubated/sedated/paralyzed. Supinated this morning    Goals of care/Advance Directives (discussion): Introduced palliative care to patient's son Carlton and wife Nathalie our role in pt's care, current medical status, and proposed treatment plans.     Spoke with son Carlton on telephone. He has been getting updates from nurses and medical team. He understands that his father has COVID-19 infection but is stable. Also aware of bacterial infection/pneumonia and had a fever a couple of days ago. He states his father is still \"needing help\" with oxygen. Plan had been to wean sedation, but course has been changing day by day.  Father lives in [place] with his wife Nathalie, Carlton's older sister and their grandmother in the same house. Carlton does not live with them. 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 Father was healthy before this, working full time and taking care of home, mowing the lawn. Father is Yazidism, but not a regular Mandaen goer. He hopes his father will recover from this infection and be able to return to home and to his life as it was prior to hospitalization. He speaks with his mother regularly to update her and family on his father's " condition. Mother speaks some English, but mostly Venezuelan. Carlton states his father has no HCD to his knowledge, as his father was always healthy. When asked about who would make decisions on behalf of his father regarding his medical treatment if his father could not speak for himself, he states probably both he and his mother would make decisions together. He asked what sort of medical decisions. Discussed, for example, that in some cases if patients are on the ventilator for over 2 weeks as his father, we become worried that his chances of being able to successfully extubate are lower. In those instances, we sometimes need to discuss tracheostomy, as keeping the tube in for longer than 4 weeks can cause swelling in the throat, damage to vocal cords, etc. In those instances we may need to discuss these decisions with a surrogate decision maker. We also discussed risk of dying from COVID-19 infection, should his condition worsen any further. Discussed that it may be important to consider what his father would want if his health would worsen, such as CPR and the difficulties of resuscitation when the lungs are damaged/scarred from COVID-19. He would like his father to remain full code for now. He states he was told that some patients may require 3-4 weeks to recover, so he still hopeful that his father will recover and that those decisions will not need to be made.    Spoke with patient's wife Bethanie over telephone with . Nathalie would like a daily update from medical team with a . She has been receiving updates from her daughter and son, but as they both work, she does not always get regular updates. She and Tobias have been  for over 20 years. They have 3 children--Carlton and Brianda Mariee [place] and they do have one other child in [place]. Daughter updated Svetlanarain this morning, stated he is doing well. She has been very worried about him as she is not able  to come to the hospital and visit him. When asked about her worries/concerns--she would like to know when he could come off ventilator and return home as they miss him a lot. She has not been able to sleep for past 2 weeks due to worrying about him. She becomes tearful during the conversation. She states Tobias has never had any serious illness or health problems in the past, and does not have a PCP. She also confirmed her  has no HCD. She becomes apprehensive when asked who would make decisions on her 's behalf as he cannot make decisions for himself right now, as she wonders if his condition has worsened. Reassured her that while we are concerned about her 's overall condition, nothing has changed to prompt this question but that knowing who is the surrogate decision maker is important for all patients. She states Carlton was listed as the main contact as he speaks English, but if there is an  available, she should be the one making decisions for her . Discussed that I would communicate this with his medical team. When asked about how we can help support her , she requests a  or  to pray for her , as she feels he may be able to hear this even though sedated and on the ventilator.     Psychosocial/Spiritual Aspects of Care: Per wife, no history of depression/anxiety or other concerns. Per son, patient is Buddhism but does not regularly attend [employer].  Marital status:   Children: 3 children. Son Carlton, older sister Brianda and another child living in [place]. Grandmother lives patient and wife.   Occupation: Cook    Current PPS% (100% normal, 0% death): 10%  Baseline PPS% (2 weeks prior to admit): %    Current Palliative Medications (If any):   See MAR    Palliative Care ROS:   Unable to obtain as patient intubated/sedated    -----------------------------------------------------------------------------------------------------------------  I have  "reviewed and supplemented the documentation in this patient's medical record listed below regarding past medical history, social history, active medical problems, allergies and medications.     Current Problem List:   Principal Problem:    COVID-19  Active Problems:    Shock (H)    Acute and chronic respiratory failure with hypoxia (H)    On mechanically assisted ventilation (H)    ARDS (adult respiratory distress syndrome) (H)    Atrial fibrillation with rapid ventricular response (H)    Atrial fibrillation with rapid ventricular response (H)      Medical/Surgical History :  No past medical history on file.  Past Surgical History:   Procedure Laterality Date     PICC  6/26/2020          Relevant Family History  No family history on file.  No family status information on file.     Social History:    he    Medication History:  No medications prior to admission.     Allergies:  No Known Allergies  Emergency Contact Info (Pulled from chart)  Extended Emergency Contact Information  Primary Emergency Contact: Carlton Hurt  Mobile Phone: [phone]  Relation: Child  Secondary Emergency Contact: Declined, per pt  Relation: Declined    PERTINENT PHYSICAL EXAMINATION:  Vital Signs: Blood pressure (!) 182/77, pulse 92, temperature 99.3  F (37.4  C), temperature source Bladder, resp. rate (!) 37, height 5' 5\" (1.651 m), weight (!) 281 lb 1.4 oz (127.5 kg), SpO2 95 %.   GENERAL: intubated/sedated    Full exam was not done given patient's isolation status for COVD19 infection and in an effort to conserve critical start shortage of PPE.   Please see intensivist Daily progress note for more detailed physical exam.    All labs/imaging reviewed in Epic     ===================================================  Tobias Palmer is a 54 y.o. male who is being evaluated via a billable telephone visit.      Patient's surrogate has given verbal consent to a Telephone visit? Yes    Phone call duration: 55 minutes    Renita Lindsay" KIRSTY    ====================================================  TT: I have personally spent a total of 55 minutes on the unit in review of medical record, consultation with the medical providers and assessment of patient today, with more than 50% of this time spent in counseling, coordination of care, and discussion with patient's wife and son re: diagnostic results, prognosis, symptom management, risks and benefits of management options, and development of plan of care as noted above.  ====================================================    Renita Lindsay PA-C  [facility]  Palliative Medicine  Office: [phone]

## 2021-06-09 NOTE — PROGRESS NOTES
FERN COVID RT PROGRESS NOTE    DATA:    VENT DAY#  5    CURRENT SETTINGS:  VENT SETTINGS: A/C, 20, 450, +20        FIO2:   75%    PATIENT PARAMETERS:    PIP 30-32  Pplat:  29-30  Pmean:  21-23  SBT: N/A  SECRETIONS:  x4 small/scant/none x2/white/thin  02 SATS:  89-97%    ETT SIZE 8.0  Secured at  23 cm at teeth    Respiratory Medications: Veletri titrated from full to half strength, next dose @ 1855     ABG:  Results for RJ GUADALUPE (MRN [id number]) as of 6/25/2020 16:29   Ref. Range 6/25/2020 15:52   pH, Arterial Latest Ref Range: 7.37 - 7.44  7.41   pCO2, Arterial Latest Ref Range: 35 - 45 mm Hg 41   pO2, Arterial Latest Ref Range: 80 - 90 mm Hg 103 (H)   Bicarbonate, Arterial Calc Latest Ref Range: 23.0 - 29.0 mmol/L 25.8   O2 Sat, Arterial Latest Ref Range: 96.0 - 97.0 % 98.3 (H)   Oxyhemoglobin Latest Ref Range: 96.0 - 97.0 % 96.2   POC Base Excess Calc Latest Units: mmol/L 1.2   Ventilation Mode Unknown AC   Peep Latest Units: cm H2O 20   Sample Stabilized Temperature Latest Units: degrees C 37.0   Rate Latest Units: rr/min 20   FIO2 Unknown 90.00   NOTE / PLAN:  Pt was proned @ 1327. No distress noted. EBBS present, tube still secured @ 23. ABG's done post prone on PEEP 20 and 90%. Head turn Q2, next head turn @ 2000

## 2021-06-09 NOTE — PROGRESS NOTES
Clinical Nutrition Follow Up Note    Update per chart review today: pt now supine. Spoke with provider who reports Propofol being d/c'd today.    Current Nutrition Prescription:   Diet: TF: Isosource 1.5 @ 10 ml/hr (goal rate with Propofol)  Supplement/modulars: no carb prosource 2 pkt QID  Flush order: water 60 ml q 4 hrs    IV dextrose or Fluids:fentaNYL citrate (PF), Last Rate: 300 mcg/hr (07/06/20 0800)  midazolam, Last Rate: 10 mg/hr (07/06/20 0800)  norepinephrine, Last Rate: 0.03 mcg/kg/min (07/06/20 0800)  propofol, Last Rate: 35 mcg/kg/min (07/06/20 0800)  sodium chloride 0.9%, Last Rate: 10 mL/hr (07/06/20 0800)  vecuronium, Last Rate: 1.2 mcg/kg/min (07/06/20 0800)      Current Nutrition Intake:  Enteral nutrition access is a nasal gastric tube, with a placement date of 6/22/2020 & verified in place per x-ray on 6/23.   TF as prescribe provides: 240 ml, 840 kcal (+ Propofol), 136 g protein, 42 g cho, 3 g fiber, 182 ml free water + 60 ml H20 flush q 4 hours to prevent FT clogging = 542 ml total H20.    IVF provided 2091 ml yesterday  Propofol provided 751 lipid Cals past 24 hrs.  Current rate would provide 549 Cals/24 hrs. Plan d/c per provider.    Meeting needs from all sources.    Anthropometrics:  Height: 5'5  Admit wt: 274 lb 4 oz,6/22/20  Weight: (!) 284 lb 9.8 oz (129.1 kg),7/5/20. Fluid up. +1 edema all extremities.  BMI:42.43  BMI indication: >40 extreme obesity (class 3)  Ideal body weight: 136 lb(+or-10%)  Usual body weight: unable to obtain currently  Weight History:268 lb(6/22/20)  Recent wt's:263 lb(6/25), 264 lb(6/28),254 lb(6/29)    GI Status/Output:   GI symptoms include:   Bowel sounds hypoactive, abd distended per RN  Rectal tube: 0548-4989 ml/day past 2 days. Receiving miralax & senna. Previous constipation?  On 1 pressor    Medications:  Medications reviewed.  Note lantus & SS Ins, IV abx, mvi w/minerals, oxycodone q 6 hrs, miralax, senna    Labs:  Labs reviewed  Lab Results   Component  Value Date/Time    ALBUMIN 2.1 (L) 07/06/2020 03:52 AM     07/06/2020 03:52 AM    K 3.7 07/06/2020 03:52 AM    BUN 16 07/06/2020 03:52 AM    CREATININE 0.50 (L) 07/06/2020 03:52 AM     07/06/2020 03:52 AM    PHOS 3.2 07/06/2020 03:52 AM    MG 2.2 07/06/2020 03:52 AM    CRP 2.1 (H) 06/26/2020 04:22 AM     FSBG  mg/dL in the past 24 hrs (within goal range)    Estimated Nutrition Needs:  Assessment weight is 121.8 kg, estimated weight    Energy Needs: 2488-3012 kcals daily, 11-14 kcal/kg  Protein Needs: 124-155 g daily, 2-2.5 g/kg.(dose: 62 kg IBW)  Fluid Needs: 6115-3709 mls daily, 25-35 mls/kg(dose wt: 77 kg adj.wt)    Malnutrition: protein calorie malnutrition noted per doctor     Nutrition Risk Level: high risk     Nutrition dx:   Swallowing difficulty r/t respiratory failure as evidenced by intubated,NPO.   Altered GI function r/t critical illness evidenced by high output rectal tube.     Goals:   Meet estimated nutrition needs and Tolerate tube feeding-progressing  BG  - goal met  D/c RT; <3 loose stools/day; more formed BM's. New     Plan:   Adjust TF off Propofol.  Isosource 1.5 to 30 ml/hr & prosource 2 pkts TID providing 1440 kcal, 138 gm Pro, 127 gm CHO, 11 gm fiber, 907 ml H2O (TF + flushes)   Note to nursing to consider holding bowel meds.     Monitoring:  Per goals

## 2021-06-09 NOTE — PROGRESS NOTES
Critical Care Progress Note  7/8/2020   12:00 PM     Admit Date: 6/22/2020  ICU Day: 16  Code Status: full code      Problem List:   Principal Problem:    COVID-19  Active Problems:    Shock (H)    Acute and chronic respiratory failure with hypoxia (H)    On mechanically assisted ventilation (H)    ARDS (adult respiratory distress syndrome) (H)    Atrial fibrillation with rapid ventricular response (H)    Atrial fibrillation with rapid ventricular response (H)      Major changes for today:    * Supinated this morning; follow up ABG pending     Plan by System:     Neuro/Psych: Encephalopathy secondary to prolonged critical illness; Sedation for vent synchrony and comfort.    * Sedating with IV Fentanyl, Versed, propofol.    * Paralyzing with Vecuronium.        Pulmonary: Acute hypoxic respiratory failure in setting of COVID19 infection.     *Stable Fio2 needs today.     * Vent Mode: AC  FiO2 (%):  [55 %-100 %] 55 %  S RR:  [22] 22  S VT:  [430 mL] 430 mL  PEEP/CPAP (cm H2O):  [14 cm H2O] 14 cm H2O  Minute Ventilation (L/min):  [9.4 L/min-9.6 L/min] 9.6 L/min  PIP:  [34 cm H2O-43 cm H2O] 35 cm H2O  MAP (cm H2O):  [20-24] 20     CV: A.fib with RVR 6/28; Shock - septic.    * On/off levophed for MAP > 65   * Amio 200mg daily     GI/: Protein calorie malnutrition; enteral access established - not post pyloric.    * Tube feeding per RD.     Renal: Rhabdo - improved; metabolic acidosis - resolved.    * Try to stay off Propofol    * Robust response to lasix dose yesterday; repeat tdoay.     ID: COVID19; MSSA pneumonia   * S/P CCP and toci 6/25 and 6/27    * S/P Ivermectin x 2 doses.    * Meropenem x 10 days    * Remdesivir x 10 days   * Cefazolin for MSSA; ID following.     Heme/Coag:  Coagulopathy in setting of COVID19     * Lovenox 60mg two times a day     Endocrine: Stress hypergylcemia   * Improved sugars with lantus d/c     * Addendum: attempted to call sonCarlton for update. No answer.   * Addendum #2: updated wife,  Nathalie over the phone with the aide of a  at 3:59 PM. Questions and concerns addressed. *    Dispo: ICU    Esmer Willis, CNP  [facility] Pulmonary/Critical Care    Total critical care time: 40  I have personally provided 40 minutes of critical care time exclusive of time spent on separately billable procedures for acute hypoxic respiratory failure. High risk for acute deterioration and death.    _______________________________________________________________    HPI: Tobias Palmer is a 54 year old male with a history of obesity (BMI 45) who was admitted and intubated on 6/21 at [facility] with COVID-19 pneumonia, transferred to [facility] ICU on 6/22. Course complicated by ARDS and shock, requiring proning and paralytics.    ROS: NATIVIDAD intubated and sedated.     Events over last 24-hours: no acute change.     Objective:     Vitals:    07/08/20 1045 07/08/20 1100 07/08/20 1115 07/08/20 1130   BP: 125/72 115/67 116/61 116/59   Patient Position:       Pulse: (!) 105 (!) 101 99 94   Resp: 22 22 22 22   Temp:       TempSrc:       SpO2: 94% 94% 94% 95%   Weight:       Height:           Vent:   Day of Intubation: 6/21  Ready to wean? No  Vent Mode: AC  FiO2 (%):  [55 %-100 %] 55 %  S RR:  [22] 22  S VT:  [430 mL] 430 mL  PEEP/CPAP (cm H2O):  [14 cm H2O] 14 cm H2O  Minute Ventilation (L/min):  [9.4 L/min-9.6 L/min] 9.6 L/min  PIP:  [34 cm H2O-43 cm H2O] 35 cm H2O  MAP (cm H2O):  [20-24] 20    Sedative Infusion: fentanyl and midazolam  Sedation vacation: No    I/O:     Intake/Output Summary (Last 24 hours) at 7/8/2020 1200  Last data filed at 7/8/2020 1150  Gross per 24 hour   Intake 2642.78 ml   Output 5830 ml   Net -3187.22 ml     Wt Readings from Last 3 Encounters:   07/08/20 (!) 281 lb 1.4 oz (127.5 kg)      Weight change:     Physical Exam:  Gen: sedated  HEENMT:swollen face; swollen tongue with breakdown.   NEURO: sedated and paralyzed.   CARDIOVASCULAR: S1, S2 normal, no  murmur, rub or gallop, regular rate and rhythm  PULMONARY: unlabored on full vent; lungs are coarse posteriorly. No wheeze.   GASTROINTESTINAL: NATIVIDAD - patient in prone position at time of exam.   INTEGUMENT: as above; gen'l edema   PSYCH: sedated     Labs:   Results from last 7 days   Lab Units 07/08/20  0405  07/06/20  0352   LN-SODIUM mmol/L 144   < > 144   LN-POTASSIUM mmol/L 4.3   < > 3.7   LN-CHLORIDE mmol/L 108*   < > 111*   LN-CO2 mmol/L 30   < > 28   LN-BLOOD UREA NITROGEN mg/dL 17   < > 16   LN-CREATININE mg/dL 0.52*   < > 0.50*   LN-CALCIUM mg/dL 7.6*   < > 7.4*   LN-PROTEIN TOTAL g/dL  --   --  4.5*   LN-BILIRUBIN TOTAL mg/dL  --   --  0.3   LN-ALKALINE PHOSPHATASE U/L  --   --  44*   LN-ALT (SGPT) U/L  --   --  27   LN-AST (SGOT) U/L  --   --  30    < > = values in this interval not displayed.       Results from last 7 days   Lab Units 07/08/20  0405   LN-WHITE BLOOD CELL COUNT thou/uL 6.9   LN-HEMOGLOBIN g/dL 11.5*   LN-HEMATOCRIT % 37.5*   LN-PLATELET COUNT thou/uL 132*       Micro:   7/4 Sputum - 3+ Staph (MSSA)    Imaging: all imaging personalized reviewed   7/5 CXR - Endotracheal tube tip mid way between clavicles and melissa. Right PICC tip projects over the mid SVC. Feeding tube tip below the film.     Limited inspiratory volume. Normal heart size. Unchanged bilateral diffuse lung infiltrates      Esmer Willis, [facility] Pulmonary/Critical Care

## 2021-06-09 NOTE — PROGRESS NOTES
07/11/20 0157   Patient Data   Vt (observed, mL) 332 mL   Minute Ventilation (L/min) 9.1 L/min   Total Resp Rate  26 BPM   PIP Observed (cm H2O) 26 cm H2O   MAP (cm H2O) 18   Auto/Intrinsic PEEP Observed (cm H2O) 0.7 cm H2O   Plateau Pressure (cm H2O) 23 cm H2O   Dynamic Compliance (L/cm H2O) 43 L/cm H2O   Airway Resistance 13   SpO2 96 %   Heart Rate 82   Patient remains critically stable on current ventilator support requiring additional sedation due to periodic agitation and hypertension. Plan to maintain current support and titrate as needed.

## 2021-06-09 NOTE — PROGRESS NOTES
CRITICAL CARE PROGRESS NOTE:    Assessment/Plan:  Tobias Palmer is a 54 year old male with a history of obesity (BMI 45) who was admitted and intubated on 6/21 at [facility] with COVID-19 pneumonia, transferred to [place] ICU on 6/22. Course complicated by ARDS and shock.     24hr events:  Proned this morning at 0530, with PF holding around 180. No other acute events overnight, however continues to have low fevers.     Changes   -Lasix 40 this am  - BUE BLE dopplers due to fevers   -Repeat Gas 1400 to determine if patient needs to be re proned this evening  -Repeated COVID test#1 today     NEURO:  Encephalopathy, requiring sedation/paralytic since admission     Continues to be off paralytic as off 7/11.     Cont Dex, fent, midaz for RASS -2 to -3    Cont two times a day seroquel.    Cont scheduled oxy and lorazepam to further facilitate weaning   RESP:  ARDS 2/2 severe covid-19 infection     Copious thick secretions improving today     Off paralytic ok compliance holding  supine     Vent: 16/350/14/50  Plt 18 PIP 26   Plan    Reprone this evening pending 1400 gas     Cont Mucomyst and albuterol for clearance     Titrate FiO2 for goal O2 sat 88-92%, PaO2 55 or greater    Pplat <30.     #Staph PNA   -+sputum cx multiple times with sensitivities   -Increasing production of sputum   -Cefazolin 7/7-7/14 total of 7 day course   -sputum cx 7/14 most likely MSSA   -Cont to monitor for now     CV:  #Resolved Shock   No known cardiac hx. No echo on file. He had afib/RVR during admission now on po amio. Circulatory shock - resolved.   -s/p total of 40 yesterday with -1L   Plan    MAP >65, currently not on pressors.    Lasix 40mg x1 today may need repeate for goal of -1L/1.5     Cont po amio     GI:  No issues, yang TF. TG downtrending     Cont TF and advance as yang    Cont bowel regimen    Cont PPI for stress ulcer ppx    RENAL:  No issues, normal renal function and tolerating daily loop diuretic.    Cont  Lasix as above for goal of -1L/-1.5L    Maintain barlow    Avoid nephrotoxins.   ID:  Severe covid-19 infection with cytokine release.   - Inflammatory markers significantly improved   -Improving leukocytosis to 9 today,however febrile overnight, w/ continued significant sputum production    Workup   -7/14 + staph/GNR most likely MSSA   -Quant gold, HBV, HCV testing negative.   -Col with yeast in sputum at risk for invasive candidiasis, +MSSA 7/2 presumed colonization   - Beta-D glucan negative 6/29, less likely. CRP, PCT negative.     S/p toci on 6/25 and 6/27    S/p CCP early in admission , ?6/25    S/p dexamethasone 6/22 - 6/29    S/p ivermectin x2 doses, strongy Ab was neg.     S/p meropenem x10 days    S/p remdisivir x10 days  Abx: S/p cefazolin for MSSA in sputum for  Final dose today 7/14 for 7 day total   Plan    Repeated Blood cx 7/15 pending today one peripheral one from PICC      Dopplers x4 ordered in concern for continual fevers per ID    May consider removing PICC if fevers progress or increase in WBC     ID following, appreciate input    HEMATOLOGIC:  # Stable Thromcbocytopenia   -Improving to 139 today    - No clots. Doubt HIT as had been stable for many days prior to recently.     Trend plt count    OK to cont LMWH, but will need to stop if plt count gets below 70K.     hgb >7 transfuse prn    Per ID recommends BUE/BLE dopplers due to fever     ENDOCRINE:  No issues    DC SSI as has not required any     ICU PROPHYLAXIS:    Full code    DISPO/CODE STATUS: will update his wife later today. She has been getting daily updates.  May need to address trach soon.     FAMILY COMMUNICATION: Updated patient son Carlton via phone, explaining that unfortunately MR. Brewster lung progressively worsened over the last few days. I explained that I am worried about his overall status. I suggested that we re prone him again today, and see over the next few days if we can make progresses but that he remains critically sick  "and potentially will need a trach.     Lines/Drains/Tubes:  [place]  ETT 8.0 day 21  A-line right 6/22  PICC 6/26  NG tube  Rectal tube    Restraints  Progress Note  Restraint Application    I recognize that restraints are physical and/or chemical interventions intended to restrict a person's movements. Restraints are currently needed to ensure the safety of this patient and/or others. My clinical rationale appears below.    Category/Type of Restraint     Non Violent:  Soft limb restraint x2  --  Behavior  Pulling at tubes/lines  --  Root Cause of the Behavior  Sedation/intubation  --  Less-Restrictive Measures that Failed  Non Violent Measures:  Close Observation  --  Response to the Restraint  Patient unable to pull at tubes/lines  --  Criteria for Release from the Restraint  Patient calm and off sedation    Elsa Tan MICU MILKA   Discussed with Dr. Howard     Critical Care time spent >40min     Objective:  Physical Exam:  Vent settings for last 24 hours:  Vent Mode: AC  FiO2 (%):  [50 %-60 %] 50 %  S RR:  [16] 16  S VT:  [350 mL] 350 mL  PEEP/CPAP (cm H2O):  [14 cm H2O] 14 cm H2O  Minute Ventilation (L/min):  [7.6 L/min-8.9 L/min] 7.6 L/min  PIP:  [14 cm H2O-19 cm H2O] 19 cm H2O  MAP (cm H2O):  [14-15] 15    /44 (Patient Position: Lying)   Pulse 60   Temp 100  F (37.8  C) (Core)   Resp 16   Ht 5' 5\" (1.651 m)   Wt (!) 265 lb 10.5 oz (120.5 kg)   SpO2 95%   BMI 44.21 kg/m      Intake/Output last 3 shifts:  I/O last 3 completed shifts:  In: 2208.1 [I.V.:864.1; NG/GT:1344]  Out: 3000 [Urine:2950; Stool:50]  Intake/Output this shift:  I/O this shift:  In: 738.5 [I.V.:268.5; NG/GT:470]  Out: 450 [Urine:450]    Physical Exam  Gen: intubated, sedated, not parlayzed; supine, RASS -3   HEENT: no OP lesions, no DIANN  CV: RRR, no m/g/r  Resp: coarse, thick yellow sputum present   Abd: soft, nontender, BS+  Neuro: PERRL, nonfocal  Ext: no edema    LAB:  Results from last 7 days   Lab Units 07/16/20  0547 "   LN-WHITE BLOOD CELL COUNT thou/uL 7.7   LN-HEMOGLOBIN g/dL 11.0*   LN-HEMATOCRIT % 35.2*   LN-PLATELET COUNT thou/uL 139*     Results from last 7 days   Lab Units 07/16/20  0547 07/15/20  0343 07/14/20  0402   LN-SODIUM mmol/L 139 141 142   LN-POTASSIUM mmol/L 4.3 4.3 3.7   LN-CHLORIDE mmol/L 104 104 104   LN-CO2 mmol/L 30 29 31   LN-BLOOD UREA NITROGEN mg/dL 23* 20 21   LN-CREATININE mg/dL 0.54* 0.58* 0.57*   LN-CALCIUM mg/dL 8.1* 8.4* 8.3*       Micro  PCT neg since 6/22  CRP neg, down from 12.6 on 6/22  Ferritin 642 on 6/26, down from 795 on 6/22   on 6/26, down from 722 on 6/22  D-dimer 0.82 on 6/26, up from 0.46 on 6/22  Fibrinogen 505 on 6/26  IL-6 42 on 6/22, not rechecked.    Sputum 7/4 MSSA  Sputum 6/24 yeast  Sputum 7/14 Likely MSSA   Blood cx 7/15 pending     Fungal cx NG  Urine NG    Current Facility-Administered Medications   Medication Dose Route Frequency Provider Last Rate Last Dose     acetaminophen 160 mg/5 mL solution 1,000 mg (TYLENOL)  1,000 mg Oral Q6H PRN Татьяна Carey MD   1,000 mg at 07/16/20 1133     acetylcysteine 200 mg/mL (20 %) solution 4 mL (MUCOMYST)  4 mL Inhalation Q6H - RT Elsa Tan CNP   4 mL at 07/16/20 1329     albuterol nebulizer solution 2.5 mg  2.5 mg Nebulization Q6H - RT Elsa Tan CNP   2.5 mg at 07/16/20 0721     amino acids-protein hydrolys 15-60 gram-kcal/30 mL packet 1 packet (ProSource No Carb)  1 packet Enteral Tube TID Joel Howard DO   1 packet at 07/16/20 0833     amiodarone tablet 200 mg (PACERONE)  200 mg Enteral Tube DAILY Shikha Brady CNP   200 mg at 07/16/20 0834     benzocaine-menthoL lozenge 1 lozenge (CEPACOL)  1 lozenge Oral Q1H PRN Elsa Tan CNP         bisacodyL suppository 10 mg (DULCOLAX)  10 mg Rectal Daily PRN Elsa Tan CNP   10 mg at 06/24/20 1608     chlorhexidine 0.12 % solution 15 mL (PERIDEX)  15 mL Topical Q12H Elsa Tan CNP   15 mL at 07/16/20 0833     dexmedetomidine (PRECEDEX)  1000 mcg/250 mL in NS (4 mcg/mL) infusion  0.1-1.5 mcg/kg/hr Intravenous Continuous Joel Howard, DO 23.8 mL/hr at 07/16/20 1200 0.8 mcg/kg/hr at 07/16/20 1200     dextrose 50 % (D50W) syringe 20-50 mL  20-50 mL Intravenous Q15 Min PRN Elsa Tan CNP         enoxaparin ANTICOAGULANT syringe 60 mg (LOVENOX)  60 mg Subcutaneous BID Elsa Tan CNP   60 mg at 07/16/20 0833     fentaNYL - BOLUS DOSE from infusion  mcg   mcg Intravenous Q2H PRN Joel Howard, DO   100 mcg at 07/16/20 0010     fentaNYL 2500 mcg/50 mL CADD infusion (50 mcg/mL)   mcg/hr Intravenous Continuous Elsa Tan CNP 4 mL/hr at 07/16/20 1200 200 mcg/hr at 07/16/20 1200     glucagon (human recombinant) injection 1 mg  1 mg Subcutaneous Q15 Min PRN Elsa Tan CNP         labetaloL injection 10-20 mg (NORMODYNE,TRANDATE)  10-20 mg Intravenous Q2H PRN Esmer Willis CNP   20 mg at 07/12/20 1009     Lactobacillus rhamnosus GG 15 Billion cell 1 capsule (CULTURELLE)  1 capsule Enteral Tube BID Niko Saldana MD   1 capsule at 07/16/20 0834     lipase-protease-amylase 5,000-17,000- 24,000 unit capsule 2 capsule (ZENPEP)  2 capsule Enteral Tube PRN Leventhal, Thomas Michael, MD        And     sodium bicarbonate tablet 325 mg  325 mg Enteral Tube PRN Leventhal, Thomas Michael, MD         LORazepam 0.5-1 mg injection (diluted concentration)  0.5-1 mg Intravenous Q8H PRN Shikha Brady CNP         LORazepam 1 mg/0.5 mL concentrated solution 2 mg (ATIVAN)  2 mg Oral Q6H FIXED TIMES Elsa Tan CNP   2 mg at 07/16/20 1134     magnesium hydroxide suspension 30 mL (MILK OF MAG)  30 mL Oral Daily PRN Elsa Tan CNP   30 mL at 06/24/20 1608     metoprolol tartrate injection 5 mg (LOPRESSOR)  5 mg Intravenous Q4H PRN Perlman, David M, MD   5 mg at 06/29/20 0425     midazolam (VERSED) - BOLUS DOSE from infusion 2 mg  2 mg Intravenous Q1H PRN Joel Howard DO         midazolam 100 mg/100 mL (1  mg/mL) infusion (VERSED)  0.25-15 mg/hr Intravenous Continuous Wipetra Esmer SONIA CNP 10 mL/hr at 07/16/20 1200 10 mg/hr at 07/16/20 1200     multivit-min-ferrous gluconate 9 mg iron/15 mL Liqd 9 mg of iron  15 mL Enteral Tube DAILY Leventhal, Thomas Michael, MD   9 mg of iron at 07/16/20 0834     naloxone injection 0.2-0.4 mg (NARCAN)  0.2-0.4 mg Intravenous PRN Elsa Tan CNP        Or     naloxone injection 0.2-0.4 mg (NARCAN)  0.2-0.4 mg Intramuscular PRN Elsa Tan CNP         omeprazole capsule 20 mg (PriLOSEC)  20 mg Oral QAM AC Elsa Tan CNP   20 mg at 07/05/20 0839    Or     omeprazole suspension 20 mg (PriLOSEC)  20 mg Enteral Tube QAM Elsa Chavez CNP   20 mg at 07/16/20 0833    Or     pantoprazole 40 mg injection  40 mg Intravenous QAM AC Elsa Tan CNP         ondansetron injection 4 mg (ZOFRAN)  4 mg Intravenous Q4H PRN Elsa Tan CNP        Or     ondansetron tablet 8 mg (ZOFRAN)  8 mg Oral Q8H PRN Elsa Tan CNP         oxyCODONE immediate release tablet 10 mg (ROXICODONE)  10 mg Enteral Tube Q4H Esmer Willis CNP   10 mg at 07/16/20 1134     oxyCODONE immediate release tablet 5-10 mg (ROXICODONE)  5-10 mg Oral Q4H PRN Angela Olson CNP   10 mg at 07/05/20 1757     polyvinyl alcohol 1.4 % ophthalmic solution 1-2 drop (LIQUIFILM TEARS)  1-2 drop Both Eyes Q1H PRN Elsa Tan CNP         QUEtiapine tablet 50 mg (SEROquel)  50 mg Oral Q8H Olman Willisca A CNP   50 mg at 07/16/20 0834     sodium chloride 0.9%  10 mL/hr Intravenous Continuous de Angela Hart CNP 10 mL/hr at 07/14/20 1200 10 mL/hr at 07/14/20 1200     sodium chloride bacteriostatic 0.9 % injection 1-5 mL  1-5 mL Intradermal Once PRN -Juan F Deng PA-C         sodium chloride flush 10-20 mL (NS)  10-20 mL Intravenous PRN Burak Moss MD   10 mL at 06/30/20 1614     sodium chloride flush 10-30 mL (NS)  10-30 mL Intravenous PRN Burak Moss MD         sodium  chloride flush 10-30 mL (NS)  10-30 mL Intravenous Q8H FIXED TIMES Burak Moss MD   10 mL at 07/16/20 0620     sodium chloride flush 20 mL (NS)  20 mL Intravenous PRN Burak Moss MD         white petrolatum-mineral oiL 83-15 % ophthalmic ointment 1 application (LUBRIFRESH PM)  1 application Both Eyes Q8H Shikha Brady CNP   1 application at 07/16/20 0852

## 2021-06-09 NOTE — PROGRESS NOTES
Infectious Disease Progress Note    Assessment/Plan  Impression:  COVID-19 pneumonia   Received CCP. Started on dexamethasone (6/22-29) and remdesivir for 10 day course  Received 400 mg tocilizumab on 6/25, 6/27 for suspected cytokine release syndrome.     Quantiferon gold negative  Hep B and C negative  Strongyloides antibody negative -- no need for further ivermectin    Ongoing fever and leukocytosis. Steroid may contribute to leukocytosis--> now normalized.     Colonized with yeast in sputum. Risk for invasive candidiasis -- broad spectrum antibiotics, central line, candida colonization. Beta-D-glucan negative 6/29 -- less likely therefore that he has invasive candidiasis.     Now with MSSA in sputum as of 7/2, 7/4 -- could be the cause of pneumonia. On coverage since 6/27, now focused to cefazolin.  Ongoing fevers despite good antibiotic coverage suggests fevers are due to another cause.     Recommend:    Cefazolin into next week, likely 7/14  Can repeat CRP, procalcitonin      Principal Problem:    COVID-19  Active Problems:    Shock (H)    Acute and chronic respiratory failure with hypoxia (H)    On mechanically assisted ventilation (H)    ARDS (adult respiratory distress syndrome) (H)    Atrial fibrillation with rapid ventricular response (H)    Atrial fibrillation with rapid ventricular response (H)    Raphael Mantilla MD  [facility]  [phone] [facility] paging  ______________________________________________________________________       Subjective  Intubated and sedated in ICU.       Objective    Anti-infectives:  Cefazolin 7/7-  Meropenem 7/2-7  Ceftriaxone 6/22  Ivermectin 6/25-26  Cefepime 6/27-7/2  Vancomycin 6/27-7    Vital signs in last 24 hours  Temp:  [98.5  F (36.9  C)-100.3  F (37.9  C)] 98.6  F (37  C)  Heart Rate:  [] 95  Resp:  [17-29] 26  Arterial Line BP: (100-214)/(51-90) 152/67  FiO2 (%):  [50 %-60 %] 50 %  Weight:   (!) 276 lb 10.8 oz (125.5  kg)    Intake/Output last 3 shifts  I/O last 3 completed shifts:  In: 2388.6 [I.V.:758.6; NG/GT:1330; IV Piggyback:300]  Out: 4740 [Urine:4210; Stool:530]  Intake/Output this shift:  I/O this shift:  In: 338.4 [I.V.:38.4; NG/GT:300]  Out: 190 [Urine:140; Stool:50]    Review of Systems   Review of systems not obtained due to inability to communicate with the patient. , otherwise negative.    Physical Exam--  General appearance: critically ill in ICU, on vent, cooling blanket  Eyes: eyes closed.  Throat: oral ett inplace.  Neck: short neck-difficult to examine  tachy  Abdomen: not distended.  Extremities: extremities normal, atraumatic, no cyanosis or edema  Skin: Skin color, texture, turgor normal. No rashes or lesions  Neurologic: Mental status: sedated  PICC R BRIANE  yen    Pertinent Labs   Lab Results: personally reviewed.     Results from last 7 days   Lab Units 07/10/20  0333 07/09/20  0424 07/08/20  0405   LN-WHITE BLOOD CELL COUNT thou/uL 10.3 7.8 6.9   LN-HEMOGLOBIN g/dL 11.4* 10.7* 11.5*   LN-HEMATOCRIT % 38.1* 34.3* 37.5*   LN-PLATELET COUNT thou/uL 125* 128* 132*        Results from last 7 days   Lab Units 07/10/20  0333 07/09/20  0424 07/08/20  0405   LN-SODIUM mmol/L 141 143 144   LN-POTASSIUM mmol/L 4.8 4.0 4.3   LN-CHLORIDE mmol/L 102 106 108*   LN-CO2 mmol/L 35* 31 30   LN-BLOOD UREA NITROGEN mg/dL 21 24* 17   LN-CREATININE mg/dL 0.54* 0.52* 0.52*   LN-CALCIUM mg/dL 7.9* 7.8* 7.6*     Lab Results   Component Value Date    ALT 27 07/06/2020    AST 30 07/06/2020    ALKPHOS 44 (L) 07/06/2020    BILITOT 0.3 07/06/2020     Micro:  6/22 blood negative  6/24 sputum usual bruno and yeast  6/27 blood including fungal isolate no growth to date   6/27 sputum gram stain with yeast; culture usual bruno  7/2 sputum MSSA  7/2 blood culture negative to date  7/4 sputum MSSA    Results for BENJAMIN RJ LOVELL (MRN [id number]) as of 6/26/2020 11:04   Ref. Range 6/24/2020 16:46   Hepatitis B Surface Ag Latest Ref Range:  Negative  Negative   Hepatitis C Ab Latest Ref Range: Negative  Negative   Results for RJ GUADALUPE (MRN [id number]) as of 6/26/2020 11:04   Ref. Range 6/23/2020 04:26 6/24/2020 05:24 6/24/2020 16:46 6/25/2020 04:40 6/26/2020 04:22   CRP Latest Ref Range: 0.0 - 0.8 mg/dL 12.0 (H) 10.0 (H)  4.2 (H) 2.1 (H)   LD (LDH) Latest Ref Range: 125 - 220 U/L 480 (H) 477 (H)  434 (H) 452 (H)     Pertinent Radiology   Radiology Results: Personally reviewed image/s and Personally reviewed impression/s    Xr Abdomen Ap Portable    Result Date: 7/10/2020  EXAM: XR ABDOMEN AP PORTABLE LOCATION: [facility] DATE/TIME: 7/10/2020 2:48 PM INDICATION: Confirmation of DHT placement, COVID pneumonia. COMPARISON: 06/23/2020     Feeding tube in place. Tip overlies the inferior aspect of the descending duodenum. Bowel gas pattern is normal. Prior NG tube has been removed.

## 2021-06-09 NOTE — PROGRESS NOTES
FERN COVID RT PROGRESS NOTE    DATA:    VENT DAY# 30    CURRENT SETTINGS: AC  VENT SETTINGS: 14/500/+5/        FIO2:  35%    PATIENT PARAMETERS:    PIP 21  Pplat: 19  Pmean: 12  SBT: YES  SECRETIONS: Moderate white yellow secretion  02 SATS: 98%    ETT SIZE 8.0  Secured at  24 cm at teeth    Respiratory Medications: Duoneb-two times a day/Mucomyst-two times a day      AB.46/39/76/27.0/96/3.1    P/F Ukhsz=651    NOTE / PLAN:   Pt sedated and on full vent support. On  pt was on PS 5/8-35% for 10hrs and placed back on AC at night. Plan is to continue PS trial as we continue to monitor his progress. No changes made on vent setting post AM ABG

## 2021-06-09 NOTE — PROGRESS NOTES
Spiritual Care Note    Per consult request, SH provided prayer at bedside.     Plan of Care: SH will follow-up with phone call to spouse tomorrow.

## 2021-06-09 NOTE — PROGRESS NOTES
Lisseth Garcia MD Notified of patient change in status. Pt developed Afib RVR.  See orders for intervention

## 2021-06-09 NOTE — PROGRESS NOTES
07/08/20 1937   Patient Data   Vt (observed, mL) 401 mL   Minute Ventilation (L/min) 9.4 L/min   Total Resp Rate  22 BPM   PIP Observed (cm H2O) 30 cm H2O   MAP (cm H2O) 19   Auto/Intrinsic PEEP Observed (cm H2O) 1.2 cm H2O   Plateau Pressure (cm H2O) 25 cm H2O   Dynamic Compliance (L/cm H2O) 33 L/cm H2O   Airway Resistance 14.5

## 2021-06-09 NOTE — PROGRESS NOTES
FERN COVID RT PROGRESS NOTE    DATA:    VENT DAY#  13    CURRENT SETTINGS:  VENT SETTINGS   430VT/AC18/+12Peep        FIO2:  80%    PATIENT PARAMETERS:    PIP 26  Pplat:  22  Pmean:  14  SBT: N/A  SECRETIONS:  Thick tanish, mod amount  02 SATS:  95%    ETT SIZE 8.5  Secured at  24 cm at teeth    Respiratory Medications: nONE     ABG:   Results for RJ GUADALUPE (MRN [id number]) as of 7/4/2020 17:41   Ref. Range 7/4/2020 05:15   pH, Arterial Latest Ref Range: 7.37 - 7.44  7.42   pCO2, Arterial Latest Ref Range: 35 - 45 mm Hg 46 (H)   pO2, Arterial Latest Ref Range: 80 - 90 mm Hg 64 (L)   Bicarbonate, Arterial Calc Latest Ref Range: 23.0 - 29.0 mmol/L 28.3   O2 Sat, Arterial Latest Ref Range: 96.0 - 97.0 % 94.8 (L)   Oxyhemoglobin Latest Ref Range: 96.0 - 97.0 % 92.8 (L)   POC Base Excess Calc Latest Units: mmol/L 4.5       NOTE / PLAN:      Attempted to wean FIO2 today but remains mainly at 80-90%. P/F ration from AM ABG is 107. Plan to paralyze and prone patient again.

## 2021-06-09 NOTE — PLAN OF CARE
Lorazepam 2mg pulled from Acudose by bedside RN. Attempted to return dose to Acudose when decided not to give.  Acudose shows med was removed but there was no option to return or waste.  Unopened Lorazepam bottle hand delivered to pharmacy. Rodríguez SWANSON, pharmacy received med and will return to stock.

## 2021-06-09 NOTE — PROGRESS NOTES
06/24/20 1322   Patient Data   PIP Observed (cm H2O) 31 cm H2O   MAP (cm H2O) 24   Auto/Intrinsic PEEP Observed (cm H2O) 0.4 cm H2O   Plateau Pressure (cm H2O) 29 cm H2O

## 2021-06-09 NOTE — PROGRESS NOTES
"Pharmacy Consult: Vancomycin Dosing    Pharmacist consulted to dose vancomycin for Tobias Palmer, a 54 y.o. male.    Ordering provider: Angela Olson CNP     Indication for vancomycin therapy: Sepsis    Goal Trough Range:  15-20 mcg/mL based on indication    Other current antimicrobials              cefepime 1 g in NaCl 0.9 % (MINI-BAG Plus) 50 mL (MAXIPIME)  Every 12 hours          vancomycin 1,750 mg in sodium chloride 0.9% 500 mL (VANCOCIN)  Every 12 hours                   Subjective/Objective:    Patient was admitted for COVID-19 on 6/22/2020    Height: 5' 5\" (1.651 m)    Actual Body Weight (ABW): (!) 119.3 kg (263 lb 0.1 oz)    Ideal body weight: 61.5 kg (135 lb 9.3 oz)  Adjusted ideal body weight: 84.6 kg (186 lb 8.9 oz)    BMI: Body mass index is 43.77 kg/m .    No Known Allergies    Patient Active Problem List   Diagnosis     COVID-19     Shock (H)     Acute and chronic respiratory failure with hypoxia (H)     On mechanically assisted ventilation (H)     ARDS (adult respiratory distress syndrome) (H)    No past medical history on file.     Temp Readings from Current Encounter:     06/27/20 0600 06/27/20 0647 06/27/20 0700   Temp: (!) 102  F (38.9  C) (!) 102.6  F (39.2  C) (!) 102  F (38.9  C)     Net Intake/Output (last 24 hours):  I/O last 3 completed shifts:  In: 3322.3 [I.V.:501.3; NG/GT:2565; IV Piggyback:256]  Out: 2755 [Urine:2645; Stool:110]    Recent Labs     06/25/20  0440 06/26/20  0422 06/27/20  0351 06/27/20  1108   WBC 9.2 9.4 11.7*  --    NEUTROABS  --   --  9.9*  --    LACTICACID  --   --   --  3.3*   CRP 4.2* 2.1*  --   --    BUN 33* 47* 45*  --    CREATININE 0.78 0.95 1.01  --      Estimated Creatinine Clearance: 97.7 mL/min (by C-G formula based on SCr of 1.01 mg/dL).    Recent Labs     06/24/20  1614   CULTURE Usual Sheila  2+ Yeast*       Results for orders placed or performed during the hospital encounter of 06/22/20   Sputum culture    Collection Time: 06/24/20  4:14 PM    " Specimen: Endotracheal; Respiratory   Result Value Status    Culture Usual Sheila Final    Culture 2+ Yeast (!) Final       No results for input(s): VANCOMYCIN in the last 168 hours.    Vancomycin administrations: (last 120 hours)     None          Assessment/Plan:    Pharmacist consulted to dose vancomycin for Sepsis, goal trough range 15-20 mcg/mL.  1. Initiate vancomycin 1750 mg  IV every q12 hours (15mg/kg actual body weight).  2. No vancomycin level available for assessment.  3. Pharmacist will plan to check a vancomycin trough level 4th dose .  4. Pharmacist will continue to follow.    Thank you for the consult.  Consuelo Bhat, PharmD 6/27/2020 12:03 PM

## 2021-06-09 NOTE — PROGRESS NOTES
06/29/20 1950   Patient Data   Vt Exp (mL) 424 mL   Minute Ventilation (L/min) 12 L/min   Total Resp Rate  24 BPM   PIP Observed (cm H2O) 14 cm H2O   MAP (cm H2O) 12   Auto/Intrinsic PEEP Observed (cm H2O) 0 cm H2O   Plateau Pressure (cm H2O)   (Unable to obtain)   Dynamic Compliance (L/cm H2O) 212 L/cm H2O   SpO2 95 %

## 2021-06-09 NOTE — PROGRESS NOTES
At the start of shift, post-pyloric tube reported to be dislodged as stated by previous nurse. Previous nurse stated she was unable to give previously scanned dose 1800 enteral meds (Seroquel, acetaminophen, tylenol, and oxycodone) due to tube dislodgement. @1930 CRNA paged to reinsert tube, x-ray revealed that the tube was not in the correct position. CRNA repaged and feeding tube exchange was performed and confirmed with xray. @ 2230: MD stated to give meds that were previous due at the current dose. Meds to be rescheduled with the pharmacist.

## 2021-06-09 NOTE — PROGRESS NOTES
07/21/20 0137   Patient Data   Vt (observed, mL) 504 mL   Minute Ventilation (L/min) 8.7 L/min   Total Resp Rate  20 BPM   PIP Observed (cm H2O) 21 cm H2O   MAP (cm H2O) 12   Dynamic Compliance (L/cm H2O) 39 L/cm H2O   SpO2 92 %   Heart Rate 99     Did not move tube due to recent sore on right side amd deep sore on left side of tongue.

## 2021-06-09 NOTE — PROGRESS NOTES
07/24/20 1614   Vent Settings   FiO2 (%) 35 %   PEEP/CPAP (cm H2O) 5 cm H2O   I:E Ratio 1:4.0   Pressure Support (cm H2O) 5 cm H20   Patient Data   Vt Exp (mL) 838 mL   Minute Ventilation (L/min) 12.3 L/min   Total Resp Rate  16 BPM   PIP Observed (cm H2O) 24 cm H2O   MAP (cm H2O) 7.8   Weaning Assessment    Total RSBI 45    HR 72, %

## 2021-06-09 NOTE — PROGRESS NOTES
07/09/20 2019   Patient Data   Vt (observed, mL) 326 mL   Minute Ventilation (L/min) 7.8 L/min   Total Resp Rate  22 BPM   PIP Observed (cm H2O) 30 cm H2O   MAP (cm H2O) 19   Auto/Intrinsic PEEP Observed (cm H2O) 0.9 cm H2O   Plateau Pressure (cm H2O) 24 cm H2O   Dynamic Compliance (L/cm H2O) 40 L/cm H2O   Airway Resistance 21   SpO2 93 %   Heart Rate (!) 101   Patient assessed and appears to be critically stable on current ventilator support. Patient is sedated and prone, tolerating head turns. Plan to monitor patient's status closely and titrate support as needed.

## 2021-06-09 NOTE — PROGRESS NOTES
06/28/20 0716   Patient Data   PIP Observed (cm H2O) 26 cm H2O   MAP (cm H2O) 17   Plateau Pressure (cm H2O) 22 cm H2O

## 2021-06-09 NOTE — PROGRESS NOTES
07/13/20 0714   Patient Data   Vt (observed, mL) 415 mL   Vt Exp (mL) 435 mL   Minute Ventilation (L/min) 8.2 L/min   Total Resp Rate  27 BPM   PIP Observed (cm H2O) 26 cm H2O   MAP (cm H2O) 18   Auto/Intrinsic PEEP Observed (cm H2O) 1.3 cm H2O   Plateau Pressure (cm H2O) 24 cm H2O   Dynamic Compliance (L/cm H2O) 89 L/cm H2O   Airway Resistance 10.5   SpO2 92 %   Heart Rate 87

## 2021-06-09 NOTE — PROGRESS NOTES
07/03/20 1945   Patient Data   Vt (observed, mL) 398 mL   Minute Ventilation (L/min) 7.7 L/min   Total Resp Rate  18 BPM   PIP Observed (cm H2O) 40 cm H2O   MAP (cm H2O) 14   Auto/Intrinsic PEEP Observed (cm H2O) 0.8 cm H2O   Plateau Pressure (cm H2O) 20 cm H2O   Dynamic Compliance (L/cm H2O) 32 L/cm H2O   Airway Resistance 18   Heart Rate 61

## 2021-06-09 NOTE — PROGRESS NOTES
[facility] COVID RT PROGRESS NOTE    DATA:    VENT DAY#  7    CURRENT SETTINGS:  VENT SETTINGS   AC/VC RR 18, Vt 450, PEEP +18        FIO2:   45%    PATIENT PARAMETERS:    PIP 27-28  Pplat:  25-26  Pmean: 20-21  SBT: N/A  SECRETIONS:  Suctioning small amount of thick yellow/white secretions  02 SATS:  96%    ETT SIZE 8.0  Secured at  23 cm at teeth    Respiratory Medications: None     AB.45/42/113/28.9    NOTE / PLAN:   Patient remains on full ventilator support, will wean vent settings as able. Patient remained supine overnight. Will continue to monitor.

## 2021-06-09 NOTE — PROGRESS NOTES
07/14/20 0653   Patient Data   Vt (observed, mL) 459 mL   Vt Exp (mL) 430 mL   Minute Ventilation (L/min) 13.6 L/min   Total Resp Rate  31 BPM   PIP Observed (cm H2O) 14 cm H2O   MAP (cm H2O) 12   Auto/Intrinsic PEEP Observed (cm H2O) 0.5 cm H2O   Plateau Pressure (cm H2O) 12 cm H2O   SpO2 93 %   Heart Rate 76

## 2021-06-09 NOTE — PROGRESS NOTES
07/19/20 1954   Patient Data   Vt (observed, mL) 418 mL   Minute Ventilation (L/min) 12.2 L/min   Total Resp Rate  28 BPM   PIP Observed (cm H2O) 22 cm H2O   MAP (cm H2O) 15   Plateau Pressure (cm H2O) 25 cm H2O   Dynamic Compliance (L/cm H2O) 25 L/cm H2O   Airway Resistance 6   SpO2 95 %   Heart Rate 60

## 2021-06-09 NOTE — PROGRESS NOTES
[facility]    ICU PROGRESS NOTE:  DOS:  06/26/2020    Patient Summary:   Tobias Palmer is a 54 year old male with a history of obesity (BMI 45) who was admitted and intubated on 6/21 at [facility] with COVID-19 pneumonia, transferred to [facility] ICU on 6/22. Course complicated by ARDS and shock, requiring proning and paralytics.     Major Changes for Today    Supine today PF ratio 254  Follow up ID recommendations for additional cares   Decrease RR rate to 18 (from 20)   Recheck ABG and possibly decrease peep to 18   Evaluate Flolan need   Family called, son Carlton  updated. Questions answered.     Assessment/Plan:    # Acute Pain  # Agitation/Anxiety  # Sedation  - Continue fentanyl, versed, precedex infusions for sedation and pain control  - Paralytics discontinued  - RASS -4 due to proning      CVS:  # Shock secondary to COVID-19 pneumonia  - Hemodynamically stable at this time. Monitor goal to keep MAP >65  - continue with continuous telemetry      RESP:  # Acute hypoxic respiratory failure in the setting of COVID-19 infection.  - Current vent settings: Vent Mode: AC 20/450/20 on 75% FIO2  - Continue Veletri at half strength.   - supine this am. P/F ratio 254 this am.  Recheck abg later today      GI/FEN:  # Protein calorie malnutrition  # Stress ulcer prophylaxis  - Tube feeds @ rate of 35 ml/hr (if no prone/supine indicated)  - When proning TF at 15 ml/hr, then 80 ml/hr supinated.   - PPI ordered for stress ulcer prophylaxis.      RENAL :  # Rhabdomyolysis, resolved.   - CK peaked to 1864 at [facility];  6/25 on . No further trending necessary    # Hypernatremia, Na resolved.   - Sodium 145.  Free water 150 every 4hours.   - net negative -1.2L/24 hours. Goal to keep net negative.      ID:  # COVID-19 infection  - Remdesivir, started 6/22 for 10 day course  - s/p CCP and tocilizumbab   - S/p Ivermectin x2 doses.   - ID consulted, advised no Tocilizumab as patient's IL-6, CRP, and LDH  "slightly improved during course of stay. Continues to follow patient    # concern for superimposed bacterial pneumonia   - Was on Vanc/Zosyn at [facility] started 6/21, D/C'ed and narrowed to ceftriaxone 6/22-6/23  - No clinical signs of bacterial pneumonia at this time.   - sputum culture 6/24 with normal bruno     # Fevers  - cont with fever spikes.   - Tylenol scheduled for fever  - WBC remains normal, procalcitonin 0.10 on 6/24, Blood cultures from 6/22 with NGTD--normal bruno, likely COVID. Cont with cooling blanket      HEMATOLOGIC:  # Anemia of critical illness  - Hgb stable at 14.0.  No outwards signs of bleeding   -Transfuse for signs/symptoms of hypoperfusion.     # Coagulopathy due to COVID-19 infection  - Lovenox 60 U BID      ENDOCRINE:  # Stress hyperglycemia  - Keep BG< 180 for optimal healing  - SSI q4h.      DISPO: ICU     GENERAL CARES:  DVT proph:vYes.  GI proph: vYes.  Requires barlow for strict I&O: Yes  Restraints required for safety: Yes.     Juan F Max PA-C   Total time 60 mins.     Overnight events:   Course reviewed. No acute events overnight. Patient toelrated pronging  Overnight. Plan to supine later today. Not able to perform ROS.     Objective:  Physical Exam:  Vent settings for last 24 hours:  Vent Mode: AC  FiO2 (%):  [65 %-95 %] 75 %  S RR:  [20] 20  S VT:  [450 mL] 450 mL  PEEP/CPAP (cm H2O):  [20 cm H2O] 20 cm H2O  Minute Ventilation (L/min):  [9.1 L/min-10.6 L/min] 9.1 L/min  PIP:  [31 cm H2O-34 cm H2O] 34 cm H2O  MAP (cm H2O):  [23-25] 24    /80 (Patient Position: Lying) Comment: A-Line  Pulse 75   Temp (!) 102.7  F (39.3  C) (Bladder)   Resp 21   Ht 5' 5\" (1.651 m)   Wt (!) 263 lb 0.1 oz (119.3 kg)   SpO2 98%   BMI 43.77 kg/m      Intake/Output last 3 shifts:  I/O last 3 completed shifts:  In: 2958.7 [I.V.:633.7; NG/GT:2325]  Out: 4105 [Urine:3805; Stool:300]  Intake/Output this shift:  No intake/output data recorded.    Physical Exam  Neuro: chemically sedated. Not able " to respond to ROS questions.   HEENT:  Midface and periorbital swelling. NJ present with feeds infusing.   RESP: BBS, lungs coarse, no wheezes, rales or rhonchi  CV: S1, S2  GI: abdomen soft and compressible. No masses  : voiding per barlow, urine clear and yellow   MSK: Extremities: calves soft and compressible. Pulses 2+.     LAB:  Results from last 7 days   Lab Units 06/26/20  0422   LN-WHITE BLOOD CELL COUNT thou/uL 9.4   LN-HEMOGLOBIN g/dL 14.0   LN-HEMATOCRIT % 45.9   LN-PLATELET COUNT thou/uL 220     Results from last 7 days   Lab Units 06/26/20  0422 06/25/20  0440 06/24/20  0524  06/22/20  1231   LN-SODIUM mmol/L 145 147* 145   < > 145   LN-POTASSIUM mmol/L 4.3 3.8 4.7   < > 3.6   LN-CHLORIDE mmol/L 109* 114* 111*   < > 105   LN-CO2 mmol/L 28 24 27   < > 28   LN-BLOOD UREA NITROGEN mg/dL 47* 33* 22   < > 14   LN-CREATININE mg/dL 0.95 0.78 0.74   < > 0.77   LN-CALCIUM mg/dL 7.9* 7.8* 7.9*   < > 7.7*   LN-PROTEIN TOTAL g/dL  --   --   --   --  6.5  6.5   LN-BILIRUBIN TOTAL mg/dL  --   --   --   --  0.4  0.4   LN-ALKALINE PHOSPHATASE U/L  --   --   --   --  49  49   LN-ALT (SGPT) U/L  --   --   --   --  45  45   LN-AST (SGOT) U/L  --   --   --   --  69*  69*    < > = values in this interval not displayed.

## 2021-06-09 NOTE — PROGRESS NOTES
Clinical Nutrition Follow Up Note    Update per care rounds today: Pt no longer proning. Started Ins GTT.  Steroid tapering off.  TF rate question: should be at 35 ml/hr continuous now off proning schedule. RD will adjust orders.    Current Nutrition Prescription:   Diet: TF: Isosource 1.5 @ 35 ml/hr   Supplement/modulars: no carb prosource 2 pkt TID  Flush order: water 150 ml q 4 hrs  IV dextrose or Fluids:amiodarone 900 mg/500 ml standard 24 hour infusion, Last Rate: 0.5 mg/min (06/29/20 0800)  dexmedetomidine infusion orderable (PRECEDEX), Last Rate: Stopped (06/27/20 1437)  fentaNYL citrate (PF), Last Rate: 150 mcg/hr (06/29/20 0800)  insulin infusion (1 unit/mL), Last Rate: 13 Units/hr (06/29/20 0800)  midazolam, Last Rate: 4 mg/hr (06/29/20 0800)  sodium chloride 0.9%, Last Rate: Stopped (06/29/20 0000)      Current Nutrition Intake:  Enteral nutrition access is a nasal gastric tube, with a placement date of 6/22/2020 & verified in place per x-ray on 6/23.   TF as prescribe provides: 840 ml ml,1620 kcal, 147 grams protein, 13 grams fiber, 148 grams CHO, 638 ml free water + flush 900 ml which will provide 1538 ml total (TF & flushes).    IVF provided 2085 ml yesterday  Meeting needs with TF prescribed    Anthropometrics:  Height: 5'5  Admit wt: 274 lb 4 oz  Weight: (!) 254 lb 10.1 oz (115.5 kg),6/29/20. Net neg I/O.  BMI:42.4  BMI indication: >40 extreme obesity (class 3)  Ideal body weight: 136 lb(+or-10%)  Usual body weight: unable to obtain currently  Weight History:268 lb(6/22/20)  Wt decreased 14 lb (5%) from baseline.    GI Status/Output:   GI symptoms include:  Rectal tube: 100-200 ml/24 hrs noted past couple days  Gastric residuals:0-150 ml noted in the past 24 hrs    Skin/Wound:  Bayron score Bayron Scale Score: 11    Medications:  Medications reviewed.  Note IV abx, Lantus, MVI w/minerals, miralax, senna-doc, Ins GTT    Labs:  Labs reviewed  Lab Results   Component Value Date/Time    ALBUMIN 2.4 (L)  06/27/2020 11:08 AM     06/29/2020 04:23 AM    K 4.3 06/29/2020 04:23 AM    BUN 31 (H) 06/29/2020 04:23 AM    CREATININE 0.71 06/29/2020 04:23 AM     (H) 06/29/2020 04:23 AM   FSBG 144-197 mg/dL in the past 12 hrs. Note TF has been running at increased rate of 80 ml/hr.  No Hx DM. Stress & steroid induced hyperglycemia.    Estimated Nutrition Needs:  Assessment weight is 121.8 kg, estimated weight    Energy Needs: 3564-8466 kcals daily, 11-14 kcal/kg  Protein Needs: 124-155 g daily, 2-2.5 g/kg.(dose: 62 kg IBW)  Fluid Needs: 7341-0380 mls daily, 25-35 mls/kg(dose wt: 77 kg adj.wt)    Malnutrition: protein calorie malnutrition noted per doctor     Nutrition Risk Level: high risk     Nutrition dx:   Swallowing difficulty r/t respiratory failure as evidenced by intubated,NPO.   Altered nutrition related labs r/t stress/steroid evidenced by hyperglycemia.     Goals:   Meet estimated nutrition needs and Tolerate tube feeding-progressing  BG  - progressing     Intervention:   Adjust TF rate in orders to 35 ml/hr continuous.     Monitoring/Evaluation:   Labs, wt, & TF

## 2021-06-09 NOTE — PLAN OF CARE
Problem: Physical Therapy  Goal: PT Goals  Description: LTGS to be met by: 8/21/2020  In order to progress towards home,  1. Patient will perform bed mobility with min assistance.  2. Patient will perform sit to/from stand and stand pivot transfers with min assistance.  3. Patient will ambulate 50ft with min assistance and least restrictive device and vitals WNL.  4. Patient will tolerate sitting EOB x 10 min, SBA and vitals WNL.    Goals were initiated on 7/24/2020 by Eli Ponce PT.      See PT eval dated 7/24/2020 for further details.

## 2021-06-09 NOTE — PROGRESS NOTES
BH COVID RT PROGRESS NOTE    DATA:    VENT DAY#  1    CURRENT SETTINGS:  VENT SETTINGS   AC 18, 450,+12,         FIO2:   45    PATIENT PARAMETERS:    PIP 26  Pplat:  25  Pmean:  21  SBT: N/A   SECRETIONS:  Small thick white   02 SATS:  98       ETT SIZE 8  Secured at  24 cm at teeth    Respiratory Medications: none     ABG:     Results for RJ GUADALUPE (MRN [id number]) as of 6/27/2020 15:15   Ref. Range 6/27/2020 13:30   pH, Arterial Latest Ref Range: 7.37 - 7.44  7.47 (H)   pCO2, Arterial Latest Ref Range: 35 - 45 mm Hg 43   pO2, Arterial Latest Ref Range: 80 - 90 mm Hg 74 (L)   Bicarbonate, Arterial Calc Latest Ref Range: 23.0 - 29.0 mmol/L 30.2 (H)   O2 Sat, Arterial Latest Ref Range: 96.0 - 97.0 % 96.9       NOTE / PLAN: Patient will remain intubated. VT decreased to 430 at 18:15 pm  per Dr's order.  Will continue to monitor.

## 2021-06-09 NOTE — PROGRESS NOTES
07/22/20 0210   Patient Data   Plateau Pressure (cm H2O) 22 cm H2O   Static Compliance (L/cm H2O) 26   Dynamic Compliance (L/cm H2O) 23 L/cm H2O   Airway Resistance 7

## 2021-06-09 NOTE — PLAN OF CARE
Problem: Mechanical Ventilation  Goal: Patient will maintain patent airway  Outcome: Progressing

## 2021-06-09 NOTE — PLAN OF CARE
Problem: Pain  Goal: Patient's pain/discomfort is manageable  7/11/2020 0029 by Lizzie Banks RN  Outcome: Progressing  7/11/2020 0027 by Lizzie Banks RN  Outcome: Progressing     Problem: Safety  Goal: Patient will be injury free during hospitalization  7/11/2020 0029 by Lizzie Banks RN  Outcome: Progressing  7/11/2020 0027 by Lizzie Banks RN  Outcome: Progressing     Problem: Daily Care  Goal: Daily care needs are met  7/11/2020 0029 by Lizzie Banks RN  Outcome: Progressing  7/11/2020 0027 by Lizzie Banks RN  Outcome: Progressing     Problem: Psychosocial Needs  Goal: Demonstrates ability to cope with hospitalization/illness  7/11/2020 0029 by Lizzie Banks RN  Outcome: Progressing  7/11/2020 0027 by Lizzie Banks RN  Outcome: Progressing  Goal: Collaborate with patient/family/caregiver to identify patient specific goals for this hospitalization  7/11/2020 0029 by Lizzie Banks RN  Outcome: Progressing  7/11/2020 0027 by Lizzie Banks RN  Outcome: Progressing     Problem: Discharge Barriers  Goal: Patient's discharge needs are met  7/11/2020 0029 by Lizzie Banks RN  Outcome: Progressing  7/11/2020 0027 by Lizzie Banks RN  Outcome: Not Progressing     Problem: Knowledge Deficit  Goal: Patient/family/caregiver demonstrates understanding of disease process, treatment plan, medications, and discharge instructions  7/11/2020 0029 by Lizzie Banks RN  Outcome: Progressing  7/11/2020 0027 by Lizzie Banks RN  Outcome: Progressing     Problem: Potential for Compromised Skin Integrity  Goal: Skin integrity is maintained or improved  7/11/2020 0029 by Lizzie Banks RN  Outcome: Progressing  7/11/2020 0027 by Lizzie Banks RN  Outcome: Progressing  Goal: Nutritional status is improving  7/11/2020 0029 by Lizzie Banks RN  Outcome: Progressing  7/11/2020 0027 by  Lizzie Banks RN  Outcome: Progressing     Problem: Urinary Incontinence  Goal: Perineal skin integrity is maintained or improved  7/11/2020 0029 by Lizzie Banks RN  Outcome: Progressing  7/11/2020 0027 by Lizzie Banks RN  Outcome: Progressing     Problem: Potential for Falls  Goal: Patient will remain free of falls  7/11/2020 0029 by Lizzie Banks RN  Outcome: Progressing  7/11/2020 0027 by Lizzie Banks RN  Outcome: Progressing     Problem: Risk of Injury Due to Unsafe Behavior  Goal: Patient will remain safe while in restraint; physical/psychological needs will be met  7/11/2020 0029 by Lizzie Banks RN  Outcome: Progressing  7/11/2020 0027 by Lizzie Banks RN  Outcome: Progressing  7/11/2020 0027 by Lizzie Banks RN  Reactivated  Goal: Alternatives to restraint will be continually assessed with use of least restrictive device and discontinuation as soon as possible  7/11/2020 0029 by Lizzie Banks RN  Outcome: Progressing  7/11/2020 0027 by Lizzie Banks RN  Outcome: Progressing  7/11/2020 0027 by Lizzie Banks RN  Reactivated  Goal: Patient/Family will be able to communicate reason for restraint and steps for restraint application and removal  7/11/2020 0029 by Lizzei Banks RN  Outcome: Progressing  7/11/2020 0027 by Lizzie Banks RN  Outcome: Progressing  7/11/2020 0027 by Lizzie Banks RN  Reactivated     Problem: Inadequate Airway Clearance  Goal: Patient will maintain patent airway  7/11/2020 0029 by Lizzie Banks RN  Outcome: Progressing  7/11/2020 0027 by Lizzie Banks RN  Outcome: Progressing     Problem: Mechanical Ventilation  Goal: Patient will maintain patent airway  7/11/2020 0029 by Lizzie Banks RN  Outcome: Progressing  7/11/2020 0027 by Lizzie Banks RN  Outcome: Progressing  Goal: ET tube will be managed safely  7/11/2020 0029 by  Lizzie Banks RN  Outcome: Progressing  7/11/2020 0027 by Lizzie Banks RN  Outcome: Progressing     Problem: Inadequate Gas Exchange  Goal: Patient is adequately oxygenated and ventilation is improved  7/11/2020 0029 by Lizzie Banks RN  Outcome: Progressing  7/11/2020 0027 by Lizzie Banks RN  Outcome: Progressing     Problem: Glucose Imbalance  Goal: Achieve optimal glucose control  7/11/2020 0029 by Lizzie Banks RN  Outcome: Progressing  7/11/2020 0027 by Lizzie Banks RN  Outcome: Progressing     Problem: Potential for transmission of organism by Contact, Enteric, Droplet and/or Airborne routes  Goal: Prevent transmission of organisms  7/11/2020 0029 by Lizzie Banks RN  Outcome: Progressing  7/11/2020 0027 by Lizzie Banks RN  Outcome: Progressing

## 2021-06-09 NOTE — PROGRESS NOTES
BH COVID RT PROGRESS NOTE    DATA:7/8/20    VENT DAY#  18    CURRENT SETTINGS:  VENT SETTINGS   Vent Mode: AC  FiO2 (%):  [55 %-100 %] 60 %  S RR:  [22] 22  S VT:  [430 mL] 430 mL  PEEP/CPAP (cm H2O):  [14 cm H2O] 14 cm H2O  Minute Ventilation (L/min):  [9.4 L/min] 9.4 L/min  PIP:  [34 cm H2O-43 cm H2O] 43 cm H2O  MAP (cm H2O):  [20-24] 24          FIO2:   60    PATIENT PARAMETERS:    PIP 43  Pplat:  29  Pmean:  24  SBT: NA  SECRETIONS:  Small tan thin    02 SATS:  95    ETT SIZE 8.0  Secured at  24 cm at teeth    Respiratory Medications: NA     ABG: Results for RJ GUADALUPE (MRN [id number]) as of 7/8/2020 05:03   Ref. Range 7/8/2020 04:04   pH, Arterial Latest Ref Range: 7.37 - 7.44  7.40   pCO2, Arterial Latest Ref Range: 35 - 45 mm Hg 53 (H)   pO2, Arterial Latest Ref Range: 80 - 90 mm Hg 84   Bicarbonate, Arterial Calc Latest Ref Range: 23.0 - 29.0 mmol/L 30.8 (H)   O2 Sat, Arterial Latest Ref Range: 96.0 - 97.0 % 96.4   Oxyhemoglobin Latest Ref Range: 96.0 - 97.0 % 94.6 (L)   POC Base Excess Calc Latest Units: mmol/L 8.4       NOTE / PLAN:   Pt is still on full ventilatory support; pt is prone since 17:40 yesterday; Q2 head turns; lowered FiO2 to 60%; no other changes made; will monitor oxygenation status

## 2021-06-09 NOTE — PROGRESS NOTES
07/06/20 2010   Patient Data   Vt (observed, mL) 436 mL   Minute Ventilation (L/min) 7.6 L/min   Total Resp Rate  18 BPM   PIP Observed (cm H2O) 37 cm H2O   MAP (cm H2O) 20   Auto/Intrinsic PEEP Observed (cm H2O) 1 cm H2O   Plateau Pressure (cm H2O) 26 cm H2O   Dynamic Compliance (L/cm H2O) 41 L/cm H2O   Airway Resistance 23

## 2021-06-09 NOTE — PROGRESS NOTES
07/02/20 1934   Patient Data   Vt Exp (mL) 404 mL   Minute Ventilation (L/min) 7.8 L/min   Total Resp Rate  18 BPM   PIP Observed (cm H2O) 27 cm H2O   MAP (cm H2O) 10   Plateau Pressure (cm H2O) 19 cm H2O

## 2021-06-09 NOTE — PROGRESS NOTES
07/16/20 0714   Vent Settings   Resp Rate (Set) 16   FiO2 (%) 50 %   Vt (Set, mL) 350 mL   PEEP/CPAP (cm H2O) 14 cm H2O   Patient Data   Vt Exp (mL) 390 mL   Minute Ventilation (L/min) 7.7 L/min   Total Resp Rate  17 BPM   PIP Observed (cm H2O) 19 cm H2O   MAP (cm H2O) 15   Plateau Pressure (cm H2O) 16 cm H2O   SAT 97%. Neb tx given, Sx and oral care done. Patient supine.

## 2021-06-09 NOTE — PROGRESS NOTES
Infectious Disease Progress Note    Assessment/Plan  Impression:  COVID-19 pneumonia   Received CCP. Started on dexamethasone (6/22-29) and remdesivir for 10 day course  Received 400 mg tocilizumab on 6/25, 6/27 for suspected cytokine release syndrome.     Quantiferon gold negative  Hep B and C negative  Strongyloides antibody negative -- no need for further ivermectin    Ongoing fever and leukocytosis. Steroid may contribute to leukocytosis.     Colonized with yeast in sputum. Risk for invasive candidiasis -- broad spectrum antibiotics, central line, candida colonization.     Recommend:    Monitor temp, WBC, cultures  Continuing cefepime and vancomycin for now  D/c'd ivermectin order  Check beta-D-glucan      Principal Problem:    COVID-19  Active Problems:    Shock (H)    Acute and chronic respiratory failure with hypoxia (H)    On mechanically assisted ventilation (H)    ARDS (adult respiratory distress syndrome) (H)    Raphael Mantilla MD  [facility]  [phone] [facility]  ______________________________________________________________________       Subjective  Intubated and sedated in ICU. On amiodarone for tachycardia. Temp reached 104 yesterday.       Objective    Anti-infectives:  Ceftriaxone 6/22  Ivermectin 6/25-26  Cefepime 6/27-  Vancomycin 6/27-    Vital signs in last 24 hours  Temp:  [100.9  F (38.3  C)-101.1  F (38.4  C)] 100.9  F (38.3  C)  Heart Rate:  [] 76  Resp:  [22-31] 23  Arterial Line BP: (109-161)/(54-76) 112/58  FiO2 (%):  [45 %-50 %] 50 %  Weight:   (!) 254 lb 10.1 oz (115.5 kg)    Intake/Output last 3 shifts  I/O last 3 completed shifts:  In: 3909.1 [I.V.:601.1; NG/GT:1823; IV Piggyback:1485]  Out: 2680 [Urine:2480; Stool:200]  Intake/Output this shift:  I/O this shift:  In: 702.4 [I.V.:152.4; NG/GT:550]  Out: 1525 [Urine:1325; Stool:200]    Review of Systems   Review of systems not obtained due to inability to communicate with the patient. ,  otherwise negative.    Physical Exam--  General appearance: critically ill in ICU, on vent, cooling blanket  Eyes: eyes closed.  Throat: oral ett inplace.  Neck: short neck-difficult to examine  tachy  Abdomen: not distended.  Extremities: extremities normal, atraumatic, no cyanosis or edema  Skin: Skin color, texture, turgor normal. No rashes or lesions  Neurologic: Mental status: sedated  PICC R UE  barlow    Pertinent Labs   Lab Results: personally reviewed.     Results from last 7 days   Lab Units 06/29/20  0423 06/28/20  0412 06/27/20  0351   LN-WHITE BLOOD CELL COUNT thou/uL 18.5* 12.6* 11.7*   LN-HEMOGLOBIN g/dL 13.6* 13.7* 14.2   LN-HEMATOCRIT % 44.4 44.6 45.7   LN-PLATELET COUNT thou/uL 198 198  196 219        Results from last 7 days   Lab Units 06/29/20  0423 06/28/20  0412 06/27/20  0351   LN-SODIUM mmol/L 144 145 145   LN-POTASSIUM mmol/L 4.3 4.0  4.1 4.2   LN-CHLORIDE mmol/L 110* 107 108*   LN-CO2 mmol/L 27 30 27   LN-BLOOD UREA NITROGEN mg/dL 31* 35* 45*   LN-CREATININE mg/dL 0.71 0.76 1.01   LN-CALCIUM mg/dL 7.7* 7.8* 7.9*     Lab Results   Component Value Date    ALT 53 (H) 06/27/2020    AST 54 (H) 06/27/2020    ALKPHOS 50 06/27/2020    BILITOT 0.4 06/27/2020     Micro:  6/22 blood negative  6/24 sputum usual bruno and yeast  6/27 blood including fungal isolate no growth to date   6/27 sputum gram stain with yeast; culture usual bruno    Results for RJ GUADALUPE (MRN [id number]) as of 6/26/2020 11:04   Ref. Range 6/24/2020 16:46   Hepatitis B Surface Ag Latest Ref Range: Negative  Negative   Hepatitis C Ab Latest Ref Range: Negative  Negative   Results for RJ GUADALUPE (MRN [id number]) as of 6/26/2020 11:04   Ref. Range 6/23/2020 04:26 6/24/2020 05:24 6/24/2020 16:46 6/25/2020 04:40 6/26/2020 04:22   CRP Latest Ref Range: 0.0 - 0.8 mg/dL 12.0 (H) 10.0 (H)  4.2 (H) 2.1 (H)   LD (LDH) Latest Ref Range: 125 - 220 U/L 480 (H) 477 (H)  434 (H) 452 (H)     Pertinent Radiology   Radiology  Results: Personally reviewed image/s and Personally reviewed impression/s    No results found.

## 2021-06-09 NOTE — PROGRESS NOTES
07/10/20 1955   Patient Data   Vt (observed, mL) 327 mL   Minute Ventilation (L/min) 9 L/min   Total Resp Rate  26 BPM   PIP Observed (cm H2O) 26 cm H2O   MAP (cm H2O) 18   Auto/Intrinsic PEEP Observed (cm H2O) 1.7 cm H2O   Plateau Pressure (cm H2O) 25 cm H2O   Dynamic Compliance (L/cm H2O) 33 L/cm H2O   Airway Resistance 10   SpO2 95 %   Heart Rate 81   Patient assessed and appears to be stable on current current ventilator support tolerating supine positioning. Though patient is still on IV neuromuscular blockade, patient will cough and grimace with suctioning and periodically breath over the vent rate. RN aware. Plan to continue current positioning and ventilator support and titrate as needed.

## 2021-06-09 NOTE — PROGRESS NOTES
07/04/20 1309   Vent Settings   Resp Rate (Set) 18   FiO2 (%) 70 %   Vt (Set, mL) 430 mL   PEEP/CPAP (cm H2O) 12 cm H2O   I:E Ratio 1:1.5   Patient Data   Minute Ventilation (L/min) 11.2 L/min   Total Resp Rate  27 BPM   PIP Observed (cm H2O) 21 cm H2O   MAP (cm H2O) 11   Plateau Pressure (cm H2O) 26 cm H2O   SAT 93%

## 2021-06-09 NOTE — PLAN OF CARE
With the assistance x 5 in providing positional changes to prone.  Needed to increase the Fentanyl and Versed drip to assist with managing pt's tachycardia and HTN.  BIS remains in the high 50s to  Mid 60 range.  Will continue to monitor pt.

## 2021-06-09 NOTE — PROGRESS NOTES
06/23/20 0819   Patient Data   PIP Observed (cm H2O) 33 cm H2O   MAP (cm H2O) 24   Auto/Intrinsic PEEP Observed (cm H2O) 0.6 cm H2O   Plateau Pressure (cm H2O) 32 cm H2O

## 2021-06-09 NOTE — PROGRESS NOTES
"Pharmacy Consult: Vancomycin Dosing    Pharmacist consulted to dose vancomycin for Tobias Palmer, a 54 y.o. male.    Ordering provider: Angela Olson CNP    Indication for vancomycin therapy: Sepsis    Goal Trough Range:  15-20 mcg/mL based on indication    Other current antimicrobials              vancomycin 1,750 mg in sodium chloride 0.9% 500 mL (VANCOCIN)  Every 8 hours          cefepime 1 g in NaCl 0.9 % (MINI-BAG Plus) 50 mL (MAXIPIME)  Every 12 hours                   Subjective/Objective:    Patient was admitted for COVID-19 on 6/22/2020    Height: 5' 5\" (1.651 m)    Actual Body Weight (ABW): (!) 115.5 kg (254 lb 10.1 oz)    Ideal body weight: 61.5 kg (135 lb 9.3 oz)  Adjusted ideal body weight: 83.1 kg (183 lb 3.2 oz)    BMI: Body mass index is 42.37 kg/m .    No Known Allergies    Patient Active Problem List   Diagnosis     COVID-19     Shock (H)     Acute and chronic respiratory failure with hypoxia (H)     On mechanically assisted ventilation (H)     ARDS (adult respiratory distress syndrome) (H)    No past medical history on file.     Temp Readings from Current Encounter:     06/28/20 0400 06/28/20 1200 06/28/20 1600   Temp: 98.8  F (37.1  C) (!) 101.1  F (38.4  C) (!) 100.9  F (38.3  C)     Net Intake/Output (last 24 hours):  I/O last 3 completed shifts:  In: 4666.5 [I.V.:883.5; NG/GT:2298; IV Piggyback:1485]  Out: 4155 [Urine:3755; Stool:400]    Recent Labs     06/27/20  0351 06/27/20  1108 06/27/20  1416 06/27/20  1738 06/27/20  2137 06/28/20  0412 06/28/20  1524 06/29/20  0423   WBC 11.7*  --   --   --   --  12.6*  --  18.5*   NEUTROABS 9.9*  --   --   --   --  10.8*  --   --    LACTICACID  --  3.3* 2.4* 2.8* 2.1  --  2.0  --    PROCAL  --  0.09  --   --   --   --   --   --    BUN 45*  --   --   --   --  35*  --  31*   CREATININE 1.01  --   --   --   --  0.76  --  0.71     Estimated Creatinine Clearance: 136.5 mL/min (by C-G formula based on SCr of 0.71 mg/dL).    Recent Labs     " 06/27/20  1344   CULTURE Usual Sheila       Results for orders placed or performed during the hospital encounter of 06/22/20   Culture/Gram Stain: Sputum    Collection Time: 06/27/20  1:44 PM    Specimen: Endotracheal; Respiratory   Result Value Status    Culture Usual Sheila Preliminary   Sputum culture    Collection Time: 06/24/20  4:14 PM    Specimen: Endotracheal; Respiratory   Result Value Status    Culture Usual Sheila Final    Culture 2+ Yeast (!) Final       Recent labs: (last 7 days)     06/28/20  1709 06/28/20  2347   VANCOMYCIN 22.2 7.4       Vancomycin administrations: (last 120 hours)     Date/Time Action Medication Dose Rate    06/29/20 0818 New Bag    vancomycin 1,750 mg in sodium chloride 0.9% 500 mL (VANCOCIN) 1,750 mg 178.3 mL/hr    06/29/20 0052 New Bag    vancomycin 1,750 mg in sodium chloride 0.9% 500 mL (VANCOCIN) 1,750 mg 178.3 mL/hr    06/28/20 1200 New Bag    vancomycin 1,750 mg in sodium chloride 0.9% 500 mL (VANCOCIN) 1,750 mg 178.3 mL/hr    06/28/20 0001 New Bag    vancomycin 1,750 mg in sodium chloride 0.9% 500 mL (VANCOCIN) 1,750 mg 178.3 mL/hr    06/27/20 1240 New Bag    vancomycin 1,750 mg in sodium chloride 0.9% 500 mL (VANCOCIN) 1,750 mg 178.3 mL/hr          Assessment/Plan:    Pharmacist consulted to dose vancomycin for Sepsis, goal trough range 15-20 mcg/mL.  1. Change to vancomycin 1750 mg IV every 8 hours (15 mg/kg actual body weight).  2. Vancomycin trough level of 7.4 mcg/mL was below the goal trough range. This trough level was drawn at steady state.  3. Pharmacist will plan to re-check a vancomycin trough level before the 5th new dose.  4. Pharmacist will continue to follow.    Thank you for the consult.  Yohana Barron, PharmD 6/29/2020 3:27 PM

## 2021-06-09 NOTE — PLAN OF CARE
Problem: Pain  Goal: Patient's pain/discomfort is manageable  Outcome: Progressing     Problem: Daily Care  Goal: Daily care needs are met  Outcome: Progressing     Problem: Potential for Compromised Skin Integrity  Goal: Skin integrity is maintained or improved  Outcome: Progressing     Problem: Urinary Incontinence  Goal: Perineal skin integrity is maintained or improved  Outcome: Progressing     Problem: Potential for Falls  Goal: Patient will remain free of falls  Outcome: Progressing     Problem: Risk of Injury Due to Unsafe Behavior  Goal: Patient will remain safe while in restraint; physical/psychological needs will be met  Outcome: Progressing  Goal: Alternatives to restraint will be continually assessed with use of least restrictive device and discontinuation as soon as possible  Outcome: Progressing     Problem: Inadequate Airway Clearance  Goal: Patient will maintain patent airway  Outcome: Progressing     Problem: Mechanical Ventilation  Goal: Patient will maintain patent airway  Outcome: Progressing     Problem: Glucose Imbalance  Goal: Achieve optimal glucose control  Outcome: Progressing     Problem: Potential for transmission of organism by Contact, Enteric, Droplet and/or Airborne routes  Goal: Prevent transmission of organisms  Outcome: Progressing

## 2021-06-09 NOTE — PLAN OF CARE
Problem: Mechanical Ventilation  Goal: Patient will maintain patent airway  Outcome: Progressing  Goal: ET tube will be managed safely  Outcome: Progressing     Problem: Inadequate Gas Exchange  Goal: Patient is adequately oxygenated and ventilation is improved  Outcome: Progressing

## 2021-06-09 NOTE — PROGRESS NOTES
[facility] COVID RT PROGRESS NOTE    DATA:    VENT DAY# 31    CURRENT SETTINGS: AC/VC  VENT SETTINGS  16/450/+5/        FIO2:  40%    PATIENT PARAMETERS:    PIP 20  Pplat: 17  Pmean: 8.6  SBT: YES  SECRETIONS: Moderate white yellow secretion  02 SATS: 100%    ETT SIZE 8.0  Secured at  24 cm at teeth    Respiratory Medications: Duoneb- two times a day/ Mucomyst-two times a day     AB.50/34/65/27.3/94.0/3.1   P/F Qcbsv=031    NOTE / PLAN: Pt sedated and on full vent support. Pt was on PS 5/5 for 10hrs and then placed back on AC for the night. Plan is to continue CPAP trial and possible extubation. Increase FIO2 to 40% post AM ABG

## 2021-06-09 NOTE — PROGRESS NOTES
[facility]    [facility] PROGRESS NOTE:  DOS:  06/27/2020    Patient Summary:   Tobias Palmer is a 54 year old male with a history of obesity (BMI 45) who was admitted and intubated on 6/21 at [facility] with COVID-19 pneumonia, transferred to [facility] ICU on 6/22. Course complicated by ARDS and shock, requiring proning and paralytics.     Major Changes for Today    - Persistently febrile  - Reculture today  - Redose Tocilizumab today  - Check lactate and follow   - 1 L LR bolus  - PEEP to 12  - Check procal, hepatic panel  - US BUE and BLE  - Stop Precedex  - RAAS 0 to -1  -  Start Vancomycin and Cefepime  - Increase SSI to resistant scale      Assessment/Plan:    # Acute Pain  # Agitation/Anxiety  # Sedation  - Continue fentanyl, versed.  - Stop Precedex as possible fever contributor  - Liberalize RAAS to 0 to -1     CVS:  #Lactic acidosis  - Hemodynamically  Normal at this time.   - continue with continuous telemetry   - Lactic acidosis--> given 1 L LR. Follow     RESP:  # Acute hypoxic respiratory failure in the setting of COVID-19 infection.  - Vent Mode: AC18/450/18-->16-->12/45%  - Veletri now off  - Follow ABG     GI/FEN:  # Protein calorie malnutrition  - Tube feeds @ rate of 35 ml/hr   - PPI ordered for stress ulcer prophylaxis     RENAL :  # Rhabdomyolysis, resolved.   #Lactic acidosis  - CK peaked to 1864 at [facility];  6/25 on . No further trending necessary  - Creatinine has been uptrending 0.78-->0.95-->1.01, still remains normal  - Lactic acidosis today in setting of persistent high fever, possibly from fluid depletion vs. Redeveloping sepsis. Given 500 ml of LR x2. Last lactate 2.4, will trend one more.     ID:  # COVID-19 infection  #Persistent fevers  - Remdesivir, started 6/22 for 10 day course  - s/p CCP and tocilizumab 6/25  - S/p Ivermectin x2 doses.   - ID consulted, recommended repeat Tocilizumab dose today if not improved. In setting of worsening fever curve, will dose  "today.   - Recultured today. Persistent fevers T max 104, WBC 9 to 11. 7. Started on Vancomycin and Cefepime  - Strongyloides antibody pending    HEMATOLOGIC:  # Anemia of critical illness  - Hgb stable at 14.2    # Coagulopathy due to COVID-19 infection  - Lovenox 60 U BID   - US BUE and BLE given persistent fevers, negative for DVT.      ENDOCRINE:  # Stress hyperglycemia  #Steroid induced hyperglycemia  - Keep BG< 180 for optimal healing  - SSI q4h, increase to resistant scale. If does not meet goal, will start insulin gtt.   - Continue dexamethasone for COVID, started on 6/22     DISPO: ICU     GENERAL CARES:  DVT proph:vYes.  GI proph: vYes.  Requires barlow for strict I&O: Yes  Restraints required for safety: Yes.     Angela Lagos CNP   Total time 60 mins  Staffed with Dr. Garcia  Son updated     Overnight events:   Course reviewed. Remains febrile in spite of continuous external cooling.     Objective:  Physical Exam:  Vent settings for last 24 hours:  Vent Mode: AC  FiO2 (%):  [45 %] 45 %  S RR:  [18] 18  S VT:  [450 mL] 450 mL  PEEP/CPAP (cm H2O):  [12 cm H2O-18 cm H2O] 12 cm H2O  Minute Ventilation (L/min):  [8.8 L/min-11.8 L/min] 9.6 L/min  PIP:  [21 cm H2O-28 cm H2O] 21 cm H2O  MAP (cm H2O):  [14-21] 14    /64 (Patient Position: Lying)   Pulse 81   Temp (!) 103.2  F (39.6  C) (Oral)   Resp 26   Ht 5' 5\" (1.651 m)   Wt (!) 263 lb 0.1 oz (119.3 kg)   SpO2 94%   BMI 43.77 kg/m      Intake/Output last 3 shifts:  I/O last 3 completed shifts:  In: 1713.1 [I.V.:437.1; NG/GT:1020; IV Piggyback:256]  Out: 3855 [Urine:3805; Stool:50]  Intake/Output this shift:  No intake/output data recorded.    Physical Exam  Neuro: sedated, withdraws extremities. Pupils 3 mm brisk and round.   HEENT:  Midface and periorbital swelling. NJ present with feeds infusing.   RESP: BBS, lungs coarse, no wheezes, rales or rhonchi  CV: S1, S2  GI: abdomen soft and compressible. No masses  : voiding per barlow, urine " clear and yellow   MSK: Extremities: calves soft and compressible. Pulses 2+.     LAB:  Results from last 7 days   Lab Units 06/27/20  0351   LN-WHITE BLOOD CELL COUNT thou/uL 11.7*   LN-HEMOGLOBIN g/dL 14.2   LN-HEMATOCRIT % 45.7   LN-PLATELET COUNT thou/uL 219     Results from last 7 days   Lab Units 06/27/20  1108 06/27/20  0351 06/26/20  0422 06/25/20  0440  06/22/20  1231   LN-SODIUM mmol/L  --  145 145 147*   < > 145   LN-POTASSIUM mmol/L  --  4.2 4.3 3.8   < > 3.6   LN-CHLORIDE mmol/L  --  108* 109* 114*   < > 105   LN-CO2 mmol/L  --  27 28 24   < > 28   LN-BLOOD UREA NITROGEN mg/dL  --  45* 47* 33*   < > 14   LN-CREATININE mg/dL  --  1.01 0.95 0.78   < > 0.77   LN-CALCIUM mg/dL  --  7.9* 7.9* 7.8*   < > 7.7*   LN-PROTEIN TOTAL g/dL 6.5  --   --   --   --  6.5  6.5   LN-BILIRUBIN TOTAL mg/dL 0.4  --   --   --   --  0.4  0.4   LN-ALKALINE PHOSPHATASE U/L 50  --   --   --   --  49  49   LN-ALT (SGPT) U/L 53*  --   --   --   --  45  45   LN-AST (SGOT) U/L 54*  --   --   --   --  69*  69*    < > = values in this interval not displayed.

## 2021-06-09 NOTE — PROGRESS NOTES
Infectious Disease Progress Note    Assessment/Plan  Impression:  COVID-19 pneumonia   Received CCP. Received dexamethasone (6/22-29) and remdesivir for 10 day course  Received 400 mg tocilizumab on 6/25, 6/27 for suspected cytokine release syndrome.     Quantiferon gold negative  Hep B and C negative  Strongyloides antibody negative -- no need for further ivermectin    Ongoing fever and leukocytosis. Steroid may contribute to leukocytosis--> now normalized. High fever today, on cooling blanket    Colonized with yeast in sputum. Risk for invasive candidiasis -- broad spectrum antibiotics, central line, candida colonization. Beta-D-glucan negative 6/29 -- less likely therefore that he has invasive candidiasis.     Now with MSSA in sputum as of 7/2, 7/4 -- could be the cause of pneumonia. Completed 7-days of cefazolin. Recent sputum with GPC and GNR on gram stain. Recent procalcitonin normal. Heavy secretions    Recommend:    Agree with repeating blood culture  Repeat procalcitonin  F/up on sputum culture      Principal Problem:    COVID-19  Active Problems:    Shock (H)    Acute and chronic respiratory failure with hypoxia (H)    On mechanically assisted ventilation (H)    ARDS (adult respiratory distress syndrome) (H)    Atrial fibrillation with rapid ventricular response (H)    Atrial fibrillation with rapid ventricular response (H)    Encephalopathy    Herb Sweet MD  [facility]  Direct messaging: VERTILAS Paging  On-Call ID provider: [phone], option: 9    ______________________________________________________________________       Subjective  Intubated and sedated. proned this morning. Fevers cooling blanket. No pressors    Objective    Anti-infectives:  Cefazolin 7/7-7/14  Meropenem 7/2-7  Ceftriaxone 6/22  Ivermectin 6/25-26  Cefepime 6/27-7/2  Vancomycin 6/27-7    Vital signs in last 24 hours  Temp:  [100  F (37.8  C)-101.1  F (38.4  C)] 100.6  F (38.1  C)  Heart Rate:   [65-91] 67  Resp:  [17-35] 23  BP: (105-154)/(56-72) 114/58  Arterial Line BP: ()/(49-72) 122/57  FiO2 (%):  [45 %-55 %] 55 %  Weight:   (!) 262 lb 5.6 oz (119 kg)    Intake/Output last 3 shifts  I/O last 3 completed shifts:  In: 2198.3 [I.V.:837.3; NG/GT:1261; IV Piggyback:100]  Out: 3225 [Urine:3225]  Intake/Output this shift:  I/O this shift:  In: 122.8 [I.V.:122.8]  Out: 525 [Urine:525]    Review of Systems   Review of systems not obtained due to inability to communicate with the patient.     Physical Exam--  General appearance: critically ill in ICU, on vent, proned   Eyes: anicteric  Throat: oral ett inplace.  Neck: short neck-difficult to examine  CV: no exam due to pronation  Lungs: clear bilaterally   Abdomen: no exam due to pronation  Extremities: scrotal edema   Skin: Skin color, texture, turgor normal. No rashes or lesions  Neurologic: sedated  PICC R UE  Hillman  Rectal tube     Pertinent Labs   Lab Results: personally reviewed.     Results from last 7 days   Lab Units 07/15/20  0343 07/14/20  0402 07/13/20  0437   LN-WHITE BLOOD CELL COUNT thou/uL 8.8 9.3 10.8   LN-HEMOGLOBIN g/dL 11.4* 11.3* 10.8*   LN-HEMATOCRIT % 36.2* 35.9* 35.0*   LN-PLATELET COUNT thou/uL 115* 94* 87*        Results from last 7 days   Lab Units 07/15/20  0343 07/14/20  0402 07/13/20  0437   LN-SODIUM mmol/L 141 142 141   LN-POTASSIUM mmol/L 4.3 3.7 3.9   LN-CHLORIDE mmol/L 104 104 103   LN-CO2 mmol/L 29 31 32*   LN-BLOOD UREA NITROGEN mg/dL 20 21 21   LN-CREATININE mg/dL 0.58* 0.57* 0.54*   LN-CALCIUM mg/dL 8.4* 8.3* 8.0*     Lab Results   Component Value Date    ALT 27 07/06/2020    AST 30 07/06/2020    ALKPHOS 44 (L) 07/06/2020    BILITOT 0.3 07/06/2020     Micro:  6/22 blood negative  6/24 sputum usual bruno and yeast  6/27 blood including fungal isolate no growth to date   6/27 sputum gram stain with yeast; culture usual bruno  7/2 sputum MSSA  7/2 blood culture negative to date  7/4 sputum MSSA    Results for ALEXANDER  RJ LOVELL (MRN [id number]) as of 6/26/2020 11:04   Ref. Range 6/24/2020 16:46   Hepatitis B Surface Ag Latest Ref Range: Negative  Negative   Hepatitis C Ab Latest Ref Range: Negative  Negative   Results for RJ GUADALUPE (MRN [id number]) as of 6/26/2020 11:04   Ref. Range 6/23/2020 04:26 6/24/2020 05:24 6/24/2020 16:46 6/25/2020 04:40 6/26/2020 04:22   CRP Latest Ref Range: 0.0 - 0.8 mg/dL 12.0 (H) 10.0 (H)  4.2 (H) 2.1 (H)   LD (LDH) Latest Ref Range: 125 - 220 U/L 480 (H) 477 (H)  434 (H) 452 (H)     Pertinent Radiology   Radiology Results: Personally reviewed image/s and Personally reviewed impression/s    No results found.

## 2021-06-09 NOTE — PROGRESS NOTES
"Clinical Nutrition Follow Up Note    Per nurse this morning at rounds patient is having decrease urine output.    Current Nutrition Prescription:   Diet: TF:  Peptamen AF at 45 ml/hr (formula changed, 7/14 due to persistent diarrhea)   Supplement/modulars: no carb prosource 2 pkt BID-increased protein modulars 7/20  Flush order: water 30 ml q 4 hrs-decreased by provider yesterday    IV dextrose or Fluids:dexmedetomidine infusion orderable (PRECEDEX), Last Rate: 1.5 mcg/kg/hr (07/22/20 1123)  fentaNYL citrate (PF), Last Rate: 25 mcg/hr (07/22/20 1000)  midazolam, Last Rate: 2 mg/hr (07/22/20 1000)  norepinephrine, Last Rate: Stopped (07/22/20 1100)  sodium chloride 0.9%, Last Rate: 10 mL/hr (07/14/20 1200)      Current Nutrition Intake:  Enteral nutrition access is a nasal duodenal tube, with a placement date of 7/10/20 & verified in place per x-ray.   TF as prescribed provides:1080 ml, 1536 kcal, 142 gm pro, 121 gm CHO, 6 gm fiber, 873 ml free water,180 ml water flush, total free water is 1053 ml.  IV drips 841.2 ml yesterday  Meeting estimated >95% of estimated Calorie needs & 100% of estimated protein needs.    Anthropometrics:  Height: 5'5\"  Admit wt: 274 lb 4 oz,6/22/20  Weight: (!) 254 lb (115.2 kg),7/22/20-+1-2 edema noted in all extremities  BMI:42.4 (dry wt)  BMI indication: >40 extreme obesity (class 3)  Ideal body weight: 136 lb(+or-10%)  Usual body weight: unable to obtain currently  Weight History:268 lb(6/22/20).   Recent wt's: 270 lb(7/12),265 lb(7/16),267 lb(7/19),254 lb(7/21)  Wt range since admit: 254-284 lb    GI Status/Output:   Rectal tube reinserted on 7/18  Rectal tube: 200-350 ml/24 hrs noted past couple days    Skin/Wound:  Bayron score Bayron Scale Score: 14  WOC note reviewed 7/17    Medications:  Medications reviewed.  Note:rocephin-IV,SSI,culturelle,prilosec,oxycodone,pantoprazole  MVI w/minerals    Labs:  Labs reviewed  Lab Results   Component Value Date/Time    ALBUMIN 2.1 (L) " 07/06/2020 03:52 AM     07/22/2020 05:45 AM    K 3.9 07/22/2020 05:45 AM    BUN 12 07/22/2020 05:45 AM    CREATININE 0.55 (L) 07/22/2020 05:45 AM     (H) 07/22/2020 05:45 AM    PHOS 4.7 (H) 07/22/2020 05:45 AM    MG 2.1 07/22/2020 05:45 AM    CRP <0.1 07/11/2020 05:27 AM   FSBG 116-143 mg/dL in the past 24 hrs. (within goal range)    Estimated Nutrition Needs: (reassessed needs)  Energy Needs: 3889-4791 kcals daily, 25-30 kcal/kg (62 kg IBW)  Protein Needs: 124-155 g daily, 2-2.5 g/kg.(dose: 62 kg IBW)  Fluid Needs: 7821-2520 mls daily, 25-35 mls/kg(dose wt: 77 kg adj.wt)    Malnutrition: protein calorie malnutrition noted per doctor     Nutrition Risk Level: high risk     Nutrition dx:   Swallowing difficulty r/t respiratory failure as evidenced by intubated,NPO.     Goals:   Meet estimated nutrition needs and Tolerate tube feeding-progressing  D/c RT; <3 loose stools/day; more formed BM's-not progressing (on antibiotic)       Intervention:   Add nutrisource fiber 1 pkt BID which provides 3 grams soluble fiber per pkt to help firm up stools.  Provider is managing fluid flushes  Requested reweigh due to wt discrepancy's    Monitoring:  Labs, wt, TF,stools, & skin integrity.

## 2021-06-09 NOTE — PLAN OF CARE
Neuro: Continued paralysis at 0/4 twitch goal, currently on 0.5mcg/kg/min vecuronium. Able to wean down sedation since increasing PO meds, versed at 8mg/hr and fentanyl at 200mcg/hr    Resp: AC 60%/22RR/430TV/14 PEEP with AM AGB 7.40/53/84/30.8, no vent changes. Remained proned since 17:45 on 7/7.    Cardiac: NSR, normotensive most of shift with brief periods of hypertension with turns. Weaned off norepi with MAP at goal >65mmHg.     GI: Rectal tube with 300mL output this shift. Tolerating TF at 30mL/hr goal.    : Adequate urine output.         Problem: Mechanical Ventilation  Goal: Patient will maintain patent airway  Outcome: Progressing  Goal: ET tube will be managed safely  Outcome: Progressing     Problem: Glucose Imbalance  Goal: Achieve optimal glucose control  Outcome: Progressing   No ss insulin coverage needed

## 2021-06-09 NOTE — PROGRESS NOTES
BH COVID RT PROGRESS NOTE    DATA:7/9/20    VENT DAY#  19    CURRENT SETTINGS:  VENT SETTINGS   Vent Mode: AC  FiO2 (%):  [50 %-55 %] 50 %  S RR:  [22] 22  S VT:  [430 mL] 430 mL  PEEP/CPAP (cm H2O):  [14 cm H2O] 14 cm H2O  Minute Ventilation (L/min):  [9.4 L/min-9.6 L/min] 9.5 L/min  PIP:  [29 cm H2O-35 cm H2O] 29 cm H2O  MAP (cm H2O):  [19-20] 19          FIO2:   50    PATIENT PARAMETERS:    PIP 29  Pplat:  26  Pmean:  19  SBT: NA  SECRETIONS:  blood tinged small  02 SATS:  94    ETT SIZE 8  Secured at  24 cm at teeth    Respiratory Medications: NA     ABG: Results for RJ GUADALUPE (MRN [id number]) as of 7/9/2020 04:55   Ref. Range 7/9/2020 04:24   pH, Arterial Latest Ref Range: 7.37 - 7.44  7.48 (H)   pCO2, Arterial Latest Ref Range: 35 - 45 mm Hg 46 (H)   pO2, Arterial Latest Ref Range: 80 - 90 mm Hg 72 (L)   Bicarbonate, Arterial Calc Latest Ref Range: 23.0 - 29.0 mmol/L 33.5 (H)   O2 Sat, Arterial Latest Ref Range: 96.0 - 97.0 % 95.3 (L)   Oxyhemoglobin Latest Ref Range: 96.0 - 97.0 % 93.3 (L)   POC Base Excess Calc Latest Units: mmol/L 11.1       NOTE / PLAN:   Pt is still on ventilatory support; pt is supine and not prone; oxygenation status will be closely monitor on pt; no changes made on vent

## 2021-06-09 NOTE — PLAN OF CARE
Care Plan per Care Management        Care Management Goals of the Day: Care progression, discharge planning.     Care Progression Reviewed With: Chart review.     Barriers to Discharge: ETT/vent. Sedated, soft wrist restraints, Nasal FT, rectal tube, proneing     Discharge Disposition: TBD pending clinical needs and therapy recommendations.     Expected Discharge Date: TBD     Transportation:  TBD     Care Coordination Narrative: Pt continues on full vent support without weaning trials.         FSW already involved per 6/23 CM note.  CM will continue to follow.    Meño Holley RN Care Coordinator

## 2021-06-09 NOTE — PROGRESS NOTES
Pulmonary/Critical Care Medicine update  Contacted by respiratory therapy staff regarding current use of Mucomyst and Albuterol nebulizers.  In review of records, patient had large, thick secretions and therapy initiated 7/14/2020 to assist with secretion management.     Since then, secretions have been frothy.      Will trial reduction in frequency and monitor secretion viscosity.  Modify orders from Q6H to Q12H.     Bettie Zelaya MD, MPH  Pulmonary & Critical Care Medicine  Pager: [phone]  7/18/2020  10:16 PM

## 2021-06-09 NOTE — PROGRESS NOTES
Patient proned with no complications. No cuff leak heard. ETT is in correct position 23cm @ teeth. BBS confirmed.

## 2021-06-09 NOTE — PROGRESS NOTES
Clinical Nutrition Follow Up Note    Update per chart review/care rounds today: pt supine, but plan to re-prone.    Current Nutrition Prescription:   Diet: TF: Isosource 1.5 @ 30 ml/hr   Supplement/modulars: no carb prosource 2 pkt TID  Flush order: water 60 ml q 4 hrs    IV dextrose or Fluids:fentaNYL citrate (PF), Last Rate: 250 mcg/hr (07/09/20 0800)  midazolam, Last Rate: 10 mg/hr (07/09/20 0800)  norepinephrine, Last Rate: Stopped (07/07/20 2020)  sodium chloride 0.9%, Last Rate: 10 mL/hr (07/09/20 0800)  vecuronium, Last Rate: 0.7 mcg/kg/min (07/09/20 0800)      Current Nutrition Intake:  Enteral nutrition access is a nasal gastric tube, with a placement date of 6/22/2020 & verified in place per x-ray on 6/23.   TF as prescribed provides: 720 ml, 1440 kcal, 139 g protein, 127 g cho, 11 g fiber, 547 ml free water + 60 ml H20 flush q 4 hours to prevent FT clogging = 907 ml total H20.    IVF provided 837 ml yesterday  Propofol d/c'd.  Meeting nutrition needs from all sources.    Anthropometrics:  Height: 5'5  Admit wt: 274 lb 4 oz,6/22/20  Weight: (!) 275 lb 5.7 oz (124.9 kg),7/9/20. Decreased 9 lb past 4 days with diuresis.  +1 edema BLE's  BMI:42.43  BMI indication: >40 extreme obesity (class 3)  Ideal body weight: 136 lb(+or-10%)  Usual body weight: unable to obtain currently  Weight History:268 lb(6/22/20)  Recent wt's:263 lb(6/25), 264 lb(6/28),254 lb(6/29)    GI Status/Output:   GI symptoms include:   Bowel sounds hypoactive, abd rounded per RN  Rectal tube resumed: 300-320 ml/day output past 2 days    Medications:  Medications reviewed.  Note SS Ins, IV abx, mvi w/minerals, oxycodone q 6 hrs    Labs:  Labs reviewed  Lab Results   Component Value Date/Time    ALBUMIN 2.1 (L) 07/06/2020 03:52 AM     07/09/2020 04:24 AM    K 4.0 07/09/2020 04:24 AM    BUN 24 (H) 07/09/2020 04:24 AM    CREATININE 0.52 (L) 07/09/2020 04:24 AM     07/09/2020 04:24 AM    PHOS 3.2 07/09/2020 04:24 AM    MG 2.2  07/09/2020 04:24 AM    CRP 2.1 (H) 06/26/2020 04:22 AM     Trig 749 (7/7/20).  Propofol stopped.  FSBG 101-126 mg/dL in the past 24 hrs. Within goal range.    Estimated Nutrition Needs:  Assessment weight is 121.8 kg, estimated weight    Energy Needs: 8161-1438 kcals daily, 11-14 kcal/kg  Protein Needs: 124-155 g daily, 2-2.5 g/kg.(dose: 62 kg IBW)  Fluid Needs: 4141-8794 mls daily, 25-35 mls/kg(dose wt: 77 kg adj.wt)    Malnutrition: protein calorie malnutrition noted per doctor     Nutrition Risk Level: high risk     Nutrition dx:   Swallowing difficulty r/t respiratory failure as evidenced by intubated,NPO.   Altered GI function r/t critical illness evidenced by high output rectal tube.     Goals:   Meet estimated nutrition needs and Tolerate tube feeding-progressing  BG  - progressing  D/c RT; <3 loose stools/day; more formed BM's. Not progressing      Plan:   Continue current TF.      Monitoring:  Per goals

## 2021-06-09 NOTE — PLAN OF CARE
Versed, Levophed and Fentanyl continue IV with RASS +1.  Opens eyes but does not track or follow commands.  Requires frequent ETT suctioning for thin tan secretions.  TF infusing at 35cc/hr.  Necessary to insert rectal tube this shift d/t frequent incontinence of liquid stool.  Hillman draining adequate amts of urine.  Tylenol given via FT x2 this shift for elevated temps.  Tmax this shift = 102.7,  Current temp = 100.5.

## 2021-06-09 NOTE — PROGRESS NOTES
Chart update    Temp 102.4  Pt on cefapime  Vanco stopped 7/1    I d/w Dr Mantilla  Will hay culture  cxr    Hold off  adding vanco back at this time

## 2021-06-09 NOTE — PLAN OF CARE
Care Plan per Care Management        Care Management Goals of the Day: Care progression, discharge planning.     Care Progression Reviewed With: Chart review.     Barriers to Discharge: ETT/vent. Sedated, soft wrist restraints, Nasal FT, rectal tube, Fever     Discharge Disposition: TBD pending clinical needs and therapy recommendations.     Expected Discharge Date: TBD     Transportation:  TBD     Care Coordination Narrative: Pt continues on full vent support without weaning trials.  FSW already involved per 6/23 CM note. Pt's primary contact is his son Carlton.  CM will continue to follow for discharge needs    Meño Holley RN Care Manager

## 2021-06-09 NOTE — PROGRESS NOTES
06/24/20 0128   Patient Data   PIP Observed (cm H2O) 33 cm H2O   MAP (cm H2O) 26   Auto/Intrinsic PEEP Observed (cm H2O) 1.4 cm H2O   Plateau Pressure (cm H2O) 32 cm H2O

## 2021-06-09 NOTE — PROGRESS NOTES
FERN COVID RT PROGRESS NOTE    DATA:    VENT DAY#  22    CURRENT SETTINGS:  VENT SETTINGS   AC 22/350/+14        FIO2:   45%    PATIENT PARAMETERS:    PIP 27  Pplat:  24  Pmean:  17  SBT: No  SECRETIONS:  Large pale yellow/tan thick creamy  02 SATS:  100%    ETT SIZE 8.0c  Secured at  24 cm at teeth    Respiratory Medications: None     AB.45/50/72/32.6   P:F 160    NOTE / PLAN:   Patient remains stable on current ventilator support tolerating weans to FIO2 during the day. Patient is sedated and tolerating supine positioning. Plan to obtain morning arterial blood gas and wean ventilator, specifically PEEP, as tolerated.

## 2021-06-09 NOTE — PROGRESS NOTES
FERN COVID RT PROGRESS NOTE    DATA:     VENT DAY 19        Vent Mode: AC  FiO2 (%):  [50 %-60 %] 50 %  S RR:  [22] 26  S VT:  [380 mL-430 mL] 350 mL  PEEP/CPAP (cm H2O):  [14 cm H2O] 14 cm H2O  Minute Ventilation (L/min):  [8.4 L/min-9.5 L/min] 8.4 L/min  PIP:  [29 cm H2O-33 cm H2O] 27 cm H2O  MAP (cm H2O):  [19-20] 18  Plateau pre- 24   SBT: On prone position   SECRETIONS: large tan thick   02 SATS:  96     ETT SIZE 8  Secured at  24 cm at teeth     Respiratory Medications: None       Plan- To remain on full ventilatory support.7/10/2020  .Wei Patel

## 2021-06-09 NOTE — PROGRESS NOTES
FERN COVID RT PROGRESS NOTE    DATA:    VENT DAY#  9    CURRENT SETTINGS:  VENT SETTINGS   AC 18/430/+12        FIO2:   45%    PATIENT PARAMETERS:    PIP 14-16  Pplat:  Unable to obtain  Pmean:  12-13  SBT: Not indicated  SECRETIONS:  Large thick yellow  02 SATS:  93-96%    ETT SIZE 8.0  Secured at  24 cm at teeth    Respiratory Medications: none     AB.47 / 41 / 74 / 5.6 / 29.0  P/F ratio: 164    NOTE / PLAN:   Suction as needed.  Monitor oxygenation / ventilation.

## 2021-06-09 NOTE — PROGRESS NOTES
07/03/20 1334   Vent Settings   Resp Rate (Set) 18   FiO2 (%) 55 %   Vt (Set, mL) 430 mL   PEEP/CPAP (cm H2O) 8 cm H2O   I:E Ratio 1:2.7   Patient Data   Vt Exp (mL) 401 mL   Minute Ventilation (L/min) 7.8 L/min   Total Resp Rate  18 BPM   PIP Observed (cm H2O) 37 cm H2O   MAP (cm H2O) 13   Plateau Pressure (cm H2O) 19 cm H2O   SAT 96%

## 2021-06-09 NOTE — PROGRESS NOTES
07/22/20 1645   Weaning Assessment    Wean Start Time  0755   Wean End Time 1645   Vent Wean Time Calculation (min) 530 min   Total RSBI 48   RSBI <105 Y

## 2021-06-09 NOTE — PROGRESS NOTES
07/02/20 0740   Vent Information   Vent Mode AC   Vent Settings   Resp Rate (Set) 18   FiO2 (%) 55 %   Insp Time (sec) 0.9 sec   Vt (Set, mL) 430 mL   PEEP/CPAP (cm H2O) 8 cm H2O   Trigger Sensitivity Flow (L/min) 2 L/min   I:E Ratio 1:2.7   Patient Data   Vt (observed, mL) 404 mL   Minute Ventilation (L/min) 7.7 L/min   Total Resp Rate  18 BPM   PIP Observed (cm H2O) 36 cm H2O   MAP (cm H2O) 13   Plateau Pressure (cm H2O) 21 cm H2O   Dynamic Compliance (L/cm H2O) 34.8 L/cm H2O   Airway Resistance 18.2   SpO2 96 %   Heart Rate 84

## 2021-06-09 NOTE — PROGRESS NOTES
06/29/20 0738   Patient Data   Vt Exp (mL) 443 mL   Minute Ventilation (L/min) 13 L/min   Total Resp Rate  28 BPM   PIP Observed (cm H2O) 19 cm H2O   MAP (cm H2O) 15   Plateau Pressure (cm H2O) 18 cm H2O  (Freeze frame)   SpO2 93 %   Heart Rate (!) 160   Patient is sedated with increase RR 28.Coarse breath sounds with a small amount of tan secretions from mouth and ETT. Patient became tachycardic with a rate of 160. Slow to recover. Sats remain in the 90's. No changes made at this time.

## 2021-06-09 NOTE — PROGRESS NOTES
07/21/20 1538   Patient Data   Vt Exp (mL) 450 mL   Minute Ventilation (L/min) 14 L/min   Total Resp Rate  27 BPM   PIP Observed (cm H2O) 19 cm H2O   MAP (cm H2O) 12   Plateau Pressure (cm H2O) 14 cm H2O

## 2021-06-09 NOTE — PROGRESS NOTES
FERN COVID RT PROGRESS NOTE    DATA:    VENT DAY#  8    CURRENT SETTINGS:  VENT SETTINGS   AC 18/430/+12         FIO2:   45%    PATIENT PARAMETERS:    PIP 15-22  Pplat:  20  Pmean:  13-15  SBT: Not indicated  SECRETIONS:  Scant white  02 SATS:  92-94%    ETT SIZE 8  Secured at  23 cm at teeth    Respiratory Medications: None     AB.46 / 47 / 64 / 9.0 / 31.4  P/F Ratio:  142    NOTE / PLAN:   Continue to monitor oxygenation / ventilation.  Suction as needed.

## 2021-06-09 NOTE — PROGRESS NOTES
[facility] COVID19  CRITICAL CARE  PROGRESS NOTE:    ICU PROGRESS NOTE:  DOS:  07/06/2020    Patient Summary:   Tobias Palmer is a 54 year old male with a history of obesity (BMI 45) who was admitted and intubated on 6/21 at [facility] with COVID-19 pneumonia, transferred to [facility] ICU on 6/22. Course complicated by ARDS and shock, requiring proning and paralytics.     Last 24 hours  -Borderline ABG this am. PEEP was weaned yesterday to 12 and was not proned last night. Sedation and paralytic restarted 48 hours ago due to worening vent dyssynchrony. Has remained supine since 7/5 at 1100 am.    Major Changes for Today    - Prone given repeat gas despite increase Fio2 to 50 % with PF ratio 132  - hold diuretic today given hypotension  - Wean Levo as able.  - Switch to Ancef tomorrow, per ID  - Decrease Amio to 200 mg daily.   - Decrease Lantus given decreased TF and borderline BS.     Assessment/Plan:  # Acute Pain  # Agitation/Anxiety  # Sedation  # Hx of polysubstance abuse (reportedly, not seeing in chart?)  - Continue fentanyl.   - Wean Propofol given elevated Trigs/CK.   - Requiring very high doses of analgesia/sedation.  - continue scheduled oxycodone + PRN  - continue Seroquel  - Currently paralyzed.      CVS:  #Atrial fibrillation with RVR 6/28 in the setting of critical illness.    # Shock, septic- intermittently requiring vasopressors  - Was initially on Amio gtt, transitioned to PO. Started on PO metop, held currently in the setting of shock.   - Decrease Amio to 200 mg daily. LFT's stable. Continue Amio PO for now, especially given holding BB in the setting of shock and high risk for AF recurrence.   - Low chads. On Lovenox two times a day currently for coagulopathy 2/2 Covid.  - Could consider echo if afib recurrs     RESP:  # Acute hypoxic respiratory failure in the setting of COVID-19 infection.  - Paralyzed currently.   - Supine since 7/5 1100.  - Wean vent as able.   - Daily ABG  - Had to  "increase Fio2 today. May need to increase PEEP  - prone this afternoon     GI/FEN:  # Protein calorie deficient malnutrition  # Hypoalbuminemia  - Tube feeds were decreased yesterday due to Prop being increased. Given decreaseing Prop, discussed with dietary may increase goal. Not post pyloric but near pylorus.  - PPI  - Bowel regimen      RENAL :  # Rhabdomyolysis, improved  #Lactic acidosis, resolved  # Hypernatremia- resolved.  - CK peaked to 1864 at [facility];  Again elevated after resuming prop 2 days ago. Wean Prop and recheck Trigs and CK tomorrow.  - Creatinine normalized   - Hold on diuresis today given hypotension/not grossly hypervolemic.    ID:  # COVID-19 infection  #Persistent fevers   # Leukocytosis  - Tmax improved today  - Remdesivir- 10 day course  - s/p CCP and tocilizumab 6/25 and 6/27  - S/p Ivermectin x2 doses. Stronglyloides antibody negative.   - Continue Jessica for 10 day course    HEMATOLOGIC:  # Anemia of critical illness  # Coagulopathy due to COVID-19 infection  - Hgb stable   - No e/o acute bleeding  - Lovenox 60 mg BID   - US BUE and BLE given persistent fevers, negative for DVT.      ENDOCRINE:  # Stress hyperglycemia  # Steroid induced hyperglycemia  - Keep BG< 180 for optimal healing  - Lantus- decrease given lower sugars and decreased FT rate  - SSI   - Dexamethasone done-completed 7 days     DISPO: ICU     GENERAL CARES:  DVT proph: Yes.  GI proph: Yes.  Requires barlow for strict I&O: Yes  Restraints required for safety: Yes.    Objective:  Physical Exam:  Vent settings for last 24 hours:  Vent Mode: AC  FiO2 (%):  [40 %-50 %] 50 %  S RR:  [18] 18  S VT:  [430 mL] 430 mL  PEEP/CPAP (cm H2O):  [12 cm H2O] 12 cm H2O  Minute Ventilation (L/min):  [7.6 L/min-7.7 L/min] 7.7 L/min  PIP:  [25 cm H2O-31 cm H2O] 30 cm H2O  MAP (cm H2O):  [16-17] 17    /44   Pulse 63   Temp 97.2  F (36.2  C) (Bladder)   Resp 18   Ht 5' 5\" (1.651 m)   Wt (!) 284 lb 9.8 oz (129.1 kg)   SpO2 94%   BMI " 47.36 kg/m      Intake/Output last 3 shifts:  I/O last 3 completed shifts:  In: 4574.5 [I.V.:2091.5; NG/GT:1003; IV Piggyback:1480]  Out: 3595 [Urine:1995; Stool:1600]  Intake/Output this shift:  I/O this shift:  In: 661.9 [I.V.:371.9; NG/GT:290]  Out: 475 [Urine:475]    Physical Exam  Neuro: sedated, supine, NAD  RESP: diminished bases, coarser bilaterally, more so than yesterday  CV: RRR, no murmur  GI: obese, hypoactive BS  Integ: no rash    LAB:  Results from last 7 days   Lab Units 07/06/20  0352   LN-WHITE BLOOD CELL COUNT thou/uL 8.5   LN-HEMOGLOBIN g/dL 11.1*   LN-HEMATOCRIT % 36.2*   LN-PLATELET COUNT thou/uL 143     Results from last 7 days   Lab Units 07/06/20  0352 07/05/20  0440 07/04/20  0515 07/03/20  0315   LN-SODIUM mmol/L 144 142 142 146*   LN-POTASSIUM mmol/L 3.7 3.8 4.2 3.9   LN-CHLORIDE mmol/L 111* 109* 109* 111*   LN-CO2 mmol/L 28 27 29 30   LN-BLOOD UREA NITROGEN mg/dL 16 18 21 30*   LN-CREATININE mg/dL 0.50* 0.60* 0.63* 0.66*   LN-CALCIUM mg/dL 7.4* 7.4* 7.3* 7.2*   LN-PROTEIN TOTAL g/dL 4.5*  --   --  5.2*   LN-BILIRUBIN TOTAL mg/dL 0.3  --   --  0.4   LN-ALKALINE PHOSPHATASE U/L 44*  --   --  45   LN-ALT (SGPT) U/L 27  --   --  41   LN-AST (SGOT) U/L 30  --   --  33     Staffed and discussed with Dr. Montes    Updated patients daughter and son.    Total time 40  mins    Shikha Brady, CNP

## 2021-06-09 NOTE — PROGRESS NOTES
ICU UPDATE:  Noted to be spiking a fever again; cool bath given. Patient developed A-fib with RVR and maintaining his pressures.    Obtain 12lead EKG.    Load with amiodarone and initiate an infusion.        Lisseth Garcia MD  Pulmonary and Critical Care  (p) [phone]

## 2021-06-09 NOTE — PROGRESS NOTES
FERN COVID RT PROGRESS NOTE    DATA:    VENT DAY#  29    CURRENT SETTINGS:  VENT SETTINGS   VC/AC: Vt 450, Rate 16, PEEP 8        FIO2:   40    PATIENT PARAMETERS:    PIP 13  Pmean:  10  SBT: No  SECRETIONS:  Scant white thin  02 SATS:  94    ETT SIZE 8  Secured at  24 cm at teeth    Respiratory Medications: None       NOTE / PLAN:   Patient remains intubated and mechanically. PEEP decreased. Tolerating current vent settings. Will continue to wean vent as tolerated.

## 2021-06-09 NOTE — PROGRESS NOTES
07/15/20 0206   Patient Data   Plateau Pressure (cm H2O) 18 cm H2O   Static Compliance (L/cm H2O) 23   Dynamic Compliance (L/cm H2O) 20 L/cm H2O   Airway Resistance 10      Fever 100.4

## 2021-06-09 NOTE — PROGRESS NOTES
07/13/20 1552   Patient Data   Vt (observed, mL) 464 mL   Vt Exp (mL) 521 mL   Minute Ventilation (L/min) 10.2 L/min   Total Resp Rate  25 BPM   PIP Observed (cm H2O) 15 cm H2O   MAP (cm H2O) 12   Auto/Intrinsic PEEP Observed (cm H2O) 0.3 cm H2O   Dynamic Compliance (L/cm H2O) 56 L/cm H2O   Airway Resistance 9.2

## 2021-06-09 NOTE — PROGRESS NOTES
FERN COVID RT PROGRESS NOTE    DATA:    VENT DAY#  10    CURRENT SETTINGS: AC  VENT SETTINGS  , RR 18, PEEP 8        FIO2:   50    PATIENT PARAMETERS:    PIP 38  Pplat:  20  Pmean:  12  SBT: n/a  SECRETIONS:  tan  02 SATS:  95    ETT SIZE 8  Secured at  24 cm at teeth    Respiratory Medications: n/a    ABG: Results for RJ GUADALUPE (MRN [id number]) as of 7/1/2020 14:17   Ref. Range 7/1/2020 12:47   pH, Arterial Latest Ref Range: 7.37 - 7.44  7.40   pCO2, Arterial Latest Ref Range: 35 - 45 mm Hg 47 (H)   pO2, Arterial Latest Ref Range: 80 - 90 mm Hg 101 (H)   Bicarbonate, Arterial Calc Latest Ref Range: 23.0 - 29.0 mmol/L 27.6   O2 Sat, Arterial Latest Ref Range: 96.0 - 97.0 % 98.5 (H)   Oxyhemoglobin Latest Ref Range: 96.0 - 97.0 % 96.5   POC Base Excess Calc Latest Units: mmol/L 3.5   Ventilation Mode Unknown AC   Peep Latest Units: cm H2O 8   Sample Stabilized Temperature Latest Units: degrees C 37.0   Rate Latest Units: rr/min 18   FIO2 Unknown 50.00       NOTE / PLAN:   Patient seems to be stable at the moment considering all circumstances. The goal is to continue to wean his vent setting and assess for weaning when appropriate n

## 2021-06-09 NOTE — PROGRESS NOTES
06/22/20 8310   Patient Data   PIP Observed (cm H2O) 35 cm H2O   MAP (cm H2O) 25   Plateau Pressure (cm H2O) 33 cm H2O   peep increased to 20

## 2021-06-09 NOTE — PROGRESS NOTES
High HR, high BP, and high PIP at beginning of shift. Increase sedation per provider with improvement in vitals/vent tolerance.    Patient placed in supine position at 1000. ABGs improving. Gentle weaning of FiO2 to 50%.    Mostly in SR 80s today. BP stable after increase in sedation.    BG stable. TF continues at goal. RT in place.    Lasix given per provider with good response.    Family updated via telephone and ipad conference call provided to family.

## 2021-06-09 NOTE — PROGRESS NOTES
07/22/20 1149   Patient Data   Vt (observed, mL) 428 mL   Vt Exp (mL) 436 mL   Minute Ventilation (L/min) 9.7 L/min   Total Resp Rate  22 BPM   PIP Observed (cm H2O) 22 cm H2O   MAP (cm H2O) 11

## 2021-06-09 NOTE — PROGRESS NOTES
06/22/20 2417   Patient Data   PIP Observed (cm H2O) 30 cm H2O   MAP (cm H2O) 22   Plateau Pressure (cm H2O) 27 cm H2O

## 2021-06-09 NOTE — PROGRESS NOTES
Train of 4, 4/4 @10  Increased vec drip, pt still moving head per RT after being placed in prone position by 5 staff including MD, RT. And resource RN.  Pt in swimmers position right arm above head, dressings loose to picc and shasta after prone, reinforced with foam tape to secure.  Both dressings changed earlier in shift today.  Unable to maintain BIZ or dressings due to diaphoresis.

## 2021-06-09 NOTE — PLAN OF CARE
Problem: Pain  Goal: Patient's pain/discomfort is manageable  6/27/2020 0828 by Ruby Brody RN  Outcome: Progressing  6/27/2020 0820 by Ruby Brody RN  Outcome: Progressing     Problem: Safety  Goal: Patient will be injury free during hospitalization  6/27/2020 0828 by Ruby Brody RN  Outcome: Progressing  6/27/2020 0820 by Ruby Brody RN  Outcome: Progressing     Problem: Daily Care  Goal: Daily care needs are met  Outcome: Progressing     Problem: Inadequate Airway Clearance  Goal: Patient will maintain patent airway  Outcome: Progressing

## 2021-06-09 NOTE — PROGRESS NOTES
07/18/20 1940   Patient Data   Vt (observed, mL) 386 mL   Minute Ventilation (L/min) 12.2 L/min   Total Resp Rate  28 BPM   PIP Observed (cm H2O) 16 cm H2O   MAP (cm H2O) 14   Plateau Pressure (cm H2O) 18 cm H2O   SpO2 96 %   Heart Rate 76

## 2021-06-09 NOTE — PROGRESS NOTES
FERN COVID RT PROGRESS NOTE    DATA:    VENT DAY# 13    CURRENT SETTINGS:  VENT SETTINGS:  430VT/AC18/+8Peep        FIO2: 55%    PATIENT PARAMETERS:    PIP:  37  Pplat:  19  Pmean:  13  SBT: N/A  SECRETIONS: Lg. Amount thick, frothy tanish secretions  02 SATS: 94%    ETT SIZE 8.0  Secured at  24 cm at teeth    Respiratory Medications: None     ABG:   Results for RJ GUADALUPE (MRN [id number]) as of 7/3/2020 16:00   Ref. Range 7/3/2020 03:15   pH, Arterial Latest Ref Range: 7.37 - 7.44  7.39   pCO2, Arterial Latest Ref Range: 35 - 45 mm Hg 50 (H)   pO2, Arterial Latest Ref Range: 80 - 90 mm Hg 110 (H)   Bicarbonate, Arterial Calc Latest Ref Range: 23.0 - 29.0 mmol/L 28.5   O2 Sat, Arterial Latest Ref Range: 96.0 - 97.0 % 98.1 (H)   Oxyhemoglobin Latest Ref Range: 96.0 - 97.0 % 96.3   POC Base Excess Calc Latest Units: mmol/L 5.7       NOTE / PLAN:    Attempt to wean FIO2 this shift. Patient still lg amounts secretions with suctioning as needed. P/F ratio of 200 with AM ABG

## 2021-06-09 NOTE — PROGRESS NOTES
Infectious Disease Progress Note    Assessment/Plan  Impression:  COVID-19 pneumonia   Received CCP. Started on dexamethasone (6/22-29) and remdesivir for 10 day course  Received 400 mg tocilizumab on 6/25, 6/27 for suspected cytokine release syndrome.     Quantiferon gold negative  Hep B and C negative  Strongyloides antibody negative -- no need for further ivermectin    Ongoing fever and leukocytosis. Steroid may contribute to leukocytosis--> now normalized.     Colonized with yeast in sputum. Risk for invasive candidiasis -- broad spectrum antibiotics, central line, candida colonization. Beta-D-glucan negative 6/29 -- less likely therefore that he has invasive candidiasis.     Now with MSSA in sputum as of 7/2, 7/4 -- could be the cause of pneumonia. On broad spectrum antibiotics. He was on vancomycin and lowish dose cefepime when this sputum was collected. Ongoing fevers despite good antibiotic coverage suggests fevers are due to another cause.     Recommend:    Focus to cefazolin      Principal Problem:    COVID-19  Active Problems:    Shock (H)    Acute and chronic respiratory failure with hypoxia (H)    On mechanically assisted ventilation (H)    ARDS (adult respiratory distress syndrome) (H)    Atrial fibrillation with rapid ventricular response (H)    Atrial fibrillation with rapid ventricular response (H)    Raphael Mantilla MD  [facility]  [phone] [facility] paging  ______________________________________________________________________       Subjective  Intubated and sedated in ICU. Has been on cooling blanket continuously -- set at 37F. On/off pressor.       Objective    Anti-infectives:  Meropenem 7/2-  Ceftriaxone 6/22  Ivermectin 6/25-26  Cefepime 6/27-7/2  Vancomycin 6/27-    Vital signs in last 24 hours  Temp:  [98.6  F (37  C)-99.5  F (37.5  C)] 98.9  F (37.2  C)  Heart Rate:  [] 68  Resp:  [0-23] 22  BP: ()/(57-70) 99/62  Arterial Line BP:  ()/(13-92) 96/65  FiO2 (%):  [55 %-100 %] 55 %  Weight:   (!) 284 lb 9.8 oz (129.1 kg)    Intake/Output last 3 shifts  I/O last 3 completed shifts:  In: 4918.7 [I.V.:1913.7; NG/GT:1090; IV Piggyback:1915]  Out: 5545 [Urine:4645; Stool:900]  Intake/Output this shift:  I/O this shift:  In: 208.8 [I.V.:88.8; NG/GT:120]  Out: 150 [Urine:150]    Review of Systems   Review of systems not obtained due to inability to communicate with the patient. , otherwise negative.    Physical Exam--  General appearance: critically ill in ICU, on vent, cooling blanket  Eyes: eyes closed.  Throat: oral ett inplace.  Neck: short neck-difficult to examine  tachy  Abdomen: not distended.  Extremities: extremities normal, atraumatic, no cyanosis or edema  Skin: Skin color, texture, turgor normal. No rashes or lesions  Neurologic: Mental status: sedated  PICC R SLOAN barlow    Pertinent Labs   Lab Results: personally reviewed.     Results from last 7 days   Lab Units 07/07/20  0332 07/06/20  0352 07/05/20  0440   LN-WHITE BLOOD CELL COUNT thou/uL 7.8 8.5 12.8*   LN-HEMOGLOBIN g/dL 11.6* 11.1* 12.3*   LN-HEMATOCRIT % 37.6* 36.2* 40.4   LN-PLATELET COUNT thou/uL 140 143 179        Results from last 7 days   Lab Units 07/07/20  0332 07/06/20  0352 07/05/20  0440   LN-SODIUM mmol/L 144 144 142   LN-POTASSIUM mmol/L 4.1 3.7 3.8   LN-CHLORIDE mmol/L 111* 111* 109*   LN-CO2 mmol/L 25 28 27   LN-BLOOD UREA NITROGEN mg/dL 17 16 18   LN-CREATININE mg/dL 0.48* 0.50* 0.60*   LN-CALCIUM mg/dL 7.4* 7.4* 7.4*     Lab Results   Component Value Date    ALT 27 07/06/2020    AST 30 07/06/2020    ALKPHOS 44 (L) 07/06/2020    BILITOT 0.3 07/06/2020     Micro:  6/22 blood negative  6/24 sputum usual bruno and yeast  6/27 blood including fungal isolate no growth to date   6/27 sputum gram stain with yeast; culture usual bruno  7/2 sputum MSSA  7/2 blood culture negative to date  7/4 sputum MSSA    Results for RJ GUADALUPE (MRN [id number]) as of 6/26/2020  11:04   Ref. Range 6/24/2020 16:46   Hepatitis B Surface Ag Latest Ref Range: Negative  Negative   Hepatitis C Ab Latest Ref Range: Negative  Negative   Results for RJ GUADALUPE (MRN [id number]) as of 6/26/2020 11:04   Ref. Range 6/23/2020 04:26 6/24/2020 05:24 6/24/2020 16:46 6/25/2020 04:40 6/26/2020 04:22   CRP Latest Ref Range: 0.0 - 0.8 mg/dL 12.0 (H) 10.0 (H)  4.2 (H) 2.1 (H)   LD (LDH) Latest Ref Range: 125 - 220 U/L 480 (H) 477 (H)  434 (H) 452 (H)     Pertinent Radiology   Radiology Results: Personally reviewed image/s and Personally reviewed impression/s    No results found.

## 2021-06-09 NOTE — PROGRESS NOTES
PULMONARY / CRITICAL CARE PROGRESS NOTE    Date / Time of Admission:  6/22/2020 11:38 AM    Assessment:   1. Acute and chronic respiratory failure , ARDS s/p intubation 6/21  2. COVID-19 viral infection  S/p CCP, remdesivir, Tocilizumab and dexamethasone.   3. Serratia / MSSA pneumonia  4. Encephalopathy  5. Atrial fibrillation  6. Obesity BMI 42  7. Anemia    Advance Directives: Full code    Plan:   1. Titrate vent settings A/C 16/450/8/40%  2. Weaning trial per protocol  3. Sedation to keep RASS 0 to -1, dexmedetomidine, versed and fentanyl drips  4. Schedule oxycodone, seroquel, ativan   5. Heplock IV fluids  6. Continue amiodarone  7. Abx per ID recommendation (currently on ceftriaxone)  8. Continue tube feedings  9. PPI for GI prophylaxis  10. Glucose level monitoring  11. DVT prophylaxis lovenox SQ  12. PT , OT evaluation     Please contact me if you have any questions.  Total critical care time, not including separately billable procedure time: 45 minutes  This patient had a high probability of imminent or life threatening deterioration due to acute respiratory failure which required my direct attention, intervention and personal management.     Harman Condon  Pulmonary / Critical Care  7/23/2020   12:53 PM      ICU DAILY CHECKLIST                           Can patient transfer out of MICU? no    FAST HUG:    Feeding:  Feeding: Yes.  Patient is receiving TUBE FEEDS    Hillman:Yes  Analgesia/Sedation:Yes fentanyl, midazolam and precedex  Thromboembolic prophylaxis: yes; Mode:  Lovenox  HOB>30:  Yes  Stress Ulcer Protocol Active: yes; Mode: PPI  Glycemic Control: Any glucose > 180 no; Mode of Insulin Therapy: Sliding Scale Insulin    INTUBATED:  Can patient have daily waking: yes  Can patient have spontaneous breathing trial:  yes    Restraints? yes    PHYSICAL THERAPY AND MOBILITY:  Can patient have PT and mobility trial: yes  Activity: PT    Subjective:   HPI:  Tobias Palmer is a 54 y.o. male  with history of obesity (BMI 45) who was admitted and intubated on 6/21 at [facility] with COVID-19 pneumonia, transferred to [facility] ICU on 6/22. Course complicated by ARDS and shock, requiring proning and paralytics. S/p CCP, remdesivir, Tocilizumab and dexamethasone.     Events overnight  - Hemodynamically stable  - Unchanged O2 requirements  - Afebrile  - Agitated overnight    Allergies: Patient has no known allergies.     MEDS:  Scheduled Meds:    acetaminophen  650 mg Enteral Tube Q6H     acetylcysteine (MUCOMYST) 20% inhalation solution  4 mL Inhalation Q12H - RT     amino acids-protein hydrolys  2 packet Enteral Tube BID     amiodarone  200 mg Enteral Tube DAILY     cefTRIAXone  2 g Intravenous Q24H     chlorhexidine  15 mL Topical Q12H     enoxaparin ANTICOAGULANT  60 mg Subcutaneous BID     guar gum  1 packet Enteral Tube BID     insulin aspart (NovoLOG) injection   Subcutaneous Q6H FIXED TIMES     ipratropium-albuteroL  3 mL Nebulization Q12H - RT     Lactobacillus rhamnosus GG  1 capsule Enteral Tube BID     LORazepam  1 mg Oral Q6H     midodrine  10 mg Oral TID with meals     multivitamin with iron-mineral  15 mL Enteral Tube DAILY     omeprazole  20 mg Oral QAM AC    Or     omeprazole  20 mg Enteral Tube QAM AC    Or     pantoprazole  40 mg Intravenous QAM AC     oxyCODONE  10 mg Enteral Tube Q4H     QUEtiapine  50 mg Oral Q8H     sodium chloride  10-30 mL Intravenous Q8H FIXED TIMES     traZODone  50 mg Enteral Tube QPM     white petrolatum-mineral oiL  1 application Both Eyes Q8H     Continuous Infusions:    dexmedetomidine infusion orderable (PRECEDEX) 1 mcg/kg/hr (07/23/20 1200)     fentaNYL citrate (PF) 200 mcg/hr (07/23/20 1200)     midazolam 3 mg/hr (07/23/20 1200)     norepinephrine Stopped (07/23/20 0700)     PRN Meds:.acetaminophen, benzocaine-menthoL, bisacodyL, dextrose 50 % (D50W), fentaNYL - BOLUS DOSE from infusion, glucagon (human recombinant), labetalol,  "lipase-protease-amylase **AND** sodium bicarbonate, LORazepam, magnesium hydroxide, metoprolol tartrate, midazolam (VERSED) - BOLUS DOSE from infusion, naloxone **OR** naloxone, ondansetron **OR** ondansetron, oxyCODONE, polyvinyl alcohol, sodium chloride bacteriostatic, sodium chloride, sodium chloride, sodium chloride    Objective:   VITALS:  /56   Pulse 95   Temp 99  F (37.2  C)   Resp 19   Ht 5' 5\" (1.651 m)   Wt (!) 265 lb 3.4 oz (120.3 kg)   SpO2 95%   BMI 44.13 kg/m       EXAM:   Gen: obese, sedated, arousable, not following commands   HEENT: pale conjunctiva, moist mucosa  Neck: no thyromegaly, masses or JVD  Lungs: ronchi both HT  CV: regular, no murmurs or gallops appreciated  Abdomen: soft, NT, BS wnl  Ext: no edema  Neuro: sedated, arousbale, not following commands.     I&O:      Intake/Output Summary (Last 24 hours) at 7/23/2020 1253  Last data filed at 7/23/2020 1200  Gross per 24 hour   Intake 1850.93 ml   Output 2980 ml   Net -1129.07 ml       Data Review:  Results from last 7 days   Lab Units 07/23/20  0600   LN-WHITE BLOOD CELL COUNT thou/uL 12.1*   LN-HEMOGLOBIN g/dL 9.4*   LN-HEMATOCRIT % 29.3*   LN-PLATELET COUNT thou/uL 275     Results from last 7 days   Lab Units 07/23/20  0600   LN-SODIUM mmol/L 142   LN-POTASSIUM mmol/L 3.6   LN-CHLORIDE mmol/L 108*   LN-CO2 mmol/L 26   LN-BLOOD UREA NITROGEN mg/dL 15   LN-CREATININE mg/dL 0.51*   LN-CALCIUM mg/dL 8.3*     TRACHEAL CULTUIRE  4+ Staphylococcus aureusAbnormal         3+ Serratia marcescensAbnormal      Reported and sent to MD as part of the  Emerging Infections Surveillance Program.            Gram Stain Result   4+ Polymorphonuclear leukocytes       4+ Gram positive cocci in clusters       3+ Gram negative bacilli                XR CHEST 1 VIEW PORTABLE  LOCATION: [facility]  DATE/TIME: 7/19/2020 6:08 AM  INDICATION: Evaluate lines/tubes.  COMPARISON: 07/11/2020   IMPRESSION:   Endotracheal tube is 3.2 cm superior to the " melissa. Enteric tube extends off the inferior aspect of the film. Stable right PICC. No pneumothorax. No significant interval change in multifocal airspace disease. No pleural effusion. The cardiac   silhouette is stable at the upper limits of normal for size.      By:  Harman Condon, 7/23/2020, 12:53 PM    Primary Care Physician:  Provider, No Primary Care

## 2021-06-09 NOTE — PROGRESS NOTES
FERN COVID RT PROGRESS NOTE    DATA:7/7/20    VENT DAY#  17    CURRENT SETTINGS:  VENT SETTINGS   Vent Mode: AC  FiO2 (%):  [40 %-75 %] 70 %  S RR:  [18-22] 22  S VT:  [430 mL] 430 mL  PEEP/CPAP (cm H2O):  [12 cm H2O-14 cm H2O] 14 cm H2O  Minute Ventilation (L/min):  [7.6 L/min-9.1 L/min] 9.1 L/min  PIP:  [30 cm H2O-50 cm H2O] 50 cm H2O  MAP (cm H2O):  [17-26] 26          FIO2:   70    PATIENT PARAMETERS:    PIP 50  Pplat:  37  Pmean:  26  SBT: NA  SECRETIONS:  Blood tinged tan small secretions   02 SATS:  97    ETT SIZE 8.0  Secured at  24 cm at teeth    Respiratory Medications: NA     ABG: Results for RJ GUADALUPE (MRN [id number]) as of 7/7/2020 05:20   Ref. Range 7/7/2020 03:32   pH, Arterial Latest Ref Range: 7.37 - 7.44  7.40   pCO2, Arterial Latest Ref Range: 35 - 45 mm Hg 48 (H)   pO2, Arterial Latest Ref Range: 80 - 90 mm Hg 87   Bicarbonate, Arterial Calc Latest Ref Range: 23.0 - 29.0 mmol/L 27.5   O2 Sat, Arterial Latest Ref Range: 96.0 - 97.0 % 96.6   Oxyhemoglobin Latest Ref Range: 96.0 - 97.0 % 94.8 (L)   POC Base Excess Calc Latest Units: mmol/L 4.2       NOTE / PLAN:   Pt is on full ventilatory support; pt is prone since 16:30 of yesterday; Q2 head turns; increased rate to 22; turn FiO2 to 70%; will continue to watch his oxygenation status.

## 2021-06-09 NOTE — PROGRESS NOTES
06/29/20 1652   Patient Data   Vt Exp (mL) 589 mL   Minute Ventilation (L/min) 10 L/min   Total Resp Rate  20 BPM   PIP Observed (cm H2O) 15 cm H2O   MAP (cm H2O) 13   Auto/Intrinsic PEEP Observed (cm H2O) 0 cm H2O   Plateau Pressure (cm H2O) 12 cm H2O   SpO2 94 %   Heart Rate 83

## 2021-06-09 NOTE — PLAN OF CARE
Problem: Pain  Goal: Patient's pain/discomfort is manageable  Outcome: Progressing     Problem: Mechanical Ventilation  Goal: Patient will maintain patent airway  Outcome: Progressing  Gentle weaning of vent settings. Tolerated supination.  Problem: Inadequate Gas Exchange  Goal: Patient is adequately oxygenated and ventilation is improved  Outcome: Progressing     Problem: Risk of Injury Due to Unsafe Behavior  Goal: Patient will remain safe while in restraint; physical/psychological needs will be met  Outcome: Completed  Goal: Alternatives to restraint will be continually assessed with use of least restrictive device and discontinuation as soon as possible  Outcome: Completed  Goal: Patient/Family will be able to communicate reason for restraint and steps for restraint application and removal  Outcome: Completed  Patient is pharmaceutically paralyzed and is not demonstrating unsafe behaviors at this time.

## 2021-06-09 NOTE — PROGRESS NOTES
FERN COVID RT PROGRESS NOTE    DATA:    VENT DAY#  3    CURRENT SETTINGS:  VENT SETTINGS  Vent Mode: AC  FiO2 (%):  [90 %-100 %] 90 %  S RR:  [18-20] 18  S VT:  [450 mL-550 mL] 450 mL  PEEP/CPAP (cm H2O):  [14 cm H2O-20 cm H2O] 20 cm H2O  Minute Ventilation (L/min):  [8.2 L/min-10.1 L/min] 8.3 L/min  PIP:  [30 cm H2O-36 cm H2O] 36 cm H2O  MAP (cm H2O):  [19-26] 26          FIO2:   90%    PATIENT PARAMETERS:    PIP 30  Pplat:  27  Pmean:  22  SBT: not indicated  SECRETIONS:  Small, white, thin  02 SATS:  92    ETT SIZE 8.0  Secured at  22 cm at teeth    Respiratory Medications: Veletri continuous      ABG: Results for RJ GUADALUPE (MRN [id number]) as of 6/23/2020 05:08   Ref. Range 6/23/2020 04:26   pH, Arterial Latest Ref Range: 7.37 - 7.44  7.51 (H)   pCO2, Arterial Latest Ref Range: 35 - 45 mm Hg 45   pO2, Arterial Latest Ref Range: 80 - 90 mm Hg 161 (H)   Bicarbonate, Arterial Calc Latest Ref Range: 23.0 - 29.0 mmol/L 33.8 (H)   O2 Sat, Arterial Latest Ref Range: 96.0 - 97.0 % 98.6 (H)   Oxyhemoglobin Latest Ref Range: 96.0 - 97.0 % 96.9   POC Base Excess Calc Latest Units: mmol/L 11.4       NOTE / PLAN:   Pt is intubated and on full ventilatory support. Pt was proned at 2100. Pt getting Q2 head turns. Peep was increased to 20 post ABG results @ 2209. Pt fio2 was weaned to 95%.Pt's rate was decreased to 18 and fio2 was further decreased to 90 post AM ABG    Respiratory Care will continue to follow and monitor this patient.   Ana M Griffith  LRT, RRT,

## 2021-06-09 NOTE — PROGRESS NOTES
07/10/20 0734   Patient Data   Vt (observed, mL) 329 mL   Vt Exp (mL) 343 mL   Minute Ventilation (L/min) 9.1 L/min   Total Resp Rate  26 BPM   PIP Observed (cm H2O) 25 cm H2O   MAP (cm H2O) 18   Plateau Pressure (cm H2O) 24 cm H2O   Dynamic Compliance (L/cm H2O) 37 L/cm H2O   Airway Resistance 10.8   Heart Rate 86

## 2021-06-09 NOTE — PROGRESS NOTES
07/12/20 0729   Patient Data   Vt (observed, mL) 331 mL   Vt Exp (mL) 378 mL   Minute Ventilation (L/min) 8.4 L/min   Total Resp Rate  25 BPM   PIP Observed (cm H2O) 26 cm H2O   MAP (cm H2O) 18   Auto/Intrinsic PEEP Observed (cm H2O) 2.4 cm H2O   Plateau Pressure (cm H2O) 22 cm H2O   Dynamic Compliance (L/cm H2O) 26.5 L/cm H2O   Airway Resistance 18.3   SpO2 94 %   Heart Rate 85

## 2021-06-09 NOTE — PROGRESS NOTES
06/23/20 1333   Patient Data   PIP Observed (cm H2O) 31 cm H2O   MAP (cm H2O) 23   Auto/Intrinsic PEEP Observed (cm H2O) 0.6 cm H2O   Plateau Pressure (cm H2O) 30 cm H2O

## 2021-06-09 NOTE — PROGRESS NOTES
FERN COVID RT PROGRESS NOTE    DATA:    VENT DAY#  3    CURRENT SETTINGS:  VENT SETTINGS: A/C, 18, 450, 20        FIO2:   70%    PATIENT PARAMETERS:    PIP  31-33  Pplat:  30-32  Pmean:  21-24  SBT: N/A  SECRETIONS:  x1 scant thin white  02 SATS:  93-98%    ETT SIZE 8.0  Secured at  23 cm at teeth    Respiratory Medications: Veletri half strength      ABG: @    NOTE / PLAN:  Pt was supinated @ 1333. Plan is to titrate FiO2 and go from there. Pt remains on full vent support and stable. Pt had cuff leak that did not resolve after RT added 2cc. Pt continues to have leak. CRNA was notified. The tube was deflated and advanced by the CRNA which resolved the leak. ETT is now secured @ 23 cm. Next Veletri due @ 3668

## 2021-06-09 NOTE — PROGRESS NOTES
06/30/20 0109   Patient Data   Vt Exp (mL) 430 mL   Minute Ventilation (L/min) 9.6 L/min   Total Resp Rate  19 BPM   PIP Observed (cm H2O) 14 cm H2O   MAP (cm H2O) 13   Auto/Intrinsic PEEP Observed (cm H2O) 0 cm H2O   SpO2 95 %

## 2021-06-09 NOTE — PROGRESS NOTES
07/16/20 1620   Patient Data   Vt Exp (mL) 353 mL   Minute Ventilation (L/min) 7.6 L/min   Total Resp Rate  17 BPM   PIP Observed (cm H2O) 18 cm H2O   MAP (cm H2O) 15   Plateau Pressure (cm H2O) 14 cm H2O

## 2021-06-09 NOTE — PROGRESS NOTES
07/06/20 0147   Vent Information   Interface Invasive   Patient Data   Vt Exp (mL) 400 mL   Minute Ventilation (L/min) 7.6 L/min   Total Resp Rate  18 BPM   PIP Observed (cm H2O) 31 cm H2O   MAP (cm H2O) 17   Auto/Intrinsic PEEP Observed (cm H2O) 0.8 cm H2O   Plateau Pressure (cm H2O) 24 cm H2O   Dynamic Compliance (L/cm H2O) 31.6 L/cm H2O   Airway Resistance 16.5

## 2021-06-09 NOTE — PROGRESS NOTES
07/07/20 0202   Patient Data   Vt (observed, mL) 383 mL   Minute Ventilation (L/min) 9.1 L/min   Total Resp Rate  22 BPM   PIP Observed (cm H2O) 50 cm H2O   MAP (cm H2O) 26   Auto/Intrinsic PEEP Observed (cm H2O) 5.1 cm H2O   Plateau Pressure (cm H2O) 37 cm H2O   Dynamic Compliance (L/cm H2O) 48 L/cm H2O   Airway Resistance 37

## 2021-06-09 NOTE — PLAN OF CARE
Patient progressing towards goals, sedation gtts weaned down, RASS -2, patient still unable to follow commands but withdrawing from pain. Patient converted to Sinus Rhythm around 1100 and heart rate remained below 100.  Problem: Pain  Goal: Patient's pain/discomfort is manageable  Outcome: Progressing     Problem: Safety  Goal: Patient will be injury free during hospitalization  Outcome: Progressing     Problem: Daily Care  Goal: Daily care needs are met  Outcome: Progressing

## 2021-06-09 NOTE — PLAN OF CARE
Problem: Safety  Goal: Patient will be injury free during hospitalization  Outcome: Progressing     Problem: Daily Care  Goal: Daily care needs are met  Outcome: Progressing     Problem: Potential for Compromised Skin Integrity  Goal: Skin integrity is maintained or improved  Outcome: Progressing  Goal: Nutritional status is improving  Outcome: Progressing     Problem: Urinary Incontinence  Goal: Perineal skin integrity is maintained or improved  Outcome: Progressing     Problem: Potential for Falls  Goal: Patient will remain free of falls  Outcome: Progressing     Problem: Inadequate Airway Clearance  Goal: Patient will maintain patent airway  Outcome: Progressing     Problem: Mechanical Ventilation  Goal: Patient will maintain patent airway  Outcome: Progressing     Problem: Glucose Imbalance  Goal: Achieve optimal glucose control  Outcome: Progressing     Problem: Potential for transmission of organism by Contact, Enteric, Droplet and/or Airborne routes  Goal: Prevent transmission of organisms  Outcome: Progressing     Problem: Inadequate Gas Exchange  Goal: Patient is adequately oxygenated and ventilation is improved  Outcome: Progressing

## 2021-06-09 NOTE — PROGRESS NOTES
"Wound Ostomy  WOC Assessment         Allergies:  No Known Allergies    Diagnosis:   Patient Active Problem List    Diagnosis Date Noted     Atrial fibrillation with rapid ventricular response (H) 06/30/2020     Atrial fibrillation with rapid ventricular response (H)      On mechanically assisted ventilation (H)      ARDS (adult respiratory distress syndrome) (H)      COVID-19 06/22/2020     Shock (H)      Acute and chronic respiratory failure with hypoxia (H)        Height:  5' 5\" (1.651 m)    Weight:   (!) 276 lb 10.8 oz (125.5 kg)    Labs:  Recent Labs     07/10/20  0333   HGB 11.4*       Bayron:  Bayron Scale Score: 14    Specialty Bed:  Specialty Bed: [address]/ [place]    Wound culture obtained: No    Edema:  Yes:  Localized    Anatomic Site/Laterality: Body    Reason for ongoing care:   Skin assessment and plan of care    Encounter Type:  Subsequent Encounter Skin integrity:   Patient with COVID-19, respiratory failure and morbid obesity.  Patient currently intubated and sedated.      Patient seen while prone and supine. Anterior body appears intact. L cheek is healed. Ok to leave open to air, cover with mepilex if patient prones again.     Right nare is healed.  Nasal septum with 0.3x0.7cm area of dried drainage.  Areas appear to be from friction with ETAD as patient was visualized previously moving his head back and forth frequently.      No skin concerns on posterior body.    Prone Positioning recommendations:    Prior  Apply Mepilex sacral dressings for protection if pt has bony prominences (shoulders, iliac crest) - may leave in place when not proned.  Ensure tongue is in the mouth, remove bite block for pressure injury prevention.  Ensure no lines/tubes/drains (as much as able) will be under patient when prone.    Prone position  Use pillows or Z hernan pads to off load pressure to bony prominences.  Position head so that ears are not folded and so that head/neck are aligned.  Reposition head every one " hour.  Use swim position per protocol for positioning when supine.    Mepilex dressings to cheeks when in prone position - may leave in place when not proned. Change every 5 days or sooner if soiled.    Prevention measures for mucosal integrity:  1. No bite block when proning. (rationale - the hard plastic will leave the patient at greater risk for pressure injury)  2. Hourly head reposition and moisturizer to exposed portion of tongue (moisturizer which comes with the oral care kits). (rationale - remove pressure to tongue/lips and face and maintain mucosal integrity for the tongue)  3. Use tongue blade when repositioning ETT, so tongue doesn t move along with the tube. (rationale - to ensure pressure is not on the same area of the tongue, even though the ETT is technically repositioned)         Nursing care provided was coordinated care with RN.    Discussed plan of care with nurse and patient (as able).    Outcomes and treatment recommendations are to promote skin integrity and promote wound healing.    Actions taken by WOC RN: 2 minutes of education.    Planned Follow Up: Early next week.    Plan for next visit: Reassess wound(s) and Reassess skin integrity

## 2021-06-09 NOTE — PROGRESS NOTES
Addendum     Temp may be related to restarting precedex to get off versed  Stopped precedex    CXR showing increased bilateral infiltrates. This is concerning for pneumonia not covered by Cefepime given he is spiking fevers on this. Added vanc, changed to Meropenem.

## 2021-06-09 NOTE — PROGRESS NOTES
07/09/20 0650   Patient Data   Vt (observed, mL) 423 mL   Vt Exp (mL) 450 mL   Minute Ventilation (L/min) 9.4 L/min   Total Resp Rate  22 BPM   PIP Observed (cm H2O) 32 cm H2O   MAP (cm H2O) 20   Auto/Intrinsic PEEP Observed (cm H2O) 0.9 cm H2O   Plateau Pressure (cm H2O) 28 cm H2O   Dynamic Compliance (L/cm H2O) 29.7 L/cm H2O   Airway Resistance 16.2   SpO2 92 %   Heart Rate 77

## 2021-06-09 NOTE — PROGRESS NOTES
06/26/20 0120   Patient Data   PIP Observed (cm H2O) 33 cm H2O   MAP (cm H2O) 24   Auto/Intrinsic PEEP Observed (cm H2O) 1.9 cm H2O   Plateau Pressure (cm H2O) 30 cm H2O

## 2021-06-09 NOTE — PROGRESS NOTES
07/17/20 0540   Vent Settings   Resp Rate (Set) 16   FiO2 (%) 50 %   Insp Time (sec) 0.85 sec   Vt (Set, mL) 350 mL   PEEP/CPAP (cm H2O) 14 cm H2O   Trigger Sensitivity Flow (L/min) 2 L/min   I:E Ratio 1.0:2.3   Humidification Heater   Heater Temperature 97.3  F (36.3  C)   Patient Data   Vt (observed, mL) 447 mL   Vt Exp (mL) 406 mL   Minute Ventilation (L/min) 9 L/min   Total Resp Rate  21 BPM   PIP Observed (cm H2O) 17 cm H2O   MAP (cm H2O) 14   Plateau Pressure (cm H2O) 11 cm H2O

## 2021-06-09 NOTE — PLAN OF CARE
Neuro: pt currently on sedation. Pt able to open eyes to pain; can't follow commands yet. CV: BP stable; on Levophed currently, attempted to hold it this morning but MAP got low and had to be restarted. MD made aware. T-max 100.0; schedule tylenol given. Resp: pt remain intubated; some vent changes made this shift. Pt tolerated well. ABG's drawn at 1500, looks better. Oral care provided during shift. GI: tube feeding currently infusing. Loose stool; rectal tube in place. : Hillman in place; urine output 100-200mL per hour. Hillman care done. Pain: pain well controlled. Currently on pain medications. Pt turned/reposition q2h for comfort.  Events: pt BP dropped around 1730 even with Levophed infusing. MD notified; recommended to MD we change the frequency of oral pain medications and also pt large urine output. Changes made to pain medications. 1000mL LR bolus given at 1800 to help with perfusion.     Problem: Discharge Barriers  Goal: Patient's discharge needs are met  Outcome: Not Applicable this Shift     Problem: Pain  Goal: Patient's pain/discomfort is manageable  Outcome: Progressing     Problem: Safety  Goal: Patient will be injury free during hospitalization  Outcome: Progressing     Problem: Psychosocial Needs  Goal: Collaborate with patient/family/caregiver to identify patient specific goals for this hospitalization  Outcome: Progressing     Problem: Knowledge Deficit  Goal: Patient/family/caregiver demonstrates understanding of disease process, treatment plan, medications, and discharge instructions  Outcome: Progressing     Problem: Potential for Compromised Skin Integrity  Goal: Skin integrity is maintained or improved  Outcome: Progressing     Problem: Urinary Incontinence  Goal: Perineal skin integrity is maintained or improved  Outcome: Progressing     Problem: Risk of Injury Due to Unsafe Behavior  Goal: Patient will remain safe while in restraint; physical/psychological needs will be met  Outcome:  Progressing  Goal: Alternatives to restraint will be continually assessed with use of least restrictive device and discontinuation as soon as possible  Outcome: Progressing  Goal: Patient/Family will be able to communicate reason for restraint and steps for restraint application and removal  Outcome: Progressing     Problem: Inadequate Airway Clearance  Goal: Patient will maintain patent airway  Outcome: Progressing     Problem: Mechanical Ventilation  Goal: Patient will maintain patent airway  Outcome: Progressing  Goal: ET tube will be managed safely  Outcome: Progressing     Problem: Glucose Imbalance  Goal: Achieve optimal glucose control  Outcome: Progressing     Problem: Inadequate Gas Exchange  Goal: Patient is adequately oxygenated and ventilation is improved  Outcome: Progressing

## 2021-06-09 NOTE — PLAN OF CARE
Problem: Inadequate Airway Clearance  Goal: Patient will maintain patent airway  Outcome: Progressing     Problem: Mechanical Ventilation  Goal: Patient will maintain patent airway  Outcome: Progressing  Goal: ET tube will be managed safely  Outcome: Progressing     Problem: Inadequate Gas Exchange  Goal: Patient is adequately oxygenated and ventilation is improved  Outcome: Progressing  Goal: Patient will achieve/maintain normal respiratory rate/effort  Outcome: Progressing

## 2021-06-09 NOTE — PROGRESS NOTES
[facility] COVID RT PROGRESS NOTE    DATA:    VENT DAY#  24    CURRENT SETTINGS:  VENT SETTINGS   AC 16/350/+12        FIO2:   55    PATIENT PARAMETERS:    PIP 29  Pplat:  18  Pmean:  18    SBT:   SECRETIONS:  Thick Yellow secretions.   02 SATS:  95    ETT SIZE 8.0  Secured at  24  cm at teeth    Respiratory Medications: MM/Alb Q6     ABG:Results for RJ GUADALUPE (MRN [id number]) as of 7/15/2020 04:26   Ref. Range 7/15/2020 03:43   pH, Arterial Latest Ref Range: 7.37 - 7.44  7.44   pCO2, Arterial Latest Ref Range: 35 - 45 mm Hg 47 (H)   pO2, Arterial Latest Ref Range: 80 - 90 mm Hg 66 (L)   Bicarbonate, Arterial Calc Latest Ref Range: 23.0 - 29.0 mmol/L 30.8 (H)   O2 Sat, Arterial Latest Ref Range: 96.0 - 97.0 % 93.3 (L)   Oxyhemoglobin Latest Ref Range: 96.0 - 97.0 % 91.0 (L)   POC Base Excess Calc Latest Units: mmol/L 8.1       NOTE / PLAN:   Pt now receiving nebs Q6, thick yellow secretions .   ETT position changed as per policy  . Pt desat with secretions and increase suctioning. BS are coarse rhonchi.

## 2021-06-09 NOTE — PLAN OF CARE
Problem: Inadequate Airway Clearance  Goal: Patient will maintain patent airway  Outcome: Progressing     Problem: Mechanical Ventilation  Goal: Patient will maintain patent airway  Outcome: Progressing

## 2021-06-09 NOTE — PLAN OF CARE
Problem: Pain  Goal: Patient's pain/discomfort is manageable  Outcome: Progressing     Problem: Safety  Goal: Patient will be injury free during hospitalization  Outcome: Progressing     Problem: Daily Care  Goal: Daily care needs are met  Outcome: Progressing     Problem: Knowledge Deficit  Goal: Patient/family/caregiver demonstrates understanding of disease process, treatment plan, medications, and discharge instructions  Outcome: Progressing     Problem: Urinary Incontinence  Goal: Perineal skin integrity is maintained or improved  Outcome: Progressing     Problem: Potential for Falls  Goal: Patient will remain free of falls  Outcome: Progressing     Problem: Risk of Injury Due to Unsafe Behavior  Goal: Patient will remain safe while in restraint; physical/psychological needs will be met  Outcome: Progressing  Goal: Alternatives to restraint will be continually assessed with use of least restrictive device and discontinuation as soon as possible  Outcome: Progressing  Goal: Patient/Family will be able to communicate reason for restraint and steps for restraint application and removal  Outcome: Progressing     Problem: Inadequate Airway Clearance  Goal: Patient will maintain patent airway  Outcome: Progressing     Problem: Glucose Imbalance  Goal: Achieve optimal glucose control  Outcome: Progressing

## 2021-06-09 NOTE — PROGRESS NOTES
Wound Ostomy  WOC Assessment         Allergies:  No Known Allergies    Diagnosis:   Patient Active Problem List    Diagnosis Date Noted     COVID-19 06/22/2020     Shock (H)      Acute and chronic respiratory failure with hypoxia (H)        Height:       Weight:   (!) 274 lb 4 oz (124.4 kg)    Labs:  Recent Labs     06/22/20  1231 06/23/20  0426   CRP 12.6* 12.0*   HGB 12.7* 13.2*   ALBUMIN 2.7*  2.7*  --        Bayron:  Bayron Scale Score: 11    Specialty Bed:  Specialty Bed: Elizabeth    Wound culture obtained: No    Edema:  Yes:  Localized    Anatomic Site/Laterality: Body    Reason for ongoing care:   Skin assessment and plan of care    Encounter Type:  Initial Skin integrity:   Patient with COVID-19, respiratory failure and morbid obesity.  Patient currently intubated and sedated.      Patient seen while in prone position.  No skin concerns on posterior body or left face.  Significant facial edema.  Patient does have extensive skin tags to his bilateral inner thighs.  RN reports no skin concerns to anterior body.      Prone Positioning recommendations:    Prior  Apply Mepilex sacral dressings for protection if pt has bony prominences (shoulders, iliac crest) - may leave in place when not proned.  Ensure tongue is in the mouth, remove bite block for pressure injury prevention.  Ensure no lines/tubes/drains (as much as able) will be under patient when prone.    Prone position  Use pillows or Z hernan pads to off load pressure to bony prominences.  Position head so that ears are not folded and so that head/neck are aligned.  Reposition head every one hour.  Use swim position per protocol for positioning when supine.    Mepilex dressings to cheeks when in prone position - may leave in place when not proned. Change every 5 days or sooner if soiled.    Prevention measures for mucosal integrity:  1. No bite block when proning. (rationale - the hard plastic will leave the patient at greater risk for pressure  injury)  2. Hourly head reposition and moisturizer to exposed portion of tongue (moisturizer which comes with the oral care kits). (rationale - remove pressure to tongue/lips and face and maintain mucosal integrity for the tongue)  3. Use tongue blade when repositioning ETT, so tongue doesn t move along with the tube. (rationale - to ensure pressure is not on the same area of the tongue, even though the ETT is technically repositioned)           Nursing care provided was coordinated care with RN.    Discussed plan of care with nurse and patient (as able).    Outcomes and treatment recommendations are to promote skin integrity.    Actions taken by WOC RN: 3 minutes of education.    Planned Follow Up: Later this week or Early next week.    Plan for next visit: Reassess skin integrity

## 2021-06-09 NOTE — PROGRESS NOTES
07/05/20 1726   Patient Data   PIP Observed (cm H2O) 25 cm H2O   MAP (cm H2O) 16   Plateau Pressure (cm H2O) 25 cm H2O

## 2021-06-09 NOTE — PLAN OF CARE
Problem: Pain  Goal: Patient's pain/discomfort is manageable  Outcome: Progressing     Problem: Safety  Goal: Patient will be injury free during hospitalization  Outcome: Progressing     Problem: Daily Care  Goal: Daily care needs are met  Outcome: Progressing     Problem: Psychosocial Needs  Goal: Demonstrates ability to cope with hospitalization/illness  Outcome: Progressing  Goal: Collaborate with patient/family/caregiver to identify patient specific goals for this hospitalization  Outcome: Progressing     Problem: Discharge Barriers  Goal: Patient's discharge needs are met  Outcome: Progressing     Problem: Knowledge Deficit  Goal: Patient/family/caregiver demonstrates understanding of disease process, treatment plan, medications, and discharge instructions  Outcome: Progressing     Problem: Potential for Compromised Skin Integrity  Goal: Skin integrity is maintained or improved  Outcome: Progressing  Goal: Nutritional status is improving  Outcome: Progressing     Problem: Urinary Incontinence  Goal: Perineal skin integrity is maintained or improved  Outcome: Progressing     Problem: Potential for Falls  Goal: Patient will remain free of falls  Outcome: Progressing     Problem: Risk of Injury Due to Unsafe Behavior  Goal: Patient will remain safe while in restraint; physical/psychological needs will be met  Outcome: Progressing  Goal: Alternatives to restraint will be continually assessed with use of least restrictive device and discontinuation as soon as possible  Outcome: Progressing  Goal: Patient/Family will be able to communicate reason for restraint and steps for restraint application and removal  Outcome: Progressing     Problem: Inadequate Airway Clearance  Goal: Patient will maintain patent airway  Outcome: Progressing     Problem: Mechanical Ventilation  Goal: Patient will maintain patent airway  Outcome: Progressing  Goal: ET tube will be managed safely  Outcome: Progressing     Problem: Glucose  Imbalance  Goal: Achieve optimal glucose control  Outcome: Progressing     Problem: Potential for transmission of organism by Contact, Enteric, Droplet and/or Airborne routes  Goal: Prevent transmission of organisms  Outcome: Progressing     Problem: Inadequate Gas Exchange  Goal: Patient is adequately oxygenated and ventilation is improved  Outcome: Progressing

## 2021-06-09 NOTE — PROGRESS NOTES
[facility] COVID RT PROGRESS NOTE    DATA:    VENT DAY#  27    CURRENT SETTINGS:  VENT SETTINGS   Ac 16/350/+14        FIO2:   45    PATIENT PARAMETERS:    PIP 23  Pplat:  23  Pmean:  16  SBT: no  SECRETIONS:  Moderate thick white  02 SATS:  95    ETT SIZE 8.0  Secured at  24 cm at teeth    Respiratory Medications: albuterol/Mucomyst Q6     ABG: Results for RJ GUADALUPE (MRN [id number]) as of 7/18/2020 05:45   Ref. Range 7/18/2020 04:42   pH, Arterial Latest Ref Range: 7.37 - 7.44  7.45 (H)   pCO2, Arterial Latest Ref Range: 35 - 45 mm Hg 42   pO2, Arterial Latest Ref Range: 80 - 90 mm Hg 100 (H)   Bicarbonate, Arterial Calc Latest Ref Range: 23.0 - 29.0 mmol/L 28.4   O2 Sat, Arterial Latest Ref Range: 96.0 - 97.0 % 98.1 (H)   Oxyhemoglobin Latest Ref Range: 96.0 - 97.0 % 95.9 (L)   POC Base Excess Calc Latest Units: mmol/L 4.9   P/F 250    NOTE / PLAN:   Patient was supine @ 1020. Doing well all day. Will continue to monitor and make changes as needed.

## 2021-06-09 NOTE — PROGRESS NOTES
07/17/20 1921   Patient Data   Vt (observed, mL) 491 mL   Minute Ventilation (L/min) 12.7 L/min   Total Resp Rate  26 BPM   PIP Observed (cm H2O) 16 cm H2O   MAP (cm H2O) 16   Static Compliance (L/cm H2O) 27   SpO2 98 %   Heart Rate 66

## 2021-06-09 NOTE — PLAN OF CARE
Neuro: pt sedated. Prone at the beginning of shift. Pupils 2+ sluggish. CV: Bp stable at the beginning of shift; at 1800 pt BP became low with MAP lower than 65. NP notified, 500cc bolus ordered; no change in BP; Levophed started. T-max-100.4.  Tylenol given and ice pack placed on pt. Resp: pt remain intubated. Pt turned supine at 1000 this morning. Tolerated well. GI: pt currently on tube feeding; tolerating well. One loose stool this shift. : Hillman in place; 50-100cc urine output per hour. Pain: well controlled. Events: pt had large BM at 1745, shortly after being clean and turn pt BP dropped. 500mL bolus given; no change; Levophed started at 1900. Will continue to monitor. Pt wife updated by MD.     Problem: Pain  Goal: Patient's pain/discomfort is manageable  Outcome: Progressing     Problem: Safety  Goal: Patient will be injury free during hospitalization  Outcome: Progressing     Problem: Risk of Injury Due to Unsafe Behavior  Goal: Patient will remain safe while in restraint; physical/psychological needs will be met  Outcome: Progressing  Goal: Alternatives to restraint will be continually assessed with use of least restrictive device and discontinuation as soon as possible  Outcome: Progressing     Problem: Inadequate Airway Clearance  Goal: Patient will maintain patent airway  Outcome: Progressing     Problem: Mechanical Ventilation  Goal: Patient will maintain patent airway  Outcome: Progressing

## 2021-06-09 NOTE — PROGRESS NOTES
07/15/20 1122   Vent Settings   Resp Rate (Set) 16   FiO2 (%) 55 %   Vt (Set, mL) 350 mL   PEEP/CPAP (cm H2O) 14 cm H2O   I:E Ratio 1:2.4   Patient Data   Vt Exp (mL) 395 mL   Minute Ventilation (L/min) 8.6 L/min   Total Resp Rate  21 BPM   PIP Observed (cm H2O) 16 cm H2O   MAP (cm H2O) 15   Plateau Pressure (cm H2O) 16 cm H2O   Pateint turned to prone. SAT 94%

## 2021-06-09 NOTE — PLAN OF CARE
Problem: Inadequate Airway Clearance  Goal: Patient will maintain patent airway  Outcome: Progressing     Problem: Mechanical Ventilation  Goal: Patient will maintain patent airway  6/27/2020 1614 by Vania Magana LRT  Outcome: Progressing  6/27/2020 1611 by Vania Magana LRT  Outcome: Progressing     Problem: Inadequate Airway Clearance  Goal: Patient will maintain patent airway  Outcome: Progressing     Problem: Mechanical Ventilation  Goal: Patient will maintain patent airway  6/27/2020 1614 by Vania Magana LRT  Outcome: Progressing  6/27/2020 1611 by Vania Magana LRT  Outcome: Progressing

## 2021-06-09 NOTE — PLAN OF CARE
Problem: Pain  Goal: Patient's pain/discomfort is manageable  Outcome: Progressing     Problem: Safety  Goal: Patient will be injury free during hospitalization  Outcome: Progressing     Problem: Daily Care  Goal: Daily care needs are met  Outcome: Progressing     Problem: Psychosocial Needs  Goal: Demonstrates ability to cope with hospitalization/illness  Outcome: Progressing  Goal: Collaborate with patient/family/caregiver to identify patient specific goals for this hospitalization  Outcome: Progressing     Problem: Discharge Barriers  Goal: Patient's discharge needs are met  Outcome: Progressing     Problem: Risk of Injury Due to Unsafe Behavior  Goal: Patient will remain safe while in restraint; physical/psychological needs will be met  Outcome: Progressing  Goal: Alternatives to restraint will be continually assessed with use of least restrictive device and discontinuation as soon as possible  Outcome: Progressing

## 2021-06-09 NOTE — PROGRESS NOTES
1600 Report received from off-going RN Elsa. Drelbert verified, safety checks completed, assumed care of patient. Tobias Palmer is a 54 y.o. male with a PMH of obesity admitted on 6/22 from [facility] with +COVID.     Vent Mode: AC  FiO2 (%):  [45 %] 45 %  S RR:  [18] 18  S VT:  [430 mL-450 mL] 430 mL  PEEP/CPAP (cm H2O):  [12 cm H2O-18 cm H2O] 12 cm H2O  Minute Ventilation (L/min):  [8.8 L/min-11.8 L/min] 9.6 L/min  PIP:  [21 cm H2O-28 cm H2O] 21 cm H2O  MAP (cm H2O):  [14-21] 14    Vitals:    06/27/20 1500 06/27/20 1600 06/27/20 1700 06/27/20 1800   BP:  147/66  119/55   Patient Position:       Pulse: 83 95 79 76   Resp: 23 (!) 29 22 22   Temp:  100.4  F (38  C)  97.9  F (36.6  C)   TempSrc:  Esophageal  Esophageal   SpO2: 91% 92% 92% 91%   Weight:       Height:         Pt was in my care for 4 hours this evening, with no major events to report. Remains on ventilator support with Fentanyl titrated to 100 mcg/hr and Versed to 2.5 mg/hr for sedation and pain control. Pt with no response to any stimulus during this time period. Turned, repositioned q 2 hours, barlow and rectal tube patent, and all orders for this time frame followed/completed; see flowsheets. Family updated via provider. Will continue to monitor and follow plan of care.

## 2021-06-09 NOTE — PROGRESS NOTES
06/24/20 1954   Patient Data   PIP Observed (cm H2O) 31 cm H2O   MAP (cm H2O) 24   Auto/Intrinsic PEEP Observed (cm H2O) 0.4 cm H2O   Plateau Pressure (cm H2O) 29 cm H2O

## 2021-06-09 NOTE — PROGRESS NOTES
[facility] COVID19  CRITICAL CARE  PROGRESS NOTE:  Date of service: 06/24/2020    Main plans for today:  - Supinate at 1300. ABG post-supination and if stable/improved consider discontinuing paralytics and remaining supinated overnight  - Procalcitonin normal, and sputum pending for persistent fevers; holding antibiotics for now  - Increase bowel regimen due to no BM since admit      Assessment/Plan:  Tobias Palmer is a 54 year old male with a history of obesity (BMI 45) who was admitted and intubated on 6/21 at [facility] with COVID-19 pneumonia, transferred to [place] ICU on 6/22. Course complicated by ARDS and shock, requiring proning and paralytics.    The past 24 hours notable for: patient paralyzed and alternating proned/supinated since evening of 6/22. Remained stable overnight and tolerated FiO2 wean to 55%.       NEURO/MSK:  # Acute Pain  # Agitation/Anxiety  # Sedation  - Continue fentanyl, versed, precedex infusions for sedation and pain control, RASS goal -4 while paralyzed  - Continue paralytic until patient supinated. May discontinue with patient stable/improved post-supination.      CVS:  # Shock secondary to COVID-19 pneumonia  - Hemodynamically stable at this time      RESP:  # Acute hypoxic respiratory failure in the setting of COVID-19 infection  - Current vent settings:  Vent Mode: AC  FiO2 (%):  [55 %-100 %] 55 %  S RR:  [18-20] 20  S VT:  [450 mL] 450 mL  PEEP/CPAP (cm H2O):  [20 cm H2O] 20 cm H2O  Minute Ventilation (L/min):  [5.4 L/min-8.9 L/min] 8.3 L/min  PIP:  [31 cm H2O-33 cm H2O] 33 cm H2O  MAP (cm H2O):  [23-27] 27   - Continue Veletri  - Repeat ABG at 1200 and 1600 pre/post supination  - Supinate at 1300  -  this am, may consider leaving supinated after flip at 1300 if patient tolerated  - Wean FiO2 as tolerated. Continue with higher PEEP as patient tolerating, larger body habitus      GI/FEN:  # Protein calorie malnutrition  # Stress ulcer prophylaxis  - Tube feeds @  rate of 15 ml/hr while prone, goal of 80 ml/hr when supinated  - PPI ordered  - Increase prn use of bowel regimen medications due to no BM since admit      RENAL :  # Rhabdomyolysis  - CK peaked to 1864 at [facility]; today , continues to improved  - Repeat CK tomorrow  # Hypernatremia  - Sodium 145 today. Receiving 175 ml q4h free water flushes. Will monitor  # Fluid balance  - Net - 469  - Will diurese with lasix 20 mg BID for net goal -1 to -2 L      ID:  # COVID-19 infection  - Candidate for Remdesivir, started 6/22  - Question cytokine storm- IL- 6 pending, ferritin 795, fibrinogen 459, CRP 12.6, . Patient may benefit from Tocilizumab, will defer to ID research team  # Possible CAP  - Was on Vanc/Zosyn at [facility] started 6/21, D/C'ed and narrowed to ceftriaxone 6/22-6/23  # Fevers  - Persistently febrile overnight to 102.6F, given tylenol and under cooling blanked  - Tylenol ordered for fever  - WBC count normal, procalcitonin 0.10 today, Blood cultures from 6/22 are pending but no growth to date. Sputum sent today  - Clinically no signs of bacterial infection. Fever suspected to be related to COVID infection. Will continue to trend cultures and WBC count.      HEMATOLOGIC:  # Anemia of critical illness  - Hgb stable at 14.0  - Daily monitoring  # Coagulopathy due to COVID-19 infection  - Lovenox 60 U BID     ENDOCRINE:  # Stress hyperglycemia  - Keep BG< 180 for optimal healing  - SSI q4h      DISPO: ICU    DVT proph:Yes.  GI proph: Yes.  Requires barlow for strict I&O: Yes  Restraints required for safety: Not Indicated, patient paralyzed    Lines/Drains/Tubes:  Central line, Placed on 6/22  Arterial line,  Placed on 6/22   ETT placed on 6/21  Feeding tube placed on 6/22  Barlow catheter placed on 6/21        Subjective/ROS or 24 hour course:  Unable to assess, patient paralyzed and sedated    Objective:  /72   Pulse 60   Temp (!) 102.6  F (39.2  C) (Bladder)   Resp 20   Wt (!) 274 lb 4 oz (124.4 kg)    SpO2 95%     Intake/Output last 3 shifts:  I/O last 3 completed shifts:  In: 3866.1 [I.V.:991.1; NG/GT:2625; IV Piggyback:250]  Out: 2275 [Urine:2235; Emesis/NG output:40]  Intake/Output this shift:  I/O this shift:  In: 451.1 [I.V.:216.1; NG/GT:235]  Out: 225 [Urine:225]    ABG:  Recent Labs     06/24/20  0525   PHART 7.36*   OXYHB 96.7   BEARTCALC 0.9   POCPEEP 20   TEMP 37.0   POCRATE 20     GEN: no acute distress, unarousable   NEURO: deeply sedated. Unable to assess.   HEENT:ETT secure, facial edema noted. Unable to assess pupils due to facial edema  PULM: diminished, coarse throughout  CV/COR: regular rate and rhythm, no murmur  GI: soft nontender, limited exam as patient is proned. Hypoactive bowel sounds  :  Hillman, urine yellow and clear  EXT:  peripheral pulses, +2 x 4 extremities.  SKIN: no obvious rash, warm and dry    Total Critical Care Time : 1 Hour    Katerin Sawant    Staffed with Dr Leventhal.    Family member Carlton, son, and Harvinder nephew, updated today by Katerin Sawant PA-C.

## 2021-06-09 NOTE — PROGRESS NOTES
07/01/20 1336   Patient Data   Vt (observed, mL) 408 mL   Vt Exp (mL) 430 mL   Minute Ventilation (L/min) 7.7 L/min   Total Resp Rate  18 BPM   PIP Observed (cm H2O) 38 cm H2O   MAP (cm H2O) 12   Plateau Pressure (cm H2O) 20 cm H2O   SpO2 95 %   Heart Rate 87

## 2021-06-09 NOTE — PLAN OF CARE
Neuro: pt remain sedated; withdraw to painful sensation. Open eyes to name. CV: BP stable and within goal parameters. Still on Levophed. T-max this shift 100.0. Cooling blanket applied.  Resp: pt still intubated; vent changes made today; tolerated well. Thick white oral secretion; mouth suction and oral care provided. GI: pt continue to have loose stool; rectal tube in place. Tube feeding infusing; tolerated well; tubing changed. : large urine output, MD aware. 100-200 per hour. Hillman care done. Pain: pain controlled. Pain medication infusion. Goal: MD would like to come down on sedation; pt is heading in the right direction and would like pt to be less sedated. Central line dressing done. Family updated.     Problem: Pain  Goal: Patient's pain/discomfort is manageable  Outcome: Progressing     Problem: Safety  Goal: Patient will be injury free during hospitalization  Outcome: Progressing     Problem: Psychosocial Needs  Goal: Collaborate with patient/family/caregiver to identify patient specific goals for this hospitalization  Outcome: Progressing     Problem: Potential for Compromised Skin Integrity  Goal: Skin integrity is maintained or improved  Outcome: Progressing     Problem: Potential for Falls  Goal: Patient will remain free of falls  Outcome: Progressing     Problem: Risk of Injury Due to Unsafe Behavior  Goal: Patient will remain safe while in restraint; physical/psychological needs will be met  Outcome: Progressing  Goal: Alternatives to restraint will be continually assessed with use of least restrictive device and discontinuation as soon as possible  Outcome: Progressing  Goal: Patient/Family will be able to communicate reason for restraint and steps for restraint application and removal  Outcome: Progressing     Problem: Inadequate Airway Clearance  Goal: Patient will maintain patent airway  Outcome: Progressing     Problem: Mechanical Ventilation  Goal: Patient will maintain patent airway  Outcome:  Progressing  Goal: ET tube will be managed safely  Outcome: Progressing     Problem: Glucose Imbalance  Goal: Achieve optimal glucose control  Outcome: Progressing     Problem: Inadequate Gas Exchange  Goal: Patient is adequately oxygenated and ventilation is improved  Outcome: Progressing

## 2021-06-09 NOTE — PROGRESS NOTES
COVID-19: CRITICAL CARE PROGRESS NOTE    Date of Service: 7/4/2020  Date / Time of Hospital Admission:  6/22/2020 11:38 AM  Length of stay: 12   Intubation date:     ID:  Tobias Palmer is a 54 y.o. male with history of BMI 45 intubated 6/21 from COVID pneumonia requiring proning and paralysis improved but with subsequent fever and pneumonia.     Changes for Today:   1. Persistently Febrile  2. Worsened hypoxia requiring increased PEEP to 12  3. Stopped precedex and went back to versed due to vent dyssynchrony     Assessment/Plan:   Neurologic:   Sedation    Fentanyl/Versed drips    Continue scheduled oxycodone and seroquel    Will need to increase RASS goal to -4 and prone.     Pulmonary:   ARDS  COVID 19  VAP-gram positive cocci     Vent:  18/430/12/80%    P/F ratio: 106 this morning    Attempted to increase sedation during the day as well as PEEP (8-->12) but still hypoxic. Plan to paralyze and prone.     Cardiovascular:   Afib with RVR post-intubation--resolved    Oral amiodarone    Holding diuretics and lopressor for now due to sepsis.       GI/:   Nutrition needs    Nutrition:  Tube feeds at goal    Last BM: June 29th    GI prophylaxis:  PPI    Renal:   Rhabdo--resolved  Hypernatremia--resolved    Monitor I/O's.  Electrolyte repletion PRN.  Avoid/limit nephrotoxic agents.     Goal Is/Os even, no further diuresis currently    Hematologic:     DVT prophylaxis:  Lovenox 60mg BID    Infectious diseases:    1) COVID 19  2) VAP with gram positive cocci    Abx changed to Meropenem/Vanc on 7/2 due to high fever and worsened Chest Xray/hypoxia    Following up cultures, nothing positive yet.     Remdesivir x10 days, CCP, Tocilizumab    Beta-D-glucan negative.     Appreciate ID input.     Endocrine:   Hyperglycemia    FSBG Q4H, Aspart insulin SS Lantus, glucose in acceptable range.     Lines/Drains/Tubes:  endotracheal tube, placed 6/22  central line, placed 6/27  arterial line, placed 6/22/20  enteral tube, placed   6/22  Hillman catheter, placed 6/21    Activity: OOB in Chair    Code Status:  Full Code.     Called and left message for Carlton (son)    This patient is considered critically ill and requires ICU level of care due to need for mechanical ventilation from hypoxia.    Total Critical Care time, not including separate billable procedure time:  60 minutes.     ICU Checklist:   ICU DAILY CHECKLIST                         Can patient transfer out of MICU? no    FAST HUG:    Feeding:  Feeding: Yes.  Patient is receiving TUBE FEEDS    Hillman:Yes  Analgesia/Sedation:Yes fentanyl and midazolam  Thromboembolic prophylaxis: yes; Mode:  Lovenox  HOB>30:  Yes  Stress Ulcer Protocol Active: yes; Mode: PPI  Glycemic Control: Any glucose > 180 no; Mode of Insulin Therapy: Sliding Scale Insulin and Long Acting Insulin    INTUBATED:  Can patient have daily waking:  no  Can patient have spontaneous breathing trial:  no    Restraints? Yes  PROVIDER RESTRAINT FOR NON-VIOLENT BEHAVIOR FACE TO FACE EVALUATION    Patient's Immediate Situation:  Patient demonstrated the following behaviors: Pulling/tugging at invasive lines or tubes and does not respond to verbal/non-verbal redirection    Patient's Reaction to the intervention:  Does patient understand the reason for restraint/seclusion? No    Medical Condition:  Is there any evidence of compromise of Skin integrity, Respiratory, Cardiovascular, Musculoskeletal, Hydration? No    Behavioral Condition:  In consultation with the RN, is there a need to continue this restraint or seclusion? Yes    See Restraint Flowsheet for complete restraint documentation and assessment.    Татьяна Carey MD            PHYSICAL THERAPY AND MOBILITY:  Can patient have PT and mobility trial: no  Activity: Bed Rest     Subjective:   HPI:  Tobias Palmer is a 54 y.o. male with history of obesity, BMI 45 intubated 6/22 developed ARDs from COVID requiring proning and paralytics. This course improved significantly and  "7/2 PEEP was down to 8. Fever to 103 7/2 with worsening hypoxia and significant worsening of opacities on CXR consistent with VAP. Required reparalyzing and reproning due to ongoing hypoxia    Review of Systems:  Review of systems not obtained due to inability to communicate with the patient.     ALLERGIES: Patient has no known allergies.    MEDS:  Reviewed.  Active include:    Continuous Infusions:    fentaNYL citrate (PF) 375 mcg/hr (07/04/20 1200)     midazolam 8 mg/hr (07/04/20 1500)     norepinephrine Stopped (07/03/20 1430)     sodium chloride 0.9% 10 mL/hr (07/04/20 1200)     Scheduled Medications:     amino acids-protein hydrolys  2 packet Enteral Tube TID     amiodarone  400 mg Enteral Tube DAILY     chlorhexidine  15 mL Topical Q12H     enoxaparin ANTICOAGULANT  60 mg Subcutaneous BID     insulin aspart (NovoLOG) injection   Subcutaneous Q4H FIXED TIMES     insulin glargine  25 Units Subcutaneous QHS     meropenem  500 mg Intravenous Q8H     midazolam  2-4 mg Intravenous Once     multivitamin with iron-mineral  15 mL Enteral Tube DAILY     omeprazole  20 mg Oral QAM AC    Or     omeprazole  20 mg Enteral Tube QAM AC    Or     pantoprazole  40 mg Intravenous QAM AC     oxyCODONE  10 mg Oral Q6H     polyethylene glycol  17 g Oral DAILY     QUEtiapine  25 mg Oral Q8H     senna-docusate  1 tablet Oral BID    Or     senna (SENOKOT) syrup  8.8 mg Enteral Tube BID     sodium chloride  10-30 mL Intravenous Q8H FIXED TIMES     vancomycin  1,750 mg Intravenous Q8H     white petrolatum-mineral oiL  1 application Both Eyes Q8H        Objective:   VITALS:  /63   Pulse (!) 114   Temp (!) 100.9  F (38.3  C) (Bladder)   Resp 27   Ht 5' 5\" (1.651 m)   Wt (!) 255 lb (115.7 kg)   SpO2 98%   BMI 42.43 kg/m      Vent Mode: AC  FiO2 (%):  [55 %-100 %] 70 %  S RR:  [18] 18  S VT:  [430 mL] 430 mL  PEEP/CPAP (cm H2O):  [8 cm H2O-12 cm H2O] 12 cm H2O  Minute Ventilation (L/min):  [7.7 L/min-11.2 L/min] 11.2 " L/min  PIP:  [21 cm H2O-40 cm H2O] 21 cm H2O  MAP (cm H2O):  [8-15] 11      PHYSICAL EXAM:    GEN: intubated, sedated  HEENT: Normocephalic, atraumatic.  Moist mucous membranes.   NECK: Supple.    PULM: Non-labored breathing.  No use of accessory muscles.  Clear to ausculation bilaterally.   CVS: Regular rate and rhythm.  Normal S1, S2.  No rubs, murmurs, or gallops.    ABDOMEN: Normoactive bowel sounds.  Non-tender to palpation.  Non-distended.    EXTREMITES:  No clubbing, cyanosis, or edema.   NEURO:  Intubated and sedated, not following commands due to sedation.    Weight change: Weight change:   I&O:      Intake/Output Summary (Last 24 hours) at 7/4/2020 1521  Last data filed at 7/4/2020 1200  Gross per 24 hour   Intake 3759.14 ml   Output 1925 ml   Net 1834.14 ml        PERTINENT STUDIES:  Serum Glucose range:No results for input(s): POCGLU in the last 72 hours.  ABG:  Recent Labs     07/03/20 0315 07/04/20 0515   PHART 7.39 7.42   CFI2FAP 50* 46*   PO2ART 110* 64*   OXYHB 96.3 92.8*   BEARTCALC 5.7 4.5   POCPEEP 8 8   TEMP 37.0 37.0   POCRATE 18  --      CBC:  Results from last 7 days   Lab Units 07/04/20 0515 07/03/20 0315 07/02/20  0502  06/28/20  0412   LN-WHITE BLOOD CELL COUNT thou/uL 9.2 9.4 9.4   < > 12.6*   LN-HEMOGLOBIN g/dL 12.3* 12.6* 12.7*   < > 13.7*   LN-HEMATOCRIT % 40.2 41.2 40.9   < > 44.6   LN-PLATELET COUNT thou/uL 155 160 143   < > 198  196   LN-NEUTROPHILS RELATIVE PERCENT %  --   --   --   --  86*   LN-MONOCYTES RELATIVE PERCENT %  --   --   --   --  7    < > = values in this interval not displayed.     Chemistry:  Results from last 7 days   Lab Units 07/04/20  0515 07/03/20  0315 07/02/20  0502   LN-SODIUM mmol/L 142 146* 143   LN-POTASSIUM mmol/L 4.2 3.9 4.3   LN-CHLORIDE mmol/L 109* 111* 110*   LN-CO2 mmol/L 29 30 28   LN-BLOOD UREA NITROGEN mg/dL 21 30* 32*   LN-CREATININE mg/dL 0.63* 0.66* 0.69*   LN-CALCIUM mg/dL 7.3* 7.2* 7.4*   LN-MAGNESIUM mg/dL 2.2 2.3 2.4   LN-ALBUMIN g/dL   --  2.4*  --    LN-ALT (SGPT) U/L  --  41  --    LN-AST (SGOT) U/L  --  33  --    LN-ALKALINE PHOSPHATASE U/L  --  45  --    LN-BILIRUBIN TOTAL mg/dL  --  0.4  --      Coags:  Lab Results   Component Value Date    INR 1.16 (H) 06/26/2020    INR 1.07 06/25/2020    INR 1.06 06/24/2020         Cardiac Markers:         Microbiology:    7/2: MSSA in sputum    7/4: Pending, gram positive cocci in pairs     Cardiac/Radiology Studies:       Radiology:  Personally reviewed impression/s  Chest X-Ray,   EXAM: XR CHEST 1 VIEW PORTABLE  LOCATION: [facility]  DATE/TIME: 7/2/2020 5:49 PM     INDICATION: fever, COVID pneumonia  COMPARISON: 06/27/2020 and older studies     IMPRESSION:   Endotracheal tube is in good position. Feeding tube can be seen coursing into the stomach. It is again coiled in the fundus. The tip is out of the field-of-view.     Marked increase in the patchy interstitial and airspace opacities bilaterally. Heart is of normal size. No pneumothorax.

## 2021-06-09 NOTE — PROGRESS NOTES
ICU staff  7/6/2020, 5:13 AM     Called for . CK has trended up/down, peaking at 1168 on 6/22. Triglycerides are elevated at 503. Recently switched from dexmedetomidine to propofol; may need to transition back. Will discuss with AM team.    Shaun Yap MD, PhD  Surgical critical care  7/6/2020, 5:15 AM

## 2021-06-09 NOTE — PLAN OF CARE
Problem: Mechanical Ventilation  Goal: Patient will maintain patent airway  Outcome: Progressing     Problem: Mechanical Ventilation  Goal: Patient will maintain patent airway  7/5/2020 1147 by Sherita Valenzuela, LRT  Outcome: Progressing  7/5/2020 1144 by Sherita Valenzuela, DEBORAHT  Outcome: Progressing  Goal: ET tube will be managed safely  Outcome: Progressing

## 2021-06-09 NOTE — PROGRESS NOTES
Pt intermittent agitation, thrashes head side to side, attempts to shrug shoulders and lift head forward,  At times ett difficult to suction due to positioning.  Volumes remain at 430, fi02 at 100% weaned to 70% until pt shows symptoms of tachypnea , agitation, restlessness.  sats decline to 94% but HR accelerates to 116 and bp 180/90's.  Versed titrated to 9 mg with 2mg boluses q 1 hour prn, fentanyl at 375 mcg/kg/hr and fio2 returned to 100% , pt responds to intervention by returning to NSR, normotensive and r/r 18-22.  Continues to need close monitoring.

## 2021-06-09 NOTE — PROGRESS NOTES
Infectious Disease Chart Check    Chart reviewed.    Note IL-6 levels declined from 66 to 42, crp and ldh slightly improved as well.    Would not dose tocilizumab at this time.    Will see tomorrow.    In meantime, let's get QFT, HBSag and HCV, as well as trend crp, ferritin and ldh.    Full consult to follow tomorrow.     Burak Moss MD

## 2021-06-09 NOTE — PROGRESS NOTES
07/18/20 0246   Patient Data   Vt (observed, mL) 439 mL   Minute Ventilation (L/min) 11.2 L/min   Total Resp Rate  26 BPM   PIP Observed (cm H2O) 23 cm H2O   MAP (cm H2O) 16   Plateau Pressure (cm H2O) 23 cm H2O   Dynamic Compliance (L/cm H2O) 33 L/cm H2O   Airway Resistance 2.3   SpO2 95 %   Heart Rate 75

## 2021-06-09 NOTE — PROGRESS NOTES
Page to on call provider: Vern ALEXANDER ABG: PH 7.32, pCO2 55, adjust vent rate? also -168. no prn ordered. thanks -nick    Update: gave prn hydralazine. Vent changes made. Will be rechecking another ABG now.      /76   Pulse (!) 58   Temp (!) 102.6  F (39.2  C) (Bladder) Comment (Src): ice packs to axillary and between thighs  Resp 20   Wt (!) 274 lb 4 oz (124.4 kg)   SpO2 97% .

## 2021-06-09 NOTE — PROGRESS NOTES
07/23/20 1924   Patient Data   PIP Observed (cm H2O) 21 cm H2O   MAP (cm H2O) 12   Auto/Intrinsic PEEP Observed (cm H2O) 1.7 cm H2O   Plateau Pressure (cm H2O) 19 cm H2O   Dynamic Compliance (L/cm H2O) 35.6 L/cm H2O   Airway Resistance 9.7   SpO2 98 %

## 2021-06-09 NOTE — PROGRESS NOTES
FERN COVID RT PROGRESS NOTE     DATA:     VENT DAY#  29     CURRENT SETTINGS:  VENT SETTINGS   AC  16/370/+10              FIO2:   40     PATIENT PARAMETERS:     PIP 16-26  Pplat:  19-25  Pmean:  12-16  SBT: no  SECRETIONS:  Small tan thick  02 SATS:  94     ETT SIZE 8.0  Secured at  24 cm at teeth     Respiratory Medications: mucomyst/albuterol BID   ABG: Results for RJ GUADALUPE (MRN [id number]) as of 7/20/2020 04:59   Ref. Range 7/20/2020 04:21   pH, Arterial Latest Ref Range: 7.37 - 7.44  7.48 (H)   pCO2, Arterial Latest Ref Range: 35 - 45 mm Hg 35   pO2, Arterial Latest Ref Range: 80 - 90 mm Hg 80   Bicarbonate, Arterial Calc Latest Ref Range: 23.0 - 29.0 mmol/L 27.0   O2 Sat, Arterial Latest Ref Range: 96.0 - 97.0 % 96.9   Oxyhemoglobin Latest Ref Range: 96.0 - 97.0 % 94.0 (L)   POC Base Excess Calc Latest Units: mmol/L 2.7   Sample Stabilized Temperature Latest Units: degrees C 37.0      P/F 200          NOTE / PLAN:   Patient had a stable night with no changes in vent settings.  Sputum sample and bood cultures were collected due to increased temperature.  Will continue to monitor and make changes as needed.  DEBORAH DegrootT

## 2021-06-09 NOTE — PROGRESS NOTES
CRITICAL CARE PROGRESS NOTE:    Assessment/Plan:  Tobias Palmer is a 54 year old male with a history of obesity (BMI 45) who was admitted and intubated on 6/21 at [facility] with COVID-19 pneumonia, transferred to [place] ICU on 6/22. Course complicated by ARDS and shock.     24hr events:  Remains supine, with again reported thick, yellow/ white mucus. PF decreasing throughout the night to 120, with increase in PEEP. Decision made to prone patient at 1100 this am. Febrile 101, repeated Blood cx.    Changes   -Lasix 40 this am  -Increase PEEP to 14 today and prone at 1100  -Repeated Blood cx     NEURO:  Encephalopathy, requiring sedation/paralytic since admission     Continues to be off paralytic as off 7/11.     Cont Dex, fent, midaz for RASS -2 to -3    Cont two times a day seroquel.    RESP:  ARDS 2/2 severe covid-19 infection     Copious thick secretions continued     Off paralytic, ok compliance but due to worsening hypoxia decision made to re prone patient this morning at 1100    Vent: 22/350/14/45  Plt 19 PIP 26   Plan    Prone today at 1100 for PF of 120    Cont Mucomyst and albuterol for clearance     Titrate FiO2 for goal O2 sat 88-92%, PaO2 55 or greater    Pplat <30.     #Staph PNA   -+sputum cx multiple times with sensitivities to   -Increasing production of sputum   -Cefazolin 7/7-7/14 total of 7 day course   Plan   -sputum cx 7/14 pending       CV:  #Resolved Shock   No known cardiac hx. No echo on file. He had afib/RVR during admission now on po amio. Circulatory shock - resolved.   -s/p total of 40 yesterday with -1L   Plan    MAP >65, currently not on pressors.    Lasix 40mg x1 today may need repeate for goal of -1L/1.5     Cont po amio for now    GI:  No issues, yang TF. TG downtrending     Cont TF and advance as yang    Cont bowel regimen    Cont PPI for stress ulcer ppx    RENAL:  No issues, normal renal function and tolerating daily loop diuretic.    Cont Lasix as above for goal of  -500/-1L    Maintain barlow    Avoid nephrotoxins.     ID:  Severe covid-19 infection with cytokine release.   - Inflammatory markers significantly improved   -Improving leukocytosis to 9 today,however febrile overnight, w/ continued significant sputum production    Workup   -Quant gold, HBV, HCV testing negative.   -Col with yeast in sputum at risk for invasive candidiasis, +MSSA 7/2 presumed colonization   - Beta-D glucan negative 6/29, less likely. CRP, PCT negative.     S/p toci on 6/25 and 6/27    S/p CCP early in admission , ?6/25    S/p dexamethasone 6/22 - 6/29    S/p ivermectin x2 doses, strongy Ab was neg.     S/p meropenem x10 days    S/p remdisivir x10 days  Abx: S/p cefazolin for MSSA in sputum for  Final dose today 7/14 for 7 day total   Plan    Follow up sputum cx 7/12 for final result     Repeated Blood cx today one peripheral one from PICC      May consider removing PICC if fevers progress or increase in WBC     ID following, appreciate input    HEMATOLOGIC:  # Stable Thromcbocytopenia   -Improving slightly  94--> 115  - No clots. Doubt HIT as had been stable for many days prior to recently.     Trend plt count    OK to cont LMWH, but will need to stop if plt count gets below 70K.     hgb >7 transfuse prn    ENDOCRINE:  No issues    DC SSI as has not required any     ICU PROPHYLAXIS:    Full code    DISPO/CODE STATUS: will update his wife later today. She has been getting daily updates.  May need to address trach soon.     FAMILY COMMUNICATION: Updated patient son Carlton via phone     Lines/Drains/Tubes:  Barlow  ETT 8.0 day 21  A-line right 6/22  PICC 6/26  NG tube  Rectal tube    Restraints  Progress Note  Restraint Application    I recognize that restraints are physical and/or chemical interventions intended to restrict a person's movements. Restraints are currently needed to ensure the safety of this patient and/or others. My clinical rationale appears below.    Category/Type of Restraint     Non  "Violent:  Soft limb restraint x2  --  Behavior  Pulling at tubes/lines  --  Root Cause of the Behavior  Sedation/intubation  --  Less-Restrictive Measures that Failed  Non Violent Measures:  Close Observation  --  Response to the Restraint  Patient unable to pull at tubes/lines  --  Criteria for Release from the Restraint  Patient calm and off sedation    Elsa Tan MICU [place]KA   Discussed with Dr. Howard     Critical Care time spent >40min     Objective:  Physical Exam:  Vent settings for last 24 hours:  Vent Mode: AC  FiO2 (%):  [45 %-55 %] 55 %  S RR:  [16] 16  S VT:  [350 mL] 350 mL  PEEP/CPAP (cm H2O):  [12 cm H2O-14 cm H2O] 14 cm H2O  Minute Ventilation (L/min):  [7.2 L/min-9.7 L/min] 8.6 L/min  PIP:  [13 cm H2O-29 cm H2O] 16 cm H2O  MAP (cm H2O):  [12-18] 15    /58 (Patient Position: Lying)   Pulse 67   Temp (!) 100.6  F (38.1  C) (Esophageal)   Resp 23   Ht 5' 5\" (1.651 m)   Wt (!) 262 lb 5.6 oz (119 kg)   SpO2 95%   BMI 43.66 kg/m      Intake/Output last 3 shifts:  I/O last 3 completed shifts:  In: 2198.3 [I.V.:837.3; NG/GT:1261; IV Piggyback:100]  Out: 3225 [Urine:3225]  Intake/Output this shift:  I/O this shift:  In: 122.8 [I.V.:122.8]  Out: 525 [Urine:525]    Physical Exam  Gen: intubated, sedated, not parlayzed; eyes opening, not following commands.   HEENT: no OP lesions, no DIANN  CV: RRR, no m/g/r  Resp: coarse, thick yellow sputum present   Abd: soft, nontender, BS+  Neuro: PERRL, nonfocal  Ext: no edema    LAB:  Results from last 7 days   Lab Units 07/15/20  0343   LN-WHITE BLOOD CELL COUNT thou/uL 8.8   LN-HEMOGLOBIN g/dL 11.4*   LN-HEMATOCRIT % 36.2*   LN-PLATELET COUNT thou/uL 115*     Results from last 7 days   Lab Units 07/15/20  0343 07/14/20  0402 07/13/20  0437   LN-SODIUM mmol/L 141 142 141   LN-POTASSIUM mmol/L 4.3 3.7 3.9   LN-CHLORIDE mmol/L 104 104 103   LN-CO2 mmol/L 29 31 32*   LN-BLOOD UREA NITROGEN mg/dL 20 21 21   LN-CREATININE mg/dL 0.58* 0.57* 0.54*   LN-CALCIUM " mg/dL 8.4* 8.3* 8.0*       Micro  PCT neg since 6/22  CRP neg, down from 12.6 on 6/22  Ferritin 642 on 6/26, down from 795 on 6/22   on 6/26, down from 722 on 6/22  D-dimer 0.82 on 6/26, up from 0.46 on 6/22  Fibrinogen 505 on 6/26  IL-6 42 on 6/22, not rechecked.    Sputum 7/4 MSSA  Sputum 6/24 yeast  Sputum 7/14 Pending   Blood cx 7/15 pending     Fungal cx pending  Urine NG    Current Facility-Administered Medications   Medication Dose Route Frequency Provider Last Rate Last Dose     acetaminophen 160 mg/5 mL solution 1,000 mg (TYLENOL)  1,000 mg Oral Q6H PRN Татьяна Carey MD   1,000 mg at 07/14/20 0900     acetylcysteine 200 mg/mL (20 %) solution 4 mL (MUCOMYST)  4 mL Inhalation Q6H - RT Elsa Tan CNP   4 mL at 07/15/20 0713     albuterol nebulizer solution 2.5 mg  2.5 mg Nebulization Q6H - RT Elsa Tan CNP   2.5 mg at 07/15/20 0713     amino acids-protein hydrolys 15-60 gram-kcal/30 mL packet 1 packet (ProSource No Carb)  1 packet Enteral Tube TID Joel Howard DO   1 packet at 07/15/20 0814     amiodarone tablet 200 mg (PACERONE)  200 mg Enteral Tube DAILY Shikha Brady CNP   200 mg at 07/15/20 0814     benzocaine-menthoL lozenge 1 lozenge (CEPACOL)  1 lozenge Oral Q1H PRN Elsa Tan CNP         bisacodyL suppository 10 mg (DULCOLAX)  10 mg Rectal Daily PRN Elsa Tan CNP   10 mg at 06/24/20 1608     chlorhexidine 0.12 % solution 15 mL (PERIDEX)  15 mL Topical Q12H Elsa Tan CNP   15 mL at 07/15/20 0813     dexmedetomidine (PRECEDEX) 400 mcg/100 mL in NS (4 mcg/mL) infusion  0.1-1.5 mcg/kg/hr Intravenous Continuous Niko Saldana MD 15.7 mL/hr at 07/15/20 1000 0.5 mcg/kg/hr at 07/15/20 1000     dextrose 50 % (D50W) syringe 20-50 mL  20-50 mL Intravenous Q15 Min PRN Elsa Tan CNP         enoxaparin ANTICOAGULANT syringe 60 mg (LOVENOX)  60 mg Subcutaneous BID Elsa Tan CNP   60 mg at 07/15/20 0813     fentaNYL - BOLUS DOSE from infusion   mcg   mcg Intravenous Q2H PRN Joel Howard DO         fentaNYL 2500 mcg/50 mL CADD infusion (50 mcg/mL)   mcg/hr Intravenous Continuous Elsa Tan CNP 5 mL/hr at 07/15/20 1000 250 mcg/hr at 07/15/20 1000     glucagon (human recombinant) injection 1 mg  1 mg Subcutaneous Q15 Min PRN Elsa Tan CNP         labetaloL injection 10-20 mg (NORMODYNE,TRANDATE)  10-20 mg Intravenous Q2H PRN Esmer Willis CNP   20 mg at 07/12/20 1009     Lactobacillus rhamnosus GG 15 Billion cell 1 capsule (CULTURELLE)  1 capsule Enteral Tube BID Niko Saldana MD   1 capsule at 07/15/20 0814     lipase-protease-amylase 5,000-17,000- 24,000 unit capsule 2 capsule (ZENPEP)  2 capsule Enteral Tube PRN Leventhal, Thomas Michael, MD        And     sodium bicarbonate tablet 325 mg  325 mg Enteral Tube PRN Leventhal, Thomas Michael, MD         LORazepam 0.5-1 mg injection (diluted concentration)  0.5-1 mg Intravenous Q8H PRN Shikha Brady CNP         LORazepam 1 mg/0.5 mL concentrated solution 2 mg (ATIVAN)  2 mg Oral Q6H FIXED TIMES Elsa Tan CNP   2 mg at 07/15/20 0945     magnesium hydroxide suspension 30 mL (MILK OF MAG)  30 mL Oral Daily PRN Elsa Tan CNP   30 mL at 06/24/20 1608     metoprolol tartrate injection 5 mg (LOPRESSOR)  5 mg Intravenous Q4H PRN Perlman, David M, MD   5 mg at 06/29/20 0425     midazolam (VERSED) - BOLUS DOSE from infusion 2 mg  2 mg Intravenous Q1H PRN Joel Howard DO         midazolam 100 mg/100 mL (1 mg/mL) infusion (VERSED)  0.25-15 mg/hr Intravenous Continuous Esmer Willis CNP 10 mL/hr at 07/15/20 1000 10 mg/hr at 07/15/20 1000     multivit-min-ferrous gluconate 9 mg iron/15 mL Liqd 9 mg of iron  15 mL Enteral Tube DAILY Leventhal, Thomas Michael, MD   9 mg of iron at 07/15/20 0814     naloxone injection 0.2-0.4 mg (NARCAN)  0.2-0.4 mg Intravenous PRN Elsa Tan CNP        Or     naloxone injection 0.2-0.4 mg (NARCAN)  0.2-0.4 mg  Intramuscular PRN Elsa Tan CNP         omeprazole capsule 20 mg (PriLOSEC)  20 mg Oral QAM AC Elsa Tan CNP   20 mg at 07/05/20 0839    Or     omeprazole suspension 20 mg (PriLOSEC)  20 mg Enteral Tube QAM AC Elsa Tan CNP   20 mg at 07/15/20 0813    Or     pantoprazole 40 mg injection  40 mg Intravenous QAM AC Elsa Tan CNP         ondansetron injection 4 mg (ZOFRAN)  4 mg Intravenous Q4H PRN Elsa Tan CNP        Or     ondansetron tablet 8 mg (ZOFRAN)  8 mg Oral Q8H PRN Elsa Tan CNP         oxyCODONE immediate release tablet 10 mg (ROXICODONE)  10 mg Enteral Tube Q4H Esmer Willis CNP   10 mg at 07/15/20 0814     oxyCODONE immediate release tablet 5-10 mg (ROXICODONE)  5-10 mg Oral Q4H PRN Angela Olson CNP   10 mg at 07/05/20 1757     polyvinyl alcohol 1.4 % ophthalmic solution 1-2 drop (LIQUIFILM TEARS)  1-2 drop Both Eyes Q1H PRN Elsa Tan CNP         QUEtiapine tablet 50 mg (SEROquel)  50 mg Oral Q8H Esmer Willis CNP   50 mg at 07/15/20 0814     sodium chloride 0.9%  10 mL/hr Intravenous Continuous de Angela Hart CNP 10 mL/hr at 07/14/20 1200 10 mL/hr at 07/14/20 1200     sodium chloride bacteriostatic 0.9 % injection 1-5 mL  1-5 mL Intradermal Once PRN Juan F Max PA-C         sodium chloride flush 10-20 mL (NS)  10-20 mL Intravenous PRN Burak Moss MD   10 mL at 06/30/20 1614     sodium chloride flush 10-30 mL (NS)  10-30 mL Intravenous PRN Burak Moss MD         sodium chloride flush 10-30 mL (NS)  10-30 mL Intravenous Q8H FIXED TIMES Burak Moss MD   10 mL at 07/13/20 0021     sodium chloride flush 20 mL (NS)  20 mL Intravenous PRN Burak Moss MD         white petrolatum-mineral oiL 83-15 % ophthalmic ointment 1 application (LUBRIFRESH PM)  1 application Both Eyes Q8H Shikha Brady CNP   1 application at 07/15/20 0813

## 2021-06-09 NOTE — PROGRESS NOTES
Critical Care Progress Note  7/19/2020   12:09 PM     Admit Date: 6/22/2020  ICU Day: 27  Code Status: full code      Problem List:   Principal Problem:    COVID-19  Active Problems:    Shock (H)    Acute and chronic respiratory failure with hypoxia (H)    On mechanically assisted ventilation (H)    ARDS (adult respiratory distress syndrome) (H)    Atrial fibrillation with rapid ventricular response (H)    Atrial fibrillation with rapid ventricular response (H)    Encephalopathy      Major changes for today:    * Increase Vt to 6.5mL/kg (400mL)      Plan by System:     Neuro/Psych: Encephalopathy secondary to prolonged critical illness; Sedation for vent synchrony and comfort.    * Sedating with IV Fentanyl, Versed, propofol - coming down nicely   * Off paralytic since 7/11   * Try to lighten sedation if safe to do so.    * Lorazepam 1-mg Q6h - decreased 7/18   * Oxy 5mg Q6-h - decreased 7/18   * Seroquel 50mg Q8h          Pulmonary: Acute hypoxic respiratory failure in setting of COVID19 infection.     * Vent Mode: AC  FiO2 (%):  [40 %] 40 %  S RR:  [16] 16  S VT:  [370 mL-400 mL] 400 mL  PEEP/CPAP (cm H2O):  [12 cm H2O] 12 cm H2O  Minute Ventilation (L/min):  [9.2 L/min-16 L/min] 16 L/min  PIP:  [16 cm H2O-19 cm H2O] 16 cm H2O  MAP (cm H2O):  [12-14] 13    * Increase tidal volume to 400mL today - he may be more comfortable on larger tidal volumes. Could consider increasing again this afternoon.    * Consider decreasing PEEP to 10 this afternoon.     CV: A.fib with RVR 6/28; Shock - septic.    * On/off levophed for MAP > 65   * Amio 200mg daily         GI/: Protein calorie malnutrition; enteral access established - not post pyloric.    * Tube feeding per RD.     Renal: Rhabdo - improved; metabolic acidosis - resolved.     * Daily assessment for diuretic need.  Hold again today.     ID: COVID19; MSSA pneumonia   * S/P CCP and toci 6/25 and 6/27    * S/P Ivermectin x 2 doses.    * Meropenem x 10 days    *  Remdesivir x 10 days   * Cefazolin for MSSA; 7/7 - 7/14    Heme/Coag:  Coagulopathy in setting of COVID19     * Lovenox 60mg two times a day     Endocrine: Stress hypergylcemia   * Resume SSI standard scale.       Family/Psychosocial:    * Palliative Care involved     * Calling wife Nathalie daily with  assistance.          Dispo: ICU    Esmer Willis, CNP  [facility] Pulmonary/Critical Care    Total critical care time: 35  I have personally provided 35 minutes of critical care time exclusive of time spent on separately billable procedures for acute hypoxic respiratory failure. High risk for acute deterioration and death.    _______________________________________________________________    HPI: Tobias Palmer is a 54 year old male with a history of obesity (BMI 45) who was admitted and intubated on 6/21 at [facility] with COVID-19 pneumonia, transferred to [facility] ICU on 6/22. Course complicated by ARDS and shock, requiring proning and paralytics.    ROS: NATIVIDAD intubated and sedated.     Events over last 24-hours:  No acute events overnight    Objective:     Vitals:    07/19/20 0915 07/19/20 0930 07/19/20 0945 07/19/20 1000   BP: 126/75 111/64 107/59 109/62   Patient Position:       Pulse: 69 72 67 65   Resp: 28 (!) 29 26 27   Temp:       TempSrc:       SpO2: 96% 94% 95% 97%   Weight:       Height:           Vent:   Day of Intubation: 6/21  Ready to wean? No  Vent Mode: AC  FiO2 (%):  [40 %] 40 %  S RR:  [16] 16  S VT:  [370 mL-400 mL] 400 mL  PEEP/CPAP (cm H2O):  [12 cm H2O] 12 cm H2O  Minute Ventilation (L/min):  [9.2 L/min-16 L/min] 16 L/min  PIP:  [16 cm H2O-19 cm H2O] 16 cm H2O  MAP (cm H2O):  [12-14] 13    Sedative Infusion: fentanyl and midazolam and precedex  Sedation vacation: No    I/O:     Intake/Output Summary (Last 24 hours) at 7/19/2020 1209  Last data filed at 7/19/2020 0657  Gross per 24 hour   Intake 2133.49 ml   Output 4575 ml   Net -2441.51 ml     Wt  Readings from Last 3 Encounters:   07/19/20 (!) 267 lb 8 oz (121.3 kg)      Weight change: 11.9 oz (0.337 kg)    Physical Exam:  Gen: sedated  No distress  HEENMT: facial swelling improved.   NEURO: sedated    CARDIOVASCULAR: S1, S2 normal, no murmur, rub or gallop, regular rate and rhythm  PULMONARY: unlabored on full vent; lungs are clear but diminished bilaterally.   GASTROINTESTINAL: obese, soft, ntnd  INTEGUMENT: diaphoretic; improved edema    PSYCH: sedated     Labs:   Results from last 7 days   Lab Units 07/19/20  0505   LN-SODIUM mmol/L 137   LN-POTASSIUM mmol/L 4.0   LN-CHLORIDE mmol/L 103   LN-CO2 mmol/L 26   LN-BLOOD UREA NITROGEN mg/dL 8   LN-CREATININE mg/dL 0.49*   LN-CALCIUM mg/dL 8.2*       Results from last 7 days   Lab Units 07/19/20  0505   LN-WHITE BLOOD CELL COUNT thou/uL 8.2   LN-HEMOGLOBIN g/dL 11.1*   LN-HEMATOCRIT % 35.1*   LN-PLATELET COUNT thou/uL 209       Micro:   7/14 Sputum - 4+ Staph (MSSA)  7/4 Sputum - 3+ Staph (MSSA)    Imaging: all imaging personalized reviewed     7/16 US Doppler BUE - negative DVT    7/11 CXR - Diffuse left lung opacities and consolidation appear stable to slightly improved. More patchy right lung opacities appear marginally increased, though could be exaggerated by differences in exams.     No significant pleural effusion. No pneumothorax. Stable cardiomediastinal silhouette.     ET tube tip 2.1 cm above the melissa. Feeding tube courses into the stomach. Right PICC tip near the cavoatrial junction    7/5 CXR - Endotracheal tube tip mid way between clavicles and melissa. Right PICC tip projects over the mid SVC. Feeding tube tip below the film.     Limited inspiratory volume. Normal heart size. Unchanged bilateral diffuse lung infiltrates      Esmer Willis, [facility] Pulmonary/Critical Care

## 2021-06-09 NOTE — PROGRESS NOTES
06/26/20 1716   Patient Data   PIP Observed (cm H2O) 27 cm H2O   MAP (cm H2O) 21   Plateau Pressure (cm H2O) 25 cm H2O

## 2021-06-09 NOTE — PROGRESS NOTES
"Pharmacy Consult: Vancomycin Dosing    Pharmacist consulted to dose vancomycin for Tobias Palmer, a 54 y.o. male.    Ordering provider: Татьяна Herrera    Indication for vancomycin therapy: Hospital Acquired Pneumonia    Goal Trough Range:  15-20 mcg/mL based on indication    Other current antimicrobials              vancomycin 1,750 mg in sodium chloride 0.9% 500 mL (VANCOCIN)  Every 8 hours          meropenem 500 mg in NaCl 0.9 % 50 mL (MINI-BAG Plus) (MERREM)  Every 8 hours                   Subjective/Objective:    Patient was admitted for COVID-19 on 6/22/2020    Height: 5' 5\" (1.651 m)    Actual Body Weight (ABW): (!) 115.5 kg (254 lb 10.1 oz)    Ideal body weight: 61.5 kg (135 lb 9.3 oz)  Adjusted ideal body weight: 83.1 kg (183 lb 3.2 oz)    BMI: Body mass index is 42.37 kg/m .    No Known Allergies    Patient Active Problem List   Diagnosis     COVID-19     Shock (H)     Acute and chronic respiratory failure with hypoxia (H)     On mechanically assisted ventilation (H)     ARDS (adult respiratory distress syndrome) (H)     Atrial fibrillation with rapid ventricular response (H)     Atrial fibrillation with rapid ventricular response (H)    No past medical history on file.     Temp Readings from Current Encounter:     06/28/20 1200 06/28/20 1600 06/30/20 0800   Temp: (!) 101.1  F (38.4  C) (!) 100.9  F (38.3  C) 98.1  F (36.7  C)     Net Intake/Output (last 24 hours):  I/O last 3 completed shifts:  In: 2511.7 [I.V.:391.7; NG/GT:1370; IV Piggyback:750]  Out: 2770 [Urine:2770]    Recent Labs     06/30/20  0407 07/01/20  0625 07/02/20  0502 07/02/20  1811   WBC 15.1* 12.5* 9.4  --    LACTICACID  --   --   --  1.5   BUN 38* 38* 32*  --    CREATININE 0.71 0.73 0.69*  --      Estimated Creatinine Clearance: 140.5 mL/min (A) (by C-G formula based on SCr of 0.69 mg/dL (L)).    No results for input(s): CULTURE in the last 72 hours.    Results for orders placed or performed during the hospital encounter of " 06/22/20   Culture/Gram Stain: Sputum    Collection Time: 06/27/20  1:44 PM    Specimen: Endotracheal; Respiratory   Result Value Status    Culture Usual Sheila Final   Sputum culture    Collection Time: 06/24/20  4:14 PM    Specimen: Endotracheal; Respiratory   Result Value Status    Culture Usual Sheila Final    Culture 2+ Yeast (!) Final       Recent labs: (last 7 days)     06/28/20  1709 06/28/20  2347 06/30/20  1557   VANCOMYCIN 22.2 7.4 21.2       Vancomycin administrations: (last 120 hours)     Date/Time Action Medication Dose Rate    07/01/20 1639 New Bag    vancomycin 1,500 mg in sodium chloride 0.9% 500 mL (VANCOCIN) 1,500 mg 176.7 mL/hr    07/01/20 0940 New Bag    vancomycin 1,500 mg in sodium chloride 0.9% 500 mL (VANCOCIN) 1,500 mg 176.7 mL/hr    07/01/20 0003 New Bag    vancomycin 1,500 mg in sodium chloride 0.9% 500 mL (VANCOCIN) 1,500 mg 176.7 mL/hr    06/30/20 1618 New Bag    vancomycin 1,750 mg in sodium chloride 0.9% 500 mL (VANCOCIN) 1,750 mg 178.3 mL/hr    06/30/20 0811 New Bag    vancomycin 1,750 mg in sodium chloride 0.9% 500 mL (VANCOCIN) 1,750 mg 178.3 mL/hr    06/30/20 0003 New Bag    vancomycin 1,750 mg in sodium chloride 0.9% 500 mL (VANCOCIN) 1,750 mg 178.3 mL/hr    06/29/20 1626 New Bag    vancomycin 1,750 mg in sodium chloride 0.9% 500 mL (VANCOCIN) 1,750 mg 178.3 mL/hr    06/29/20 0818 New Bag    vancomycin 1,750 mg in sodium chloride 0.9% 500 mL (VANCOCIN) 1,750 mg 178.3 mL/hr    06/29/20 0052 New Bag    vancomycin 1,750 mg in sodium chloride 0.9% 500 mL (VANCOCIN) 1,750 mg 178.3 mL/hr    06/28/20 1200 New Bag    vancomycin 1,750 mg in sodium chloride 0.9% 500 mL (VANCOCIN) 1,750 mg 178.3 mL/hr    06/28/20 0001 New Bag    vancomycin 1,750 mg in sodium chloride 0.9% 500 mL (VANCOCIN) 1,750 mg 178.3 mL/hr          Assessment/Plan:    Pharmacist consulted to dose vancomycin for Hospital Acquired Pneumonia, goal trough range 15-20 mcg/mL.  1. Initiate vancomycin 1750mg IV every 8 hours  .  2. Pharmacist will plan to check a vancomycin trough level 7/4 am.  3. Pharmacist will continue to follow.    Thank you for the consult.  Avila Castillo RPh 7/2/2020 7:11 PM

## 2021-06-09 NOTE — PROGRESS NOTES
[facility]  Palliative Care Social Work Note:    Called wife Nathalie to introduce self and provide emotional support. She shared that this is a very devastating time and she has a lot she is worried about especially her 's health and finances. She shared that her  is still very sick, but showing some improvement. She hasn't had an update today yet. She is hopeful he will recover. She has a strong nani and prays that God and the doctors will heal him. She and pt are [employer] and appreciate bedside prayer from .     She has support from her kids and friends. Her mom lives with her. Her son and close friend check on her and bring her food.  Tobias loves being with his family, kids and grand kids. He moved from [place] 23 years ago. He was a  there.  Now he is a cook and does mechanics as well. She describes him as a strong man.     Provided active listening, valdaition of feelings and emotional support. She felt conversation was helpful and welcomed continued support. Updated Palliative team and Taisha CAMPO.     Patricia Sutherland, [facility]  Palliative Care   Office # [phone]

## 2021-06-09 NOTE — PROGRESS NOTES
Pt noted to be hypertensive , pulling at wrist restraints,agitation present. Titration on pain and sedation edication for comfort. Repositioned.

## 2021-06-09 NOTE — PROGRESS NOTES
07/15/20 1317   Patient Data   PIP Observed (cm H2O) 18 cm H2O   MAP (cm H2O) 14   Plateau Pressure (cm H2O) 15 cm H2O   Head turn done. ETT tape cut in back to relief pressure from neck and cheek. Reinforced again with no problems.

## 2021-06-09 NOTE — PLAN OF CARE
Problem: Pain  Goal: Patient's pain/discomfort is manageable  Outcome: Progressing     Problem: Safety  Goal: Patient will be injury free during hospitalization  Outcome: Progressing     Problem: Daily Care  Goal: Daily care needs are met  Outcome: Progressing     Problem: Psychosocial Needs  Goal: Demonstrates ability to cope with hospitalization/illness  Outcome: Progressing     Problem: Knowledge Deficit  Goal: Patient/family/caregiver demonstrates understanding of disease process, treatment plan, medications, and discharge instructions  Outcome: Progressing     Problem: Potential for Compromised Skin Integrity  Goal: Skin integrity is maintained or improved  Outcome: Progressing  Goal: Nutritional status is improving  Outcome: Progressing     Problem: Urinary Incontinence  Goal: Perineal skin integrity is maintained or improved  Outcome: Progressing     Problem: Potential for Falls  Goal: Patient will remain free of falls  Outcome: Progressing     Problem: Risk of Injury Due to Unsafe Behavior  Goal: Patient will remain safe while in restraint; physical/psychological needs will be met  Outcome: Progressing  Goal: Alternatives to restraint will be continually assessed with use of least restrictive device and discontinuation as soon as possible  Outcome: Progressing     Problem: Inadequate Airway Clearance  Goal: Patient will maintain patent airway  Outcome: Progressing     Problem: Mechanical Ventilation  Goal: Patient will maintain patent airway  Outcome: Progressing     Problem: Glucose Imbalance  Goal: Achieve optimal glucose control  Outcome: Progressing  Continued care in ICU with sedation on ventilator.  Pt is maintaining and tolerating current ventilator settings.  Vitals are stable, he is febrile utilizing cooling blanket and tylenol as ordered to get temperature under control.  Pt has been free from falls, or injury.  Restraints cont. To prevent extubation of ETT. Frequent barlow and line care per  hospital protocols.  Cont on isolation for COVID, airborne, droplet, and contact precautions in place.  Pt has adequate urine out and continuing tube feeding with sterile water bolus every 4 hours as ordered. Cont. To monitor.

## 2021-06-09 NOTE — PROGRESS NOTES
FERN COVID RT PROGRESS NOTE    DATA:    VENT DAY#  25    CURRENT SETTINGS:  VENT SETTINGS 350VT/AC16/P+14        FIO2:  50%    PATIENT PARAMETERS:    PIP: 18  Pplat:  14  Pmean: 15  SBT: N/A  SECRETIONS: Mod amount frothy tan thick secretions     02 SATS: 95%    ETT SIZE 8.0  Secured at  24 cm at teeth    Respiratory Medications: Albuterol/mucomyst Q6    ABG:   Results for RJ GUADALUPE (MRN [id number]) as of 7/16/2020 16:54   Ref. Range 7/16/2020 14:01   pH, Arterial Latest Ref Range: 7.37 - 7.44  7.41   pCO2, Arterial Latest Ref Range: 35 - 45 mm Hg 51 (H)   pO2, Arterial Latest Ref Range: 80 - 90 mm Hg 75 (L)   Bicarbonate, Arterial Calc Latest Ref Range: 23.0 - 29.0 mmol/L 29.9 (H)   O2 Sat, Arterial Latest Ref Range: 96.0 - 97.0 % 97.4 (H)   Oxyhemoglobin Latest Ref Range: 96.0 - 97.0 % 94.4 (L)   POC Base Excess Calc Latest Units: mmol/L 6.5       NOTE / PLAN:    Patient was in supine today with PF ratio on last ABG of 150. Ventilation pressures WNL.  Will prone again. Will continue to monitor.

## 2021-06-09 NOTE — CONSULTS
Consultation - [facility]  Tobias Palmer,  1965, MRN [id number]    [facility]  COVID-19    PCP: Provider, No Primary Care, None   Code status:  Full Code       Extended Emergency Contact Information  Primary Emergency Contact: Carlton Hurt  Mobile Phone: [phone]  Relation: Child  Secondary Emergency Contact: Declined, per pt  Relation: Declined       Assessment and Plan   Principal Problem:    COVID-19  Active Problems:    Shock (H)    Acute and chronic respiratory failure with hypoxia (H)    On mechanically assisted ventilation (H)    ARDS (adult respiratory distress syndrome) (H)    Impression:  COVID-19 pneumonia  No other infection identified.  Received CCP. Started on dexamethasone and remdesivir    Mixed picture for CRS--IL-6 and CRP are declining, but still febrile and ferritin still elevated.    Recommend:  Check strongyloides antibody and dose ivermectin.  Tocilizumab x 1.  May repeat tomorrow if not improving.    Further recommendations to follow clinical course.    Thank you for consulting [facility]    Burak Moss MD  [phone]     Chief Complaint COVID-19       HPI   We have been requested by Katerin Sawant CNP to evaluate Tobias Palmer for COVID-19 pneumonai who is a 54 y.o. year old male.    Pt is a 53 yo man transferred from [facility] on  with COVID-19 pneumonia.  Patient currently intubated and sedated, so unable to obtain hx.  Chart reviewed.    I called daughter--he was born in [place].  No family history of tuberculosis.       Medical History  There are no active non-hospital problems to display for this patient.    No past medical history on file. Surgical History  He  has no past surgical history on file.   Social History  Reviewed, and he     Allergies  No Known Allergies Family History  Noncontributory to current problem except as mentioned above    Psychosocial Needs  Social  "History     Social History Narrative     Not on file     Additional psychosocial needs reviewed per nursing assessment.     Prior to Admission Medications   No medications prior to admission.          Review of Systems:  Review of systems not obtained due to inability to communicate with the patient. , otherwise all others negative. Physical Exam:  Temp:  [101.5  F (38.6  C)-102  F (38.9  C)] 101.5  F (38.6  C)  Heart Rate:  [53-69] 65  Resp:  [18-24] 21  Arterial Line BP: (111-141)/(51-66) 111/66  FiO2 (%):  [55 %-100 %] 65 %    /72   Pulse 65   Temp (!) 101.5  F (38.6  C) (Esophageal)   Resp 21   Ht 5' 5\" (1.651 m)   Wt (!) 263 lb 0.1 oz (119.3 kg)   SpO2 97%   BMI 43.77 kg/m    General appearance: appears stated age, morbidly obese and critically ill in ICU  Head: Normocephalic, without obvious abnormality, atraumatic  Eyes: eyes closed.  Throat: oral ett inplace.  Neck: short neck-difficult to examine  Lungs: clear to auscultation bilaterally  Heart: regular rate and rhythm, S1, S2 normal, no murmur, click, rub or gallop  Abdomen: soft, non-tender; bowel sounds normal; no masses,  no organomegaly  Extremities: extremities normal, atraumatic, no cyanosis or edema  Skin: Skin color, texture, turgor normal. No rashes or lesions  Neurologic: Mental status: sedated       Pertinent Labs  Lab Results: personally reviewed.   Results from last 7 days   Lab Units 06/25/20 0440 06/24/20 0525 06/23/20 0426   LN-WHITE BLOOD CELL COUNT thou/uL 9.2 8.0 8.9   LN-HEMOGLOBIN g/dL 13.6* 14.0 13.2*   LN-HEMATOCRIT % 44.3 45.8 41.6   LN-PLATELET COUNT thou/uL 199 190 188        Results from last 7 days   Lab Units 06/25/20 0440 06/24/20  0524 06/23/20  0426   LN-SODIUM mmol/L 147* 145 148*   LN-POTASSIUM mmol/L 3.8 4.7 3.9   LN-CHLORIDE mmol/L 114* 111* 104   LN-CO2 mmol/L 24 27 31   LN-BLOOD UREA NITROGEN mg/dL 33* 22 21   LN-CREATININE mg/dL 0.78 0.74 0.79   LN-CALCIUM mg/dL 7.8* 7.9* 8.0*     Procedure  Component "  Value  Units  Date/Time    Sputum culture [[id number]]   Collected: 06/24/20 1614    Order Status: Completed  Specimen: Respiratory from Endotracheal  Updated: 06/25/20 1323     Culture  Usual Sheila     Gram Stain Result  3+ Polymorphonuclear leukocytes      No organisms seen    Blood Culture from PERIPHERAL SITE (2nd One) [[id number]]   Collected: 06/22/20 1628    Order Status: Sent  Specimen: Blood from Central Venous Line  Updated: 06/22/20 1642      IL 6 66.8 on 6/21, 42 on 6/22       Pertinent Radiology  Radiology Results: Personally reviewed image/s and Personally reviewed impression/s     No results found.    XR Chest 1 View Portable (Order [id number])   Imaging   Date: 6/22/2020  Department: [address]  Released By/Authorizing: Leventhal, Thomas Michael, MD (auto-released)    Study Result     EXAM: XR CHEST 1 VIEW PORTABLE  LOCATION: [facility]  DATE/TIME: 6/22/2020 3:58 PM     INDICATION: Covid 19 pneumonia. Intubate and right IJ CVC placement  COMPARISON: CXRs 06/22/2020 1:18 PM and 2:51 AM and 06/21/2020.     IMPRESSION:   Endotracheal tube tip 4 cm in the melissa. Right IJ central venous catheter has been placed with tip in the mid SVC. Nasogastric tube tip in the stomach just inferior to the field-of-view. Bilateral airspace opacity with slight volume loss in   the mid and lower lungs unchanged. No pneumothorax

## 2021-06-09 NOTE — PROGRESS NOTES
Pt had uneventful night. Resp status stable. Pt did have some hypotension with use of precedex, gtt stopped and hypotension resolved. Pt tolerated current sedation with activity. Will continue to monitor.

## 2021-06-09 NOTE — PLAN OF CARE
Problem: Pain  Goal: Patient's pain/discomfort is manageable  Outcome: Progressing   Tylenol, oxycodone and fentanyl are pain regimen, pt nods no to questions regarding pain however pt continues to fidget and reposition himself and grimace with movement  Problem: Daily Care  Goal: Daily care needs are met  Outcome: Progressing  Face and hands washed, oral care, repositioned in bed  Problem: Knowledge Deficit  Goal: Patient/family/caregiver demonstrates understanding of disease process, treatment plan, medications, and discharge instructions  Outcome: Progressing   Pt nods head no when questioned about diagnosis.  Pt informed about mckeon virus.  Pt nods head yes that he is aware of what corona virus is.  Problem: Potential for Compromised Skin Integrity  Goal: Nutritional status is improving  Outcome: Progressing  Tolerating tube feeds well. Last kub shows dobhoff in proximal duodenum on 07/22/20

## 2021-06-09 NOTE — PROGRESS NOTES
07/24/20 1257   Vent Settings   FiO2 (%) 35 %   PEEP/CPAP (cm H2O) 5 cm H2O   I:E Ratio 1:3.3   Pressure Support (cm H2O) 5 cm H20   Patient Data   Vt Exp (mL) 620 mL   Minute Ventilation (L/min) 9.7 L/min   Total Resp Rate  21 BPM   PIP Observed (cm H2O) 15 cm H2O   MAP (cm H2O) 7   Weaning Assessment    Total RSBI 54

## 2021-06-09 NOTE — PROGRESS NOTES
Pt placed supine early due to ETT not secure.  Pt supine at 10:45 with no adverse reactions noted.  ETT retaped and secured at 24 at teeth.  ETT taped just to right of center due to lip breakdown in the center.

## 2021-06-09 NOTE — PROGRESS NOTES
Gentle weaning of sedation with guidance of BIS and VS. Paralytic weaned per TOF.    Levo restarted at low doses. NSR 60s-90s. ST with stimulation.    Supine at 1000. Prone at 1740. Vent settings stable- FiO2 currently 60% and PEEP 14. Coarse lung sounds.    TF at goal. BM x2. Rectal tube replaced. BG ranged from 70s to low 100s.    Family updated via telephone by RN and NP. Ipad conference call provided to family.

## 2021-06-09 NOTE — PROGRESS NOTES
07/04/20 1611   Vent Settings   Resp Rate (Set) 18   FiO2 (%) 80 %   Vt (Set, mL) 430 mL   PEEP/CPAP (cm H2O) 12 cm H2O   Patient Data   Minute Ventilation (L/min) 9.9 L/min   Total Resp Rate  22 BPM   PIP Observed (cm H2O) 24 cm H2O   MAP (cm H2O) 13   Plateau Pressure (cm H2O) 22 cm H2O   SAT 95%

## 2021-06-09 NOTE — PROGRESS NOTES
FERN COVID RT PROGRESS NOTE    DATA:    VENT DAY#  12    CURRENT SETTINGS:  Vent Mode: AC  FiO2 (%):  [50 %-55 %] 55 %  S RR:  [18] 18  S VT:  [430 mL] 430 mL  PEEP/CPAP (cm H2O):  [8 cm H2O] 8 cm H2O  Minute Ventilation (L/min):  [7.7 L/min-8.2 L/min] 7.8 L/min  PIP:  [19 cm H2O-41 cm H2O] 27 cm H2O  MAP (cm H2O):  [8.7-14] 10    PATIENT PARAMETERS:  Pplat:  19  SBT: n/a  SECRETIONS:  Small, thick, pale yellow/white  02 SATS:  97    ETT SIZE 8.0  Secured at  24 cm at teeth    Respiratory Medications: n/a     ABG: Results for RJ GUADALUPE (MRN [id number]) as of 7/3/2020 04:40   Ref. Range 7/3/2020 03:15   pH, Arterial Latest Ref Range: 7.37 - 7.44  7.39   pCO2, Arterial Latest Ref Range: 35 - 45 mm Hg 50 (H)   pO2, Arterial Latest Ref Range: 80 - 90 mm Hg 110 (H)   Bicarbonate, Arterial Calc Latest Ref Range: 23.0 - 29.0 mmol/L 28.5   O2 Sat, Arterial Latest Ref Range: 96.0 - 97.0 % 98.1 (H)       NOTE / PLAN:   Patient remained intubated and respiratory status stable overnight on current vent settings.   Ashlyn Cotto, LRT, BSRT-RRT

## 2021-06-09 NOTE — PROGRESS NOTES
FERN COVID RT PROGRESS NOTE    DATA:    VENT DAY#  30     CURRENT SETTINGS:  VENT SETTINGS: 450VT/AC16/+8PEEP        FIO2: 35%    PATIENT PARAMETERS:    PIP:  14  Pplat: 11  Pmean:  9  SBT: 5/5, 35% since 0737AM  SECRETIONS:  Mod amount pale yellow  02 SATS: 98%    ETT SIZE 8.0  Secured at  24 cm at teeth    Respiratory Medications: Duoneb/mucomyst BID     ABG:   Results for RJ GUADALUPE (MRN [id number]) as of 7/24/2020 16:26   Ref. Range 7/24/2020 05:18   pH, Arterial Latest Ref Range: 7.37 - 7.44  7.46 (H)   pCO2, Arterial Latest Ref Range: 35 - 45 mm Hg 39   pO2, Arterial Latest Ref Range: 80 - 90 mm Hg 76 (L)   Bicarbonate, Arterial Calc Latest Ref Range: 23.0 - 29.0 mmol/L 27.0   O2 Sat, Arterial Latest Ref Range: 96.0 - 97.0 % 96.0   Oxyhemoglobin Latest Ref Range: 96.0 - 97.0 % 93.8 (L)   POC Base Excess Calc Latest Units: mmol/L 3.1       NOTE / PLAN:    Patient was placed on SBT at 0737am with patient tolerating well. RSBI 44-54. Patient awake following commands. PF ratio this AM was 217.

## 2021-06-09 NOTE — PROGRESS NOTES
[facility]:  DOS:  07/02/2020    Patient Summary:   Tobias Palmer is a 54 year old male with a history of obesity (BMI 45) who was admitted and intubated on 6/21 at [facility] with COVID-19 pneumonia, transferred to [place] ICU on 6/22. Course complicated by ARDS and shock, requiring proning and paralytics.     Major Changes for Today    - add precedex drip  - wean off versed  -Goal RASS 0 to -1  -keep peep at 8 today    Assessment/Plan:  # Acute Pain  # Agitation/Anxiety  # Sedation  - Continue fentanyl  Add precedex  Get off versed drip  Rass 0 to -1  - continue scheduled oxycodone + PRN  - continue Seroquel     CVS:  #Atrial fibrillation with RVR 6/28  - amio via GT     RESP:  # Acute hypoxic respiratory failure in the setting of COVID-19 infection.  - keep peep at 8 today  -follow abg     GI/FEN:  # Protein calorie malnutrition  - Tube feeds @ rate of 35 ml/hr   - PPI     RENAL :  # Rhabdomyolysis, resolved.   #Lactic acidosis, resolved  - CK peaked to 1864 at [facility];  6/25 on . No further trending necessary  - Creatinine normalized     ID:  # COVID-19 infection  #Persistent fevers   - Remdesivir, started 6/22 for 10 day course  - s/p CCP and tocilizumab 6/25 and 6/27  - S/p Ivermectin x2 doses. Stronglyloides antibody negative.   - continue cefepime per ID for 10 days  - ivermectin stopped by ID  -beta-D-glucan pending today(7/2)    HEMATOLOGIC:  # Anemia of critical illness  - Hgb stable     # Coagulopathy due to COVID-19 infection  - Lovenox 60 mg BID   - US BUE and BLE given persistent fevers, negative for DVT.      ENDOCRINE:  # Stress hyperglycemia  #Steroid induced hyperglycemia  - Keep BG< 180 for optimal healing  - Insulin gtt given uncontrolled glucoses, requiring high doses. Start additional Lantus 15 units daily. Suspect needs might decrease with steroids now off.   - Dexamethasone done-completed 7 days     DISPO: ICU     GENERAL CARES:  DVT  "proph: Yes.  GI proph: Yes.  Requires barlow for strict I&O: Yes  Restraints required for safety: Yes.     Shikha Colvin, CNP   [facility] pulm/CC    Total time 40  mins    I talked with pt's son Carlton  He is asking to connect with his father via IPAD  Staff will assist with this  Overnight events:   No issues overnight   Objective:  Physical Exam:  Vent settings for last 24 hours:  Vent Mode: AC  FiO2 (%):  [50 %-55 %] 55 %  S RR:  [18] 18  S VT:  [430 mL] 430 mL  PEEP/CPAP (cm H2O):  [8 cm H2O] 8 cm H2O  Minute Ventilation (L/min):  [7.7 L/min-9.4 L/min] 7.7 L/min  PIP:  [23 cm H2O-41 cm H2O] 36 cm H2O  MAP (cm H2O):  [8.9-14] 13    /82   Pulse 82   Temp 98.1  F (36.7  C) (Oral)   Resp 18   Ht 5' 5\" (1.651 m)   Wt (!) 254 lb 10.1 oz (115.5 kg)   SpO2 96%   BMI 42.37 kg/m      Intake/Output last 3 shifts:  I/O last 3 completed shifts:  In: 3203.6 [I.V.:396.6; NG/GT:1507; IV Piggyback:1300]  Out: 2595 [Urine:2595]  Intake/Output this shift:  I/O this shift:  In: -   Out: 175 [Urine:175]    Physical Exam  Neuro: sedated, no eye opening to voice, minimal withdrawal of extremities. Pupils 3 mm brisk and round.   HEENT: NC PERRL OETT NGT   RESP: cta   CV: RRR  GI: obese, soft + bs  MSK: no e/c/c    LAB:  Results from last 7 days   Lab Units 07/02/20  0502   LN-WHITE BLOOD CELL COUNT thou/uL 9.4   LN-HEMOGLOBIN g/dL 12.7*   LN-HEMATOCRIT % 40.9   LN-PLATELET COUNT thou/uL 143     Results from last 7 days   Lab Units 07/02/20  0502 07/01/20  0625 06/30/20  0407  06/27/20  1108   LN-SODIUM mmol/L 143 142 140   < >  --    LN-POTASSIUM mmol/L 4.3 4.1 4.0   < >  --    LN-CHLORIDE mmol/L 110* 106 105   < >  --    LN-CO2 mmol/L 28 29 30   < >  --    LN-BLOOD UREA NITROGEN mg/dL 32* 38* 38*   < >  --    LN-CREATININE mg/dL 0.69* 0.73 0.71   < >  --    LN-CALCIUM mg/dL 7.4* 7.5* 7.5*   < >  --    LN-PROTEIN TOTAL g/dL  --   --   --   --  6.5   LN-BILIRUBIN TOTAL mg/dL  --   --   --   --  0.4   LN-ALKALINE " PHOSPHATASE U/L  --   --   --   --  50   LN-ALT (SGPT) U/L  --   --   --   --  53*   LN-AST (SGOT) U/L  --   --   --   --  54*    < > = values in this interval not displayed.

## 2021-06-09 NOTE — PLAN OF CARE
Problem: Pain  Goal: Patient's pain/discomfort is manageable  Outcome: Progressing  Fentanyl weaned to 25mcg/hr, oxycodone scheduled  Problem: Safety  Goal: Patient will be injury free during hospitalization  Outcome: Progressing  Pt free of injury this shift     Problem: Daily Care  Goal: Daily care needs are met  Outcome: Progressing  Sponge bathed , changed sheets, kush and barlow care q shift and prn     Problem: Psychosocial Needs  Goal: Demonstrates ability to cope with hospitalization/illness  Outcome: Progressing  Spoke with pt's family with  and family visited via IPAD in room  Goal: Collaborate with patient/family/caregiver to identify patient specific goals for this hospitalization  Outcome: Progressing  Informed pt's family of intent to wean meds and extubate.  Encouraged family to encourage pt to remain calm and follow instructions to expedite weaning process.     Problem: Discharge Barriers  Goal: Patient's discharge needs are met  Outcome: Progressing     Problem: Knowledge Deficit  Goal: Patient/family/caregiver demonstrates understanding of disease process, treatment plan, medications, and discharge instructions  Outcome: Progressing  See above note     Problem: Potential for Compromised Skin Integrity  Goal: Skin integrity is maintained or improved  Outcome: Progressing  Oral area healing, foam pads under commercial ett gore  Goal: Nutritional status is improving  Outcome: Progressing     Problem: Urinary Incontinence  Goal: Perineal skin integrity is maintained or improved  Outcome: Progressing  Barlow care q shift and prn     Problem: Risk of Injury Due to Unsafe Behavior  Goal: Patient will remain safe while in restraint; physical/psychological needs will be met  Outcome: Progressing  Pt following most commands, still reaches for ett with coughing , remains in soft bilateral wrist restraints to protect airway  Goal: Alternatives to restraint will be continually assessed with use of  least restrictive device and discontinuation as soon as possible  Outcome: Progressing  Pt remains in soft restraints to pr.otect airway and lines  Goal: Patient/Family will be able to communicate reason for restraint and steps for restraint application and removal  Outcome: Progressing     Problem: Inadequate Airway Clearance  Goal: Patient will maintain patent airway  Outcome: Progressing  Pt with thick secretions, needs frequent suction and occasional lavage to mobilize secretions     Problem: Mechanical Ventilation  Goal: Patient will maintain patent airway  Outcome: Progressing  Converted from AC to cpap and tolerating well  Goal: ET tube will be managed safely  Outcome: Progressing     Problem: Glucose Imbalance  Goal: Achieve optimal glucose control  Outcome: Progressing  Accuchecks wnl     Problem: Potential for transmission of organism by Contact, Enteric, Droplet and/or Airborne routes  Goal: Prevent transmission of organisms  Outcome: Progressing  Pt in negative pressure room, maintaining isolation per policy     Problem: Inadequate Gas Exchange  Goal: Patient is adequately oxygenated and ventilation is improved  Outcome: Progressing  Pt tolerating 35% fio2  Goal: Patient will achieve/maintain normal respiratory rate/effort  Outcome: Progressing

## 2021-06-09 NOTE — PROGRESS NOTES
"Pharmacy Consult: Vancomycin Dosing    Pharmacist consulted to dose vancomycin for Tobias Palmer, a 54 y.o. male.    Ordering provider: Татьяна Herrera    Indication for vancomycin therapy: Hospital Acquired Pneumonia    Goal Trough Range:  15-20 mcg/mL based on indication    Other current antimicrobials              vancomycin 1,750 mg in sodium chloride 0.9% 500 mL (VANCOCIN)  Every 8 hours          meropenem 500 mg in NaCl 0.9 % 50 mL (MINI-BAG Plus) (MERREM)  Every 8 hours                   Subjective/Objective:    Patient was admitted for COVID-19 on 6/22/2020    Height: 5' 5\" (1.651 m)    Actual Body Weight (ABW): (!) 115.7 kg (255 lb)    Ideal body weight: 61.5 kg (135 lb 9.3 oz)  Adjusted ideal body weight: 83.2 kg (183 lb 5.6 oz)    BMI: Body mass index is 42.43 kg/m .    No Known Allergies    Patient Active Problem List   Diagnosis     COVID-19     Shock (H)     Acute and chronic respiratory failure with hypoxia (H)     On mechanically assisted ventilation (H)     ARDS (adult respiratory distress syndrome) (H)     Atrial fibrillation with rapid ventricular response (H)     Atrial fibrillation with rapid ventricular response (H)    No past medical history on file.     Temp Readings from Current Encounter:     06/30/20 0800 07/03/20 0400 07/03/20 0600   Temp: 98.1  F (36.7  C) 100.1  F (37.8  C) 100.5  F (38.1  C)     Net Intake/Output (last 24 hours):  I/O last 3 completed shifts:  In: 3224.1 [I.V.:539.1; NG/GT:1600; IV Piggyback:1085]  Out: 2035 [Urine:1735; Stool:300]    Recent Labs     07/01/20  0625 07/02/20  0502 07/02/20  1811 07/03/20  0315   WBC 12.5* 9.4  --  9.4   LACTICACID  --   --  1.5  --    BUN 38* 32*  --  30*   CREATININE 0.73 0.69*  --  0.66*     Estimated Creatinine Clearance: 147.1 mL/min (A) (by C-G formula based on SCr of 0.66 mg/dL (L)).    Recent Labs     07/02/20  1712   CULTURE No Growth       Results for orders placed or performed during the hospital encounter of 06/22/20 "   Urine culture    Collection Time: 07/02/20  5:12 PM    Specimen: Urine   Result Value Status    Culture No Growth Final   Culture/Gram Stain: Sputum    Collection Time: 06/27/20  1:44 PM    Specimen: Endotracheal; Respiratory   Result Value Status    Culture Usual Sheila Final   Sputum culture    Collection Time: 06/24/20  4:14 PM    Specimen: Endotracheal; Respiratory   Result Value Status    Culture Usual Sheila Final    Culture 2+ Yeast (!) Final       Recent labs: (last 7 days)     06/28/20  1709 06/28/20  2347 06/30/20  1557 07/04/20  0310   VANCOMYCIN 22.2 7.4 21.2 16.8       Vancomycin administrations: (last 120 hours)     Date/Time Action Medication Dose Rate    07/04/20 0400 New Bag    vancomycin 1,750 mg in sodium chloride 0.9% 500 mL (VANCOCIN) 1,750 mg 178.3 mL/hr    07/03/20 2010 New Bag    vancomycin 1,750 mg in sodium chloride 0.9% 500 mL (VANCOCIN) 1,750 mg 178.3 mL/hr    07/03/20 1143 New Bag    vancomycin 1,750 mg in sodium chloride 0.9% 500 mL (VANCOCIN) 1,750 mg 178.3 mL/hr    07/03/20 0324 New Bag    vancomycin 1,750 mg in sodium chloride 0.9% 500 mL (VANCOCIN) 1,750 mg 178.3 mL/hr    07/02/20 2011 New Bag    vancomycin 1,750 mg in sodium chloride 0.9% 500 mL (VANCOCIN) 1,750 mg 178.3 mL/hr    07/01/20 1639 New Bag    vancomycin 1,500 mg in sodium chloride 0.9% 500 mL (VANCOCIN) 1,500 mg 176.7 mL/hr    07/01/20 0940 New Bag    vancomycin 1,500 mg in sodium chloride 0.9% 500 mL (VANCOCIN) 1,500 mg 176.7 mL/hr    07/01/20 0003 New Bag    vancomycin 1,500 mg in sodium chloride 0.9% 500 mL (VANCOCIN) 1,500 mg 176.7 mL/hr    06/30/20 1618 New Bag    vancomycin 1,750 mg in sodium chloride 0.9% 500 mL (VANCOCIN) 1,750 mg 178.3 mL/hr    06/30/20 0811 New Bag    vancomycin 1,750 mg in sodium chloride 0.9% 500 mL (VANCOCIN) 1,750 mg 178.3 mL/hr    06/30/20 0003 New Bag    vancomycin 1,750 mg in sodium chloride 0.9% 500 mL (VANCOCIN) 1,750 mg 178.3 mL/hr    06/29/20 1626 New Bag    vancomycin 1,750 mg in  sodium chloride 0.9% 500 mL (VANCOCIN) 1,750 mg 178.3 mL/hr    06/29/20 0818 New Bag    vancomycin 1,750 mg in sodium chloride 0.9% 500 mL (VANCOCIN) 1,750 mg 178.3 mL/hr          Assessment/Plan:    Pharmacist consulted to dose vancomycin for Hospital Acquired Pneumonia, goal trough range 15-20 mcg/mL.  1. Continue vancomycin 1750mg IV every 8 hours .  2. Pharmacist will plan to check a vancomycin trough level as appropriate if continued  3. Pharmacist will continue to follow.    Thank you for the consult.  Chrystal Dillard, PharmD 7/4/2020 4:25 AM

## 2021-06-09 NOTE — PROGRESS NOTES
[facility]    [facility] PROGRESS NOTE:  DOS:  07/01/2020    Patient Summary:   Tobias Palmer is a 54 year old male with a history of obesity (BMI 45) who was admitted and intubated on 6/21 at [facility] with COVID-19 pneumonia, transferred to [place] ICU on 6/22. Course complicated by ARDS and shock, requiring proning and paralytics.     Major Changes for Today    - decrease peep to 8  -ABG at noon  -DC rectal tube  -decrease PEEP to 10  -ABG 1300     Assessment/Plan:  # Acute Pain  # Agitation/Anxiety  # Sedation  - Continue fentanyl, versed.  -increase fentanyl drip  - Precedex off 6/27 as possible fever contributor   - Liberalize RAAS to 0 to -1. Has been dyssynchronous with ventilator when IV sedation was weaned  - continue scheduled oxycodone + PRN  - continue Seroquel     CVS:  #Atrial fibrillation with RVR 6/28  - Continue amiodarone infusion. Rate poorly controlled this morning. Converted after initiation of enteral metoprolol. Plan to complete amidarone gtt protocol  - PRN Metoprolol available     RESP:  # Acute hypoxic respiratory failure in the setting of COVID-19 infection.  - decreased peep to 8  -follow abg     GI/FEN:  # Protein calorie malnutrition  - Tube feeds @ rate of 35 ml/hr   - PPI     RENAL :  # Rhabdomyolysis, resolved.   #Lactic acidosis, resolved  - CK peaked to 1864 at [facility];  6/25 on . No further trending necessary  - Creatinine normalized     ID:  # COVID-19 infection  #Persistent fevers   - Remdesivir, started 6/22 for 10 day course  - s/p CCP and tocilizumab 6/25 and 6/27  - S/p Ivermectin x2 doses. Stronglyloides antibody negative.   - Pancultured 6/27, NGTD  - Fever curve has overall improved after Tocilizumab dosing vs. Empiric antibiotics vs. Discontinuation of Precedex. WBC up today  - Started on Vancomycin and Cefepime 6/27. NGTD, other than yeast in sputum. Continue for now, consider stopping after 72 hours if cultures remain negative  - Fungitell  "sent    HEMATOLOGIC:  # Anemia of critical illness  - Hgb stable at 13.6, platelets normal.     # Coagulopathy due to COVID-19 infection  - Lovenox 60 mg BID   - US BUE and BLE given persistent fevers, negative for DVT.      ENDOCRINE:  # Stress hyperglycemia  #Steroid induced hyperglycemia  - Keep BG< 180 for optimal healing  - Insulin gtt given uncontrolled glucoses, requiring high doses. Start additional Lantus 15 units daily. Suspect needs might decrease with steroids now off.   - Dexamethasone done-completed 7 days     DISPO: ICU     GENERAL CARES:  DVT proph: Yes.  GI proph: Yes.  Requires barlow for strict I&O: Yes  Restraints required for safety: Yes.     Shikha Colvin, VIRIDIANA   [facility] pulm/CC    Total time 35  mins      Overnight events:   Course reviewed. Fever curve improved. Very sedated, no eye opening unable to obtain ROS. A fib with RVR started on amiodarone, required metoprolol PRN for HR 130s. Converted to SR late morning.     Objective:  Physical Exam:  Vent settings for last 24 hours:  Vent Mode: AC  FiO2 (%):  [50 %] 50 %  S RR:  [18] 18  S VT:  [430 mL] 430 mL  PEEP/CPAP (cm H2O):  [8 cm H2O-10 cm H2O] 8 cm H2O  Minute Ventilation (L/min):  [8.4 L/min-11.1 L/min] 8.4 L/min  PIP:  [14 cm H2O-33 cm H2O] 14 cm H2O  MAP (cm H2O):  [9.8-15] 9.8    /82   Pulse 87   Temp 98.1  F (36.7  C) (Oral)   Resp 20   Ht 5' 5\" (1.651 m)   Wt (!) 254 lb 10.1 oz (115.5 kg)   SpO2 95%   BMI 42.37 kg/m      Intake/Output last 3 shifts:  I/O last 3 completed shifts:  In: 3554.6 [I.V.:1141.6; NG/GT:1783; IV Piggyback:630]  Out: 2975 [Urine:2975]  Intake/Output this shift:  I/O this shift:  In: 691.4 [I.V.:64.4; NG/GT:127; IV Piggyback:500]  Out: 375 [Urine:375]    Physical Exam  Neuro: sedated, no eye opening to voice, minimal withdrawal of extremities. Pupils 3 mm brisk and round.   HEENT: NJ present with feeds infusing.   RESP: BBS, lungs coarse, no wheezes, rales or rhonchi  CV: S1, S2  GI: " abdomen soft and compressible. No masses  : voiding per barlow, urine clear and yellow   MSK: Extremities: calves soft and compressible. Pulses 2+.     LAB:  Results from last 7 days   Lab Units 07/01/20  0625   LN-WHITE BLOOD CELL COUNT thou/uL 12.5*   LN-HEMOGLOBIN g/dL 12.8*   LN-HEMATOCRIT % 41.2   LN-PLATELET COUNT thou/uL 141     Results from last 7 days   Lab Units 07/01/20  0625 06/30/20  0407 06/29/20  0423  06/27/20  1108   LN-SODIUM mmol/L 142 140 144   < >  --    LN-POTASSIUM mmol/L 4.1 4.0 4.3   < >  --    LN-CHLORIDE mmol/L 106 105 110*   < >  --    LN-CO2 mmol/L 29 30 27   < >  --    LN-BLOOD UREA NITROGEN mg/dL 38* 38* 31*   < >  --    LN-CREATININE mg/dL 0.73 0.71 0.71   < >  --    LN-CALCIUM mg/dL 7.5* 7.5* 7.7*   < >  --    LN-PROTEIN TOTAL g/dL  --   --   --   --  6.5   LN-BILIRUBIN TOTAL mg/dL  --   --   --   --  0.4   LN-ALKALINE PHOSPHATASE U/L  --   --   --   --  50   LN-ALT (SGPT) U/L  --   --   --   --  53*   LN-AST (SGOT) U/L  --   --   --   --  54*    < > = values in this interval not displayed.

## 2021-06-09 NOTE — PROGRESS NOTES
FERN COVID RT PROGRESS NOTE    DATA:    VENT DAY#  18    CURRENT SETTINGS:  VENT SETTINGS   AC  RR 22, Vt 430, Peep +14        FIO2:   50%    PATIENT PARAMETERS:    PIP:  30  Pplat:  25  Pmean:  19  SBT: N/A  SECRETIONS:  Small to moderate, thick, tan/white inline; upper airway pink tinged/bloody white thick.  02 SATS:  94%    ETT SIZE 8.0  Secured at  24 cm at teeth    Respiratory Medications: none     ABG: Results for RJ GUADALUPE (MRN [id number]) as of 7/8/2020 16:22   Ref. Range 7/8/2020 16:09   pH, Arterial Latest Ref Range: 7.37 - 7.44  7.48 (H)   pCO2, Arterial Latest Ref Range: 35 - 45 mm Hg 46 (H)   pO2, Arterial Latest Ref Range: 80 - 90 mm Hg 87   Bicarbonate, Arterial Calc Latest Ref Range: 23.0 - 29.0 mmol/L 32.4 (H)   O2 Sat, Arterial Latest Ref Range: 96.0 - 97.0 % 98.3 (H)   Oxyhemoglobin Latest Ref Range: 96.0 - 97.0 % 96.1   POC Base Excess Calc Latest Units: mmol/L 9.6   Ventilation Mode Unknown AC   Peep Latest Units: cm H2O 14   Sample Stabilized Temperature Latest Units: degrees C 37.0   Rate Latest Units: rr/min 22   FIO2 Unknown 0.50       NOTE / PLAN:     Patient proned overnight, turned supine this morning at around 10:15, removed tape and replaced with ETT holster, swelling improved around tongue and mouth area within a few hours of patient being on his back.  Some breakdown still noted on the tip and top of tongue.   Plan to leave patient supine overnight, will continue to monitor with possible wean of FIO2 if appropriate.

## 2021-06-09 NOTE — PROGRESS NOTES
Clinical Nutrition Therapy Assessment Note    Reason for Assessment:   Tobias Palmer is a 54 y.o. male assessed by the registered Dietitian for consult, nutrition support and MD referral.  Consulted to initiate & manage TF(include trickle TF when proned 8 hours).    H&P reviewed: patient with PMHx of obesity who presents in Transfer from [facility] on 6/22 after presentation on 6/21 with 3 days of fevers/cough, COVID +, rapid decompensation from HFNC to intubation.      Nutrition History:  Information from chart.  Unable to obtain nutrition history at this time due to intubation.  Food allergies or intolerances: NKFA    Current Nutrition Prescription:   Diet: no order  IV dextrose or Fluids:epoprostenol, Last Rate: 8 mL/hr at 06/23/20 0224  fentaNYL citrate (PF), Last Rate: 300 mcg/hr (06/23/20 0600)  midazolam, Last Rate: 12 mg/hr (06/23/20 0600)  vecuronium, Last Rate: 1 mcg/kg/min (06/23/20 0629)      Current Nutrition Intake:  Enteral nutrition access is a nasal gastric tube, with a placement date of 6/22/2020 & verified in place per x-ray.     Anthropometrics:  Height: 5'5  Weight: (!) 274 lb 4 oz (124.4 kg),6/22/20  BMI:45.6  BMI indication: >40 extreme obesity (class 3)  Ideal body weight: 136 lb(+or-10%)  Usual body weight: unable to obtain today  Weight History:268 lb(6/22/20)    RD Nutrition Focused Physical Exam:  The patient has the following physical signs which could indicate malnutrition: unable to complete per policy with COVID-19 pandemic    GI Status/Output:   GI symptoms include:abdomen rounded per nurse  Bowel Sounds hypoactive per nurse  Last BM: unknown    Skin/Wound:  Bayron score Bayron Scale Score: 11    Medications:  Medications reviewed.  Note:rocephin-IV,decadron-IV,prilosec,pantoprazole,remdesivir,pericolace,senna    Labs:  Labs reviewed  Lab Results   Component Value Date/Time    ALBUMIN 2.7 (L) 06/22/2020 12:31 PM    ALBUMIN 2.7 (L) 06/22/2020 12:31 PM     (H)  06/23/2020 04:26 AM    K 3.9 06/23/2020 04:26 AM    BUN 21 06/23/2020 04:26 AM    CREATININE 0.79 06/23/2020 04:26 AM     (H) 06/23/2020 04:26 AM   CRP:12(6/23/20)-high  Trig's: 138(6/22/20)-WNL    Estimated Nutrition Needs:  Assessment weight is 121.8 kg, estimated weight    Energy Needs: 6669-8665 kcals daily, 11-14 kcal/kg  Protein Needs: 124-155 g daily, 2-2.5 g/kg.(dose: 62 kg IBW)  Fluid Needs: 6109-9398 mls daily, 25-35 mls/kg(dose wt: 77 kg adj.wt)    Malnutrition: Not noted    Nutrition Risk Level: high risk    Nutrition dx:   Swallowing difficulty r/t respiratory failure as evidenced by intubated,NPO.     Goal:   Meet estimated nutrition needs and Tolerate tube feeding    Intervention:   Run TF rate at goal of 80 ml/hr when supine & 15 ml/hr when prone. (have to hold TF 1 hr from supine to prone & from prone to supine. + 2 pkt no carb prosource TID   Enteral nutrition will provide total ~785 ml,1537 kcal,143 grams protein,12 grams fiber,138 grams CHO,600 ml free water. + water 175 ml q 4 hrs which will provide 1650 ml total (TF & flushes).  When no longer have to prone run TF rate @35 ml/hr continuous.  MVI w/minerals daily    Monitoring/Evaluation:   Labs, wt, & TF tolerance/prone therapy    Electronically signed by:  Chantel Riley RD

## 2021-06-09 NOTE — PROGRESS NOTES
Infectious Disease Progress Note    Assessment/Plan  Impression:  COVID-19 pneumonia   Received CCP. Started on dexamethasone (6/22-29) and remdesivir for 10 day course  Received 400 mg tocilizumab on 6/25, 6/27 for suspected cytokine release syndrome.     Quantiferon gold negative  Hep B and C negative  Strongyloides antibody negative -- no need for further ivermectin    Ongoing fever and leukocytosis. Steroid may contribute to leukocytosis.     Colonized with yeast in sputum. Risk for invasive candidiasis -- broad spectrum antibiotics, central line, candida colonization.     Temps and WBC better today.     Recommend:    Monitor temp, WBC, cultures  Continuing cefepime and vancomycin for now, likely off vancomycin soon  D/c'd ivermectin order  Checking beta-D-glucan      Principal Problem:    COVID-19  Active Problems:    Shock (H)    Acute and chronic respiratory failure with hypoxia (H)    On mechanically assisted ventilation (H)    ARDS (adult respiratory distress syndrome) (H)    Atrial fibrillation with rapid ventricular response (H)    Atrial fibrillation with rapid ventricular response (H)    Raphael Mantilla MD  [facility]  [phone] [facility] paging  ______________________________________________________________________       Subjective  Intubated and sedated in ICU. On amiodarone for tachycardia. Temps better, off cooling blanket for now.       Objective    Anti-infectives:  Ceftriaxone 6/22  Ivermectin 6/25-26  Cefepime 6/27-  Vancomycin 6/27-    Vital signs in last 24 hours  Temp:  [98.1  F (36.7  C)] 98.1  F (36.7  C)  Heart Rate:  [66-92] 92  Resp:  [11-30] 24  BP: (121-123)/(66-70) 121/70  Arterial Line BP: (108-163)/(54-74) 122/64  FiO2 (%):  [45 %-60 %] 50 %  Weight:   (!) 254 lb 10.1 oz (115.5 kg)    Intake/Output last 3 shifts  I/O last 3 completed shifts:  In: 3414.9 [I.V.:714.9; NG/GT:2065; IV Piggyback:635]  Out: 4550 [Urine:4350; Stool:200]  Intake/Output  this shift:  I/O this shift:  In: 368.3 [I.V.:158.3; NG/GT:210]  Out: 825 [Urine:825]    Review of Systems   Review of systems not obtained due to inability to communicate with the patient. , otherwise negative.    Physical Exam--  General appearance: critically ill in ICU, on vent  Eyes: eyes closed.  Throat: oral ett inplace.  Neck: short neck-difficult to examine  tachy  Abdomen: not distended.  Extremities: extremities normal, atraumatic, no cyanosis or edema  Skin: Skin color, texture, turgor normal. No rashes or lesions  Neurologic: Mental status: sedated  PICC R UE  yen    Pertinent Labs   Lab Results: personally reviewed.     Results from last 7 days   Lab Units 06/30/20  0407 06/29/20  0423 06/28/20  0412   LN-WHITE BLOOD CELL COUNT thou/uL 15.1* 18.5* 12.6*   LN-HEMOGLOBIN g/dL 13.2* 13.6* 13.7*   LN-HEMATOCRIT % 42.4 44.4 44.6   LN-PLATELET COUNT thou/uL 165 198 198  196        Results from last 7 days   Lab Units 06/30/20  0407 06/29/20  0423 06/28/20  0412   LN-SODIUM mmol/L 140 144 145   LN-POTASSIUM mmol/L 4.0 4.3 4.0  4.1   LN-CHLORIDE mmol/L 105 110* 107   LN-CO2 mmol/L 30 27 30   LN-BLOOD UREA NITROGEN mg/dL 38* 31* 35*   LN-CREATININE mg/dL 0.71 0.71 0.76   LN-CALCIUM mg/dL 7.5* 7.7* 7.8*     Lab Results   Component Value Date    ALT 53 (H) 06/27/2020    AST 54 (H) 06/27/2020    ALKPHOS 50 06/27/2020    BILITOT 0.4 06/27/2020     Micro:  6/22 blood negative  6/24 sputum usual bruno and yeast  6/27 blood including fungal isolate no growth to date   6/27 sputum gram stain with yeast; culture usual bruno    Results for RJ GUADALUPE (MRN [id number]) as of 6/26/2020 11:04   Ref. Range 6/24/2020 16:46   Hepatitis B Surface Ag Latest Ref Range: Negative  Negative   Hepatitis C Ab Latest Ref Range: Negative  Negative   Results for RJ GUADALUPE (MRN [id number]) as of 6/26/2020 11:04   Ref. Range 6/23/2020 04:26 6/24/2020 05:24 6/24/2020 16:46 6/25/2020 04:40 6/26/2020 04:22   CRP Latest Ref  Range: 0.0 - 0.8 mg/dL 12.0 (H) 10.0 (H)  4.2 (H) 2.1 (H)   LD (LDH) Latest Ref Range: 125 - 220 U/L 480 (H) 477 (H)  434 (H) 452 (H)     Pertinent Radiology   Radiology Results: Personally reviewed image/s and Personally reviewed impression/s    No results found.
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
## 2021-06-09 NOTE — PROGRESS NOTES
[place] COVID19  CRITICAL CARE  PROGRESS NOTE:  Date of service: 06/23/2020    Main plans for today:  - Supinate at 1300, ABGs before and after  - Wean FiO2 as tolerated  - Add precedex infusion for additional sedation  - Discontinue antibiotics       Assessment/Plan:  Tobias Palmer is a 54 year old male with a history of obesity (BMI 45) who was admitted and intubated on 6/21 at [facility] with COVID-19 pneumonia, transferred to [facility] ICU on 6/22. Course complicated by ARDS and shock, requiring proning and paralytics.    The past 24 hours notable for: Arrived at [facility], emergency central line and arterial line access placed for administration of medications and blood pressure monitoring.  Paralyzed and proned yesterday evening. No acute issues overnight.     NEURO/MSK:  # Acute Pain  # Agitation/Anxiety  - On versed 12 mg/hr for sedation, fentanyl 300 mcg/hr for pain  - Add precedex infusion to achieve sedation goals  - On vecuronium 1 mg/hr for paralytics, continue for now  - RASS goal of -4, TOF 4/4    CVS:  # Shock secondary to COVID-19 pneumonia  - Hemodynamically stable at this time    RESP:  # Acute hypoxic respiratory failure in the setting of COVID-19 infection  - Current vent settings:  Vent Mode: AC  FiO2 (%):  [90 %-100 %] 90 %  S RR:  [18-20] 18  S VT:  [450 mL-550 mL] 450 mL  PEEP/CPAP (cm H2O):  [14 cm H2O-20 cm H2O] 20 cm H2O  Minute Ventilation (L/min):  [7.5 L/min-10.1 L/min] 7.5 L/min  PIP:  [30 cm H2O-36 cm H2O] 33 cm H2O  MAP (cm H2O):  [19-26] 24  - Will plan to supinate at 1300 today, proned yesterday at 2100  - Repeat ABG at 1200 and 1500, prior and after supination  - PEEP up to 20, patient tolerating, does have large body habitus   -  this morning  - Wean FiO2 as tolerated, leave PEEP at 20      GI/FEN:  # Protein calorie malnutrition  # Stress ulcer prophylaxis  - Tube feeds @ rate of 15 ml/hr while prone, goal of 80 ml/hr when supinated  - PPI ordered    RENAL  :  # Rhabdomyolysis  - CK peaked to 1864 at [facility]; today    - Repeat CK daily for additional 2 days, monitor for downtrend  # Hypernatremia  - Sodium 148 today. Receiving 175 ml q4h free water flushes. Will monitor  # Fluid balance  - Mild edema in feet. Will diurese x1 with diamox 250 mg due to alkalosis on ABG this morning    ID:  # COVID-19 infection  - Candidate for Remdesivir, started 6/22  - Question cytokine storm- IL- 6 pending, ferritin 795, fibrinogen 459, CRP 12.6, . Patient may benefit from Tocilizumab, will defer to ID research team  # Possible CAP  - Was on Vanc/Zosyn at [facility] started 6/21, D/C'ed and narrowed to ceftriaxone 6/22  - WBC count normal, procalcitonin 0.12 (6/22), Blood cultures from 6/22 are pending  - Will discontinue ceftriaxone as there are no clinical signs of bacterial infection at this time, will continue to monitor    HEMATOLOGIC:  # Anemia of critical illness  - Hgb stable at 13.2  - Daily monitoring  # Coagulopathy due to COVID-19 infection  - D-dimer 0.46  - Lovenox 60 U BID     ENDOCRINE:  # Stress hyperglycemia  - Keep BG< 180 for optimal healing  - SSI q4h      DISPO: ICU    DVT proph:Yes.  GI proph: Yes.  Requires barlow for strict I&O: Yes  Restraints required for safety: Not Indicated, patient paralyzed      Lines/Drains/Tubes:  Central line, Placed on 6/22  Arterial line,  Placed on 6/22   ETT placed on 6/21  Feeding tube placed on 6/22  Barlow catheter placed on 6/21      Subjective/ROS or 24 hour course:  Unable to assess, patient paralyzed and sedated    Objective:  BP 97/53   Pulse 68   Temp 98.9  F (37.2  C) (Oral)   Resp 18   Wt (!) 274 lb 4 oz (124.4 kg)   SpO2 96%     Intake/Output last 3 shifts:  I/O last 3 completed shifts:  In: 524.3 [I.V.:314.3; NG/GT:210]  Out: 2585 [Urine:785; Emesis/NG output:1800]  Intake/Output this shift:  No intake/output data recorded.    ABG:  Recent Labs     06/23/20  0426   PHART 7.51*   OXYHB 96.9   BEARTCALC 11.4    POCPEEP 20   TEMP 37.0   POCRATE 20     GEN: no acute distress, unarousable   NEURO: deeply sedated. Unable to assess, heladio  HEENT:ETT secure, mild edema of tongu  PULM: diminished, no audible wheezes  CV/COR: regular rate and rhythm, no murmur  GI: soft nontender, limited exam as patient is proned  :  [place], urine yellow and clear  EXT:  peripheral pulses, +2 x 4 extremities. Pedal edema noted  SKIN: no obvious rash, warm and dry    Total Critical Care Time : 1 Hour    Katerin Sawant    Staffed with Dr Leventhal.    All ventilatory settings/changes completed at the direction of the intensivist.     Family member Carlton, son, updated today by Katerin Sawant.

## 2021-06-09 NOTE — PROGRESS NOTES
06/22/20 1221   Patient Data   PIP Observed (cm H2O) 30 cm H2O   MAP (cm H2O) 21   Plateau Pressure (cm H2O) 27 cm H2O   SpO2 92 %   Heart Rate 61

## 2021-06-09 NOTE — PROGRESS NOTES
07/23/20 0728   Patient Data   Vt Exp (mL) 519 mL   Minute Ventilation (L/min) 13.4 L/min   Total Resp Rate  25 BPM   PIP Observed (cm H2O) 16 cm H2O   MAP (cm H2O) 11   Plateau Pressure (cm H2O) 15 cm H2O   Dynamic Compliance (L/cm H2O) 48.8 L/cm H2O   Airway Resistance 1.2   SpO2 93 %   Heart Rate (!) 107

## 2021-06-09 NOTE — PROGRESS NOTES
Clinical Nutrition Therapy TF Note    Discussed at care rounds this morning when in supine position run TF at 80 ml/hr & when in prone position @15 ml/hr.  When no longer having to prone patient run @35 ml/hr    Current Nutrition Prescription:   Diet: TF:Isosource 1.5 @15 ml/hr when in prone position & TF @80 ml/hr when in supine position  When no longer having to prone patient run rate @35 ml/hr  Supplement/modulars: no carb prosource 2 pkt TID  Flush order: water 175 ml q 4 hrs  IV dextrose or Fluids:dexmedetomidine infusion orderable (PRECEDEX), Last Rate: 0.5 mcg/kg/hr (06/26/20 0900)  epoprostenol, Last Rate: 8 mL/hr at 06/26/20 0734  fentaNYL citrate (PF), Last Rate: 200 mcg/hr (06/26/20 0900)  midazolam, Last Rate: 8 mg/hr (06/26/20 0900)      Current Nutrition Intake:  Enteral nutrition access is a nasal gastric tube, with a placement date of 6/22/2020 & verified in place per x-ray on 6/22 and 6/23.   TF as prescribe provides:~785 ml,1537 kcal,143 grams protein,12 grams fiber,138 grams CHO,600 ml free water. + water 175 ml q 4 hrs which will provide 1650 ml total (TF & flushes).  IV drips:633.7 ml yesterday  Meeting needs with TF prescribed    Anthropometrics:  Height: 5'5  Admit wt: 274 lb 4 oz  Weight: (!) 263 lb 0.1 oz (119.3 kg),6/25/20  BMI:43.77  BMI indication: >40 extreme obesity (class 3)  Ideal body weight: 136 lb(+or-10%)  Usual body weight: unable to obtain today  Weight History:268 lb(6/22/20)  Fluid wt loss mainly with diuresing on lasix    GI Status/Output:   GI symptoms include:  Rectal tube:200-310 ml/24 hrs noted past couple days  Gastric residuals:0-100 ml noted in the past 24 hrs    Skin/Wound:  Bayron score Bayron Scale Score: 11    Medications:  Medications reviewed.    Labs:  Labs reviewed  Lab Results   Component Value Date/Time    ALBUMIN 2.7 (L) 06/22/2020 12:31 PM    ALBUMIN 2.7 (L) 06/22/2020 12:31 PM     06/26/2020 04:22 AM    K 4.3 06/26/2020 04:22 AM    BUN 47 (H)  06/26/2020 04:22 AM    CREATININE 0.95 06/26/2020 04:22 AM     (H) 06/26/2020 04:22 AM   FSBG 146-196 mg/dL in the past 24 hrs,most <180    Estimated Nutrition Needs:  Assessment weight is 121.8 kg, estimated weight    Energy Needs: 8399-7241 kcals daily, 11-14 kcal/kg  Protein Needs: 124-155 g daily, 2-2.5 g/kg.(dose: 62 kg IBW)  Fluid Needs: 8465-1938 mls daily, 25-35 mls/kg(dose wt: 77 kg adj.wt)    Plan: continue current TF support as ordered which is meeting estimated needs & tolerating  Recommend decreasing scheduled bowel medications due to having loose stools with rectal tube.  Please see RD note from yesterday for further details

## 2021-06-09 NOTE — PLAN OF CARE
Problem: Inadequate Airway Clearance  Goal: Patient will maintain patent airway  Outcome: Progressing

## 2021-06-09 NOTE — PROGRESS NOTES
Nutrition Update  Pt started on propofol. At current rate providing 900 lipid kcals + TF is exceeding nutrition needs.    Will reduce TF rate to meet needs with propofol to Isosource 1.5 10 ml/hr + 8 no carb prosource = 240 ml, 840 kcal, 136 g protein, 42 g cho, 3 g fiber, 182 ml free water + 60 ml H20 flush q 4 hours to prevent FT clogging = 542 ml total H20 + propofol meets nutrition needs    Notified MD of reduction to TF and recommend reducing lantus to prevent hypoglycemia.

## 2021-06-09 NOTE — PROGRESS NOTES
CRITICAL CARE PROGRESS NOTE:    Assessment/Plan:  Tobias Palmer is a 54 year old male with a history of obesity (BMI 45) who was admitted and intubated on 6/21 at [facility] with COVID-19 pneumonia, transferred to [facility] on 6/22. Course complicated by ARDS and shock, requiring proning and paralytics.    RESP:  ARDS 2/2 severe covid-19 infection. Intubated on 6/21    Cont LPV Vt is 350cc, which is slightly below 6cc/kg, gas exchange acceptable on this. Cont RR 26    Titrate FiO2 for goal O2 sat 88-92%, PaO2 55 or greater    Leave PEEP at 14 today, wean FiO2 if able, last p:f was 151 this AM    Pplat <30, last was 26, DP 12 at goal    Check ABG at noon to reeval p:f ratio    Daily ABG    CXR today unchanged    Off paralytic today 7/11    Not requiring inh epo at this time    Will need to discuss trach with wife at some point, if unable to make progress by next week.     Will leave supine for now since he is far out from initial ARDS onset; unclear benefit to additional proning 2 weeks out and clinically seems to be improved the last few days.     CV:  No known cardiac hx. No echo on file. He had afib/RVR during admission now on po amio. Circulatory shock - resolved.     MAP >65, currently not on pressors.    Tele monitoring    Lactate normal on 7/6, no need to trend further    Lasix 20mg IV two times a day to keep on dry side.    Cont po amio for now    NEURO:  Encephalopathy, requiring sedation/paralytic since admission     Stop vec today    Add dex for add'l sedation    Cont fent, midaz for RASS -2 to -3    Cont two times a day seroquel.    GI:  No issues, yang TF    Cont TF and advance as yang    Cont bowel regimen    Cont PPI for stress ulcer ppx    RENAL:  No issues, normal renal function and tolerating daily loop diuretic. Had rhabdo early on, improved.     Cont Lasix as above    Maintain barlow    Avoid nephrotoxins.     ID:  Severe covid-19 infection with cytokine release. Inflammatory markers  mildly elevated and/or improved. Last ID note 7/10 reviewed. Quant gold, HBV, HCV testing negative. Col with yeast in sputum at risk for invasive candidiasis. Beta-D glucan negative 6/29, less likely. CRP, PCT negative.     S/p toci on 6/25 and 6/27    S/p CCP early in admission , ?6/25    S/p dexamethasone 6/22 - 6/29    S/p ivermectin x2 doses, strongy Ab was neg.     S/p meropenem x10 days    S/p remdisivir x10 days    Cont cefazolin for MSSA in sputum, 10 day course.    ID following, appreciate input    HEMATOLOGIC:  Thromcbocytopenia, slight worsening last few days. No clots. Doubt HIT as had been stable for many days.     Trend plt count    hgb >7    ENDOCRINE:  No issues    FBSG checks, insulin SS/drip per ICU protocol    ICU PROPHYLAXIS:    Full code    DISPO/CODE STATUS: will update his wife later today. She has been getting daily updates.  May need to address trach soon.     FAMILY COMMUNICATION: as above    Lines/Drains/Tubes:  [place]  ETT 8.0 day 21  A-line right 6/22  PICC 6/26  NG tube  Rectal tube    Restraints  Progress Note  Restraint Application    I recognize that restraints are physical and/or chemical interventions intended to restrict a person's movements. Restraints are currently needed to ensure the safety of this patient and/or others. My clinical rationale appears below.    Category/Type of Restraint     Non Violent:  Soft limb restraint x2  --  Behavior  Pulling at tubes/lines  --  Root Cause of the Behavior  Sedation/intubation  --  Less-Restrictive Measures that Failed  Non Violent Measures:  Close Observation  --  Response to the Restraint  Patient unable to pull at tubes/lines  --  Criteria for Release from the Restraint  Patient calm and off sedation    Niko Saldana MD (Avi)  [facility]  Pager [phone]  Clinic [phone]      Overnight events:  Fairly uneventful night did require some bumps of sedation  P:f borderline 151  "today  vec stopped today, starting dex for add'l sedation  Good UOP with Lasix.     Subjective:  Unable to assess    Objective:  Physical Exam:  Vent settings for last 24 hours:  Vent Mode: AC  FiO2 (%):  [45 %-60 %] 50 %  S RR:  [26] 26  S VT:  [350 mL] 350 mL  PEEP/CPAP (cm H2O):  [14 cm H2O] 14 cm H2O  Minute Ventilation (L/min):  [9 L/min-9.2 L/min] 9.2 L/min  PIP:  [26 cm H2O-28 cm H2O] 28 cm H2O  MAP (cm H2O):  [18] 18    /61 (Patient Position: Lying)   Pulse 67   Temp 98.6  F (37  C) (Axillary)   Resp 26   Ht 5' 5\" (1.651 m)   Wt (!) 276 lb 14.4 oz (125.6 kg)   SpO2 97%   BMI 46.08 kg/m      Intake/Output last 3 shifts:  I/O last 3 completed shifts:  In: 3210.5 [I.V.:685.5; NG/GT:2225; IV Piggyback:300]  Out: 4030 [Urine:3630; Stool:400]  Intake/Output this shift:  I/O this shift:  In: 617.4 [I.V.:37.4; NG/GT:480; IV Piggyback:100]  Out: 380 [Urine:320; Stool:60]    Physical Exam  Gen: intubated, sedated, on low dose vec but did open eyes for me.  HEENT: no OP lesions, no DIANN  CV: RRR, no m/g/r  Resp: coarse ant no wheezing.   Abd: soft, nontender, BS+  Neuro: PERRL, nonfocal  Ext: no edema    LAB:  Results from last 7 days   Lab Units 07/11/20 0527   LN-WHITE BLOOD CELL COUNT thou/uL 10.4   LN-HEMOGLOBIN g/dL 10.6*   LN-HEMATOCRIT % 34.6*   LN-PLATELET COUNT thou/uL 102*     Results from last 7 days   Lab Units 07/11/20  0527 07/10/20  0333 07/09/20  0424  07/06/20  0352   LN-SODIUM mmol/L 142 141 143   < > 144   LN-POTASSIUM mmol/L 3.8 4.8 4.0   < > 3.7   LN-CHLORIDE mmol/L 102 102 106   < > 111*   LN-CO2 mmol/L 36* 35* 31   < > 28   LN-BLOOD UREA NITROGEN mg/dL 25* 21 24*   < > 16   LN-CREATININE mg/dL 0.58* 0.54* 0.52*   < > 0.50*   LN-CALCIUM mg/dL 8.0* 7.9* 7.8*   < > 7.4*   LN-PROTEIN TOTAL g/dL  --   --   --   --  4.5*   LN-BILIRUBIN TOTAL mg/dL  --   --   --   --  0.3   LN-ALKALINE PHOSPHATASE U/L  --   --   --   --  44*   LN-ALT (SGPT) U/L  --   --   --   --  27   LN-AST (SGOT) U/L  -- "   --   --   --  30    < > = values in this interval not displayed.       Micro  PCT neg since 6/22  CRP neg, down from 12.6 on 6/22  Ferritin 642 on 6/26, down from 795 on 6/22   on 6/26, down from 722 on 6/22  D-dimer 0.82 on 6/26, up from 0.46 on 6/22  Fibrinogen 505 on 6/26  IL-6 42 on 6/22, not rechecked.    Sputum 7/4 MSSA  Sputum 6/24 yeast  Blood neg  Fungal cx pending  Urine NG      Current Facility-Administered Medications   Medication Dose Route Frequency Provider Last Rate Last Dose     acetaminophen 160 mg/5 mL solution 1,000 mg (TYLENOL)  1,000 mg Oral Q6H PRN Татьяна Carey MD   1,000 mg at 07/10/20 0445     amino acids-protein hydrolys 15-60 gram-kcal/30 mL packet 2 packet (ProSource No Carb)  2 packet Enteral Tube TID Татьяна Carey MD   2 packet at 07/11/20 0841     amiodarone tablet 200 mg (PACERONE)  200 mg Enteral Tube DAILY Shikha Brady CNP   200 mg at 07/11/20 0841     benzocaine-menthoL lozenge 1 lozenge (CEPACOL)  1 lozenge Oral Q1H PRN Elsa Tan CNP         bisacodyL suppository 10 mg (DULCOLAX)  10 mg Rectal Daily PRN Elsa Tan CNP   10 mg at 06/24/20 1608     ceFAZolin 2 g in 100 mL in D5W (ANCEF)  2 g Intravenous Q8H Raphael Mantilla MD   2 g at 07/11/20 0911     chlorhexidine 0.12 % solution 15 mL (PERIDEX)  15 mL Topical Q12H Elsa Tan CNP   15 mL at 07/11/20 0748     dexmedetomidine (PRECEDEX) 400 mcg/100 mL in NS (4 mcg/mL) infusion  0.1-1.5 mcg/kg/hr Intravenous Continuous Niko Saldana MD 3.15 mL/hr at 07/11/20 1019 0.1 mcg/kg/hr at 07/11/20 1019     dextrose 50 % (D50W) syringe 20-50 mL  20-50 mL Intravenous Q15 Min PRN Elsa Tan CNP         enoxaparin ANTICOAGULANT syringe 60 mg (LOVENOX)  60 mg Subcutaneous BID Elsa Tan CNP   60 mg at 07/11/20 0842     fentaNYL 2500 mcg/50 mL CADD infusion (50 mcg/mL)   mcg/hr Intravenous Continuous Elsa Tan CNP 5.5 mL/hr at 07/11/20 1019 275 mcg/hr at 07/11/20 1019      fentaNYL pf injection  mcg (SUBLIMAZE)   mcg Intravenous Q2H PRN Hua Robert MD   50 mcg at 07/10/20 2238     glucagon (human recombinant) injection 1 mg  1 mg Subcutaneous Q15 Min PRN Elsa Tan CNP         insulin aspart U-100 injection (NovoLOG)   Subcutaneous Q6H FIXED TIMES Esmer Willis CNP         labetaloL injection 10-20 mg (NORMODYNE,TRANDATE)  10-20 mg Intravenous Q2H PRN Esmer Willis CNP   10 mg at 07/10/20 0134     Lactobacillus rhamnosus GG 15 Billion cell 1 capsule (CULTURELLE)  1 capsule Enteral Tube BID Niko Saldana MD   1 capsule at 07/11/20 0911     lipase-protease-amylase 5,000-17,000- 24,000 unit capsule 2 capsule (ZENPEP)  2 capsule Enteral Tube PRN Leventhal, Thomas Michael, MD        And     sodium bicarbonate tablet 325 mg  325 mg Enteral Tube PRN Leventhal, Thomas Michael, MD         LORazepam 0.5-1 mg injection (diluted concentration)  0.5-1 mg Intravenous Q8H PRN Shikha Brady CNP         magnesium hydroxide suspension 30 mL (MILK OF MAG)  30 mL Oral Daily PRN Elsa Tan CNP   30 mL at 06/24/20 1608     metoprolol tartrate injection 5 mg (LOPRESSOR)  5 mg Intravenous Q4H PRN Perlman, David M, MD   5 mg at 06/29/20 0425     midazolam (PF) injection 2 mg (VERSED)  2 mg Intravenous Q1H PRN Elsa Tan CNP   2 mg at 07/10/20 2316     midazolam 100 mg/100 mL (1 mg/mL) infusion (VERSED)  0.25-15 mg/hr Intravenous Continuous Esmer Willis CNP 9 mL/hr at 07/11/20 1019 9 mg/hr at 07/11/20 1019     multivit-min-ferrous gluconate 9 mg iron/15 mL Liqd 9 mg of iron  15 mL Enteral Tube DAILY Leventhal, Thomas Michael, MD   9 mg of iron at 07/11/20 0841     naloxone injection 0.2-0.4 mg (NARCAN)  0.2-0.4 mg Intravenous PRN Elsa Tan, VIRIDIANA        Or     naloxone injection 0.2-0.4 mg (NARCAN)  0.2-0.4 mg Intramuscular PRN Elsa Tan, VIRIDIANA         norepinephrine 4 mg/250 mL in NS (16 mcg/mL)  0.01-0.4 mcg/kg/min Intravenous  Continuous Татьяна Carey MD   Stopped at 07/07/20 2020     omeprazole capsule 20 mg (PriLOSEC)  20 mg Oral QAM AC Elsa Tan CNP   20 mg at 07/05/20 0839    Or     omeprazole suspension 20 mg (PriLOSEC)  20 mg Enteral Tube QAM AC Elsa Tan CNP   20 mg at 07/11/20 0533    Or     pantoprazole 40 mg injection  40 mg Intravenous QAM AC Elsa Tan CNP         ondansetron injection 4 mg (ZOFRAN)  4 mg Intravenous Q4H PRN Elsa Tan CNP        Or     ondansetron tablet 8 mg (ZOFRAN)  8 mg Oral Q8H PRN Elsa Tan CNP         oxyCODONE immediate release tablet 10 mg (ROXICODONE)  10 mg Enteral Tube Q4H Esmer Willis CNP   10 mg at 07/11/20 0749     oxyCODONE immediate release tablet 5-10 mg (ROXICODONE)  5-10 mg Oral Q4H PRN Angela Olson CNP   10 mg at 07/05/20 1757     polyvinyl alcohol 1.4 % ophthalmic solution 1-2 drop (LIQUIFILM TEARS)  1-2 drop Both Eyes Q1H PRN Elsa Tan CNP         QUEtiapine tablet 50 mg (SEROquel)  50 mg Oral Q8H Esmer Willis CNP   50 mg at 07/11/20 0841     sodium chloride 0.9%  10 mL/hr Intravenous Continuous de Angela Hart CNP 10 mL/hr at 07/11/20 1000 10 mL/hr at 07/11/20 1000     sodium chloride bacteriostatic 0.9 % injection 1-5 mL  1-5 mL Intradermal Once PRN Juan F Max PA-C         sodium chloride flush 10-20 mL (NS)  10-20 mL Intravenous PRN Burak Moss MD   10 mL at 06/30/20 1614     sodium chloride flush 10-30 mL (NS)  10-30 mL Intravenous PRN Burak Moss MD         sodium chloride flush 10-30 mL (NS)  10-30 mL Intravenous Q8H FIXED TIMES Burak Moss MD   30 mL at 07/11/20 0532     sodium chloride flush 20 mL (NS)  20 mL Intravenous PRN Burak Moss MD         white petrolatum-mineral oiL 83-15 % ophthalmic ointment 1 application (LUBRIFRESH PM)  1 application Both Eyes Q8H Shikha Brady, CNP   1 application at 07/11/20 0749       Critical care attestation: 45 minutes spent managing  the following issues: acute respiratory failure requiring intubation/IMV, severe covid-19 infection with ARDS. Encephalopathy, high doses of sedation and paralytics. High risk for organ deterioration and death requiring ICU level care.

## 2021-06-09 NOTE — PROGRESS NOTES
FERN COVID RT PROGRESS NOTE    DATA:    VENT DAY#  20    CURRENT SETTINGS:  VENT SETTINGS   26/350/+14        FIO2:   45%    PATIENT PARAMETERS:    PIP 26  Pplat:  23  Pmean:  18  SBT: No  SECRETIONS:  Large pale yellow thick  02 SATS:  97%    ETT SIZE 8.0c  Secured at  24 cm at teeth    Respiratory Medications: None     AB.46/54/68/34.7/95%   P:F 151    NOTE / PLAN:  Patient remains critically stable on current ventilator support tolerating supine positioning. Though patient is still on IV neuromuscular blockade, patient will cough and grimace with suctioning and periodically breath over the vent rate and has periodic hypertension associated with agitation. RN aware and administered PRN sedation. Plan to continue current positioning and ventilator support and titrate as needed.

## 2021-06-09 NOTE — PROGRESS NOTES
07/11/20 1934   Patient Data   Vt (observed, mL) 339 mL   Minute Ventilation (L/min) 7.8 L/min   Total Resp Rate  22 BPM   PIP Observed (cm H2O) 24 cm H2O   MAP (cm H2O) 17   Auto/Intrinsic PEEP Observed (cm H2O) 1.2 cm H2O   Plateau Pressure (cm H2O) 23 cm H2O   SpO2 96 %   Heart Rate 69   Patient assessed and appears to be stable on current ventilator support. Patient is not currently breathing over the ventilator support. Patient noted to have labile blood pressures and periodic agitation related to sedation. Plan to monitor patient's respiratory status closely and titrate support as needed.

## 2021-06-09 NOTE — PROGRESS NOTES
Infectious Disease Progress Note    Assessment/Plan  Impression:  COVID-19 pneumonia   Received CCP. Received dexamethasone (6/22-29) and remdesivir for 10 day course  Received 400 mg tocilizumab on 6/25, 6/27 for suspected cytokine release syndrome. Prolonged hospitalization.    Quantiferon gold negative  Hep B and C negative  Strongyloides antibody negative -- no need for further ivermectin    Ongoing fever and leukocytosis. Steroid may contribute to leukocytosis--> now normalized. Upper and lower ext u/s on 7/16 without DVTs. A-line changed on 7/17. Afebrile today, but intermittently on a cooling blanket.    Colonized with yeast in sputum. Risk for invasive candidiasis -- broad spectrum antibiotics, central line, candida colonization. Beta-D-glucan negative 6/29 -- less likely therefore that he has invasive candidiasis.     Now with MSSA in sputum as of 7/2, 7/4 -- could be the cause of pneumonia. Completed 7-days of cefazolin. Recent sputum again shows MSSA, which represents colonization at this point. GNR also seen. No treatment at this point, but will await identification. Has been having heavy secretions. Recent procalcitonin normal.     Recommend:    Hold antibiotics for now  await ID of gram negatives in sputum  F/up on repeat blood cultures      Principal Problem:    COVID-19  Active Problems:    Shock (H)    Acute and chronic respiratory failure with hypoxia (H)    On mechanically assisted ventilation (H)    ARDS (adult respiratory distress syndrome) (H)    Atrial fibrillation with rapid ventricular response (H)    Atrial fibrillation with rapid ventricular response (H)    Encephalopathy    Herb Sweet MD  [facility]  Direct messaging: [facility] P[facility]  On-Call ID provider: [phone], option: 9    ______________________________________________________________________       Subjective  No fever spikes overnight, but was on cooling blanket when proned. No cooling blanket  currently. A-line changed today. PICC line remains in place.    Objective    Anti-infectives:  Cefazolin 7/7-7/14  Meropenem 7/2-7  Ceftriaxone 6/22  Ivermectin 6/25-26  Cefepime 6/27-7/2  Vancomycin 6/27-7    Vital signs in last 24 hours  Temp:  [99.6  F (37.6  C)-100  F (37.8  C)] 99.6  F (37.6  C)  Heart Rate:  [54-82] 55  Resp:  [0-25] 18  Arterial Line BP: ()/(45-95) 88/60  FiO2 (%):  [45 %-50 %] 45 %  Weight:   (!) 266 lb 3.2 oz (120.7 kg)    Intake/Output last 3 shifts  I/O last 3 completed shifts:  In: 2202.2 [I.V.:902.2; NG/GT:1300]  Out: 2470 [Urine:2470]  Intake/Output this shift:  I/O this shift:  In: 1335.5 [I.V.:700.5; NG/GT:635]  Out: 1000 [Urine:1000]    Review of Systems   Review of systems not obtained due to inability to communicate with the patient.     Physical Exam--  Limited exam to conserve PPE. Patient seen from window. See intensivist note for full exam.  Lying in bed, supine, no distress. intubated     Pertinent Labs   Lab Results: personally reviewed.     Results from last 7 days   Lab Units 07/17/20 0523 07/16/20  0547 07/15/20  0343   LN-WHITE BLOOD CELL COUNT thou/uL 6.9 7.7 8.8   LN-HEMOGLOBIN g/dL 11.2* 11.0* 11.4*   LN-HEMATOCRIT % 36.2* 35.2* 36.2*   LN-PLATELET COUNT thou/uL 163 139* 115*        Results from last 7 days   Lab Units 07/17/20 0523 07/16/20  0547 07/15/20  0343   LN-SODIUM mmol/L 140 139 141   LN-POTASSIUM mmol/L 4.4 4.3 4.3   LN-CHLORIDE mmol/L 103 104 104   LN-CO2 mmol/L 29 30 29   LN-BLOOD UREA NITROGEN mg/dL 20 23* 20   LN-CREATININE mg/dL 0.48* 0.54* 0.58*   LN-CALCIUM mg/dL 8.3* 8.1* 8.4*     Lab Results   Component Value Date    ALT 27 07/06/2020    AST 30 07/06/2020    ALKPHOS 44 (L) 07/06/2020    BILITOT 0.3 07/06/2020     Micro:  6/22 blood negative  6/24 sputum usual bruno and yeast  6/27 blood including fungal isolate no growth to date   6/27 sputum gram stain with yeast; culture usual bruno  7/2 sputum MSSA  7/2 blood culture negative to  date  7/4 sputum MSSA    Results for RJ GUADALUPE (MRN [id number]) as of 6/26/2020 11:04   Ref. Range 6/24/2020 16:46   Hepatitis B Surface Ag Latest Ref Range: Negative  Negative   Hepatitis C Ab Latest Ref Range: Negative  Negative   Results for RJ GUADALUPE (MRN [id number]) as of 6/26/2020 11:04   Ref. Range 6/23/2020 04:26 6/24/2020 05:24 6/24/2020 16:46 6/25/2020 04:40 6/26/2020 04:22   CRP Latest Ref Range: 0.0 - 0.8 mg/dL 12.0 (H) 10.0 (H)  4.2 (H) 2.1 (H)   LD (LDH) Latest Ref Range: 125 - 220 U/L 480 (H) 477 (H)  434 (H) 452 (H)     Pertinent Radiology   Radiology Results: Personally reviewed image/s and Personally reviewed impression/s    Us Venous Legs Bilateral    Result Date: 7/16/2020  EXAM: US VENOUS LEGS BILATERAL LOCATION: [facility] DATE/TIME: 7/16/2020 5:10 PM INDICATION: Respiratory failure. fevers, r/out dvt COMPARISON: 06/27/2020 TECHNIQUE: Venous Duplex ultrasound of bilateral lower extremities with and without compression, augmentation and duplex. Color flow and spectral Doppler with waveform analysis performed. FINDINGS: Exam includes the common femoral, femoral, popliteal veins as well as segmentally visualized deep calf veins and greater saphenous vein. RIGHT: No deep vein thrombosis. No superficial thrombophlebitis. No popliteal cyst. LEFT: No deep vein thrombosis. No superficial thrombophlebitis. No popliteal cyst.     1.  No deep venous thrombosis in the bilateral lower extremities.    Us Venous Arms Bilateral    Result Date: 7/16/2020  EXAM: US VENOUS ARMS BILATERAL LOCATION: [facility] DATE/TIME: 7/16/2020 5:10 PM INDICATION: fevers, r/out dvt COMPARISON: None. TECHNIQUE: Venous Duplex ultrasound of both upper extremities with (when possible) and without compression, augmentation, and duplex. Color flow and spectral Doppler with waveform analysis performed. FINDINGS: Ultrasound includes evaluation of the internal jugular veins, innominate veins, subclavian  veins, axillary veins, and brachial veins. The superficial cephalic and basilic veins were also evaluated where seen. RIGHT: No deep venous thrombosis. No superficial thrombophlebitis. Subcutaneous edema is present in the right forearm. The right basilic vein was not identified. A catheter is present in the right cephalic and subclavian veins. LEFT: No deep venous thrombosis. No superficial thrombophlebitis.     1.  No deep venous thrombosis in the bilateral upper extremities.

## 2021-06-09 NOTE — PROCEDURES
"  PICC Line Insertion Procedure Note  Pt. Name: Tobias Palmer  MRN:        [id number]    Procedure: Insertion of a  Triple Lumen  5 fr  Bard SOLO (valved) Power PICC, Lot number [id number]    Indications: IV meds    Contraindications : none    Procedure Details   Patient identified with 2 identifiers and \"Time Out\" conducted.  .     Central line insertion bundle followed: hand hygeine performed prior to procedure, site cleansed with cholraprep, hat, mask, sterile gloves,sterile gown worn, patient draped with maximum barrier head to toe drape, sterile field maintained.    The vein was assessed and found to be compressible and of adequate size. 1 ml 1% Lidocaine administered sq to the insertion site. A 5 Fr PICC was inserted into the cephalic vein of the right arm with ultrasound guidance. 1 attempt(s) required to access vein.   Catheter threaded without difficulty. Good blood return noted.    Modified Seldinger Technique used for insertion.    The 8 sharps that are included in the PICC insertion kit were accounted for and disposed of in the sharps container prior to breakdown of the sterile field.    Catheter secured with Statlock, biopatch and Tegaderm dressing applied.    Findings:  Total catheter length  55 cm, with 4 cm exposed. Mid upper arm circumference is 34 cm. Catheter was flushed with 30 cc NS. Patient  tolerated procedure well.    Tip placement verified 3CG found with xray section. Tip placement in the SVC    CLABSI prevention brochure left at bedside.    Patient's primary RN notified PICC is ready for use.    Comments:  Thanks        Raina Sears, RN,BSN  [facility]            "

## 2021-06-09 NOTE — PROGRESS NOTES
[place] COVID19  CRITICAL CARE  PROGRESS NOTE:  Date of service: 06/25/2020    Main plans for today:  - Half strength Veletri from full  - Prone today due to  this morning  - Increase diuresis  - Scheduled tylenol for ongoing fever    Assessment/Plan:  Tobias Palmer is a 54 year old male with a history of obesity (BMI 45) who was admitted and intubated on 6/21 at [facility] with COVID-19 pneumonia, transferred to [place] ICU on 6/22. Course complicated by ARDS and shock, requiring proning and paralytics.    The past 24 hours notable for: Patient remained supinated overnight and paralytics were discontinued.  On 70% FiO2 this morning with ABG, was decreased to 65% but desaturated to 80s%, back to 75%.        NEURO/MSK:  # Acute Pain  # Agitation/Anxiety  # Sedation  - Continue fentanyl, versed, precedex infusions for sedation and pain control  - Paralytics discontinued  - RASS -4 as patient will be proned today      CVS:  # Shock secondary to COVID-19 pneumonia  - Hemodynamically stable at this time      RESP:  # Acute hypoxic respiratory failure in the setting of COVID-19 infection.  - Current vent settings:  Vent Mode: AC  FiO2 (%):  [55 %-100 %] 75 %  S RR:  [20] 20  S VT:  [450 mL] 450 mL  PEEP/CPAP (cm H2O):  [20 cm H2O] 20 cm H2O  Minute Ventilation (L/min):  [8.2 L/min-10.8 L/min] 9.1 L/min  PIP:  [30 cm H2O-34 cm H2O] 30 cm H2O  MAP (cm H2O):  [21-24] 21   - Continue Veletri but reduce to half strength  - Prone today at 1300,  this am  - Wean FiO2 as tolerated  - Repeat ABG before and after proning       GI/FEN:  # Protein calorie malnutrition  # Stress ulcer prophylaxis  - Tube feeds @ rate of 35 ml/hr currently  - When proning TF at 15 ml/hr, then 80 ml/hr supinated. Will adjust to this schedule today again with proning  - PPI ordered      RENAL :  # Rhabdomyolysis  - CK peaked to 1864 at [facility]; today   - No further trending necessary  # Hypernatremia  - Sodium 147 today.  Receiving 175 ml q4h free water flushes. Will monitor  # Fluid balance  - Net + 297  - Increase lasix to 40 mg BID, one time dose of metolazone 5 mg today       ID:  # COVID-19 infection  - Candidate for Remdesivir, started 6/22 for 10 day course  - ID consulted, advised no Tocilizumab as patient's IL-6, CRP, and LDH slightly improved during course of stay. Continues to follow patient  # Possible CAP  - Was on Vanc/Zosyn at [facility] started 6/21, D/C'ed and narrowed to ceftriaxone 6/22-6/23  - No clinical signs of bacterial pneumonia at this time  # Fevers  - Continues to run fevers in last 24 hours  - Tylenol scheduled for fever  - WBC count normal today at 9.2, procalcitonin 0.10 on 6/24, Blood cultures from 6/22 with NGTD  - Clinically no signs of bacterial infection. Fever suspected to be related to COVID infection. Will continue to trend cultures and WBC count.      HEMATOLOGIC:  # Anemia of critical illness  - Hgb stable at 13.6  - Daily monitoring  # Coagulopathy due to COVID-19 infection  - Lovenox 60 U BID       ENDOCRINE:  # Stress hyperglycemia  - Keep BG< 180 for optimal healing  - SSI q4h      DISPO: ICU    DVT proph:Yes.  GI proph: Yes.  Requires barlow for strict I&O: Yes  Restraints required for safety: Yes.      PROVIDER RESTRAINT FOR NON-VIOLENT BEHAVIOR FACE TO FACE EVALUATION    Patient's Immediate Situation:  Patient demonstrated the following behaviors: Pulling/tugging at invasive lines or tubes and does not respond to verbal/non-verbal redirection    Patient's Reaction to the intervention:  Does patient understand the reason for restraint/seclusion? Unable to Express    Medical Condition:  Is there any evidence of compromise of Skin integrity, Respiratory, Cardiovascular, Musculoskeletal, Hydration? No    Behavioral Condition:  In consultation with the RN, is there a need to continue this restraint or seclusion? Yes    See Restraint Flowsheet for complete restraint documentation and  "assessment.    Katerin Sawant PA-C        Lines/Drains/Tubes:  Central line, Placed on 6/22  Arterial line,  Placed on 6/22   ETT placed on 6/21  Feeding tube placed on 6/22  Hillman catheter placed on 6/21      Subjective/ROS or 24 hour course:  Unable to perform full ROS, patient deeply sedated.    Objective:  /72   Pulse 69   Temp (!) 101.5  F (38.6  C) (Esophageal)   Resp 23   Ht 5' 5\" (1.651 m)   Wt (!) 263 lb 0.1 oz (119.3 kg)   SpO2 90%   BMI 43.77 kg/m      Intake/Output last 3 shifts:  I/O last 3 completed shifts:  In: 3452.6 [I.V.:872.6; NG/GT:2330; IV Piggyback:250]  Out: 2685 [Urine:2485; Stool:200]  Intake/Output this shift:  I/O this shift:  In: 417.7 [I.V.:82.7; NG/GT:335]  Out: 1125 [Urine:1025; Stool:100]    ABG:  Recent Labs     06/25/20  0440   PHART 7.39   OXYHB 96.2   BEARTCALC 0.2   POCPEEP 20   TEMP 37.0   POCRATE 20     GEN: no acute distress, unarousable   NEURO: deeply sedated. Unable to assess, heladio  HEENT:ETT secure, visible edema of the face, mild  PULM: diffusely coarse throughout  CV/COR: regular rate and rhythm, no murmur  GI: soft nontender,  no guarding  :  Hillman, urine yellow and clear  EXT:  peripheral pulses, +2 x 4 extremities  SKIN: no obvious rash, warm and dry    Total Critical Care Time : 1 Hour    Katerin Sawant    Staffed with Dr Leventhal.    All ventilatory settings/changes completed at the direction of the intensivist.    Family member ellyn Blantonew, updated today by Katerin Sawant PA-C.    "

## 2021-06-09 NOTE — PROGRESS NOTES
[facility] COVID RT PROGRESS NOTE    DATA:    VENT DAY#  6     CURRENT SETTINGS:  VENT SETTINGS   AC 18,450, +18,         FIO2:   45    PATIENT PARAMETERS:    PIP 34  Pplat:  31  Pmean:  24  SBT: none  SECRETIONS:  Small thick white  02 SATS:  95    ETT SIZE 8.0  Secured at  23 cm at teeth    Respiratory Medications:     ABG:     Results for RJ GUADALUPE (MRN [id number]) as of 6/26/2020 15:53   Ref. Range 6/26/2020 15:24   pH, Arterial Latest Ref Range: 7.37 - 7.44  7.41   pCO2, Arterial Latest Ref Range: 35 - 45 mm Hg 43   pO2, Arterial Latest Ref Range: 80 - 90 mm Hg 102 (H)   Bicarbonate, Arterial Calc Latest Ref Range: 23.0 - 29.0 mmol/L 26.6   O2 Sat, Arterial Latest Ref Range: 96.0 - 97.0 % 98.2 (H)                   NOTE / PLAN:   Decreased RR from 20 to 18. Decreased Peep from +20 to +18. Supined patient at 10:25am. Veletri stopped at 17:15. No issues

## 2021-06-09 NOTE — PROGRESS NOTES
07/04/20 0708   Vent Settings   Resp Rate (Set) 18   FiO2 (%) 70 %   Vt (Set, mL) 430 mL   PEEP/CPAP (cm H2O) 8 cm H2O   I:E Ratio 1:1.5   Patient Data   Minute Ventilation (L/min) 11.2 L/min   Total Resp Rate  25 BPM   PIP Observed (cm H2O) 23 cm H2O   MAP (cm H2O) 8   Plateau Pressure (cm H2O) 20 cm H2O   SAT 95%.

## 2021-06-09 NOTE — PLAN OF CARE
Problem: Pain  Goal: Patient's pain/discomfort is manageable  7/18/2020 0350 by Waldemar Oh RN  Outcome: Progressing  7/18/2020 0349 by Waldemar Oh RN  Outcome: Progressing     Problem: Safety  Goal: Patient will be injury free during hospitalization  7/18/2020 0350 by Waldemar Oh RN  Outcome: Progressing  7/18/2020 0349 by Waldemar Oh RN  Outcome: Progressing     Problem: Daily Care  Goal: Daily care needs are met  7/18/2020 0350 by Waldemar Oh RN  Outcome: Progressing  7/18/2020 0349 by Waldemar Oh RN  Outcome: Progressing     Problem: Psychosocial Needs  Goal: Demonstrates ability to cope with hospitalization/illness  7/18/2020 0350 by Waldemar Oh RN  Outcome: Progressing  7/18/2020 0349 by Waldemar Oh RN  Outcome: Progressing  Goal: Collaborate with patient/family/caregiver to identify patient specific goals for this hospitalization  7/18/2020 0350 by Waldemar Oh RN  Outcome: Progressing  7/18/2020 0349 by Waldemar Oh RN  Outcome: Progressing     Problem: Discharge Barriers  Goal: Patient's discharge needs are met  7/18/2020 0350 by Waldemar Oh RN  Outcome: Progressing  7/18/2020 0349 by Waldemar Oh RN  Outcome: Progressing     Problem: Knowledge Deficit  Goal: Patient/family/caregiver demonstrates understanding of disease process, treatment plan, medications, and discharge instructions  7/18/2020 0350 by Waldemar Oh RN  Outcome: Progressing  7/18/2020 0349 by Waldemar Oh RN  Outcome: Progressing     Problem: Potential for Compromised Skin Integrity  Goal: Skin integrity is maintained or improved  7/18/2020 0350 by Waldemar Oh RN  Outcome: Progressing  7/18/2020 0349 by Waldemar Oh RN  Outcome: Progressing  Goal: Nutritional status is improving  7/18/2020 0350 by Waldemar Oh RN  Outcome: Progressing  7/18/2020 0349 by Waldemar Oh RN  Outcome: Progressing     Problem: Urinary Incontinence  Goal: Perineal skin integrity is  maintained or improved  7/18/2020 0350 by Waldemar Oh RN  Outcome: Progressing  7/18/2020 0349 by Waldemar Oh RN  Outcome: Progressing     Problem: Potential for Falls  Goal: Patient will remain free of falls  7/18/2020 0350 by Waldemar Oh RN  Outcome: Progressing  7/18/2020 0349 by Waldemar Oh RN  Outcome: Progressing     Problem: Risk of Injury Due to Unsafe Behavior  Goal: Patient will remain safe while in restraint; physical/psychological needs will be met  7/18/2020 0350 by Waldemar Oh RN  Outcome: Progressing  7/18/2020 0349 by Waldemar Oh RN  Outcome: Progressing  Goal: Alternatives to restraint will be continually assessed with use of least restrictive device and discontinuation as soon as possible  7/18/2020 0350 by Waldemar Oh RN  Outcome: Progressing  7/18/2020 0349 by Waldemar Oh RN  Outcome: Progressing  Goal: Patient/Family will be able to communicate reason for restraint and steps for restraint application and removal  7/18/2020 0350 by Waldemar Oh RN  Outcome: Progressing  7/18/2020 0349 by Waldemar Oh RN  Outcome: Progressing     Problem: Inadequate Airway Clearance  Goal: Patient will maintain patent airway  7/18/2020 0350 by Waldemar Oh RN  Outcome: Progressing  7/18/2020 0349 by Waldemar Oh RN  Outcome: Progressing     Problem: Mechanical Ventilation  Goal: Patient will maintain patent airway  7/18/2020 0350 by Waldemar Oh RN  Outcome: Progressing  7/18/2020 0349 by Waldemar Oh RN  Outcome: Progressing  Goal: ET tube will be managed safely  7/18/2020 0350 by Waldemar Oh RN  Outcome: Progressing  7/18/2020 0349 by Waldemar Oh RN  Outcome: Progressing     Problem: Glucose Imbalance  Goal: Achieve optimal glucose control  7/18/2020 0350 by Waldemar Oh RN  Outcome: Progressing  7/18/2020 0349 by Waldemar Oh RN  Outcome: Progressing     Problem: Potential for transmission of organism by Contact, Enteric, Droplet  and/or Airborne routes  Goal: Prevent transmission of organisms  7/18/2020 0350 by Waldemar Oh RN  Outcome: Progressing  7/18/2020 0349 by Waldemar Oh RN  Outcome: Progressing     Problem: Inadequate Gas Exchange  Goal: Patient is adequately oxygenated and ventilation is improved  7/18/2020 0350 by Waldemar Oh RN  Outcome: Progressing  7/18/2020 0349 by Waldemar Oh RN  Outcome: Progressing

## 2021-06-09 NOTE — PROGRESS NOTES
[facility] COVID RT PROGRESS NOTE    DATA:    VENT DAY# 24    CURRENT SETTINGS:  VENT SETTINGS 350VT/AC16/+14P        FIO2:  60%    PATIENT PARAMETERS:    PIP: 18  Pplat:  15  Pmean:  14  SBT: N/A  SECRETIONS:  Large amounts frothy tanish thick secretions  02 SATS:  94%    ETT SIZE 8.0  Secured at  24 cm at teeth    Respiratory Medications: Albuterol/Mucomyst Q6     ABG:  Results for RJ GUADALUPE (MRN [id number]) as of 7/15/2020 17:52   Ref. Range 7/15/2020 13:26   pH, Arterial Latest Ref Range: 7.37 - 7.44  7.42   pCO2, Arterial Latest Ref Range: 35 - 45 mm Hg 51 (H)   pO2, Arterial Latest Ref Range: 80 - 90 mm Hg 82   Bicarbonate, Arterial Calc Latest Ref Range: 23.0 - 29.0 mmol/L 30.5 (H)   O2 Sat, Arterial Latest Ref Range: 96.0 - 97.0 % 96.0   Oxyhemoglobin Latest Ref Range: 96.0 - 97.0 % 93.9 (L)   POC Base Excess Calc Latest Units: mmol/L 7.9       NOTE / PLAN:   Patient turned to prone at 11:15AM. Head turn done Q2. PF ratio in prone 149. Neb txs given.  Vent pressures WNL.

## 2021-06-09 NOTE — PROGRESS NOTES
07/12/20 1527   Patient Data   Vt (observed, mL) 335 mL   Vt Exp (mL) 419 mL   Minute Ventilation (L/min) 8.1 L/min   Total Resp Rate  27 BPM   PIP Observed (cm H2O) 17 cm H2O   MAP (cm H2O) 116   Auto/Intrinsic PEEP Observed (cm H2O) 4 cm H2O   Plateau Pressure (cm H2O) 5 cm H2O   Dynamic Compliance (L/cm H2O) 25.6 L/cm H2O

## 2021-06-09 NOTE — PROGRESS NOTES
06/27/20 0728   Patient Data   PIP Observed (cm H2O) 26 cm H2O   MAP (cm H2O) 21   Plateau Pressure (cm H2O) 25 cm H2O

## 2021-06-09 NOTE — PLAN OF CARE
Problem: Inadequate Airway Clearance  Goal: Patient will maintain patent airway  Outcome: Progressing     Problem: Mechanical Ventilation  Goal: Patient will maintain patent airway  Outcome: Progressing     Problem: Inadequate Gas Exchange  Goal: Patient is adequately oxygenated and ventilation is improved  Outcome: Progressing

## 2021-06-09 NOTE — PROGRESS NOTES
07/24/20 1951   Patient Data   PIP Observed (cm H2O) 20 cm H2O   MAP (cm H2O) 8.6   Auto/Intrinsic PEEP Observed (cm H2O) 0 cm H2O   Plateau Pressure (cm H2O) 17 cm H2O   Dynamic Compliance (L/cm H2O) 36.6 L/cm H2O   Airway Resistance 4.3   SpO2 100 %

## 2021-06-09 NOTE — PROGRESS NOTES
[facility]ESS [facility]:  DOS:  06/29/2020    Patient Summary:   Tobias Palmer is a 54 year old male with a history of obesity (BMI 45) who was admitted and intubated on 6/21 at [facility] with COVID-19 pneumonia, transferred to [place] ICU on 6/22. Course complicated by ARDS and shock, requiring proning and paralytics.     Major Changes for Today    - Continue Amiodarone gtt  - Start enteral metoprolol  - Schedule oxycodone + PRN  - Schedule seroquel  - Metolazone + Lasix  - Start lantus 15 units daily  - Stop  steroids    Assessment/Plan:  # Acute Pain  # Agitation/Anxiety  # Sedation  - Continue fentanyl, versed.  - Precedex off 6/27 as possible fever contributor   - Liberalize RAAS to 0 to -1. Has been dyssynchronous with ventilator when IV sedation was weaned  - Start scheduled oxycodone + PRN  - Start Seroquel     CVS:  #Atrial fibrillation with RVR 6/28  - Continue with continuous telemetry   - Continue amiodarone infusion. Rate poorly controlled this morning. Converted after initiation of enteral metoprolol. Plan to complete amidarone gtt protocol  - PRN Metoprolol available     RESP:  # Acute hypoxic respiratory failure in the setting of COVID-19 infection.  - Vent Mode: AC 18/430/12/45-->40%  - Veletri now off  - Ventilator bundle     GI/FEN:  # Protein calorie malnutrition  - Tube feeds @ rate of 35 ml/hr   - PPI ordered for stress ulcer prophylaxis     RENAL :  # Rhabdomyolysis, resolved.   #Lactic acidosis, resolved  - CK peaked to 1864 at [facility];  6/25 on . No further trending necessary  - Creatinine normalized  - Diurese today with lasix and metolazone. Goal of -500 to 1 L.     ID:  # COVID-19 infection  #Persistent fevers   - Remdesivir, started 6/22 for 10 day course  - s/p CCP and tocilizumab 6/25 and 6/27  - S/p Ivermectin x2 doses. Stronglyloides antibody negative.   - Pancultured 6/27, NGTD  - Fever curve has overall improved after Tocilizumab dosing vs.  "Empiric antibiotics vs. Discontinuation of Precedex. WBC up today  - Started on Vancomycin and Cefepime 6/27. NGTD, other than yeast in sputum. Continue for now, consider stopping after 72 hours if cultures remain negative  - Fungitell sent    HEMATOLOGIC:  # Anemia of critical illness  - Hgb stable at 13.6, platelets normal.     # Coagulopathy due to COVID-19 infection  - Lovenox 60 mg BID   - US BUE and BLE given persistent fevers, negative for DVT.      ENDOCRINE:  # Stress hyperglycemia  #Steroid induced hyperglycemia  - Keep BG< 180 for optimal healing  - Insulin gtt given uncontrolled glucoses, requiring high doses. Start additional Lantus 15 units daily. Suspect needs might decrease with steroids now off.   - Stop Dexamethasone, as has completed 7 days     DISPO: ICU     GENERAL CARES:  DVT proph: Yes.  GI proph: Yes.  Requires barlow for strict I&O: Yes  Restraints required for safety: Yes.     Angela Lagos CNP   Total time 38 mins  Staffed with Dr. Hernandez  Son updated via phone    Overnight events:   Course reviewed. Fever curve improved. Very sedated, no eye opening unable to obtain ROS. A fib with RVR started on amiodarone, required metoprolol PRN for HR 130s. Converted to SR late morning.     Objective:  Physical Exam:  Vent settings for last 24 hours:  Vent Mode: VCV  FiO2 (%):  [45 %-50 %] 45 %  S RR:  [18] 18  S VT:  [430 mL] 430 mL  PEEP/CPAP (cm H2O):  [12 cm H2O] 12 cm H2O  Minute Ventilation (L/min):  [9.3 L/min-13 L/min] 13 L/min  PIP:  [14 cm H2O-19 cm H2O] 19 cm H2O  MAP (cm H2O):  [12-15] 15    /55   Pulse 78   Temp (!) 100.9  F (38.3  C) (Bladder)   Resp 25   Ht 5' 5\" (1.651 m)   Wt (!) 254 lb 10.1 oz (115.5 kg)   SpO2 94%   BMI 42.37 kg/m      Intake/Output last 3 shifts:  I/O last 3 completed shifts:  In: 3909.1 [I.V.:601.1; NG/GT:1823; IV Piggyback:1485]  Out: 2680 [Urine:2480; Stool:200]  Intake/Output this shift:  I/O this shift:  In: 1057.5 [I.V.:282.5; " NG/GT:775]  Out: 2225 [Urine:2025; Stool:200]    Physical Exam  Neuro: sedated, no eye opening to voice, minimal withdrawal of extremities. Pupils 3 mm brisk and round.   HEENT: NJ present with feeds infusing.   RESP: BBS, lungs coarse, no wheezes, rales or rhonchi  CV: S1, S2  GI: abdomen soft and compressible. No masses  : voiding per barlow, urine clear and yellow   MSK: Extremities: calves soft and compressible. Pulses 2+.     LAB:  Results from last 7 days   Lab Units 06/29/20  0423   LN-WHITE BLOOD CELL COUNT thou/uL 18.5*   LN-HEMOGLOBIN g/dL 13.6*   LN-HEMATOCRIT % 44.4   LN-PLATELET COUNT thou/uL 198     Results from last 7 days   Lab Units 06/29/20  0423 06/28/20  0412 06/27/20  1108 06/27/20  0351   LN-SODIUM mmol/L 144 145  --  145   LN-POTASSIUM mmol/L 4.3 4.0  4.1  --  4.2   LN-CHLORIDE mmol/L 110* 107  --  108*   LN-CO2 mmol/L 27 30  --  27   LN-BLOOD UREA NITROGEN mg/dL 31* 35*  --  45*   LN-CREATININE mg/dL 0.71 0.76  --  1.01   LN-CALCIUM mg/dL 7.7* 7.8*  --  7.9*   LN-PROTEIN TOTAL g/dL  --   --  6.5  --    LN-BILIRUBIN TOTAL mg/dL  --   --  0.4  --    LN-ALKALINE PHOSPHATASE U/L  --   --  50  --    LN-ALT (SGPT) U/L  --   --  53*  --    LN-AST (SGOT) U/L  --   --  54*  --

## 2021-06-09 NOTE — PROGRESS NOTES
0700 Report received from off-going RN Bernadette. Drips verified, safety checks completed, assumed care of patient. Tobias Palmer is a 54 y.o. male with a PMH of obesity admitted on 6/22 from [facility] with COVID.     Vent Mode: AC  FiO2 (%):  [40 %-50 %] 50 %  S RR:  [18] 18  S VT:  [430 mL] 430 mL  PEEP/CPAP (cm H2O):  [12 cm H2O] 12 cm H2O  Minute Ventilation (L/min):  [7.6 L/min-8.1 L/min] 7.7 L/min  PIP:  [29 cm H2O-35 cm H2O] 35 cm H2O  MAP (cm H2O):  [17-18] 18    Vitals:    07/06/20 1700 07/06/20 1745 07/06/20 1800 07/06/20 1815   BP:  110/57 112/58 123/66   Pulse: 80 88 87 100   Resp: (!) 0 18 18 18   Temp:   99  F (37.2  C)    TempSrc:   Bladder    SpO2: 93% 91% 91% (!) 89%   Weight:       Height:           0800 Initial assessment completed as documented.   0900 Rounds with NP Shikha Brady . Changes include weaning off propofol and vecuronium   1200 Pt reassessed, changes as documented.   1600 Pt reassessed, changes as documented.   1930 Report given to on-coming RN. Relinquished care of patient.        Pt remains intubated,sedated, paralyzed this shift. See MAR for gtts and titrations. Propofol and Vecuronium titrated down this shift. Pt afebrile till end of shift, cooling blanket applied. TF at goal, will titrate up as propofol decreased. Pt placed prone at 1645, decreased 02 sats note, ABG sent, awaiting further orders. No insulin replacement needed this shift, glucose checks continue q 4 hours. Pt on Lovenox and and SCDs placed to bilateral lower extremities. Turn/repo/oral cares completed q 2 hours. Pt with water stool and flexseal in place. Hillman patent with 50ml/hr output, MD aware. 250 LR bolus given at end of shift per MD order. Will continue to monitor and follow plan of care.  Family updated this shift.

## 2021-06-09 NOTE — PROGRESS NOTES
[facility] COVID RT PROGRESS NOTE    DATA:    VENT DAY#  27    CURRENT SETTINGS:  VENT SETTINGS   AC/VC: Vt 370, Rate 16, PEEP 12        FIO2:   40    PATIENT PARAMETERS:    PIP 19  Pplat:  Unable to obtain  Pmean:  13  SBT: No  SECRETIONS:  Small thick tan  02 SATS:  98    ETT SIZE 8.0  Secured at  24 cm at teeth    Respiratory Medications: Albuterol/Mucomyst  Q6    ABG: Results for RJ GUADALUPE (MRN [id number]) as of 7/18/2020 17:51   Ref. Range 7/18/2020 14:53   pH, Arterial Latest Ref Range: 7.37 - 7.44  7.42   pCO2, Arterial Latest Ref Range: 35 - 45 mm Hg 46 (H)   pO2, Arterial Latest Ref Range: 80 - 90 mm Hg 92 (H)   Bicarbonate, Arterial Calc Latest Ref Range: 23.0 - 29.0 mmol/L 28.5   O2 Sat, Arterial Latest Ref Range: 96.0 - 97.0 % 98.4 (H)   Oxyhemoglobin Latest Ref Range: 96.0 - 97.0 % 96.2       NOTE / PLAN:   Patient remains intubated and mechanically ventilated. Vt increased and PEEP decreased this shift. No issues and patient is in sync with the vent. Will continue to wean vent as tolerated.

## 2021-06-09 NOTE — PROGRESS NOTES
Discussed plans to supinate patient briefly for ett securement , chest xray.  dht not post pyloric, tube feeds decreased to 20 ml of isosource 3.5 an hour. Plan to return pt to prone position when appropriate.

## 2021-06-09 NOTE — PROGRESS NOTES
PULMONARY / CRITICAL CARE PROGRESS NOTE    Date / Time of Admission:  6/22/2020 11:38 AM    Assessment:   1. Acute and chronic respiratory failure , ARDS s/p intubation 6/21  2. COVID-19 viral infection  S/p CCP, remdesivir, Tocilizumab and dexamethasone.   3. Serratia / MSSA pneumonia  4. Encephalopathy  5. Atrial fibrillation  6. Obesity BMI 42  7. Anemia    Advance Directives: Full code    Plan:   1. Titrate vent settings A/C 16/450/8/40%  2. Weaning trial per protocol  3. Sedation to keep RASS 0 to -1, dexmedetomidine, versed and fentanyl drips  4. Schedule oxycodone, seroquel, ativan   5. Heplock IV fluids  6. Continue amiodarone  7. Abx per ID recommendation (currently on ceftriaxone)  8. Continue tube feedings  9. PPI for GI prophylaxis  10. Glucose level monitoring  11. DVT prophylaxis lovenox SQ  12. PT , OT evaluation     Please contact me if you have any questions.  Total critical care time, not including separately billable procedure time: 45 minutes  This patient had a high probability of imminent or life threatening deterioration due to acute respiratory failure which required my direct attention, intervention and personal management.     Harman Condon  Pulmonary / Critical Care  7/22/2020   3:19 PM      ICU DAILY CHECKLIST                           Can patient transfer out of MICU? no    FAST HUG:    Feeding:  Feeding: Yes.  Patient is receiving TUBE FEEDS    Hillman:Yes  Analgesia/Sedation:Yes fentanyl, midazolam and precedex  Thromboembolic prophylaxis: yes; Mode:  Lovenox  HOB>30:  Yes  Stress Ulcer Protocol Active: yes; Mode: PPI  Glycemic Control: Any glucose > 180 no; Mode of Insulin Therapy: Sliding Scale Insulin    INTUBATED:  Can patient have daily waking: yes  Can patient have spontaneous breathing trial:  yes    Restraints? yes    PHYSICAL THERAPY AND MOBILITY:  Can patient have PT and mobility trial: yes  Activity: PT    Subjective:   HPI:  Tobias Palmer is a 54 y.o. male  with history of obesity (BMI 45) who was admitted and intubated on 6/21 at [facility] with COVID-19 pneumonia, transferred to [place] ICU on 6/22. Course complicated by ARDS and shock, requiring proning and paralytics. S/p CCP, remdesivir, Tocilizumab and dexamethasone.     Events overnight  - Sedation was increased due to agitation  - Hemodynamically stable  - Unchanged O2 requirements    Allergies: Patient has no known allergies.     MEDS:  Scheduled Meds:    acetaminophen  650 mg Enteral Tube Q6H     acetylcysteine (MUCOMYST) 20% inhalation solution  4 mL Inhalation Q12H - RT     amino acids-protein hydrolys  2 packet Enteral Tube BID     amiodarone  200 mg Enteral Tube DAILY     cefTRIAXone  2 g Intravenous Q24H     chlorhexidine  15 mL Topical Q12H     enoxaparin ANTICOAGULANT  60 mg Subcutaneous BID     guar gum  1 packet Enteral Tube BID     insulin aspart (NovoLOG) injection   Subcutaneous Q6H FIXED TIMES     ipratropium-albuteroL  3 mL Nebulization Q12H - RT     Lactobacillus rhamnosus GG  1 capsule Enteral Tube BID     LORazepam  2 mg Oral Q6H     multivitamin with iron-mineral  15 mL Enteral Tube DAILY     omeprazole  20 mg Oral QAM AC    Or     omeprazole  20 mg Enteral Tube QAM AC    Or     pantoprazole  40 mg Intravenous QAM AC     oxyCODONE  5 mg Enteral Tube Q3H     QUEtiapine  50 mg Oral Q8H     sodium chloride  10-30 mL Intravenous Q8H FIXED TIMES     white petrolatum-mineral oiL  1 application Both Eyes Q8H     Continuous Infusions:    dexmedetomidine infusion orderable (PRECEDEX) 1.5 mcg/kg/hr (07/22/20 1400)     fentaNYL citrate (PF) 25 mcg/hr (07/22/20 1400)     midazolam 0.5 mg/hr (07/22/20 1400)     norepinephrine 0.02 mcg/kg/min (07/22/20 1400)     sodium chloride 0.9% 10 mL/hr (07/14/20 1200)     PRN Meds:.acetaminophen, benzocaine-menthoL, bisacodyL, dextrose 50 % (D50W), fentaNYL - BOLUS DOSE from infusion, glucagon (human recombinant), labetalol, lipase-protease-amylase **AND**  "sodium bicarbonate, LORazepam, magnesium hydroxide, metoprolol tartrate, midazolam (VERSED) - BOLUS DOSE from infusion, naloxone **OR** naloxone, ondansetron **OR** ondansetron, oxyCODONE, polyvinyl alcohol, sodium chloride bacteriostatic, sodium chloride, sodium chloride, sodium chloride    Objective:   VITALS:  /56   Pulse 62   Temp 99.3  F (37.4  C)   Resp 27   Ht 5' 5\" (1.651 m)   Wt (!) 254 lb (115.2 kg)   SpO2 96%   BMI 42.27 kg/m       EXAM:   Gen: obese, sedated, arousable, not following commands   HEENT: pale conjunctiva, moist mucosa  Neck: no thyromegaly, masses or JVD  Lungs: ronchi both HT  CV: regular, no murmurs or gallops appreciated  Abdomen: soft, NT, BS wnl  Ext: no edema  Neuro: sedated, arousbale, not following commands.     I&O:      Intake/Output Summary (Last 24 hours) at 7/22/2020 1519  Last data filed at 7/22/2020 1400  Gross per 24 hour   Intake 2553.72 ml   Output 2935 ml   Net -381.28 ml       Data Review:  Results from last 7 days   Lab Units 07/22/20  0623   LN-WHITE BLOOD CELL COUNT thou/uL 10.4   LN-HEMOGLOBIN g/dL 10.1*   LN-HEMATOCRIT % 32.0*   LN-PLATELET COUNT thou/uL 270     Results from last 7 days   Lab Units 07/22/20  0545   LN-SODIUM mmol/L 139   LN-POTASSIUM mmol/L 3.9   LN-CHLORIDE mmol/L 105   LN-CO2 mmol/L 23   LN-BLOOD UREA NITROGEN mg/dL 12   LN-CREATININE mg/dL 0.55*   LN-CALCIUM mg/dL 8.6     Tracheal Culture  4+ Staphylococcus aureusAbnormal         3+ Gram Negative RodsAbnormal                 Gram Stain Result   4+ Polymorphonuclear leukocytes       4+ Gram positive cocci in clusters       3+ Gram negative bacilli                XR CHEST 1 VIEW PORTABLE  LOCATION: [facility]  DATE/TIME: 7/19/2020 6:08 AM  INDICATION: Evaluate lines/tubes.  COMPARISON: 07/11/2020   IMPRESSION:   Endotracheal tube is 3.2 cm superior to the melissa. Enteric tube extends off the inferior aspect of the film. Stable right PICC. No pneumothorax. No significant interval " change in multifocal airspace disease. No pleural effusion. The cardiac   silhouette is stable at the upper limits of normal for size.      By:  Harman Condon, 7/22/2020, 3:19 PM    Primary Care Physician:  Provider, No Primary Care

## 2021-06-09 NOTE — PROGRESS NOTES
07/11/20 1352   Patient Data   Vt (observed, mL) 336 mL   Vt Exp (mL) 380 mL   Minute Ventilation (L/min) 9.3 L/min   Total Resp Rate  23 BPM   PIP Observed (cm H2O) 24 cm H2O   MAP (cm H2O) 17   Auto/Intrinsic PEEP Observed (cm H2O) 0.6 cm H2O   Plateau Pressure (cm H2O) 20 cm H2O   Dynamic Compliance (L/cm H2O) 26.6 L/cm H2O   Airway Resistance 13.3   SpO2 98 %   Heart Rate (!) 103

## 2021-06-09 NOTE — PROGRESS NOTES
FERN COVID RT PROGRESS NOTE    DATA:    VENT DAY#  23    CURRENT SETTINGS:  VENT SETTINGS   AC 16/350/+12        FIO2:   40    PATIENT PARAMETERS:    PIP 13  Pplat:  10  Pmean:  12  SBT:   SECRETIONS:  Large thick yellow   02 SATS:  98    ETT SIZE 8.0  Secured at  24 cm at teeth    Respiratory Medications: none      ABG: Results for RJ GUADALUPE (MRN [id number]) as of 7/14/2020 04:54   Ref. Range 7/14/2020 04:02   pH, Arterial Latest Ref Range: 7.37 - 7.44  7.44   pCO2, Arterial Latest Ref Range: 35 - 45 mm Hg 51 (H)   pO2, Arterial Latest Ref Range: 80 - 90 mm Hg 63 (L)   Bicarbonate, Arterial Calc Latest Ref Range: 23.0 - 29.0 mmol/L 32.5 (H)   O2 Sat, Arterial Latest Ref Range: 96.0 - 97.0 % 92.6 (L)   Oxyhemoglobin Latest Ref Range: 96.0 - 97.0 % 90.3 (L)   POC Base Excess Calc Latest Units: mmol/L 10.3       NOTE / PLAN:   Pt is awake , strong productive cough. BS are coarse but clear with suctioning. ETT position changed Q4 by this writer.   Possible wean today .

## 2021-06-09 NOTE — PLAN OF CARE
Received telephone consent from pawel Strickland to place PICC line.  IJ line catheter was damaged and with D/C of line, tip intact.  Pressure held for 2 minutes without hematoma.    Will continue to monitor site and pt.

## 2021-06-09 NOTE — PROGRESS NOTES
07/07/20 0758   Patient Data   PIP Observed (cm H2O) 41 cm H2O   MAP (cm H2O) 24   Plateau Pressure (cm H2O) 38 cm H2O

## 2021-06-09 NOTE — PROGRESS NOTES
06/26/20 0740   Patient Data   PIP Observed (cm H2O) 34 cm H2O   MAP (cm H2O) 24   Plateau Pressure (cm H2O) 31 cm H2O

## 2021-06-09 NOTE — PLAN OF CARE
Problem: Pain  Goal: Patient's pain/discomfort is manageable  7/5/2020 0952 by Stevan Dean RN  Outcome: Progressing  Pt compliant with vent while on fentanyl 400 mcg/kg/hr, oxycodone scheduled and prn, pt on paralytics and appears calm and comfortable according to vitals measured  Problem: Daily Care  Goal: Daily care needs are met  7/5/2020 0952 by Stevan Dean, RN  Outcome: Progressing  Plan today is to supinate and pronate after chest xray, crna to be present for securing airway  Problem: Potential for Compromised Skin Integrity  Goal: Nutritional status is improving  7/5/2020 0952 by Stevan Dean, RN  Outcome: Progressing  Change tube feeds due to dht not post pyloric  Problem: Inadequate Airway Clearance  Goal: Patient will maintain patent airway  7/5/2020 0952 by Stevan Dean, RN  Outcome: Progressing  Team aware of concerns regarding airway tape to secure ett.  Plan to supinate early to secure tube better

## 2021-06-09 NOTE — PROGRESS NOTES
07/09/20 0223   Patient Data   Vt (observed, mL) 405 mL   Minute Ventilation (L/min) 9.5 L/min   Total Resp Rate  22 BPM   PIP Observed (cm H2O) 29 cm H2O   MAP (cm H2O) 19   Auto/Intrinsic PEEP Observed (cm H2O) 0.8 cm H2O   Plateau Pressure (cm H2O) 26 cm H2O   Dynamic Compliance (L/cm H2O) 34.3 L/cm H2O   Airway Resistance 14.2

## 2021-06-09 NOTE — PLAN OF CARE
During positional changes, the Cortrax tube pulled back to 65 cm.  Spoke with MD at bedside.  Received order as well as contacting CRNA for possible placement of feeding tube.  Held TF and will continue to monitor pt.

## 2021-06-09 NOTE — PROGRESS NOTES
FERN COVID RT PROGRESS NOTE    DATA:    VENT DAY#  15    CURRENT SETTINGS:  VENT SETTINGS   Ac 18, 430, peep 12, 50%            PATIENT PARAMETERS:    PIP 25  Pplat:  25  Pmean:  16  SBT: n/a  SECRETIONS:  Small to moderate white thick  02 SATS:  96%    ETT SIZE 8.0  Secured at  24 cm at teeth    Respiratory Medications: none    AB:35:  7.39/48/135/28/99%    NOTE / PLAN:   Pt placed supine at 10:45.  No plan to prone at this time.

## 2021-06-09 NOTE — PROGRESS NOTES
Clinical Nutrition Follow Up Note    Update per chart review/care rounds today: pt currently proning. Continues propofol - discussed elevated triglycerides. Rectal tube discontinued.    Current Nutrition Prescription:   Diet: TF: Isosource 1.5 @ 30 ml/hr (increased 7/6 with plan to taper Propofol)   Supplement/modulars: no carb prosource 2 pkt TID  Flush order: water 60 ml q 4 hrs    IV dextrose or Fluids:fentaNYL citrate (PF), Last Rate: 350 mcg/hr (07/07/20 0800)  midazolam, Last Rate: 15 mg/hr (07/07/20 0800)  norepinephrine, Last Rate: Stopped (07/07/20 0015)  propofol, Last Rate: 9.939 mcg/kg/min (07/07/20 0800)  sodium chloride 0.9%, Last Rate: 10 mL/hr (07/07/20 0800)  vecuronium, Last Rate: 1.2 mcg/kg/min (07/07/20 0825)      Current Nutrition Intake:  Enteral nutrition access is a nasal gastric tube, with a placement date of 6/22/2020 & verified in place per x-ray on 6/23.   TF as prescribed provides: 720 ml, 1440 kcal (+ Propofol), 139 g protein, 127 g cho, 11 g fiber, 547 ml free water + 60 ml H20 flush q 4 hours to prevent FT clogging = 907 ml total H20.    IVF provided 3163 ml yesterday  Propofol provided 683 lipid Cals past 24 hrs.  Current rate decreased & would provide 182 Cals/24 hrs.     Within goal range to meet nutrition needs from all sources with current Propofol rate..    Anthropometrics:  Height: 5'5  Admit wt: 274 lb 4 oz,6/22/20  Weight: (!) 284 lb 9.8 oz (129.1 kg),7/5/20. Fluid up. +1 edema all extremities.  BMI:42.43  BMI indication: >40 extreme obesity (class 3)  Ideal body weight: 136 lb(+or-10%)  Usual body weight: unable to obtain currently  Weight History:268 lb(6/22/20)  Recent wt's:263 lb(6/25), 264 lb(6/28),254 lb(6/29)    GI Status/Output:   GI symptoms include:   Bowel sounds hypoactive, abd rounded per RN  Rectal tube: 900 ml/day past 24 hrs. RT now d/c'd. Laxatives d/c'd.  Pressor stopped today.    Medications:  Medications reviewed.  Note lantus & SS Ins, IV abx, mvi  w/minerals, oxycodone q 6 hrs    Labs:  Labs reviewed  Lab Results   Component Value Date/Time    ALBUMIN 2.1 (L) 07/06/2020 03:52 AM     07/07/2020 03:32 AM    K 4.1 07/07/2020 03:32 AM    BUN 17 07/07/2020 03:32 AM    CREATININE 0.48 (L) 07/07/2020 03:32 AM     07/07/2020 03:32 AM    PHOS 3.4 07/07/2020 03:32 AM    MG 2.1 07/07/2020 03:32 AM    CRP 2.1 (H) 06/26/2020 04:22 AM     Trig 749 (7/7/20). Hope to taper Propofol.  FSBG  mg/dL in the past 24 hrs. Trending lower - lantus was decreased per provider.     Estimated Nutrition Needs:  Assessment weight is 121.8 kg, estimated weight    Energy Needs: 1651-6818 kcals daily, 11-14 kcal/kg  Protein Needs: 124-155 g daily, 2-2.5 g/kg.(dose: 62 kg IBW)  Fluid Needs: 6862-3605 mls daily, 25-35 mls/kg(dose wt: 77 kg adj.wt)    Malnutrition: protein calorie malnutrition noted per doctor     Nutrition Risk Level: high risk     Nutrition dx:   Swallowing difficulty r/t respiratory failure as evidenced by intubated,NPO.   Altered GI function r/t critical illness evidenced by high output rectal tube.     Goals:   Meet estimated nutrition needs and Tolerate tube feeding-progressing  BG  - progressing  D/c RT; <3 loose stools/day; more formed BM's. Progressing      Plan:   Continue current TF rate. Monitor Propofol level & adjust TF accordingly.     Monitoring:  Per goals

## 2021-06-09 NOTE — PROGRESS NOTES
Assumed careof pt. Awake on ventilator with mild agitation. Versed, fentenyl, levophed drip in place. Plan of care explained.

## 2021-06-09 NOTE — PROGRESS NOTES
FERN COVID RT PROGRESS NOTE    DATA:    VENT DAY 20    CURRENT SETTINGS: A/C   VENT SETTINGS   26/350/14        FIO2:   50    PATIENT PARAMETERS:    PIP 24  Pplat:  20  Pmean:  17  SBT: none  SECRETIONS:  Large thick tan  02 SATS:  96    ETT SIZE 8  Secured at  24 cm at teeth    Respiratory Medications: None     ABG:    Ref. Range 7/11/2020 12:12   pH, Arterial Latest Ref Range: 7.37 - 7.44  7.48 (H)   pCO2, Arterial Latest Ref Range: 35 - 45 mm Hg 48 (H)   pO2, Arterial Latest Ref Range: 80 - 90 mm Hg 77 (L)       NOTE / PLAN:   To remain on ventilatory support for overnight.7/11/2020  .Wei Patel

## 2021-06-09 NOTE — PLAN OF CARE
Problem: Inadequate Airway Clearance  Goal: Patient will maintain patent airway  Outcome: Progressing     Problem: Mechanical Ventilation  Goal: Patient will maintain patent airway  Outcome: Progressing     Problem: Mechanical Ventilation  Goal: ET tube will be managed safely  Outcome: Progressing     Problem: Inadequate Gas Exchange  Goal: Patient is adequately oxygenated and ventilation is improved  Outcome: Progressing

## 2021-06-09 NOTE — PROGRESS NOTES
COVID RT PROGRESS NOTE    DATA:    VENT DAY# 26    CURRENT SETTINGS:  VENT SETTINGS     Vent Mode: AC  FiO2 (%):  [50 %] 50 %  S RR:  [16] 16  S VT:  [350 mL] 350 mL  PEEP/CPAP (cm H2O):  [14 cm H2O] 14 cm H2O  Minute Ventilation (L/min):  [7.6 L/min-9 L/min] 9 L/min  PIP:  [15 cm H2O-19 cm H2O] 17 cm H2O  MAP (cm H2O):  [14-15] 14          FIO2:  50%    PATIENT PARAMETERS:    PIP 17  Pplat: 11  Pmean: 14  SBT: None  SECRETIONS: Thick [place]att  02 SATS: 95%    ETT SIZE 8.0  Secured at  24 cm at teeth    Respiratory Medications: Albuterol Q6 Mucomyst Q6    ABG:     Results for RJ GUADALUPE (MRN [id number]) as of 7/17/2020 06:25   Ref. Range 7/17/2020 05:24   pH, Arterial Latest Ref Range: 7.37 - 7.44  7.41   pCO2, Arterial Latest Ref Range: 35 - 45 mm Hg 50 (H)   pO2, Arterial Latest Ref Range: 80 - 90 mm Hg 88   Bicarbonate, Arterial Calc Latest Ref Range: 23.0 - 29.0 mmol/L 29.9 (H)   O2 Sat, Arterial Latest Ref Range: 96.0 - 97.0 % 97.1 (H)     NOTE / PLAN: Patient on vent settings stated above tolerating well. Patient Q2 head turn became difficult throughout the night due to patient sedation. Patient currently prone. Will continue to monitor vent and oxygenation.                 Patient brought back to room. Reviewed screening/triage note.     Triage notes:   Patient presents to the urgent care with a complaint of sinus problem for \"the last couple days\" last Saturday?    Symptoms present: tightness in neck as well as facial pressure    Denies: headaches    Recent ABX use: keflex currently    OTC medications: flonase    Work note: no    Contacted PCP: yes

## 2021-06-09 NOTE — PROGRESS NOTES
BIZ applied but difficult to maintain due to diaphoresis, readings decreased from 63-49 after propofol drip started

## 2021-06-09 NOTE — PROGRESS NOTES
CRITICAL CARE PROGRESS NOTE:    Assessment/Plan:  Tobias Palmer is a 54 year old male with a history of obesity (BMI 45) who was admitted and intubated on 6/21 at [facility] with COVID-19 pneumonia, transferred to [place] ICU on 6/22. Course complicated by ARDS and shock, requiring proning and paralytics.    24hr events: No acute events overnight, low grade fevers. Remains supine     Changes   -Lasix 40 this am, with additional dose most likely this afternoon   -Wean PEEP to 12  -Wean sedation preferably versed first with the addition of Dex added yesterday     NEURO:  Encephalopathy, requiring sedation/paralytic since admission     Continues to be off paralytic as off 7/11.     Cont Dex, fent, midaz for RASS -2 to -3    Cont two times a day seroquel.  Plan to wean Midaz first as tolerated     RESP:  ARDS 2/2 severe covid-19 infection c/b ARDS Intubated on 6/2    Off paralytic, remains supine for now tolerating well     Vent: 22/350/12/45  Plt 24 PIP 26     Titrate FiO2 for goal O2 sat 88-92%, PaO2 55 or greater    Weaning PEEP to 12 today    Pplat <30.     CV:  No known cardiac hx. No echo on file. He had afib/RVR during admission now on po amio. Circulatory shock - resolved.     MAP >65, currently not on pressors.    Tele monitoring    Lactate normal on 7/6, no need to trend further    Lasix 40mg x1 today for goal of -500/-1L    Cont po amio for now    GI:  No issues, yang TF. TG downtrending     Cont TF and advance as yang    Cont bowel regimen    Cont PPI for stress ulcer ppx    RENAL:  No issues, normal renal function and tolerating daily loop diuretic.    Cont Lasix as above for goal of -500/-1L    Maintain barlow    Avoid nephrotoxins.     ID:  Severe covid-19 infection with cytokine release.   - Inflammatory markers mildly elevated and/or improved.   -Quant gold, HBV, HCV testing negative.   -Col with yeast in sputum at risk for invasive candidiasis  - Beta-D glucan negative 6/29, less likely.  CRP, PCT negative.     S/p toci on 6/25 and 6/27    S/p CCP early in admission , ?6/25    S/p dexamethasone 6/22 - 6/29    S/p ivermectin x2 doses, strongy Ab was neg.     S/p meropenem x10 days    S/p remdisivir x10 days    Cont cefazolin for MSSA in sputum for 10 day course.    ID following, appreciate input    HEMATOLOGIC:  #Thromcbocytopenia continues to worsen to 87 today   - No clots. Doubt HIT as had been stable for many days prior to recently.     Trend plt count    Check peripheral blood smear    OK to cont LMWH, but will need to stop if plt count gets below 70K.     hgb >7 transfuse prn    ENDOCRINE:  No issues    FBSG checks, insulin SS/drip per ICU protocol    ICU PROPHYLAXIS:    Full code    DISPO/CODE STATUS: will update his wife later today. She has been getting daily updates.  May need to address trach soon.     FAMILY COMMUNICATION: as above    Lines/Drains/Tubes:  [place]  ETT 8.0 day 21  A-line right 6/22  PICC 6/26  NG tube  Rectal tube    Restraints  Progress Note  Restraint Application    I recognize that restraints are physical and/or chemical interventions intended to restrict a person's movements. Restraints are currently needed to ensure the safety of this patient and/or others. My clinical rationale appears below.    Category/Type of Restraint     Non Violent:  Soft limb restraint x2  --  Behavior  Pulling at tubes/lines  --  Root Cause of the Behavior  Sedation/intubation  --  Less-Restrictive Measures that Failed  Non Violent Measures:  Close Observation  --  Response to the Restraint  Patient unable to pull at tubes/lines  --  Criteria for Release from the Restraint  Patient calm and off sedation    Elsa Tan MICU MILKA   Discussed with Dr. Howard     Critical Care time spent >40min     Objective:  Physical Exam:  Vent settings for last 24 hours:  Vent Mode: AC  FiO2 (%):  [45 %] 45 %  S RR:  [16-22] 16  S VT:  [350 mL] 350 mL  PEEP/CPAP (cm H2O):  [12 cm H2O-14 cm H2O] 12 cm  "H2O  Minute Ventilation (L/min):  [8 L/min-11 L/min] 10.2 L/min  PIP:  [15 cm H2O-27 cm H2O] 15 cm H2O  MAP (cm H2O):  [12-18] 12    /86 (Patient Position: Lying)   Pulse 90   Temp 100.4  F (38  C) (Oral)   Resp 22   Ht 5' 5\" (1.651 m)   Wt (!) 270 lb 15.1 oz (122.9 kg)   SpO2 92%   BMI 45.09 kg/m      Intake/Output last 3 shifts:  I/O last 3 completed shifts:  In: 2340.5 [I.V.:620.5; NG/GT:1420; IV Piggyback:300]  Out: 2965 [Urine:2665; Stool:300]  Intake/Output this shift:  I/O this shift:  In: 128.3 [I.V.:38.3; NG/GT:90]  Out: 575 [Urine:575]    Physical Exam  Gen: intubated, sedated, not parlayzed; eyes open staring up but not responding or following commands.   HEENT: no OP lesions, no DIANN  CV: RRR, no m/g/r  Resp: coarse ant no wheezing.   Abd: soft, nontender, BS+  Neuro: PERRL, nonfocal  Ext: no edema    LAB:  Results from last 7 days   Lab Units 07/13/20  0437   LN-WHITE BLOOD CELL COUNT thou/uL 10.8   LN-HEMOGLOBIN g/dL 10.8*   LN-HEMATOCRIT % 35.0*   LN-PLATELET COUNT thou/uL 87*     Results from last 7 days   Lab Units 07/13/20  0437 07/12/20  0402 07/11/20  0527   LN-SODIUM mmol/L 141 141 142   LN-POTASSIUM mmol/L 3.9 4.0 3.8   LN-CHLORIDE mmol/L 103 104 102   LN-CO2 mmol/L 32* 34* 36*   LN-BLOOD UREA NITROGEN mg/dL 21 24* 25*   LN-CREATININE mg/dL 0.54* 0.57* 0.58*   LN-CALCIUM mg/dL 8.0* 7.9* 8.0*       Micro  PCT neg since 6/22  CRP neg, down from 12.6 on 6/22  Ferritin 642 on 6/26, down from 795 on 6/22   on 6/26, down from 722 on 6/22  D-dimer 0.82 on 6/26, up from 0.46 on 6/22  Fibrinogen 505 on 6/26  IL-6 42 on 6/22, not rechecked.    Sputum 7/4 MSSA  Sputum 6/24 yeast  Blood neg  Fungal cx pending  Urine NG    Current Facility-Administered Medications   Medication Dose Route Frequency Provider Last Rate Last Dose     acetaminophen 160 mg/5 mL solution 1,000 mg (TYLENOL)  1,000 mg Oral Q6H PRN Татьяна Carey MD   1,000 mg at 07/10/20 3522     amino acids-protein hydrolys " 15-60 gram-kcal/30 mL packet 2 packet (ProSource No Carb)  2 packet Enteral Tube TID Татьяна Carey MD   2 packet at 07/13/20 1601     amiodarone tablet 200 mg (PACERONE)  200 mg Enteral Tube DAILY Shikha Bardy CNP   200 mg at 07/13/20 0854     benzocaine-menthoL lozenge 1 lozenge (CEPACOL)  1 lozenge Oral Q1H PRN Elsa Tan CNP         bisacodyL suppository 10 mg (DULCOLAX)  10 mg Rectal Daily PRN Elsa Tan CNP   10 mg at 06/24/20 1608     ceFAZolin 2 g in 100 mL in D5W (ANCEF)  2 g Intravenous Q8H Raphael Mantilla MD   2 g at 07/13/20 0857     chlorhexidine 0.12 % solution 15 mL (PERIDEX)  15 mL Topical Q12H Elsa Tan CNP   15 mL at 07/13/20 0855     dexmedetomidine (PRECEDEX) 400 mcg/100 mL in NS (4 mcg/mL) infusion  0.1-1.5 mcg/kg/hr Intravenous Continuous Niko Saldana MD 3.15 mL/hr at 07/13/20 1600 0.1 mcg/kg/hr at 07/13/20 1600     dextrose 50 % (D50W) syringe 20-50 mL  20-50 mL Intravenous Q15 Min PRN Elsa Tan CNP         enoxaparin ANTICOAGULANT syringe 60 mg (LOVENOX)  60 mg Subcutaneous BID Elsa Tan CNP   60 mg at 07/13/20 0856     fentaNYL 2500 mcg/50 mL CADD infusion (50 mcg/mL)   mcg/hr Intravenous Continuous Elsa Tan CNP 6 mL/hr at 07/13/20 1600 300 mcg/hr at 07/13/20 1600     fentaNYL pf injection  mcg (SUBLIMAZE)   mcg Intravenous Q2H PRN Hua Robert MD   50 mcg at 07/13/20 0445     glucagon (human recombinant) injection 1 mg  1 mg Subcutaneous Q15 Min PRN Elsa Tan CNP         insulin aspart U-100 injection (NovoLOG)   Subcutaneous Q6H FIXED TIMES Esmer Willis CNP         labetaloL injection 10-20 mg (NORMODYNE,TRANDATE)  10-20 mg Intravenous Q2H PRN Esmer Willis CNP   20 mg at 07/12/20 1009     Lactobacillus rhamnosus GG 15 Billion cell 1 capsule (CULTURELLE)  1 capsule Enteral Tube BID Niko Saldana MD   1 capsule at 07/13/20 0855     lipase-protease-amylase 5,000-17,000- 24,000 unit  capsule 2 capsule (ZENPEP)  2 capsule Enteral Tube PRN Leventhal, Thomas Michael, MD        And     sodium bicarbonate tablet 325 mg  325 mg Enteral Tube PRN Leventhal, Thomas Michael, MD         LORazepam 0.5-1 mg injection (diluted concentration)  0.5-1 mg Intravenous Q8H PRN Shikha Brady CNP         magnesium hydroxide suspension 30 mL (MILK OF MAG)  30 mL Oral Daily PRN Elsa Tan CNP   30 mL at 06/24/20 1608     metoprolol tartrate injection 5 mg (LOPRESSOR)  5 mg Intravenous Q4H PRN Perlman, David M, MD   5 mg at 06/29/20 0425     midazolam (PF) injection 2 mg (VERSED)  2 mg Intravenous Q1H PRN Elsa Tan CNP   2 mg at 07/12/20 2029     midazolam 100 mg/100 mL (1 mg/mL) infusion (VERSED)  0.25-15 mg/hr Intravenous Continuous Esmer Willis CNP 10 mL/hr at 07/13/20 1600 10 mg/hr at 07/13/20 1600     multivit-min-ferrous gluconate 9 mg iron/15 mL Liqd 9 mg of iron  15 mL Enteral Tube DAILY Leventhal, Thomas Michael, MD   9 mg of iron at 07/13/20 0855     naloxone injection 0.2-0.4 mg (NARCAN)  0.2-0.4 mg Intravenous PRN Elsa Tan CNP        Or     naloxone injection 0.2-0.4 mg (NARCAN)  0.2-0.4 mg Intramuscular PRN Elsa Tna CNP         norepinephrine 4 mg/250 mL in NS (16 mcg/mL)  0.01-0.4 mcg/kg/min Intravenous Continuous Татьяна Carey MD   Stopped at 07/07/20 2020     omeprazole capsule 20 mg (PriLOSEC)  20 mg Oral QAM Elsa Chavez CNP   20 mg at 07/05/20 0839    Or     omeprazole suspension 20 mg (PriLOSEC)  20 mg Enteral Tube QAM Elsa Chavez CNP   20 mg at 07/13/20 0609    Or     pantoprazole 40 mg injection  40 mg Intravenous QAM Elsa Chavez CNP         ondansetron injection 4 mg (ZOFRAN)  4 mg Intravenous Q4H PRN Elsa Tan CNP        Or     ondansetron tablet 8 mg (ZOFRAN)  8 mg Oral Q8H PRN Elsa Tan CNP         oxyCODONE immediate release tablet 10 mg (ROXICODONE)  10 mg Enteral Tube Q4H Esmer Willis CNP   10 mg at  07/13/20 1602     oxyCODONE immediate release tablet 5-10 mg (ROXICODONE)  5-10 mg Oral Q4H PRN de La Raiza Leroyl CNP   10 mg at 07/05/20 1757     polyvinyl alcohol 1.4 % ophthalmic solution 1-2 drop (LIQUIFILM TEARS)  1-2 drop Both Eyes Q1H PRN Elsa Tan CNP         QUEtiapine tablet 50 mg (SEROquel)  50 mg Oral Q8H Esmer Willis CNP   50 mg at 07/13/20 1602     sodium chloride 0.9%  10 mL/hr Intravenous Continuous de La MaterRaizal, CNP 10 mL/hr at 07/13/20 1200 10 mL/hr at 07/13/20 1200     sodium chloride bacteriostatic 0.9 % injection 1-5 mL  1-5 mL Intradermal Once PRN Juan F Max PA-C         sodium chloride flush 10-20 mL (NS)  10-20 mL Intravenous PRN Burak Moss MD   10 mL at 06/30/20 1614     sodium chloride flush 10-30 mL (NS)  10-30 mL Intravenous PRN Burak Moss MD         sodium chloride flush 10-30 mL (NS)  10-30 mL Intravenous Q8H FIXED TIMES Burak Moss MD   10 mL at 07/13/20 0021     sodium chloride flush 20 mL (NS)  20 mL Intravenous PRN Burak Moss MD         white petrolatum-mineral oiL 83-15 % ophthalmic ointment 1 application (LUBRIFRESH PM)  1 application Both Eyes Q8H Shikha Brady CNP   1 application at 07/13/20 1603

## 2021-06-09 NOTE — PROGRESS NOTES
[facility] COVID RT PROGRESS NOTE    DATA:    VENT DAY#  29    CURRENT SETTINGS:  VENT SETTINGS   VC-AC RR 16//+8        FIO2:   35%    PATIENT PARAMETERS:    PIP 16  Pplat:  15  Pmean: 11  SBT: Patient has been on SBT since  35% RSBI 34-68.  Will pass onto night shift to switch to resting settings overnight.    SECRETIONS:  Moderate amount of tan thick secretions   02 SATS:  93-98%    ETT SIZE 8.0  Secured at  24 cm at teeth    Respiratory Medications: DUO-NEB and MUCOMYST every 12 hours     AB20 at 0600- 7.47/38/77/27.8 on 35% for a P/F ratio of 220    NOTE / PLAN:   Continue PS trials as tolerated.      DEBORAH SyedT

## 2021-06-09 NOTE — PROGRESS NOTES
COVID RT PROGRESS NOTE    DATA:    VENT DAY#  5    CURRENT SETTINGS:  VENT SETTINGS   Vent Mode: AC  FiO2 (%):  [55 %-100 %] 65 %  S RR:  [20] 20  S VT:  [450 mL] 450 mL  PEEP/CPAP (cm H2O):  [20 cm H2O] 20 cm H2O  Minute Ventilation (L/min):  [8.2 L/min-10.8 L/min] 10.8 L/min  PIP:  [31 cm H2O-34 cm H2O] 34 cm H2O  MAP (cm H2O):  [24-27] 24        FIO2:   65%    PATIENT PARAMETERS:    PIP 34  Pplat:  30  Pmean:  24  SBT: n/a  SECRETIONS:  Scant white clear  02 SATS:  97%    ETT SIZE 8.0  Secured at  23 cm at teeth    Respiratory Medications: Veletri continuous     ABG: Results for RJ GUADALUPE (MRN [id number]) as of 6/25/2020 06:10   Ref. Range 6/25/2020 04:40   pH, Arterial Latest Ref Range: 7.37 - 7.44  7.39   pCO2, Arterial Latest Ref Range: 35 - 45 mm Hg 41   pO2, Arterial Latest Ref Range: 80 - 90 mm Hg 104 (H)   Bicarbonate, Arterial Calc Latest Ref Range: 23.0 - 29.0 mmol/L 24.5   O2 Sat, Arterial Latest Ref Range: 96.0 - 97.0 % 97.9 (H)   Oxyhemoglobin Latest Ref Range: 96.0 - 97.0 % 96.2       NOTE / PLAN:  Patient on above vent settings tolerating well RT will continue to monitor and wean as tolerated.

## 2021-06-09 NOTE — PROGRESS NOTES
2000- Informed MD of increasing temp    0000- Patient became agitated and destating requiring more sedation. Called MD with orders to restart versed gtt if needed. Versed gtt restarted but now at 0600 turned back off. Fent and dex increased at this time, also weaned back down.     0100-Patient continue to have intermittent episodes of desaturation. MD called with orders to increase PEEP and to titrate fi02 to keep sats >92. No other orders given. RT went into room and lavaged patient and removed copious amounts of sputum which helped. Fi02 and peep able to be weaned back down.

## 2021-06-09 NOTE — PROGRESS NOTES
07/24/20 0701   Vent Information   Interface Invasive   Vent Settings   Resp Rate (Set) 16   FiO2 (%) 35 %   Vt (Set, mL) 450 mL   PEEP/CPAP (cm H2O) 8 cm H2O   Patient Data   Vt Exp (mL) 444 mL   Minute Ventilation (L/min) 14.5 L/min   Total Resp Rate  30 BPM   PIP Observed (cm H2O) 14 cm H2O   MAP (cm H2O) 8.7   Plateau Pressure (cm H2O) 11 cm H2O   SpO2 100 %   Heart Rate 92

## 2021-06-09 NOTE — PROGRESS NOTES
07/03/20 0301   Patient Data   Vt Exp (mL) 406 mL   Minute Ventilation (L/min) 8 L/min   Total Resp Rate  18 BPM   PIP Observed (cm H2O) 41 cm H2O   MAP (cm H2O) 13   Plateau Pressure (cm H2O) 18 cm H2O

## 2021-06-09 NOTE — PROGRESS NOTES
[place] COVID19  CRITICAL CARE  PROGRESS NOTE:    ICU PROGRESS NOTE:  DOS:  07/03/2020    Patient Summary:   Tobias Palmer is a 54 year old male with a history of obesity (BMI 45) who was admitted and intubated on 6/21 at [facility] with COVID-19 pneumonia, transferred to [place] ICU on 6/22. Course complicated by ARDS and shock, requiring proning and paralytics.     Last 24 hours  - Tmax improved. Yesterday, hypotensive, febrile and with GPC and PML in blood. Vanc started and Cefepime transitioned to Jessica. Hypotensive requiring intermittent pressors.    Major Changes for Today    - Attempt Precedex initiation in hopes of weaning off versed  - DC insulin gtt  - Levo as needed for BP  - Goal RASS 0 to -1  - Wean peep to 5 mm hg. today  - Hold PM Lasix given hypernatremia, adequate UOP. May consider increasing FWF. UOP adequatye, 2 L out, net positive 124 cc still, net negative 2 L since admission    Assessment/Plan:  # Acute Pain  # Agitation/Anxiety  # Sedation  - Continue fentanyl  - Add precedex in hopes to wean down/off Versed.  - Rass 0 to -1  - continue scheduled oxycodone + PRN  - continue Seroquel     CVS:  #Atrial fibrillation with RVR 6/28 in the setting of critical illness.    # Shock, septic- intermittently requiring vasopressors  - Was initially on Amio gtt, transitioned to PO. Started on PO metop, held currently in the setting of shock  - Continue Amio PO for now, especially given holding BB in the setting of shock.   - Low chads. Unlikely will need AC.   - Could consider echo if recurrs     RESP:  # Acute hypoxic respiratory failure in the setting of COVID-19 infection.  - Wean vent as able.   - Daily abg     GI/FEN:  # Protein calorie malnutrition  # Hypoalbuminemia  - Tube feeds @ goal  - PPI  - Bowel regimen     RENAL :  # Rhabdomyolysis, resolved.   #Lactic acidosis, resolved  # Hypernatremia.  - CK peaked to 1864 at [facility];  6/25 on . No further trending necessary  - Creatinine  "normalized   - Diuresis started yesterday. Hold afternoon diuresis given hypernatremia, essentially net even.     ID:  # COVID-19 infection  #Persistent fevers   # Leukocytosis  - Tmax improved today  - Remdesivir, started 6/22 for 10 day course  - s/p CCP and tocilizumab 6/25 and 6/27  - S/p Ivermectin x2 doses. Stronglyloides antibody negative.   - Continue Jessica for 10 day course  - beta-D-glucan pending today(7/2)    HEMATOLOGIC:  # Anemia of critical illness  - Hgb stable     # Coagulopathy due to COVID-19 infection  - Lovenox 60 mg BID   - US BUE and BLE given persistent fevers, negative for DVT.      ENDOCRINE:  # Stress hyperglycemia  #Steroid induced hyperglycemia  - Keep BG< 180 for optimal healing  - Insulin gtt DC'  - Lantus  - SSI   - Dexamethasone done-completed 7 days     DISPO: ICU     GENERAL CARES:  DVT proph: Yes.  GI proph: Yes.  Requires barlow for strict I&O: Yes  Restraints required for safety: Yes.    Objective:  Physical Exam:  Vent settings for last 24 hours:  Vent Mode: AC  FiO2 (%):  [55 %-70 %] 70 %  S RR:  [18] 18  S VT:  [430 mL] 430 mL  PEEP/CPAP (cm H2O):  [8 cm H2O] 8 cm H2O  Minute Ventilation (L/min):  [7.7 L/min-8.1 L/min] 7.7 L/min  PIP:  [19 cm H2O-45 cm H2O] 45 cm H2O  MAP (cm H2O):  [8.7-14] 14    /82   Pulse 73   Temp 100.5  F (38.1  C) (Esophageal)   Resp 18   Ht 5' 5\" (1.651 m)   Wt (!) 255 lb (115.7 kg)   SpO2 98%   BMI 42.43 kg/m      Intake/Output last 3 shifts:  I/O last 3 completed shifts:  In: 2494.1 [I.V.:674.1; NG/GT:1820]  Out: 2370 [Urine:2070; Stool:300]  Intake/Output this shift:  No intake/output data recorded.    Physical Exam  Neuro: sedated, moving head side to side, not yet tracking. PERRl  HEENT: NC PERRL OETT NGT   RESP: cta   CV: RRR  GI: obese, soft + bs  MSK: no e/c/c    LAB:  Results from last 7 days   Lab Units 07/03/20  0315   LN-WHITE BLOOD CELL COUNT thou/uL 9.4   LN-HEMOGLOBIN g/dL 12.6*   LN-HEMATOCRIT % 41.2   LN-PLATELET COUNT " thou/uL 160     Results from last 7 days   Lab Units 07/03/20  0315 07/02/20  0502 07/01/20  0625  06/27/20  1108   LN-SODIUM mmol/L 146* 143 142   < >  --    LN-POTASSIUM mmol/L 3.9 4.3 4.1   < >  --    LN-CHLORIDE mmol/L 111* 110* 106   < >  --    LN-CO2 mmol/L 30 28 29   < >  --    LN-BLOOD UREA NITROGEN mg/dL 30* 32* 38*   < >  --    LN-CREATININE mg/dL 0.66* 0.69* 0.73   < >  --    LN-CALCIUM mg/dL 7.2* 7.4* 7.5*   < >  --    LN-PROTEIN TOTAL g/dL 5.2*  --   --   --  6.5   LN-BILIRUBIN TOTAL mg/dL 0.4  --   --   --  0.4   LN-ALKALINE PHOSPHATASE U/L 45  --   --   --  50   LN-ALT (SGPT) U/L 41  --   --   --  53*   LN-AST (SGOT) U/L 33  --   --   --  54*    < > = values in this interval not displayed.     Staffed and discussed with Dr. Татьяна Brady, CNP     Total time 40  mins    I left a VM  with pt's son Carlton and spoke to daughter.

## 2021-06-09 NOTE — PROGRESS NOTES
Infectious Disease Progress Note    Assessment/Plan  Impression:  COVID-19 pneumonia   Received CCP. Received dexamethasone (6/22-29) and remdesivir for 10 day course  Received 400 mg tocilizumab on 6/25, 6/27 for suspected cytokine release syndrome.     Quantiferon gold negative  Hep B and C negative  Strongyloides antibody negative -- no need for further ivermectin    Ongoing fever and leukocytosis. Steroid may contribute to leukocytosis--> now normalized. No fever for about 2 days. Still having intermittent low grade temps    Colonized with yeast in sputum. Risk for invasive candidiasis -- broad spectrum antibiotics, central line, candida colonization. Beta-D-glucan negative 6/29 -- less likely therefore that he has invasive candidiasis.     Now with MSSA in sputum as of 7/2, 7/4 -- could be the cause of pneumonia. On coverage since 6/27, now focused to cefazolin.  Ongoing fevers despite good antibiotic coverage suggests fevers are due to another cause.     Recommend:    Cefazolin, last day today  Repeat blood cultures if fever >101        Principal Problem:    COVID-19  Active Problems:    Shock (H)    Acute and chronic respiratory failure with hypoxia (H)    On mechanically assisted ventilation (H)    ARDS (adult respiratory distress syndrome) (H)    Atrial fibrillation with rapid ventricular response (H)    Atrial fibrillation with rapid ventricular response (H)    Encephalopathy    Herb Sweet MD  [facility]  Direct messaging: [email or web address] Paging  On-Call ID provider: [phone], option: [phone]    ______________________________________________________________________       Subjective  No events. Remains intubated and sedated. Low-grade temps. Vent settings stable     Objective    Anti-infectives:  Cefazolin 7/7-  Meropenem 7/2-7  Ceftriaxone 6/22  Ivermectin 6/25-26  Cefepime 6/27-7/2  Vancomycin 6/27-7    Vital signs in last 24 hours  Temp:  [99.2  F (37.3  C)-100.4  F (38  C)] 99.2  F  (37.3  C)  Heart Rate:  [] 71  Resp:  [17-26] 17  BP: ()/(50-86) 112/54  Arterial Line BP: (104-252)/(-7-74) 104/49  FiO2 (%):  [45 %] 45 %  Weight:   (!) 272 lb 11.3 oz (123.7 kg)    Intake/Output last 3 shifts  I/O last 3 completed shifts:  In: 1768 [I.V.:472; NG/GT:1096; IV Piggyback:200]  Out: 3275 [Urine:2475; Stool:800]  Intake/Output this shift:  I/O this shift:  In: 409.6 [I.V.:319.6; NG/GT:90]  Out: 650 [Urine:650]    Review of Systems   Review of systems not obtained due to inability to communicate with the patient.     Physical Exam--  Limited exam to conserve PPE. Patient seen from window. See intensivist note for full exam.  Intubated, supine, no distress.     Pertinent Labs   Lab Results: personally reviewed.     Results from last 7 days   Lab Units 07/14/20  0402 07/13/20  0437 07/12/20  1225   LN-WHITE BLOOD CELL COUNT thou/uL 9.3 10.8 10.2   LN-HEMOGLOBIN g/dL 11.3* 10.8* 10.5*   LN-HEMATOCRIT % 35.9* 35.0* 34.1*   LN-PLATELET COUNT thou/uL 94* 87* 98*        Results from last 7 days   Lab Units 07/14/20  0402 07/13/20  0437 07/12/20  0402   LN-SODIUM mmol/L 142 141 141   LN-POTASSIUM mmol/L 3.7 3.9 4.0   LN-CHLORIDE mmol/L 104 103 104   LN-CO2 mmol/L 31 32* 34*   LN-BLOOD UREA NITROGEN mg/dL 21 21 24*   LN-CREATININE mg/dL 0.57* 0.54* 0.57*   LN-CALCIUM mg/dL 8.3* 8.0* 7.9*     Lab Results   Component Value Date    ALT 27 07/06/2020    AST 30 07/06/2020    ALKPHOS 44 (L) 07/06/2020    BILITOT 0.3 07/06/2020     Micro:  6/22 blood negative  6/24 sputum usual bruno and yeast  6/27 blood including fungal isolate no growth to date   6/27 sputum gram stain with yeast; culture usual bruno  7/2 sputum MSSA  7/2 blood culture negative to date  7/4 sputum MSSA    Results for RJ GUADALUPE (MRN [id number]) as of 6/26/2020 11:04   Ref. Range 6/24/2020 16:46   Hepatitis B Surface Ag Latest Ref Range: Negative  Negative   Hepatitis C Ab Latest Ref Range: Negative  Negative   Results for BENJAMIN  RJ LOVELL (MRN [id number]) as of 6/26/2020 11:04   Ref. Range 6/23/2020 04:26 6/24/2020 05:24 6/24/2020 16:46 6/25/2020 04:40 6/26/2020 04:22   CRP Latest Ref Range: 0.0 - 0.8 mg/dL 12.0 (H) 10.0 (H)  4.2 (H) 2.1 (H)   LD (LDH) Latest Ref Range: 125 - 220 U/L 480 (H) 477 (H)  434 (H) 452 (H)     Pertinent Radiology   Radiology Results: Personally reviewed image/s and Personally reviewed impression/s    No results found.

## 2021-06-09 NOTE — PROGRESS NOTES
07/12/20 1956   Patient Data   Vt (observed, mL) 402 mL   Minute Ventilation (L/min) 11 L/min   Total Resp Rate  22 BPM   PIP Observed (cm H2O) 17 cm H2O   MAP (cm H2O) 15   Auto/Intrinsic PEEP Observed (cm H2O)   (Unable to obtain due to spontaneous patient effort.)   Plateau Pressure (cm H2O)   (Unable to obtain due to spontaneous patient effort.)   SpO2 98 %   Heart Rate 88   Patient assessed and appears to be comfortable and stable on current ventilator support. No changes made to ventilator support during day. Plan to maintain current support and wean as tolerated overnight.

## 2021-06-09 NOTE — PROGRESS NOTES
Spiritual Care Note    Spiritual Assessment: Followed-up with phone call to spouse via . She described feeling understandably worried about all the care that Tobias is requiring, but she remains hopeful for his recovery and return home. She noted that family members have been supportive and connected over the phone, but as they try to social distance they have not been visiting, and that has been quite difficult. She expressed appreciation for being able to see Tobias via ipad, and SH affirmed/encouraged her and family to see/speak to him.     Care Provided: Provided reflective conversation, empathic listening, and prayer.    Plan of Care: Spouse welcomed further  support per LOS. SH will continue to follow.

## 2021-06-09 NOTE — PROGRESS NOTES
Infectious Disease Progress Note    Assessment/Plan  Impression:  COVID-19 pneumonia   Received CCP. Started on dexamethasone (6/22-29) and remdesivir for 10 day course  Received 400 mg tocilizumab on 6/25, 6/27 for suspected cytokine release syndrome.     Quantiferon gold negative  Hep B and C negative  Strongyloides antibody negative -- no need for further ivermectin    Ongoing fever and leukocytosis. Steroid may contribute to leukocytosis.     Colonized with yeast in sputum. Risk for invasive candidiasis -- broad spectrum antibiotics, central line, candida colonization.     WBC trending down. Still low grade fever. Recent low procalcitonin on 6/27.     Recommend:    Monitor temp, WBC, cultures  Continuing cefepime and will stop vancomycin  D/c'd ivermectin order  Checking beta-D-glucan      Principal Problem:    COVID-19  Active Problems:    Shock (H)    Acute and chronic respiratory failure with hypoxia (H)    On mechanically assisted ventilation (H)    ARDS (adult respiratory distress syndrome) (H)    Atrial fibrillation with rapid ventricular response (H)    Atrial fibrillation with rapid ventricular response (H)    Raphael Mantilla MD  [facility]  [phone] [facility] paging  ______________________________________________________________________       Subjective  Intubated and sedated in ICU.Temp 100.8, off cooling blanket for now.       Objective    Anti-infectives:  Ceftriaxone 6/22  Ivermectin 6/25-26  Cefepime 6/27-  Vancomycin 6/27-    Vital signs in last 24 hours  Heart Rate:  [] 93  Resp:  [14-25] 19  BP: (108-145)/(55-82) 144/82  Arterial Line BP: (105-190)/(51-84) 122/55  FiO2 (%):  [50 %] 50 %  Weight:   (!) 254 lb 10.1 oz (115.5 kg)    Intake/Output last 3 shifts  I/O last 3 completed shifts:  In: 3808.4 [I.V.:978.4; NG/GT:1700; IV Piggyback:1130]  Out: 2715 [Urine:2715]  Intake/Output this shift:  No intake/output data recorded.    Review of Systems   Review  of systems not obtained due to inability to communicate with the patient. , otherwise negative.    Physical Exam--  General appearance: critically ill in ICU, on vent  Eyes: eyes closed.  Throat: oral ett inplace.  Neck: short neck-difficult to examine  tachy  Abdomen: not distended.  Extremities: extremities normal, atraumatic, no cyanosis or edema  Skin: Skin color, texture, turgor normal. No rashes or lesions  Neurologic: Mental status: sedated  PICC R UE  yen    Pertinent Labs   Lab Results: personally reviewed.     Results from last 7 days   Lab Units 07/01/20  0625 06/30/20  0407 06/29/20  0423   LN-WHITE BLOOD CELL COUNT thou/uL 12.5* 15.1* 18.5*   LN-HEMOGLOBIN g/dL 12.8* 13.2* 13.6*   LN-HEMATOCRIT % 41.2 42.4 44.4   LN-PLATELET COUNT thou/uL 141 165 198        Results from last 7 days   Lab Units 07/01/20  0625 06/30/20  0407 06/29/20  0423   LN-SODIUM mmol/L 142 140 144   LN-POTASSIUM mmol/L 4.1 4.0 4.3   LN-CHLORIDE mmol/L 106 105 110*   LN-CO2 mmol/L 29 30 27   LN-BLOOD UREA NITROGEN mg/dL 38* 38* 31*   LN-CREATININE mg/dL 0.73 0.71 0.71   LN-CALCIUM mg/dL 7.5* 7.5* 7.7*     Lab Results   Component Value Date    ALT 53 (H) 06/27/2020    AST 54 (H) 06/27/2020    ALKPHOS 50 06/27/2020    BILITOT 0.4 06/27/2020     Micro:  6/22 blood negative  6/24 sputum usual bruno and yeast  6/27 blood including fungal isolate no growth to date   6/27 sputum gram stain with yeast; culture usual bruno    Results for RJ GUADALUPE (MRN [id number]) as of 6/26/2020 11:04   Ref. Range 6/24/2020 16:46   Hepatitis B Surface Ag Latest Ref Range: Negative  Negative   Hepatitis C Ab Latest Ref Range: Negative  Negative   Results for RJ GUADALUPE (MRN [id number]) as of 6/26/2020 11:04   Ref. Range 6/23/2020 04:26 6/24/2020 05:24 6/24/2020 16:46 6/25/2020 04:40 6/26/2020 04:22   CRP Latest Ref Range: 0.0 - 0.8 mg/dL 12.0 (H) 10.0 (H)  4.2 (H) 2.1 (H)   LD (LDH) Latest Ref Range: 125 - 220 U/L 480 (H) 477 (H)  434 (H)  452 (H)     Pertinent Radiology   Radiology Results: Personally reviewed image/s and Personally reviewed impression/s    No results found.

## 2021-06-09 NOTE — PROGRESS NOTES
07/15/20 0710   Vent Settings   Resp Rate (Set) 16   FiO2 (%) 50 %   Vt (Set, mL) 350 mL   PEEP/CPAP (cm H2O) 12 cm H2O   I:E Ratio 1:1.7   Patient Data   Vt Exp (mL) 330 mL   Minute Ventilation (L/min) 9.7 L/min   Total Resp Rate  26 BPM   PIP Observed (cm H2O) 15 cm H2O   MAP (cm H2O) 12   Plateau Pressure (cm H2O) 15 cm H2O   SpO2 94 %   Heart Rate 77   Neb tx given, Sx large frothy tan secretions, oral care done.

## 2021-06-09 NOTE — PROGRESS NOTES
Physical Therapy     07/24/20 1500   Visit Specifics   Eval Type Initial eval   Restraint Reapplied  Yes  (B Soft wrist restraints)   Bed/Tabs/Pad Alarm Applied Yes   Subjective Patient Comments Pt is lethargic at eval and RN is present to assist with bed mobitlity.  RN reports that the pt has been nodding Y/N but does not participate with PT this PM.   General   Onset date 06/22/20   Additional Pertinent History Per H&P: Tobias Palmer is a 54 y.o. male with PMHx of obesity who presents in Transfer from [facility] on 6/22 after presentation on 6/21 with 3 days of fevers/cough, COVID +, rapid decompensation from HFNC to intubation.  Given 6mg IV dexamethasone.  Progressive hypoxemic respiratory prompted initiation of Flolan prior to transfer.   Chart Reviewed Yes   PT/OT Patient/Caregiver Stated Goals Unable to state   Family/Caregiver Present No   Treatment Time   Theraputic Activity 8   Home Living   Type of Home House   Home Layout One level;Laundry in basement;Able to live on main level with bedroom/bathroom   Additional Comments Rambler home with 2 MARY and no HR.  FOS to basement with HR.    Prior Status   Independent With All ADL's/IADL's   Prior Device Use None of the above   Indoor Mobility Independent   Lives With Spouse   Vocational Full time employment  (Line cook)   Cognition   Overall Cognitive Status Unable to assess   RLE Assessment   RLE Assessment   (ROM WNL; MMT: 0/5 at hip and knee, 1/5 ankle)   LLE Assessment   LLE Assessment   (ROM WNL; MMT: 0/5 at hip and knee, 1/5 ankle)   Sensory   Sensory Impairment   (NT due to impaired alertness)   Bed Mobility   Bed Mobility Rolling;Supine Scoot   Rolling Max assist to left;Assist of 2;Max assist to right   Supine to Sit Max assist;Assist of 2   Sit to Supine Max assist;Assist of 2   Supine Scoot Dependent;Assist of 2   Bed Mobility Comments Pt demos fair to poor head control and demos sig weakenss in trunk.    Endurance Deficit   Endurance  Deficit Yes   Endurance Deficit Description Pt intubated; prolonged hospitalization   Wheelchair Activities   Wheelchair Yes   Wheelchair Type Specialty tilt   Wheelchair Cushion Air floatation   Neuro Re-Ed   Neuro Re-Ed Static Sitting Balance   Balance   Sitting Balance Max assist;Dependent/total assist;Assist x2   Static Sitting Balance   Level of Assistance Max A;Dependent;Assist of 2   Location Edge of bed   Time 5 min   Cues Manual;Verbal   Patient Response  Pt demos no postural response to LOB when sitting.  Leans heavily posterior and to the left.   Fall Risk   Fall Risk High   Plan   Treatment/Interventions Functional transfer training;Gait/stair training;Neuromuscular re-ed;Strengthening/ROM   PT Frequency 4x/week  (Plan to increase as pt becomes more participatory)   Assessment   Prognosis Fair   Problem List Decreased strength;Decreased endurance;Impaired balance;Decreased mobility;Decreased cognition;Impaired judgement   Barriers to Discharge Inaccessible home environment   Recommendation   PT Discharge Recommendation TCU;Further Therapy Recommended   PT Equipment Recommendation Rolling walker / FWW   Treatment Suggestions for Next Session Bed mobility, supine exercises, sitting balance   PT Summary Pt is a 55 yo male with COVID and subsequent intubation and prolonged hospitalization.  At PT eval, the pt remains intubated and is very lethargic.  Note, the RN does report the pt has been more interactive earlier in the day and communicating with thumbs up.  This was not evident this PM and the pt was max-total A x 2 for bed mobility with minimal to no participation.  At baseline, the pt is independent with all mobility but at this time the pt is mostly dependent with the most basic of bed mobility and requires a ceiling lift transfer for safety.  The pt would benefit from skilled 1:1 PT to address his physical and functional impairments.      LTGS to be met by: 8/21/2020  In order to progress towards  home,  1. Patient will perform bed mobility with min assistance.  2. Patient will perform sit to/from stand and stand pivot transfers with min assistance.  3. Patient will ambulate 50ft with min assistance and least restrictive device and vitals WNL.  4. Patient will tolerate sitting EOB x 10 min, SBA and vitals WNL.    Goals were initiated on 7/24/2020 by Eli Ponce, PT.

## 2021-06-09 NOTE — PROGRESS NOTES
07/20/20 0816   Patient Data   PIP Observed (cm H2O) 22 cm H2O   MAP (cm H2O) 15   Auto/Intrinsic PEEP Observed (cm H2O) 1.7 cm H2O   Plateau Pressure (cm H2O)   (unable to obtain)

## 2021-06-09 NOTE — PROCEDURES
Central line    Date/Time: 6/22/2020 2:00 PM  Performed by: Elsa Tan CNP  Authorized by: Elsa Tan CNP       Universal Protocol    Site marked: Yes    Prior images obtained and reviewed: Yes    Required items: required blood products, implants, devices, and special equipment available    Patient identity confirmed: verbally with patient, arm band, provided demographic data, hospital-assigned identification number and anonymous protocol, patient vented/unresponsive    Reevaluation: Patient was reevaluated immediately before administering moderate or deep sedation or anesthesia    Confirmation checklist: patient's identity using two indicators, relevant allergies, procedure was appropriate and matched the consent or emergent situation and correct equipment/implants were available    Time out: Immediately prior to procedure a time out was called to verify the correct patient, procedure, equipment, support staff and site/side marked as required    Universal Protocol: Joint Commission Universal Protocol was followed    Preparation: Patient was prepped and draped in the usual sterile fashion  Indications  Indications: vascular access (Vasopressors )      Anesthesia  Local anesthesia used?: No    Sedation    Patient sedation: Yes (sedated on Fent/Versed gtts)    Vital signs: Vital signs monitored during sedationPreparation: skin prepped with 2% chlorhexidine  Sterile barriers: all five maximum sterile barriers used - cap, mask, sterile gown, sterile gloves, and large sterile sheet  Hand hygiene: hand hygiene performed prior to central venous catheter insertion  Location details: right internal jugularPatient position: flat  Catheter type: triple lumen  Pre-procedure: landmarks identified  Post-procedure: line sutured and dressing applied  Assessment: blood return through all ports,  placement verified by x-ray,  no pneumothorax on x-ray and free fluid flow      Post-procedure    Description of procedure:  Time out was called prior to procedure. Patient was prepped and draped in sterile fashion. Right IJ was visualized under ultrasound, vessel was accessed x1 attempt, with wire visualized within IJ via ultrasound. Small knick was made, dilator was passed, and catheter was threaded over wire. Wire removed intact, blood returned through all ports, and easily flushed. 20cm catheter was used, however only inserted to 15cm based on height. Line was sutured in place. Chest xray confirmed placement. Line is ok to use.     Patient tolerance: Patient tolerated the procedure well with no immediate complications   Length of time physician present for 1:1 monitoring during sedation: 0

## 2021-06-09 NOTE — PROGRESS NOTES
07/25/20 0735   Vent Settings   Resp Rate (Set) 16   FiO2 (%) 40 %   Vt (Set, mL) 450 mL   PEEP/CPAP (cm H2O) 5 cm H2O   I:E Ratio 1:1.9   Patient Data   Vt Exp (mL) 455 mL   Minute Ventilation (L/min) 10.3 L/min   Total Resp Rate  24 BPM   PIP Observed (cm H2O) 14 cm H2O   MAP (cm H2O) 8.1   Plateau Pressure (cm H2O) 16 cm H2O   SpO2 98 %   Heart Rate 74   Placed on SBT 5/5, 40% at 0748

## 2021-06-09 NOTE — PLAN OF CARE
Problem: Mechanical Ventilation  Goal: Patient will maintain patent airway  Outcome: Progressing     Problem: Inadequate Gas Exchange  Goal: Patient is adequately oxygenated and ventilation is improved  Outcome: Progressing

## 2021-06-09 NOTE — PROGRESS NOTES
"Wound Ostomy  WOC Assessment         Allergies:  No Known Allergies    Diagnosis:   Patient Active Problem List    Diagnosis Date Noted     On mechanically assisted ventilation (H)      ARDS (adult respiratory distress syndrome) (H)      COVID-19 06/22/2020     Shock (H)      Acute and chronic respiratory failure with hypoxia (H)        Height:  5' 5\" (1.651 m)    Weight:   (!) 263 lb 0.1 oz (119.3 kg)    Labs:  Recent Labs     06/25/20  0440   CRP 4.2*   HGB 13.6*       Bayron:  Bayron Scale Score: 12    Specialty Bed:  Specialty Bed: DolPsychiatric    Wound culture obtained: No    Edema:  Yes:  Localized    Anatomic Site/Laterality: Body    Reason for ongoing care:   Skin assessment and plan of care    Encounter Type:  Subsequent Encounter Skin integrity:   Patient with COVID-19, respiratory failure and morbid obesity.  Patient currently intubated and sedated.      Patient seen while supine. Anterior body appears intact. L cheek just superior to ETAD with small area of dried drainage measuring 0.5x0.5cm. appears like skin stretching/abrasion. Ok to leave open to air, cover with mepilex if patient prones again.     Prone Positioning recommendations:    Prior  Apply Mepilex sacral dressings for protection if pt has bony prominences (shoulders, iliac crest) - may leave in place when not proned.  Ensure tongue is in the mouth, remove bite block for pressure injury prevention.  Ensure no lines/tubes/drains (as much as able) will be under patient when prone.    Prone position  Use pillows or Z hernan pads to off load pressure to bony prominences.  Position head so that ears are not folded and so that head/neck are aligned.  Reposition head every one hour.  Use swim position per protocol for positioning when supine.    Mepilex dressings to cheeks when in prone position - may leave in place when not proned. Change every 5 days or sooner if soiled.    Prevention measures for mucosal integrity:  1. No bite block when proning. " (rationale - the hard plastic will leave the patient at greater risk for pressure injury)  2. Hourly head reposition and moisturizer to exposed portion of tongue (moisturizer which comes with the oral care kits). (rationale - remove pressure to tongue/lips and face and maintain mucosal integrity for the tongue)  3. Use tongue blade when repositioning ETT, so tongue doesn t move along with the tube. (rationale - to ensure pressure is not on the same area of the tongue, even though the ETT is technically repositioned)         Nursing care provided was coordinated care with RN.    Discussed plan of care with nurse and patient (as able).    Outcomes and treatment recommendations are to promote skin integrity.    Actions taken by WOC RN: 1 minutes of education.    Planned Follow Up: Early next week.    Plan for next visit: Reassess skin integrity

## 2021-06-09 NOTE — PROGRESS NOTES
FERN COVID RT PROGRESS NOTE    DATA:    VENT DAY#  11    CURRENT SETTINGS:  VENT SETTINGS   AC 18 / 430 / 55% / +8        FIO2:   55%    PATIENT PARAMETERS:    PIP 37  Pplat:  22  Pmean:  13  SBT: N  SECRETIONS:  Large, pink/tan, thick   02 SATS:  97%    ETT SIZE 8.0  Secured at  24 cm at teeth    Respiratory Medications: NONE     ABG: N    NOTE / PLAN:     Patient tolerating current vent settings fine. No changes to PEEP made based on previous ABG. Coarse breath sounds. Moderate to large secretions. Continue suctioning as necessary to prevent plugging.

## 2021-06-09 NOTE — PROGRESS NOTES
FERN COVID RT PROGRESS NOTE     DATA:     VENT DAY#  16     CURRENT SETTINGS:  VENT SETTINGS   Ac 18, 430, peep 12, 40%                   PATIENT PARAMETERS:     PIP 31  Pplat:  24  Pmean:  17  SBT: n/a  SECRETIONS:  Scant  02 SATS:  93%     ETT SIZE 8.0  Secured at  24 cm at teeth     Respiratory Medications: none     AB.41/46/62/27.9    Patient had stable night, scant secretions, weaned FiO2 to 40%

## 2021-06-09 NOTE — PROGRESS NOTES
07/17/20 1443   Vent Settings   Resp Rate (Set) 16   FiO2 (%) 45 %   Insp Time (sec) 0.85 sec   Vt (Set, mL) 350 mL   PEEP/CPAP (cm H2O) 14 cm H2O   Trigger Sensitivity Flow (L/min) 2 L/min   I:E Ratio 1:2.6   Humidification Heater   Heater Temperature 99.7  F (37.6  C)   Patient Data   Vt Exp (mL) 411 mL   Minute Ventilation (L/min) 9 L/min   Total Resp Rate  21 BPM   PIP Observed (cm H2O) 21 cm H2O   MAP (cm H2O) 16   Plateau Pressure (cm H2O)   (unable to obtain)   Dynamic Compliance (L/cm H2O) 132 L/cm H2O   Airway Resistance 1.9   SpO2 98 %

## 2021-06-09 NOTE — PROGRESS NOTES
07/20/20 9856   Patient Data   Vt (observed, mL) 552 mL   Minute Ventilation (L/min) 13.1 L/min   Total Resp Rate  25 BPM   PIP Observed (cm H2O) 12 cm H2O   MAP (cm H2O) 9.5   SpO2 94 %   Heart Rate 97     Left side of tongue has a sore. Will notify nurse

## 2021-06-09 NOTE — PROGRESS NOTES
FERN COVID RT PROGRESS NOTE    DATA:    VENT DAY#  10    CURRENT SETTINGS:  VENT SETTINGS   AC 18 / 430 / 50% / +10        FIO2:   50%    PATIENT PARAMETERS:    PIP 19  Pplat:  Could not calculate  Pmean:  15  SBT: N  SECRETIONS:  Moderate, thick, yellow  02 SATS:  96%    ETT SIZE 8@24  Secured at  24 cm at teeth    Respiratory Medications: NO    ABG: Results for RJ GUADALUPE (MRN [id number]) as of 6/30/2020 17:35   Ref. Range 6/30/2020 13:56   pH, Arterial Latest Ref Range: 7.37 - 7.44  7.43   pCO2, Arterial Latest Ref Range: 35 - 45 mm Hg 46 (H)   pO2, Arterial Latest Ref Range: 80 - 90 mm Hg 92 (H)   Bicarbonate, Arterial Calc Latest Ref Range: 23.0 - 29.0 mmol/L 29.0   O2 Sat, Arterial Latest Ref Range: 96.0 - 97.0 % 98.3 (H)       NOTE / PLAN:   Patient remains on full vent support. PEEP was able to be reduced to 10 cmh2o. Secretions are very thick and suctioning has been performed numerous times throughout the shift to prevent plugging. ABG above was obtained on current vent settings listed. Diminished breath sounds.

## 2021-06-09 NOTE — PROGRESS NOTES
Critical Care Progress Note  7/20/2020     Admit Date: 6/22/2020  ICU Day: 28  Code Status: full code    Assessment/plan:  Tobias Palmer is a 54 year old male with a history of obesity (BMI 45) who was admitted and intubated on 6/21 at [facility] with COVID-19 pneumonia, transferred to [place] ICU on 6/22. Course complicated by ARDS and shock, requiring proning and paralytics.     Problem List:   Principal Problem:    COVID-19  Active Problems:    Shock (H)    Acute and chronic respiratory failure with hypoxia (H)    On mechanically assisted ventilation (H)    ARDS (adult respiratory distress syndrome) (H)    Atrial fibrillation with rapid ventricular response (H)    Atrial fibrillation with rapid ventricular response (H)    Encephalopathy      Major changes for today:    -- Add Duo-nebs   -- Decrease PEEP to 8   -- Decrease sedation    -- Increase PO oxy and ativan to come off drips   -- 20mg lasix      Plan by System:     Neuro/Psych:   #Encephalopathy 2/2 to prolonged critical illness  #Sedation for vent synchrony and comfort.    * Sedating with IV Fentanyl, Versed, Precedex - Titrate versed down first   * Off paralytic since 7/11   * Try to lighten sedation if safe to do so.    * Lorazepam 1-mg Q4h - Increased today to help come off drips   * Oxy 5mg Q4h - Increased today to help come off drips   * Seroquel 50mg Q8h          Pulmonary:   #Acute hypoxic respiratory failure in setting of COVID19 infection.     Vent Mode: AC  FiO2 (%):  [40 %] 40 %  S RR:  [16] 16  S VT:  [400 mL-450 mL] 450 mL  PEEP/CPAP (cm H2O):  [10 cm H2O-12 cm H2O] 10 cm H2O  Minute Ventilation (L/min):  [11.4 L/min-16 L/min] 11.6 L/min  PIP:  [16 cm H2O-27 cm H2O] 26 cm H2O  MAP (cm H2O):  [13-16] 16   -- Decrease PEEP to 8 today    CV:   #A.fib with RVR 6/28; resolved  #Septic shock, resolved    * On/off Norepi for MAP > 65   * Amio 200mg daily       GI/:   #Protein calorie malnutrition; enteral access established - not post  pyloric.    * Tube feeding per nutrition.     Renal:   #Rhabdo - resolved  #Metabolic acidosis - resolved.     * Daily assessment for diuretic need-- 20mg lasix today    ID:   #COVID19  #MSSA pneumonia   * S/P CCP and toci 6/25 and 6/27    * S/P Ivermectin x 2 doses.    * Meropenem x 10 days    * Remdesivir x 10 days   * Cefazolin for MSSA; 7/7 - 7/14   * 7/14 sputum culture with staph aureus and serratia, thought to be colonized   * COVID retest 7/16 positive; plan to test once a week    Heme/Coag:    #Coagulopathy in setting of COVID19     * Lovenox 60mg two times a day     Endocrine:   #Stress hypergylcemia   * SSI standard scale.       Family/Psychosocial:    * Palliative Care involved     * Calling wife Nathalie daily with  assistance.          Dispo: ICU    Farzana Roche NPDavidC    Total critical care time: 40  I have personally provided 40 minutes of critical care time exclusive of time spent on separately billable procedures for acute hypoxic respiratory failure. High risk for acute deterioration and death.    _______________________________________________________________    HPI: Tobias Palmer is a 54 year old male with a history of obesity (BMI 45) who was admitted and intubated on 6/21 at [facility] with COVID-19 pneumonia, transferred to [place] ICU on 6/22. Course complicated by ARDS and shock, requiring proning and paralytics.    ROS: NATIVIDAD intubated and sedated.     Events over last 24-hours:  No acute events overnight    Objective:     Vitals:    07/20/20 0400 07/20/20 0500 07/20/20 0558 07/20/20 0600   BP: 125/77 138/79  129/78   Patient Position: Lying      Pulse: (!) 57 (!) 57  (!) 57   Resp: 25 25  23   Temp: 99  F (37.2  C)      TempSrc: Esophageal      SpO2: 95% 97%  97%   Weight:   (!) 270 lb 5.9 oz (122.6 kg)    Height:           Vent:   Day of Intubation: 6/21  Ready to wean? No  Vent Mode: AC  FiO2 (%):  [40 %] 40 %  S RR:  [16] 16  S VT:  [400 mL-450 mL]  450 mL  PEEP/CPAP (cm H2O):  [10 cm H2O-12 cm H2O] 10 cm H2O  Minute Ventilation (L/min):  [11.4 L/min-16 L/min] 11.6 L/min  PIP:  [16 cm H2O-27 cm H2O] 26 cm H2O  MAP (cm H2O):  [13-16] 16    Sedative Infusion: fentanyl and midazolam and precedex  Sedation vacation: No    I/O:     Intake/Output Summary (Last 24 hours) at 7/20/2020 0822  Last data filed at 7/20/2020 0600  Gross per 24 hour   Intake 2562.28 ml   Output 2675 ml   Net -112.72 ml     Wt Readings from Last 3 Encounters:   07/20/20 (!) 270 lb 5.9 oz (122.6 kg)      Weight change: 2 lb 13.9 oz (1.301 kg)    Physical Exam:  Gen: sedated  No distress  HEENMT: facial swelling improved.   NEURO: sedated    CARDIOVASCULAR: S1, S2 normal, no murmur, rub or gallop, regular rate and rhythm  PULMONARY: unlabored on full vent; lungs are coarse and diminished bilaterally.   GASTROINTESTINAL: obese, soft, ntnd  INTEGUMENT: diaphoretic; generalized edema  PSYCH: sedated     Labs:   Results from last 7 days   Lab Units 07/20/20  0421   LN-SODIUM mmol/L 137   LN-POTASSIUM mmol/L 3.7   LN-CHLORIDE mmol/L 105   LN-CO2 mmol/L 25   LN-BLOOD UREA NITROGEN mg/dL 11   LN-CREATININE mg/dL 0.46*   LN-CALCIUM mg/dL 8.0*       Results from last 7 days   Lab Units 07/20/20  0421   LN-WHITE BLOOD CELL COUNT thou/uL 8.0   LN-HEMOGLOBIN g/dL 10.7*   LN-HEMATOCRIT % 33.3*   LN-PLATELET COUNT thou/uL 229       Micro:   7/14 Sputum - 4+ Staph (MSSA)  7/4 Sputum - 3+ Staph (MSSA)    Imaging: all imaging personalized reviewed     7/16 US Doppler BUE - negative DVT    7/11 CXR - Diffuse left lung opacities and consolidation appear stable to slightly improved. More patchy right lung opacities appear marginally increased, though could be exaggerated by differences in exams.     No significant pleural effusion. No pneumothorax. Stable cardiomediastinal silhouette.     ET tube tip 2.1 cm above the melissa. Feeding tube courses into the stomach. Right PICC tip near the cavoatrial junction    7/5  CXR - Endotracheal tube tip mid way between clavicles and melissa. Right PICC tip projects over the mid SVC. Feeding tube tip below the film.     Limited inspiratory volume. Normal heart size. Unchanged bilateral diffuse lung infiltrates    Farzana Roche NP-C

## 2021-06-09 NOTE — PLAN OF CARE
Problem: Pain  Goal: Patient's pain/discomfort is manageable  Outcome: Progressing     Problem: Safety  Goal: Patient will be injury free during hospitalization  Outcome: Progressing     Problem: Daily Care  Goal: Daily care needs are met  Outcome: Progressing     Problem: Psychosocial Needs  Goal: Demonstrates ability to cope with hospitalization/illness  Outcome: Progressing  Goal: Collaborate with patient/family/caregiver to identify patient specific goals for this hospitalization  Outcome: Progressing     Problem: Discharge Barriers  Goal: Patient's discharge needs are met  Outcome: Progressing     Problem: Knowledge Deficit  Goal: Patient/family/caregiver demonstrates understanding of disease process, treatment plan, medications, and discharge instructions  Outcome: Progressing     Problem: Potential for Compromised Skin Integrity  Goal: Skin integrity is maintained or improved  Outcome: Progressing  Goal: Nutritional status is improving  Outcome: Progressing     Problem: Urinary Incontinence  Goal: Perineal skin integrity is maintained or improved  Outcome: Progressing     Problem: Potential for Falls  Goal: Patient will remain free of falls  Outcome: Progressing     Problem: Risk of Injury Due to Unsafe Behavior  Goal: Patient will remain safe while in restraint; physical/psychological needs will be met  Outcome: Progressing  Goal: Alternatives to restraint will be continually assessed with use of least restrictive device and discontinuation as soon as possible  Outcome: Progressing  Goal: Patient/Family will be able to communicate reason for restraint and steps for restraint application and removal  Outcome: Progressing     Problem: Inadequate Airway Clearance  Goal: Patient will maintain patent airway  Outcome: Progressing     Problem: Mechanical Ventilation  Goal: Patient will maintain patent airway  Outcome: Progressing     Problem: Glucose Imbalance  Goal: Achieve optimal glucose control  Outcome:  Progressing     Problem: Potential for transmission of organism by Contact, Enteric, Droplet and/or Airborne routes  Goal: Prevent transmission of organisms  Outcome: Progressing

## 2021-06-09 NOTE — PROGRESS NOTES
07/19/20 9607   Airway Suctioning/Secretions   Suction Device  Inline;Catheter   Secretion Amount Moderate   Secretion Color Pink tinged;Tan   Secretion Consistency Thick   Suction Tolerance Tolerated well   Suctioning Adverse Effects None   Sputum Sample Sent Yes     Sputum sample set

## 2021-06-09 NOTE — PROGRESS NOTES
FERN COVID RT PROGRESS NOTE    DATA:    VENT DAY#  25    CURRENT SETTINGS:  VENT SETTINGS     Vent Mode: AC  FiO2 (%):  [50 %-60 %] 60 %  S RR:  [16] 16  S VT:  [350 mL] 350 mL  PEEP/CPAP (cm H2O):  [12 cm H2O-14 cm H2O] 14 cm H2O  Minute Ventilation (L/min):  [7.7 L/min-9.7 L/min] 7.7 L/min  PIP:  [14 cm H2O-18 cm H2O] 14 cm H2O  MAP (cm H2O):  [12-15] 15          FIO2: 60%    PATIENT PARAMETERS:    PIP 14  Pplat: No Value  Pmean: 15  SBT: None  SECRETIONS: Tan/Pink Tinged  02 SATS:     ETT SIZE 8.0  Secured at  24 cm at teeth    Respiratory Medications: Albuterol Q6  Mucomyst Q6    ABG:    Results for RJ GUADALUPE (MRN [id number]) as of 7/16/2020 06:24   Ref. Range 7/16/2020 05:46   pH, Arterial Latest Ref Range: 7.37 - 7.44  7.44   pCO2, Arterial Latest Ref Range: 35 - 45 mm Hg 45   pO2, Arterial Latest Ref Range: 80 - 90 mm Hg 195 (H)   Bicarbonate, Arterial Calc Latest Ref Range: 23.0 - 29.0 mmol/L 29.8 (H)   O2 Sat, Arterial Latest Ref Range: 96.0 - 97.0 % 100.0 (H)     NOTE / PLAN: Patient tolerated vent well throughout the night. Q2 head turns tolerated well. Patient supine at 4:20am. Will continue to monitor vent and oxygenation.

## 2021-06-09 NOTE — PROGRESS NOTES
Pt had fairly uneventful shift.   VSS. Pt noted to have some decline in saturations when turned to left side.  Pt noted to sat well when turned to right side- No changes  in respiratory status auscultated.      Pt continues to be febrile- cooling blanket and PRN tylenol given with not much progress.   Pt Occasionally breathing above the vent and stacking breaths. PRN boluses of Midazolam given and gtt titrated - see MAR for details. Continues on Fentanyl and Precedex as well.     Rectal tube placed this shift d/t large amounts of loose stools.      Chrystal Hernandez RN

## 2021-06-09 NOTE — PROGRESS NOTES
Pt  BH COVID RT PROGRESS NOTE    DATA:    VENT DAY# 29    CURRENT SETTINGS: AC  VENT SETTINGS  16/450/+8/        FIO2: 35%    PATIENT PARAMETERS:    PIP 16  Pplat: 15  Pmean: 11  SBT: NA  SECRETIONS: Moderate yellow white secretion  02 SATS:  97%    ETT SIZE 8.0  Secured at  24 cm at teeth    Respiratory Medications: Mucomyst-two times a day/Duoneb-two times a day      AB.47/38/77/27.8/97.1/3.8  P/F Obleo=322    NOTE / PLAN:  Pt sedated and on full vent support. On  Pt was placed on PS 5/5-30% but did not tolerate it and then increased to 8/8-35% which he did tolerate for 9 hours. After which pt was placed back on AC for the rest of day. Pt stable on current vent setting and plan is to continue CPAP/PS for the day as we monitor pt's progress. No changes made on vent setting.

## 2021-06-09 NOTE — PROGRESS NOTES
Critical Care Progress Note  7/9/2020   10:41 AM     Admit Date: 6/22/2020  ICU Day: 17  Code Status: full code      Problem List:   Principal Problem:    COVID-19  Active Problems:    Shock (H)    Acute and chronic respiratory failure with hypoxia (H)    On mechanically assisted ventilation (H)    ARDS (adult respiratory distress syndrome) (H)    Atrial fibrillation with rapid ventricular response (H)    Atrial fibrillation with rapid ventricular response (H)      Major changes for today:    * Resume proning protocol    * Decrease Vt to 380mL (~ 6mL/kg) due to ongoing high PIPs.      Plan by System:     Neuro/Psych: Encephalopathy secondary to prolonged critical illness; Sedation for vent synchrony and comfort.    * Sedating with IV Fentanyl, Versed, propofol.    * Paralyzing with Vecuronium.        Pulmonary: Acute hypoxic respiratory failure in setting of COVID19 infection.     *PF dropped to 144 this morning - resume proning    * Vent Mode: AC  FiO2 (%):  [50 %-60 %] 60 %  S RR:  [22] 22  S VT:  [380 mL-430 mL] 380 mL  PEEP/CPAP (cm H2O):  [14 cm H2O] 14 cm H2O  Minute Ventilation (L/min):  [9.4 L/min-9.5 L/min] 9.4 L/min  PIP:  [29 cm H2O-32 cm H2O] 32 cm H2O  MAP (cm H2O):  [19-20] 20    * Decrease Vt to 380mL for 6mL/kg to try and limit PIPs.     CV: A.fib with RVR 6/28; Shock - septic.    * On/off levophed for MAP > 65   * Amio 200mg daily     GI/: Protein calorie malnutrition; enteral access established - not post pyloric.    * Tube feeding per RD.     Renal: Rhabdo - improved; metabolic acidosis - resolved.    * Try to stay off Propofol    * Daily assessment for diuretic need. Will give another 40mg IV Lasix     ID: COVID19; MSSA pneumonia   * S/P CCP and toci 6/25 and 6/27    * S/P Ivermectin x 2 doses.    * Meropenem x 10 days    * Remdesivir x 10 days   * Cefazolin for MSSA; ID following.     Heme/Coag:  Coagulopathy in setting of COVID19     * Lovenox 60mg two times a day     Endocrine: Stress  hypergylcemia   * Improved sugars with lantus d/c      Family/Psychosocial:    * Palliative Care involved     * Calling wife Nathalie daily with  assistance.    * Addendum: Wife updated at 2:47 PM over the phone with .     Dispo: ICU    Esmer Willis, CNP  [facility] Pulmonary/Critical Care    Total critical care time: 40  I have personally provided 40 minutes of critical care time exclusive of time spent on separately billable procedures for acute hypoxic respiratory failure. High risk for acute deterioration and death.    _______________________________________________________________    HPI: Tobias Palmer is a 54 year old male with a history of obesity (BMI 45) who was admitted and intubated on 6/21 at [facility] with COVID-19 pneumonia, transferred to [place] ICU on 6/22. Course complicated by ARDS and shock, requiring proning and paralytics.    ROS: NATIVIDAD intubated and sedated.     Events over last 24-hours: PF dropped to 144 this morning o/w no acute change.      Objective:     Vitals:    07/09/20 0700 07/09/20 0800 07/09/20 0900 07/09/20 1000   BP:       Pulse: 75 79 80 87   Resp: 22 22 22 22   Temp:  98.7  F (37.1  C)     TempSrc:  Axillary     SpO2: 92% 96% 95% 94%   Weight:       Height:           Vent:   Day of Intubation: 6/21  Ready to wean? No  Vent Mode: AC  FiO2 (%):  [50 %-60 %] 60 %  S RR:  [22] 22  S VT:  [380 mL-430 mL] 380 mL  PEEP/CPAP (cm H2O):  [14 cm H2O] 14 cm H2O  Minute Ventilation (L/min):  [9.4 L/min-9.5 L/min] 9.4 L/min  PIP:  [29 cm H2O-32 cm H2O] 32 cm H2O  MAP (cm H2O):  [19-20] 20    Sedative Infusion: fentanyl and midazolam  Sedation vacation: No    I/O:     Intake/Output Summary (Last 24 hours) at 7/9/2020 1041  Last data filed at 7/9/2020 0900  Gross per 24 hour   Intake 2250.85 ml   Output 2365 ml   Net -114.15 ml     Wt Readings from Last 3 Encounters:   07/09/20 (!) 275 lb 5.7 oz (124.9 kg)      Weight change: -5  lb 11.7 oz (-2.6 kg)    Physical Exam:  Gen: sedated  HEENMT: facial and tongue swelling much improved this morning; tongue lacerations.    NEURO: sedated and paralyzed.   CARDIOVASCULAR: S1, S2 normal, no murmur, rub or gallop, regular rate and rhythm  PULMONARY: unlabored on full vent; lungs are coarse posteriorly. No wheeze.   GASTROINTESTINAL: abd distended, active bowel sounds.    INTEGUMENT: as above; gen'l edema   PSYCH: sedated     Labs:   Results from last 7 days   Lab Units 07/09/20  0424  07/06/20  0352   LN-SODIUM mmol/L 143   < > 144   LN-POTASSIUM mmol/L 4.0   < > 3.7   LN-CHLORIDE mmol/L 106   < > 111*   LN-CO2 mmol/L 31   < > 28   LN-BLOOD UREA NITROGEN mg/dL 24*   < > 16   LN-CREATININE mg/dL 0.52*   < > 0.50*   LN-CALCIUM mg/dL 7.8*   < > 7.4*   LN-PROTEIN TOTAL g/dL  --   --  4.5*   LN-BILIRUBIN TOTAL mg/dL  --   --  0.3   LN-ALKALINE PHOSPHATASE U/L  --   --  44*   LN-ALT (SGPT) U/L  --   --  27   LN-AST (SGOT) U/L  --   --  30    < > = values in this interval not displayed.       Results from last 7 days   Lab Units 07/09/20  0424   LN-WHITE BLOOD CELL COUNT thou/uL 7.8   LN-HEMOGLOBIN g/dL 10.7*   LN-HEMATOCRIT % 34.3*   LN-PLATELET COUNT thou/uL 128*       Micro:   7/4 Sputum - 3+ Staph (MSSA)    Imaging: all imaging personalized reviewed   7/5 CXR - Endotracheal tube tip mid way between clavicles and melissa. Right PICC tip projects over the mid SVC. Feeding tube tip below the film.     Limited inspiratory volume. Normal heart size. Unchanged bilateral diffuse lung infiltrates      Esmer Willis, [facility] Pulmonary/Critical Care

## 2021-06-09 NOTE — PLAN OF CARE
Patient remains stable on current ventilator support, tolerating weans to respiratory rate made during the day. Patient continues to tolerate supine positioning. Labile blood pressures with periodic agitation noted. Plan to monitory patient's status and titrate support as needed.

## 2021-06-09 NOTE — PROGRESS NOTES
FERN COVID RT PROGRESS NOTE    DATA:    VENT DAY#  28    CURRENT SETTINGS:  VENT SETTINGS   AC/VC: Vt 450, Rate 16, PEEP 10        FIO2:   40%    PATIENT PARAMETERS:    PIP 27  Pplat:  24  Pmean:  15  SBT: No  SECRETIONS:   Small/ moderate thick yellow  02 SATS:  94    ETT SIZE 8.0  Secured at  24 cm at teeth    Respiratory Medications: Albuterol/mucomyst BID     ABG: No ABG this shift     NOTE / PLAN:   Patient remains intubated and mechanically ventilated. Increased Vt ,decreased PEEP. Will continue to wean vent as tolerated. Currently tolerating settings.

## 2021-06-09 NOTE — PROGRESS NOTES
"Wound Ostomy  WOC Assessment         Allergies:  No Known Allergies    Diagnosis:   Patient Active Problem List    Diagnosis Date Noted     Atrial fibrillation with rapid ventricular response (H) 06/30/2020     Atrial fibrillation with rapid ventricular response (H)      On mechanically assisted ventilation (H)      ARDS (adult respiratory distress syndrome) (H)      COVID-19 06/22/2020     Shock (H)      Acute and chronic respiratory failure with hypoxia (H)        Height:  5' 5\" (1.651 m)    Weight:   (!) 284 lb 9.8 oz (129.1 kg)    Labs:  Recent Labs     07/06/20  0352 07/07/20  0332   HGB 11.1* 11.6*   ALBUMIN 2.1*  --        Bayron:  Bayron Scale Score: 10    Specialty Bed:  Specialty Bed: [place]    Wound culture obtained: No    Edema:  Yes:  Localized    Anatomic Site/Laterality: Body    Reason for ongoing care:   Skin assessment and plan of care    Encounter Type:  Subsequent Encounter Skin integrity:   Patient with COVID-19, respiratory failure and morbid obesity.  Patient currently intubated and sedated.      Patient seen while prone and supine. Anterior body appears intact. L cheek is healed. Ok to leave open to air, cover with mepilex if patient prones again.     Right nare is healed.  Nasal septum with 0.3x1cm area of dried drainage.  Areas appear to be from friction with ETAD as patient was visualized previously moving his head back and forth frequently.      No skin concerns on posterior body.    Prone Positioning recommendations:    Prior  Apply Mepilex sacral dressings for protection if pt has bony prominences (shoulders, iliac crest) - may leave in place when not proned.  Ensure tongue is in the mouth, remove bite block for pressure injury prevention.  Ensure no lines/tubes/drains (as much as able) will be under patient when prone.    Prone position  Use pillows or Z hernan pads to off load pressure to bony prominences.  Position head so that ears are not folded and so that head/neck are " aligned.  Reposition head every one hour.  Use swim position per protocol for positioning when supine.    Mepilex dressings to cheeks when in prone position - may leave in place when not proned. Change every 5 days or sooner if soiled.    Prevention measures for mucosal integrity:  1. No bite block when proning. (rationale - the hard plastic will leave the patient at greater risk for pressure injury)  2. Hourly head reposition and moisturizer to exposed portion of tongue (moisturizer which comes with the oral care kits). (rationale - remove pressure to tongue/lips and face and maintain mucosal integrity for the tongue)  3. Use tongue blade when repositioning ETT, so tongue doesn t move along with the tube. (rationale - to ensure pressure is not on the same area of the tongue, even though the ETT is technically repositioned)         Nursing care provided was coordinated care with RN.    Discussed plan of care with nurse and patient (as able).    Outcomes and treatment recommendations are to promote skin integrity and promote wound healing.    Actions taken by WOC RN: 2 minutes of education.    Planned Follow Up: Later this week or Early next week.    Plan for next visit: Reassess wound(s) and Reassess skin integrity

## 2021-06-09 NOTE — PROGRESS NOTES
"Wound Ostomy  WOC Assessment         Allergies:  No Known Allergies    Diagnosis:   Patient Active Problem List    Diagnosis Date Noted     Encephalopathy      Atrial fibrillation with rapid ventricular response (H) 06/30/2020     Atrial fibrillation with rapid ventricular response (H)      On mechanically assisted ventilation (H)      ARDS (adult respiratory distress syndrome) (H)      COVID-19 06/22/2020     Shock (H)      Acute and chronic respiratory failure with hypoxia (H)        Height:  5' 5\" (1.651 m)    Weight:   (!) 266 lb 3.2 oz (120.7 kg)    Labs:  Recent Labs     07/17/20  0523   HGB 11.2*       Bayron:  Bayron Scale Score: 12    Specialty Bed:  Specialty Bed: [place]    Wound culture obtained: No    Edema:  Yes:  Localized    Anatomic Site/Laterality: Body    Reason for ongoing care:   Skin assessment and plan of care    Encounter Type:  Subsequent Encounter Skin integrity:   Patient with COVID-19, respiratory failure and morbid obesity.  Patient currently intubated and sedated.      Patient seen while supine and prone.     Right nare remains healed.  Nasal septum with 0.3x1.2cm area, 100% agranular.  Areas appear to be from friction with ETAD as patient was visualized previously moving his head back and forth frequently.  Leave open to air.      Perianal area with 1.5x2cm agranular area due to moisture, start Critic Aid.    Prone Positioning recommendations:    Prior  Apply Mepilex sacral dressings for protection if pt has bony prominences (shoulders, iliac crest) - may leave in place when not proned.  Ensure tongue is in the mouth, remove bite block for pressure injury prevention.  Ensure no lines/tubes/drains (as much as able) will be under patient when prone.    Prone position  Use pillows or Z hernan pads to off load pressure to bony prominences.  Position head so that ears are not folded and so that head/neck are aligned.  Reposition head every one hour.  Use swim position per protocol for " positioning when supine.    Mepilex dressings to cheeks when in prone position - may leave in place when not proned. Change every 5 days or sooner if soiled.    Prevention measures for mucosal integrity:  1. No bite block when proning. (rationale - the hard plastic will leave the patient at greater risk for pressure injury)  2. Hourly head reposition and moisturizer to exposed portion of tongue (moisturizer which comes with the oral care kits). (rationale - remove pressure to tongue/lips and face and maintain mucosal integrity for the tongue)  3. Use tongue blade when repositioning ETT, so tongue doesn t move along with the tube. (rationale - to ensure pressure is not on the same area of the tongue, even though the ETT is technically repositioned)         Nursing care provided was coordinated care with RN.    Discussed plan of care with nurse and patient (as able).    Outcomes and treatment recommendations are to promote skin integrity and promote wound healing.    Actions taken by WOC RN: 2 minutes of education.    Planned Follow Up: Early next week.    Plan for next visit: Reassess wound(s) and Reassess skin integrity

## 2021-06-09 NOTE — PROGRESS NOTES
FERN COVID RT PROGRESS NOTE    DATA:    VENT DAY#  14    CURRENT SETTINGS:  VENT SETTINGS   AC 18/430/+14        FIO2:   60%    PATIENT PARAMETERS:    PIP 28  Pplat:  24  Pmean:  17  SBT: none  SECRETIONS: mod bloody/peters  02 SATS:  96    ETT SIZE 8.0 Secured at  24 cm at teeth    Respiratory Medications: none    ABG: Results for RJ GUADALUPE (MRN [id number]) as of 7/5/2020 04:51   Ref. Range 7/5/2020 04:41   pH, Arterial Latest Ref Range: 7.37 - 7.44  7.33 (L)   pCO2, Arterial Latest Ref Range: 35 - 45 mm Hg 55 (H)   pO2, Arterial Latest Ref Range: 80 - 90 mm Hg 116 (H)   Bicarbonate, Arterial Calc Latest Ref Range: 23.0 - 29.0 mmol/L 26.5   O2 Sat, Arterial Latest Ref Range: 96.0 - 97.0 % 98.6 (H)   Oxyhemoglobin Latest Ref Range: 96.0 - 97.0 % 96.9   POC Base Excess Calc Latest Units: mmol/L 2.0   Ventilation Mode Unknown AC   Peep Latest Units: cm H2O 14       NOTE / PLAN:   Pt remains stable on vent, no vent changes made, pt was proned at 1900, Q2 head turns done, increased PEEP to +14 per MD, decreased to 60% from 80%.

## 2021-06-09 NOTE — PROGRESS NOTES
"Pharmacy Consult: Vancomycin Dosing    Pharmacist consulted to dose vancomycin for Tobias Palmer, a 54 y.o. male.    Ordering provider: Angela Olson CNP    Indication for vancomycin therapy: Sepsis    Goal Trough Range:  15-20 mcg/mL based on indication    Other current antimicrobials              vancomycin 1,750 mg in sodium chloride 0.9% 500 mL (VANCOCIN)  Every 8 hours          cefepime 1 g in NaCl 0.9 % (MINI-BAG Plus) 50 mL (MAXIPIME)  Every 12 hours                   Subjective/Objective:    Patient was admitted for COVID-19 on 6/22/2020    Height: 5' 5\" (1.651 m)    Actual Body Weight (ABW): (!) 115.5 kg (254 lb 10.1 oz)    Ideal body weight: 61.5 kg (135 lb 9.3 oz)  Adjusted ideal body weight: 83.1 kg (183 lb 3.2 oz)    BMI: Body mass index is 42.37 kg/m .    No Known Allergies    Patient Active Problem List   Diagnosis     COVID-19     Shock (H)     Acute and chronic respiratory failure with hypoxia (H)     On mechanically assisted ventilation (H)     ARDS (adult respiratory distress syndrome) (H)     Atrial fibrillation with rapid ventricular response (H)     Atrial fibrillation with rapid ventricular response (H)    No past medical history on file.     Temp Readings from Current Encounter:     06/28/20 1200 06/28/20 1600 06/30/20 0800   Temp: (!) 101.1  F (38.4  C) (!) 100.9  F (38.3  C) 98.1  F (36.7  C)     Net Intake/Output (last 24 hours):  I/O last 3 completed shifts:  In: 2848.7 [I.V.:608.7; NG/GT:1605; IV Piggyback:635]  Out: 3285 [Urine:3285]    Recent Labs     06/27/20  1738 06/27/20  2137 06/28/20  0412 06/28/20  1524 06/29/20  0423 06/30/20  0407   WBC  --   --  12.6*  --  18.5* 15.1*   NEUTROABS  --   --  10.8*  --   --   --    LACTICACID 2.8* 2.1  --  2.0  --   --    BUN  --   --  35*  --  31* 38*   CREATININE  --   --  0.76  --  0.71 0.71     Estimated Creatinine Clearance: 136.5 mL/min (by C-G formula based on SCr of 0.71 mg/dL).    No results for input(s): CULTURE in the last " 72 hours.    Results for orders placed or performed during the hospital encounter of 06/22/20   Culture/Gram Stain: Sputum    Collection Time: 06/27/20  1:44 PM    Specimen: Endotracheal; Respiratory   Result Value Status    Culture Usual Sheila Final   Sputum culture    Collection Time: 06/24/20  4:14 PM    Specimen: Endotracheal; Respiratory   Result Value Status    Culture Usual Sheila Final    Culture 2+ Yeast (!) Final       Recent labs: (last 7 days)     06/28/20  1709 06/28/20  2347 06/30/20  1557   VANCOMYCIN 22.2 7.4 21.2       Vancomycin administrations: (last 120 hours)     Date/Time Action Medication Dose Rate    06/30/20 1618 New Bag    vancomycin 1,750 mg in sodium chloride 0.9% 500 mL (VANCOCIN) 1,750 mg 178.3 mL/hr    06/30/20 0811 New Bag    vancomycin 1,750 mg in sodium chloride 0.9% 500 mL (VANCOCIN) 1,750 mg 178.3 mL/hr    06/30/20 0003 New Bag    vancomycin 1,750 mg in sodium chloride 0.9% 500 mL (VANCOCIN) 1,750 mg 178.3 mL/hr    06/29/20 1626 New Bag    vancomycin 1,750 mg in sodium chloride 0.9% 500 mL (VANCOCIN) 1,750 mg 178.3 mL/hr    06/29/20 0818 New Bag    vancomycin 1,750 mg in sodium chloride 0.9% 500 mL (VANCOCIN) 1,750 mg 178.3 mL/hr    06/29/20 0052 New Bag    vancomycin 1,750 mg in sodium chloride 0.9% 500 mL (VANCOCIN) 1,750 mg 178.3 mL/hr    06/28/20 1200 New Bag    vancomycin 1,750 mg in sodium chloride 0.9% 500 mL (VANCOCIN) 1,750 mg 178.3 mL/hr    06/28/20 0001 New Bag    vancomycin 1,750 mg in sodium chloride 0.9% 500 mL (VANCOCIN) 1,750 mg 178.3 mL/hr    06/27/20 1240 New Bag    vancomycin 1,750 mg in sodium chloride 0.9% 500 mL (VANCOCIN) 1,750 mg 178.3 mL/hr          Assessment/Plan:    Pharmacist consulted to dose vancomycin for Sepsis, goal trough range 15-20 mcg/mL.  1. Change to vancomycin 1500 mg IV every 8 hours (13 mg/kg actual body weight).  2. Vancomycin trough level of 21.1 mcg/mL was above the goal trough range. This trough level was drawn at steady  state.  3. Pharmacist will plan to re-check a vancomycin trough level before the 5th new dose.  4. Pharmacist will continue to follow.    Thank you for the consult.  Osiel Dickens, PharmRONALD 6/30/2020 4:46 PM

## 2021-06-09 NOTE — PROGRESS NOTES
07/10/20 1355   Patient Data   Vt (observed, mL) 375 mL   Minute Ventilation (L/min) 9.1 L/min   Total Resp Rate  26 BPM   PIP Observed (cm H2O) 27 cm H2O   MAP (cm H2O) 18   Auto/Intrinsic PEEP Observed (cm H2O) 0.2 cm H2O   Plateau Pressure (cm H2O) 24 cm H2O   Dynamic Compliance (L/cm H2O) 37.9 L/cm H2O   Airway Resistance 12.9

## 2021-06-09 NOTE — PROGRESS NOTES
06/23/20 1929   Patient Data   PIP Observed (cm H2O) 32 cm H2O   MAP (cm H2O) 24   Auto/Intrinsic PEEP Observed (cm H2O) 0.5 cm H2O   Plateau Pressure (cm H2O) 31 cm H2O

## 2021-06-09 NOTE — PROGRESS NOTES
07/20/20 0209   Patient Data   Vt (observed, mL) 415 mL   Minute Ventilation (L/min) 11.6 L/min   Total Resp Rate  26 BPM   PIP Observed (cm H2O) 26 cm H2O   MAP (cm H2O) 16   SpO2 95 %   Heart Rate (!) 57

## 2021-06-09 NOTE — PROGRESS NOTES
Pt had fairly uneventful shift. Pt does continue to be Hypertensive with stimulation and head turns. PRN boluses of Fentanyl and Versed utilized. PRN labetalol given x 1.  VSS otherwise stable.     Continues on gtts of Versed, Fentanyl, Vecuronium, and TKO.     Pt's facial edema has worsened throughout the shift despite reverse trendelenburg position.     Pt vent respiratory rate changed based on ABG results.Recheck  Showed mild improvement no further adjustments made per Dr. Roseanna Pineda.     Chrystal Hernandez RN

## 2021-06-09 NOTE — PROGRESS NOTES
Per chart review and discussion with PT, Plan to hold OT eval at this time. Pt continues to be full vent support and not appropriate for skilled OT at this time. Will check in with RN in couple days to determine if eval appropriate at that time.     Tricia Johnson, OT

## 2021-06-09 NOTE — PLAN OF CARE
Problem: Pain  Goal: Patient's pain/discomfort is manageable  Outcome: Progressing     Problem: Safety  Goal: Patient will be injury free during hospitalization  Outcome: Progressing     Problem: Daily Care  Goal: Daily care needs are met  Outcome: Progressing     Problem: Potential for Compromised Skin Integrity  Goal: Skin integrity is maintained or improved  Outcome: Progressing     Problem: Inadequate Airway Clearance  Goal: Patient will maintain patent airway  Outcome: Progressing     Problem: Mechanical Ventilation  Goal: Patient will maintain patent airway  Outcome: Progressing

## 2021-06-09 NOTE — PROGRESS NOTES
[facility]    [facility] PROGRESS NOTE:  DOS:  06/30/2020    Patient Summary:   Tobias Palmer is a 54 year old male with a history of obesity (BMI 45) who was admitted and intubated on 6/21 at [facility] with COVID-19 pneumonia, transferred to [facility] ICU on 6/22. Course complicated by ARDS and shock, requiring proning and paralytics.     Major Changes for Today    - stat po amio  -dc amio drip  -increase Lantus to 25  -decrease PEEP to 10  -ABG 1300    Assessment/Plan:  # Acute Pain  # Agitation/Anxiety  # Sedation  - Continue fentanyl, versed.  - Precedex off 6/27 as possible fever contributor   - Liberalize RAAS to 0 to -1. Has been dyssynchronous with ventilator when IV sedation was weaned  - Start scheduled oxycodone + PRN  - Start Seroquel     CVS:  #Atrial fibrillation with RVR 6/28  - Continue with continuous telemetry   - Continue amiodarone infusion. Rate poorly controlled this morning. Converted after initiation of enteral metoprolol. Plan to complete amidarone gtt protocol  - PRN Metoprolol available     RESP:  # Acute hypoxic respiratory failure in the setting of COVID-19 infection.  - Vent Mode: AC 18/430/12/45-->40%  - Veletri off  - Ventilator bundle     GI/FEN:  # Protein calorie malnutrition  - Tube feeds @ rate of 35 ml/hr   - PPI ordered for stress ulcer prophylaxis     RENAL :  # Rhabdomyolysis, resolved.   #Lactic acidosis, resolved  - CK peaked to 1864 at [facility];  6/25 on . No further trending necessary  - Creatinine normalized  - Diurese today with lasix and metolazone. Goal of -500 to 1 L.     ID:  # COVID-19 infection  #Persistent fevers   - Remdesivir, started 6/22 for 10 day course  - s/p CCP and tocilizumab 6/25 and 6/27  - S/p Ivermectin x2 doses. Stronglyloides antibody negative.   - Pancultured 6/27, NGTD  - Fever curve has overall improved after Tocilizumab dosing vs. Empiric antibiotics vs. Discontinuation of Precedex. WBC up today  - Started on Vancomycin  "and Cefepime 6/27. NGTD, other than yeast in sputum. Continue for now, consider stopping after 72 hours if cultures remain negative  - Fungitell sent    HEMATOLOGIC:  # Anemia of critical illness  - Hgb stable at 13.6, platelets normal.     # Coagulopathy due to COVID-19 infection  - Lovenox 60 mg BID   - US BUE and BLE given persistent fevers, negative for DVT.      ENDOCRINE:  # Stress hyperglycemia  #Steroid induced hyperglycemia  - Keep BG< 180 for optimal healing  - Insulin gtt given uncontrolled glucoses, requiring high doses. Start additional Lantus 15 units daily. Suspect needs might decrease with steroids now off.   - Dexamethasone done-completed 7 days     DISPO: ICU     GENERAL CARES:  DVT proph: Yes.  GI proph: Yes.  Requires barlow for strict I&O: Yes  Restraints required for safety: Yes.     Shikha Colvin, VIRIDIANA   [facility] pulm/CC    Total time 40  mins      Overnight events:   Course reviewed. Fever curve improved. Very sedated, no eye opening unable to obtain ROS. A fib with RVR started on amiodarone, required metoprolol PRN for HR 130s. Converted to SR late morning.     Objective:  Physical Exam:  Vent settings for last 24 hours:  Vent Mode: AC  FiO2 (%):  [45 %-60 %] 50 %  S RR:  [18] 18  S VT:  [430 mL] 430 mL  PEEP/CPAP (cm H2O):  [10 cm H2O-12 cm H2O] 10 cm H2O  Minute Ventilation (L/min):  [9.4 L/min-12 L/min] 9.4 L/min  PIP:  [14 cm H2O-23 cm H2O] 23 cm H2O  MAP (cm H2O):  [12-14] 14    /70   Pulse 68   Temp 98.1  F (36.7  C) (Oral)   Resp 19   Ht 5' 5\" (1.651 m)   Wt (!) 254 lb 10.1 oz (115.5 kg)   SpO2 98%   BMI 42.37 kg/m      Intake/Output last 3 shifts:  I/O last 3 completed shifts:  In: 3414.9 [I.V.:714.9; NG/GT:2065; IV Piggyback:635]  Out: 4550 [Urine:4350; Stool:200]  Intake/Output this shift:  I/O this shift:  In: 306.9 [I.V.:96.9; NG/GT:210]  Out: 600 [Urine:600]    Physical Exam  Neuro: sedated, no eye opening to voice, minimal withdrawal of extremities. Pupils 3 " mm brisk and round.   HEENT: NJ present with feeds infusing.   RESP: BBS, lungs coarse, no wheezes, rales or rhonchi  CV: S1, S2  GI: abdomen soft and compressible. No masses  : voiding per barlow, urine clear and yellow   MSK: Extremities: calves soft and compressible. Pulses 2+.     LAB:  Results from last 7 days   Lab Units 06/30/20  0407   LN-WHITE BLOOD CELL COUNT thou/uL 15.1*   LN-HEMOGLOBIN g/dL 13.2*   LN-HEMATOCRIT % 42.4   LN-PLATELET COUNT thou/uL 165     Results from last 7 days   Lab Units 06/30/20  0407 06/29/20  0423 06/28/20  0412 06/27/20  1108   LN-SODIUM mmol/L 140 144 145  --    LN-POTASSIUM mmol/L 4.0 4.3 4.0  4.1  --    LN-CHLORIDE mmol/L 105 110* 107  --    LN-CO2 mmol/L 30 27 30  --    LN-BLOOD UREA NITROGEN mg/dL 38* 31* 35*  --    LN-CREATININE mg/dL 0.71 0.71 0.76  --    LN-CALCIUM mg/dL 7.5* 7.7* 7.8*  --    LN-PROTEIN TOTAL g/dL  --   --   --  6.5   LN-BILIRUBIN TOTAL mg/dL  --   --   --  0.4   LN-ALKALINE PHOSPHATASE U/L  --   --   --  50   LN-ALT (SGPT) U/L  --   --   --  53*   LN-AST (SGOT) U/L  --   --   --  54*

## 2021-06-09 NOTE — PROCEDURES
Insert arterial line    Date/Time: 6/22/2020 2:00 PM  Performed by: Elas Tan CNP  Authorized by: Elsa Tan CNP       Universal Protocol    Site marked: Yes    Prior images obtained and reviewed: Yes    Required items: required blood products, implants, devices, and special equipment available    Patient identity confirmed: anonymous protocol, patient vented/unresponsive    Reevaluation: Patient was reevaluated immediately before administering moderate or deep sedation or anesthesia    Confirmation checklist: patient's identity using two indicators, procedure was appropriate and matched the consent or emergent situation and correct equipment/implants were available    Time out: Immediately prior to procedure a time out was called to verify the correct patient, procedure, equipment, support staff and site/side marked as required    Universal Protocol: Joint Commission Universal Protocol was followed    Preparation: Patient was prepped and draped in the usual sterile fashion    Indications: respiratory failure and hemodynamic monitoring  Indications comments: Acute hypoxic respirotry failure, shock  Location: right radial        Anesthesia  Local anesthesia used?: No    Sedation    Patient sedation: Yes (Fent/Versed gtt )    Sedation type: deep    Vital signs: Vital signs monitored during sedation    Procedure Details   Junior's test normal: yes  Needle gauge: 20  Seldinger technique: Seldinger technique used  Number of attempts: 1  Post-procedure: line sutured and dressing applied  Post-procedure CMS: normal      Post-procedure    Description of procedure: Patient was prepped and draped. Under ultrasound right radial artery was visualized and accessed x1 attempt with pulsatile blood flow. Wire was threaded easily, catheter was placed over wire, and wire was removed intact with good pulsatile blood flow noted. Crisp waveform was noted on the monitor.     Patient tolerance: Patient tolerated the procedure  well with no immediate complications   Length of time physician present for 1:1 monitoring during sedation: 0

## 2021-06-09 NOTE — PROGRESS NOTES
Infectious Disease Progress Note    Assessment/Plan  Impression:  COVID-19 pneumonia   Received CCP. Started on dexamethasone (6/22-29) and remdesivir for 10 day course  Received 400 mg tocilizumab on 6/25, 6/27 for suspected cytokine release syndrome.     Quantiferon gold negative  Hep B and C negative  Strongyloides antibody negative -- no need for further ivermectin    Colonized with yeast in sputum. Risk for invasive candidiasis -- broad spectrum antibiotics, central line, candida colonization.     WBC trending down now normal. Still with fever. Recent low procalcitonin on 6/27.     Recommend:    Monitor temp, WBC, cultures  Continuing cefepime 10 days.  D/c'd ivermectin order  Checking beta-D-glucan    Please call if questions over next 3 days. I will return on 7/6.      Principal Problem:    COVID-19  Active Problems:    Shock (H)    Acute and chronic respiratory failure with hypoxia (H)    On mechanically assisted ventilation (H)    ARDS (adult respiratory distress syndrome) (H)    Atrial fibrillation with rapid ventricular response (H)    Atrial fibrillation with rapid ventricular response (H)    Raphael Mantilla MD  [facility]  [phone] [facility] paging  ______________________________________________________________________       Subjective  Intubated and sedated in ICU. remains febrile.       Objective    Anti-infectives:  Ceftriaxone 6/22  Ivermectin 6/25-26  Cefepime 6/27-  Vancomycin 6/27-7/1    Vital signs in last 24 hours  Heart Rate:  [] 82  Resp:  [18-24] 18  Arterial Line BP: ()/(51-71) 114/53  FiO2 (%):  [50 %-55 %] 55 %  Weight:   (!) 254 lb 10.1 oz (115.5 kg)    Intake/Output last 3 shifts  I/O last 3 completed shifts:  In: 3203.6 [I.V.:396.6; NG/GT:1507; IV Piggyback:1300]  Out: 2595 [Urine:2595]  Intake/Output this shift:  I/O this shift:  In: -   Out: 175 [Urine:175]    Review of Systems   Review of systems not obtained due to inability to  communicate with the patient. , otherwise negative.    Physical Exam--  General appearance: critically ill in ICU, on vent  Eyes: eyes closed.  Throat: oral ett inplace.  Neck: short neck-difficult to examine  tachy  Abdomen: not distended.  Extremities: extremities normal, atraumatic, no cyanosis or edema  Skin: Skin color, texture, turgor normal. No rashes or lesions  Neurologic: Mental status: sedated  PICC R UE  yen    Pertinent Labs   Lab Results: personally reviewed.     Results from last 7 days   Lab Units 07/02/20  0502 07/01/20 0625 06/30/20  0407   LN-WHITE BLOOD CELL COUNT thou/uL 9.4 12.5* 15.1*   LN-HEMOGLOBIN g/dL 12.7* 12.8* 13.2*   LN-HEMATOCRIT % 40.9 41.2 42.4   LN-PLATELET COUNT thou/uL 143 141 165        Results from last 7 days   Lab Units 07/02/20  0502 07/01/20 0625 06/30/20  0407   LN-SODIUM mmol/L 143 142 140   LN-POTASSIUM mmol/L 4.3 4.1 4.0   LN-CHLORIDE mmol/L 110* 106 105   LN-CO2 mmol/L 28 29 30   LN-BLOOD UREA NITROGEN mg/dL 32* 38* 38*   LN-CREATININE mg/dL 0.69* 0.73 0.71   LN-CALCIUM mg/dL 7.4* 7.5* 7.5*     Lab Results   Component Value Date    ALT 53 (H) 06/27/2020    AST 54 (H) 06/27/2020    ALKPHOS 50 06/27/2020    BILITOT 0.4 06/27/2020     Micro:  6/22 blood negative  6/24 sputum usual bruno and yeast  6/27 blood including fungal isolate no growth to date   6/27 sputum gram stain with yeast; culture usual bruno    Results for RJ GUADALUPE (MRN [id number]) as of 6/26/2020 11:04   Ref. Range 6/24/2020 16:46   Hepatitis B Surface Ag Latest Ref Range: Negative  Negative   Hepatitis C Ab Latest Ref Range: Negative  Negative   Results for RJ GUADALUPE (MRN [id number]) as of 6/26/2020 11:04   Ref. Range 6/23/2020 04:26 6/24/2020 05:24 6/24/2020 16:46 6/25/2020 04:40 6/26/2020 04:22   CRP Latest Ref Range: 0.0 - 0.8 mg/dL 12.0 (H) 10.0 (H)  4.2 (H) 2.1 (H)   LD (LDH) Latest Ref Range: 125 - 220 U/L 480 (H) 477 (H)  434 (H) 452 (H)     Pertinent Radiology   Radiology  Results: Personally reviewed image/s and Personally reviewed impression/s    No results found.

## 2021-06-09 NOTE — PROCEDURES
ARTERIAL LINE INSERTION PROCEDURE NOTE  (NON-OR)    Procedure Date:  7/17/2020   Performing Provider:  Esmer Willis CNP    Pre-Procedure Diagnosis:     RESPIRATORY FAILURE  Post-Procedure Diagnosis:  Same as Pre-Procedure Diagnosis    Procedure:  Arterial line insertion  Left Brachial  Indications:  RESPIRATORY FAILURE      Estimated Blood Loss: Minimal   Complications: none      Procedure Details:   The risks, benefits, complications, treatment options, and expected outcomes were discussed with the patient. The risks and potential complications of their problem and purposed procedure include but are not limited to infection, bleeding, pain,  the need for additional procedures, and nerve and vessel injury. The patient/alternate (see above) concurred with the proposed plan, giving informed consent.  The site of the procedure was properly noted/marked. The patient was identified as Tobias Palmer with Date of Birth 1965 and the procedure verified as Arterial Line Insertion.  A Time Out was held and the above information confirmed.     In sterile fashion, the line site was prepped with Chlorhexidine.  Strict sterile conditions were maintained,  Cap, mask, and sterile gloves were worn by all participants.  2 ml of   Lidocaine 1% anesthetic were infiltrated into the skin.  The left brachial artery was located with US guidance. The arterial line was placed in the Left Brachial artery percutaneously,  without  difficulty.  The linewas  sutured in place  and an occlusive sterile dressing was applied.  The total number of needle stick attempts was 1.      Condition: stable    Esmer Willis, 7/17/2020, 11:28 AM

## 2021-06-09 NOTE — PROGRESS NOTES
Spoke with wife via language line , updated wife on pt's alertness and followed commands today, informed wife pt is still being weaned from meds to wean him from ventilator support.  Wife expressed gratitude for information and pt's slight progress.

## 2021-06-09 NOTE — PROGRESS NOTES
07/18/20 0801   Patient Data   PIP Observed (cm H2O) 18 cm H2O   MAP (cm H2O) 14   Auto/Intrinsic PEEP Observed (cm H2O)   (unable to obtain)   Plateau Pressure (cm H2O)   (unable to obtain)       Breathing over the vent

## 2021-06-09 NOTE — PROGRESS NOTES
07/14/20 1903   Patient Data   Plateau Pressure (cm H2O) 18 cm H2O   Static Compliance (L/cm H2O) 67   Dynamic Compliance (L/cm H2O) 14 L/cm H2O   Airway Resistance 2        07/14/20 1903   Patient Data   Plateau Pressure (cm H2O) 18 cm H2O   Static Compliance (L/cm H2O) 67   Dynamic Compliance (L/cm H2O) 14 L/cm H2O   Airway Resistance 2

## 2021-06-09 NOTE — PROGRESS NOTES
BH COVID RT PROGRESS NOTE    DATA:    VENT DAY# 11    CURRENT SETTINGS:AC  VENT SETTINGS   18/430/+10/        FIO2: 50%    PATIENT PARAMETERS:    PIP 20  Pplat: 18  Pmean: 13  SBT: NA  SECRETIONS:  Moderate thick than secretion  02 SATS: 98%    ETT SIZE 8  Secured at  24 cm at teeth    Respiratory Medications: None    ABG: last ABG 7.43/46/92/29.0/98.3/5.3 P/F TBVMH=237    NOTE / PLAN:  Pt sedated and on full vent support. Pt has diminished breath sound with thick tan secretion. And also stable on current vent setting. No current ABG drawn on pt and no changes made at this time on vent setting

## 2021-06-09 NOTE — PROGRESS NOTES
07/08/20 0224   Patient Data   Vt (observed, mL) 405 mL   Minute Ventilation (L/min) 9.4 L/min   Total Resp Rate  22 BPM   PIP Observed (cm H2O) 43 cm H2O   MAP (cm H2O) 24   Auto/Intrinsic PEEP Observed (cm H2O) 4.4 cm H2O   Plateau Pressure (cm H2O) 29 cm H2O   Dynamic Compliance (L/cm H2O) 59 L/cm H2O   Airway Resistance 34

## 2021-06-09 NOTE — PROGRESS NOTES
"Clinical Nutrition Follow Up Note    Current Nutrition Prescription:   Diet: TF:  Peptamen AF at 45 ml/hr (formula changed, 7/14 due to persistent diarrhea)   Supplement/modulars: no carb prosource 2 pkt BID & nutrisource fiber 1 pkt BID  Flush order: water 30 ml q 4 hrs-decreased by provider 7/21    IV dextrose or Fluids:dexmedetomidine infusion orderable (PRECEDEX), Last Rate: 1.5 mcg/kg/hr (07/24/20 1200)  fentaNYL citrate (PF), Last Rate: 25 mcg/hr (07/24/20 1200)  midazolam, Last Rate: Stopped (07/24/20 1038)  norepinephrine, Last Rate: Stopped (07/24/20 0900)      Current Nutrition Intake:  Enteral nutrition access is a nasal duodenal tube, with a placement date of 7/10/20. Last x-ray showed tip in duodenum   TF as prescribed provides:1080 ml, 1536 kcal, 142 gm pro, 121 gm CHO, 12 gm fiber, 873 ml free water,180 ml water flush, total free water is 1053 ml.  IV drips 1541.5 ml yesterday  Meeting estimated needs including all sources    Anthropometrics:  Height: 5'5\"  Admit wt: 274 lb 4 oz,6/22/20  Weight: (!) 259 lb 0.7 oz (117.5 kg),7/24/20-+1-2 edema noted in all extremities  BMI:42.4 (dry wt)  BMI indication: >40 extreme obesity (class 3)  Ideal body weight: 136 lb(+or-10%)  Usual body weight: unable to obtain currently  Weight History:268 lb(6/22/20).   Recent wt's: 270 lb(7/12),265 lb(7/16),267 lb(7/19),254 lb(7/21),265 lb(7/23) Wt range since admit: 254-284 lb    GI Status/Output:   Rectal tube reinserted on 7/18  Rectal tube: 300 ml/24 hrs noted past couple days    Skin/Wound:  Bayron score Bayron Scale Score: 14  WOC note reviewed 7/17    Medications:  Medications reviewed.  Note:rocephin-IV,SSI,culturelle,prilosec,oxycodone,pantoprazole  MVI w/minerals    Labs:  Labs reviewed  Lab Results   Component Value Date/Time    ALBUMIN 2.1 (L) 07/06/2020 03:52 AM     07/24/2020 05:18 AM    K 3.6 07/24/2020 05:18 AM    BUN 14 07/24/2020 05:18 AM    CREATININE 0.52 (L) 07/24/2020 05:18 AM     " 07/24/2020 05:18 AM    PHOS 4.1 07/24/2020 05:18 AM    MG 1.8 07/24/2020 05:18 AM    CRP <0.1 07/11/2020 05:27 AM   FSBG 136-161 mg/dL in the past 24 hrs. (within goal range)    Estimated Nutrition Needs: (reassessed needs)  Energy Needs: 8777-1416 kcals daily, 25-30 kcal/kg (62 kg IBW)  Protein Needs: 124-155 g daily, 2-2.5 g/kg.(dose: 62 kg IBW)  Fluid Needs: 7301-6104 mls daily, 25-35 mls/kg(dose wt: 77 kg adj.wt)    Malnutrition: protein calorie malnutrition noted per doctor     Nutrition Risk Level: high risk     Nutrition dx:   Swallowing difficulty r/t respiratory failure as evidenced by intubated,NPO.     Goals:   Meet estimated nutrition needs and Tolerate tube feeding-progressing  D/c RT; <3 loose stools/day; more formed BM's-slowly progressing (on antibiotic)       Intervention:   Increase nutrisource fiber 1 pkt TID to help firm up stools.  Provider managing fluid flushes    Monitoring:  Labs, wt, TF tolerance, & skin integrity.

## 2021-06-09 NOTE — PLAN OF CARE
Care Plan-Care Management     Care Management Goals of the Day: progression of care, DC planning     Care Progression: Chart review completed.     Barriers to Discharge: pending medical clearance. Remains in ICU. Has been intubated/sedated on full vent support since 6/21. Is requiring pronation therapy. Is receiving the following infusions: Precedex, Fentanyl, Versed and Vecuronium. Also getting continuous Veletri nebs. Is NPO on enteral feeding via nasal feeding tube. Therapies are not following yet.      Discharge Disposition: Per therapy recommendations.     Expected Discharge Date: TBD     Transportation: TBD     Care Coordination Narrative: COVID positive. Pt's primary contact is his son Carlton. Does not have a primary care provider listed. Is self pay per medical record. Will contact financial . Discharge plan pending therapy recommendations. CM is following.

## 2021-06-09 NOTE — PROGRESS NOTES
Assumed care of pt from night shift nurse, agree with previous assessment. Vs stable, pt suppinated, no adverse events noted. Will continue to monitor.

## 2021-06-10 NOTE — PLAN OF CARE
Problem: Speech Therapy  Goal: SLP Goals  Description: Initial Goals entered on 7/27/2020 by Keyon Duran, SLP and to be met by 8/7/20  Frequency: 5x/wk  Patient Stated Goal(s): I want to go home  Patient will:   1) Improve auditory comprehension at simple sentence level (y/n questions, 1 step directions) to 100% accuracy independently in [place]itian using   2) [place] x4 given min cues and external aids  3) complete bedside swallow study to determine aspiration risk and safest, least restrictive diet        Outcome: Not Applicable this Shift       See speech eval dated 7/27/2020 for details.

## 2021-06-10 NOTE — PROGRESS NOTES
07/27/20 0047   Oxygen Therapy/Pulse Ox   O2 Device High-flow NC   O2 Flow Rate (L/min) 40 L/min   FiO2 (%) 40 %     Patient doing well.

## 2021-06-10 NOTE — PROGRESS NOTES
07/30/20 2028   Oxygen Therapy/Pulse Ox   O2 Device Nasal cannula   O2 Flow Rate (L/min) 1 L/min   O2 Therapy Oxygen humidified   SpO2 96 %

## 2021-06-10 NOTE — PLAN OF CARE
Goal: Pt's goal is to return home, care team is recommending ARU.   Outcome: Discussed progression of care with hospitalist. Pt improving. Tolerating 2L NC. Started Mechanical soft diet and hold tube feeding. Rectal tube to be removed.   Discharge Disposition: ARU   Planned Discharge Date: TBD   Problem: Barriers to discharge include: COVID recovery and no insurance   Transportation: TBD     Care Management Narrative: Spoke with pt via phone with . Introduced care management and discussed discharge plans. Pt did confirm that he does not have insurance, writer assured pt that Financial SW would be connecting with him regarding his financing.     Also discussed discharge plan and the care team recommendation for ARU. Pt is in agreement with this plan, but need to get MA application in process before pt can be accepted at ARU or TCU. CM will continue to work with pt regarding discharge plan. Will refer pt to ARU in hopes MA process can happen soon.   Clary Engle RN/CM 7/30/2020 2:01 PM

## 2021-06-10 NOTE — PROGRESS NOTES
Patient remains on nasal cannula at 3 L.  Breathing comfortable in the 20's with an SpO2 in the 90's.  Patient received scheduled nebulizers.      DEBORAH SyedT

## 2021-06-10 NOTE — PROGRESS NOTES
[facility]    Hospitalist Progress Note    Name: Tobias Palmer    MRN: [id number]  YOB: 1965    Age: 54 y.o.  Date of Admission:  6/22/2020  Primary Provider:  Provider, No Primary Care, None  Physician:  Larry Arreola MD      Assessment and Plan:  Tobias Palmer is a 54 y.o. male with history of obesity who was transferred to [facility] on 6/22/2020 for further management of confirmed COVID-19 infection.    COVID-19 Diagnosis Details:  Onset of COVID symptoms ~6/18/20   Date of positive test results 6/21/20   Research study Yes: CCP     # Confirmed COVID-19 infection    # Acute Hypoxic Respiratory Failure secondary to COVID-19 infection  # Viral Pneumonia secondary to COVID-19 infection  # Acute Respiratory Distress Syndrome  # Bacterial Superinfection  # Septic shock  Patient initially admitted to [facility] on 6/21/2020 after presenting with fever, cough and dyspnea. He was intubated and transferred to [facility] on 6/22. Imaging required diffuse bilateral infiltrates.  He required flolan. He remained intubated until 7/26 at which time he was successfully extubated. And is now working on weaning O2.   - Continue supportive care with continuous pulse oximetry, COVID-19 special precautions, minimized lab draws, and care consolidation  - Oxygen: continue current support as needed--currently on RA as of 7/29; titrate to keep SpO2 between 90-96%  - Labs: trend PRN  - Imaging: no additional imaging needed at this time  - Breathing treatments: Combivent inhaler four times a day and PRN; avoid nebulizers in favor of MDIs   - IV fluids: not indicated at this time  - Antibiotics: Multiple bacterial infections and septic shock thoughout his course including MSSA and serattia pneumonia and serratia UTI. S/p  Antimicobials have included vancomycin  (6/27 - 7/7), cefepime (6/27 - 7/2) meropenem (7/2 - 7/7), cefazolin (7/7 - 7/14), ceftriaxone (7/20 - 7/27)  -  Research study protocol/COVID medications: convalescent plasma, remdesivir, tocilizumab  - Can be seen in ICU survivorship clinic after discharge    ACTIVE HOSPITAL PROBLEMS:  ICU delirium  Resolving as he weans from ICU sedation. Was sedated for weeks so likely will benefit from slow wean of benzos and opaites Started wean 7/29  - Ativan 1 mg q8h --> 0.5 mg q8h 7/29 --> 0.25 mg q8h 7/31 -- stop date 8/2  - Oxycodone 5 mg q4h --> 2.5 mg q4h 7/30 --> 2.5 mg q8h 8/1 --> stop date 8/3  - PRN oxycodone and ativan available   - Continue seroquel 25 mg daily (would attempt to stop on 8/4)  - continue trazodone 50 mg at bedtime (would be last to discontinue)    A fib:   brief a fib with RVR when critically ill. Resolved with amiodarone.   - discontinued amiodarone 7/29 with no issues  - No indication for anticoagulation for A fib at this time  - CTM    Protein/calorie malnutrition  - holding tube feeds as of 7/29. Removed feeding tube 7/31.   - speech cleared for regular diet with thin liquids. Awaiting recs for further advancement.     Lines and tubes  - Arterial line placed 7/17. Removed 7/28  - Feeding tube removed 7/31  - PICC placed on 6/26. No longer using. Removed 7/31.   - Hillman catheter placed on 7/22. Will continue for now due to scrotal edema  - Rectal tube placed on 7/18.  Removed 7/31      # DVT Prophylaxis:  D-Dimer, Quant   Date Value Ref Range Status   06/26/2020 0.72 (H) <=0.50 FEU ug/mL Final   - PROPHYLACTIC dosing: lovenox 40mg two times a day  - At high risk of thrombosis due to COVID-19; consider anticoagulation on discharge    # Code Status: Full Code.   # Diet: Diet Dysphagia II (Mechanically Altered); Thin liquids  # Disposition: Expected discharge on 4-7 days to TCU once safe disposition plan/TCU bed available.  # Bedside iPad: NOT used due to severity of illness/medical appropriateness.    Larry Arreola MD  [facility]  ______________________________________________________________________    Subjective:  No acute events.  Tolerating dysphagia level 2 diet with thin liquids well.  Placed back on O2 yd for desats while sleeping. Now back to RA. Reasonable PO intake. Scrotal edema improving.     4 pt ROS otherwise negative    Data reviewed today: I reviewed all medications, new labs and imaging results over the last 24 hours.     Physical Exam:  Temp:  [98  F (36.7  C)-99.3  F (37.4  C)] 98.6  F (37  C)  Heart Rate:  [] 95  Resp:  [20-37] 28  BP: (111-137)/(73-87) 111/85  SpO2:  [92 %-99 %] 97 %  Vitals:    06/22/20 1430 06/25/20 0530 06/28/20 0400 06/29/20 0000   Weight: (!) 274 lb 4 oz (124.4 kg) (!) 263 lb 0.1 oz (119.3 kg) (!) 264 lb 8.8 oz (120 kg) (!) 254 lb 10.1 oz (115.5 kg)    07/03/20 0606 07/05/20 2010 07/08/20 0000 07/09/20 0400   Weight: (!) 255 lb (115.7 kg) (!) 284 lb 9.8 oz (129.1 kg) (!) 281 lb 1.4 oz (127.5 kg) (!) 275 lb 5.7 oz (124.9 kg)    07/10/20 0600 07/11/20 0600 07/12/20 0400 07/14/20 0500   Weight: (!) 276 lb 10.8 oz (125.5 kg) (!) 276 lb 14.4 oz (125.6 kg) (!) 270 lb 15.1 oz (122.9 kg) (!) 272 lb 11.3 oz (123.7 kg)    07/15/20 0400 07/16/20 0547 07/17/20 0600 07/18/20 0333   Weight: (!) 262 lb 5.6 oz (119 kg) (!) 265 lb 10.5 oz (120.5 kg) (!) 266 lb 3.2 oz (120.7 kg) (!) 266 lb 12.1 oz (121 kg)    07/19/20 0512 07/20/20 0558 07/21/20 0600 07/22/20 0600   Weight: (!) 267 lb 8 oz (121.3 kg) (!) 270 lb 5.9 oz (122.6 kg) (!) 254 lb 10.1 oz (115.5 kg) (!) 254 lb (115.2 kg)    07/23/20 0600 07/24/20 0500 07/24/20 2300 07/27/20 0400   Weight: (!) 265 lb 3.4 oz (120.3 kg) (!) 259 lb 0.7 oz (117.5 kg) (!) 251 lb 5.2 oz (114 kg) (!) 250 lb (113.4 kg)    07/29/20 0600 07/30/20 0600 07/31/20 0600   Weight: (!) 249 lb 5.4 oz (113.1 kg) (!) 250 lb (113.4 kg) (!) 224 lb 13.9 oz (102 kg)       GEN:  healthy, alert and no distress  HEENT:  extra ocular movement intact, sclera clear, anicteric and oropharynx  clear, no lesions, feeding tube in place   CV:   Pulses 2+ no LE edema  PULM: nonlabored, mild tachypnea  GI:  Soft, NT, ND  : improving scrotal edema. Hillman in place  EXT:  extremities normal, atraumatic, no cyanosis or edema  SKIN:  no rashes or significant lesions  NEURO:  Grossly normal

## 2021-06-10 NOTE — PROGRESS NOTES
07/25/20 1934   Patient Data   Vt (observed, mL) 475 mL   Minute Ventilation (L/min) 20 L/min   Total Resp Rate  35 BPM   PIP Observed (cm H2O) 19 cm H2O   MAP (cm H2O) 10   SpO2 95 %   Heart Rate 62

## 2021-06-10 NOTE — PROGRESS NOTES
Speech Language/Pathology  Videofluoroscopic Swallow Study     Problem:  Patient Active Problem List   Diagnosis     COVID-19     Shock (H)     Acute and chronic respiratory failure with hypoxia (H)     On mechanically assisted ventilation (H)     ARDS (adult respiratory distress syndrome) (H)     Atrial fibrillation with rapid ventricular response (H)     Atrial fibrillation with rapid ventricular response (H)     Encephalopathy     Bacterial pneumonia       Onset date: 6/22/20  Reason for evaluation: r/o silent aspiration   Pertinent History: Patient was intubated on 6/22/20 and extubated on 7/26/20 (32 days). He is currently aphonic and had a BSS on 7/28/20 and presented with no s/s aspiration. A VFSS was recommended to r/o silent aspiration prior to diet initiation.   Current Diet: NPO, TF  Baseline Diet: presumed regular and thin    Assessment:    Patient demonstrated no oral and mild pharyngeal dysphagia.    Patient is at low aspiration risk with all intake with use of swallow strategies listed below.    Rehab potential is good based on prior level of function and evaluation results.    Patient is a good candidate to complete therapeutic exercises and/or compensatory strategies to improve oral and/or pharyngeal phase of swallow due to motivation and cooperation.     Recommendations:    Plan:Recommend NDD2 and thin liquids. Handoff to MD via txt page and charge nurse via phone.    Strategies: 1:1 assist with intake, upright to 90 degrees, and slow rate.    Speech therapy 5 times per week    Referrals: N/A    Subjective:    Patient presents as alert and cooperative during this evaluation.   An  was present, however, connection was poor and ongoing education is warranted.    Patient was given puree, honey, nectar, thin and regular solid.    Objective:    Dentition/Oral hygiene: WFL for this evaluation.    Oral motor function was not impaired.     Bolus prep and oral control was not impaired.  Mastication was slow but functional and the patient used rotary chewing. Some concern for fatigue with advanced textures.    Anterior-Posterior transit was not impaired.    Premature spill beyond the base of the tongue into the valleculae occurred with all textures trialed.    Tongue base retraction was not impaired.    Oral stasis did not occur with all textures trialed.    Aspiration did not occur with all textures trialed.     Laryngeal penetration did not occur with all textures trialed.     Swallow response was delayed with all textures trialed.  Swallow was triggered at the level of valleculae with solid and puree. Swallow was triggered at the pyriform sinuses with honey thick, nectar thick, and thin.     Epiglottic movement was complete consistently across texture trials.    Pharyngeal stasis did not occur with all textures trialed.     Pharyngeal constriction was not impaired.    Hyolaryngeal elevation was not impaired. Hyolaryngeal excursion was not impaired.    Cricopharyngeal function was not impaired. Cervical esophageal function was not impaired.    30 dysphagia minutes    Susie Chinchilla MA, CCC-SLP

## 2021-06-10 NOTE — PROGRESS NOTES
PALLIATIVE CARE SERVICE CHART CHECK     This patient's chart has been reviewed by the PALLIATIVE CARE Service. It has been determined that no change is necessary for symptom management at this time and goals of care have been established. Palliative care will sign off. Please re-consult as needed.     Thank you.  Renita Lindsay PA-C  Palliative Care   To contact me via Text Page, click here or Palliative Care Service Line at [phone]

## 2021-06-10 NOTE — PROGRESS NOTES
Pt discharged to [facility] with all belongings. Report called to [facility], all questions answered. Report given to EMS upon arrival, pt left with EMS without issue at 1400.

## 2021-06-10 NOTE — PROGRESS NOTES
Pt is A&Ox4, LS clear/diminished, no complaints of pain or shortness of breath during shift. Hillman intact with adequate output during shift and one BM during shift. At 0200 pt started desat down into the high 60's low 70's, during this time pts heart went as low as 30 with PVCs. RT was called to bedside and with writer and RT in room pt agreed to wear CPap at night. Once CPap was placed pt had no events for rest of shift. Will continue to monitor.    Dori Haywood RN

## 2021-06-10 NOTE — PLAN OF CARE
Occupational Therapy Discharge Summary    Date of OT Discharge: 8/4/2020  Refer to daily doc flowsheet for equipment issued and current functional status.  Discharge Destination: [facility]  Discharge Comments: Recommend continuation of skilled OT services to address deficits in functional tasks & mobility.     Please note that if goals are not fully met the patient is making progress towards established goals, which is documented in flowsheet notes. If further therapy is recommended it is related to documented deficits, and is necessary to maximize functional independence in order for patient to return to previous level of function.      8/4/2020 by Jaycee Shearer, OT
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
## 2021-06-10 NOTE — PLAN OF CARE
Problem: Pain  Goal: Patient's pain/discomfort is manageable  Outcome: Progressing     Problem: Safety  Goal: Patient will be injury free during hospitalization  Outcome: Progressing     Problem: Daily Care  Goal: Daily care needs are met  Outcome: Progressing     Problem: Psychosocial Needs  Goal: Demonstrates ability to cope with hospitalization/illness  Outcome: Progressing  Goal: Collaborate with patient/family/caregiver to identify patient specific goals for this hospitalization  Outcome: Progressing     Problem: Discharge Barriers  Goal: Patient's discharge needs are met  Outcome: Progressing     Problem: Knowledge Deficit  Goal: Patient/family/caregiver demonstrates understanding of disease process, treatment plan, medications, and discharge instructions  Outcome: Progressing     Problem: Potential for Compromised Skin Integrity  Goal: Skin integrity is maintained or improved  Outcome: Progressing  Goal: Nutritional status is improving  Outcome: Progressing     Problem: Urinary Incontinence  Goal: Perineal skin integrity is maintained or improved  Outcome: Progressing     Problem: Potential for Falls  Goal: Patient will remain free of falls  Outcome: Progressing     Problem: Risk of Injury Due to Unsafe Behavior  Goal: Patient will remain safe while in restraint; physical/psychological needs will be met  Outcome: Progressing     Problem: Glucose Imbalance  Goal: Achieve optimal glucose control  Outcome: Progressing     Problem: Potential for transmission of organism by Contact, Enteric, Droplet and/or Airborne routes  Goal: Prevent transmission of organisms  Outcome: Progressing     Problem: Inadequate Gas Exchange  Goal: Patient will achieve/maintain normal respiratory rate/effort  Outcome: Progressing

## 2021-06-10 NOTE — PLAN OF CARE
Physical Therapy Discharge Summary    Date of PT Discharge: 8/4/20  Refer to Care Plan goals above for progress towards goals at time of discharge.   Goals Met? No  Barriers to achieve above goals: discharge from hospital  Recommended Equipment: N/a  Discharge Destination:ARU  Discharge Recommendations: ARU  Discharge Comments:

## 2021-06-10 NOTE — PROGRESS NOTES
Patient was extubated at 1135 this morning after days of successful SBT trials. Patient is awake and alert and follows commands. BIPAP on SB. Extubated to HFNC 50L and 40%. Neb txs given two times a day. Will continue to monitor and treat.

## 2021-06-10 NOTE — PLAN OF CARE
Goal: Pt's goal is to return home, care team is recommending ARU.   Outcome: Discussed progression of care with hospitalist. Pt improving. Now on RA.   Discharge Disposition: ARU   Planned Discharge Date: TBD   Problem: Barriers to discharge include: COVID recovery and no insurance   Transportation: TBD     Care Management Narrative:  7/30/20Spoke with pt via phone with . Introduced care management and discussed discharge plans. Pt did confirm that he does not have insurance, writer assured pt that Financial SW would be connecting with him regarding his financing.     Also discussed discharge plan and the care team recommendation for ARU. Pt is in agreement with this plan, but need to get MA application in process before pt can be accepted at ARU or TCU. CM will continue to work with pt regarding discharge plan. Will refer pt to ARU in hopes MA process can happen soon.     8/3/20 Pt potentially accepted at [facility] pending second COVID testing negative. Will f/u with ARU and pt tomorrow. Tentative stretcher ride set for 1:30pm tomorrow.     Clary Engle RN/CM 8/3/2020 4:16 PM

## 2021-06-10 NOTE — PROGRESS NOTES
While RT in room , pt reached up and un-spiked bag of heparin,  Informed MD and ordered mitts for protection of lines.

## 2021-06-10 NOTE — PROGRESS NOTES
[facility]    Hospitalist Progress Note    Name: Tobias Palmer    MRN: [id number]  YOB: 1965    Age: 54 y.o.  Date of Admission:  6/22/2020  Primary Provider:  Provider, No Primary Care, None  Physician:  Larry Arreola MD      Assessment and Plan:  Tobias Palmer is a 54 y.o. male with history of obesity who was transferred to [facility] on 6/22/2020 for further management of confirmed COVID-19 infection.    COVID-19 Diagnosis Details:  Onset of COVID symptoms ~6/18/20   Date of positive test results 6/21/20   Research study Yes: CCP     # Confirmed COVID-19 infection    # Acute Hypoxic Respiratory Failure secondary to COVID-19 infection  # Viral Pneumonia secondary to COVID-19 infection  # Acute Respiratory Distress Syndrome  # Bacterial Superinfection  # Septic shock  Patient initially admitted to [facility] on 6/21/2020 after presenting with fever, cough and dyspnea. He was intubated and transferred to [place] on 6/22. Imaging required diffuse bilateral infiltrates.  He required flolan. He remained intubated until 7/26 at which time he was successfully extubated. And is now stable on RA with exception of with sleep.   - Continue supportive care with continuous pulse oximetry, COVID-19 special precautions, minimized lab draws, and care consolidation  - Oxygen: continue current support as needed--currently on RA as of 7/29; Appears to have some degree of JEROMY. Desats only when asleep. Resolves when wearing CPAP--but pt does not like it and at times refuses. O2 as needed with sleep but okay with O2 sats 85% or greater when sleeping as long as >89% when awake.    - Labs: trend PRN  - Imaging: no additional imaging needed at this time  - Breathing treatments: Combivent inhaler four times a day and PRN; avoid nebulizers in favor of MDIs   - IV fluids: not indicated at this time  - Antibiotics: Multiple bacterial infections and septic shock thoughout  his course including MSSA and serattia pneumonia and serratia UTI. S/p  Antimicobials have included vancomycin  (6/27 - 7/7), cefepime (6/27 - 7/2) meropenem (7/2 - 7/7), cefazolin (7/7 - 7/14), ceftriaxone (7/20 - 7/27)  - Research study protocol/COVID medications: convalescent plasma, remdesivir, tocilizumab  - Can be seen in ICU survivorship clinic after discharge    ACTIVE HOSPITAL PROBLEMS:  JEROMY  After weaning from O2 continued to desat while in deep sleep and required O2. Pt tried BiPAP/CPAP but would request to be taken off due to not being comfortable on it. Overnight 8/1 desat to 70's and bradycardic to 30's associated with apnea. Recovered quickly when awoken. Did agree to wear CPAP and was able to tolerate for a few hours.   - Refer to sleep medicine once ready for discharge to TCU/medical assistance pending  - Unclear if he would be able to do CPAP at TCU without sleep study. If not, it is likely not dangerous in the short term as he likely has longstanding JEROMY.     Scrotal edema  Marked scrotal edema, preventing removal of barlow--penis is fully retracted within swelling; unsure if he would be able to void, and we would not be able to easily cath afterward.   - scrotal elevation  - lasix 40 mg PO 8/1 and 8/2.   - Repeat electrolytes in am  - continue to reassess ability to remove barlow    ICU delirium  Resolved as he weaned from ICU sedation. Was sedated for weeks so likely will benefit from slow wean of benzos and opaites Started wean 7/29 as below and tolerating well.   - Ativan 1 mg q8h --> 0.5 mg q8h 7/29 --> 0.25 mg q8h 7/31 -- stop 8/2  - Oxycodone 5 mg q4h --> 2.5 mg q4h 7/30 --> 2.5 mg q8h 8/1 --> stop 8/3  - PRN oxycodone and ativan available   - Continue seroquel 25 mg daily (would attempt to stop on 8/4)  - continue trazodone 50 mg at bedtime (would be last to discontinue)    A fib:   brief a fib with RVR when critically ill. Resolved with amiodarone. Discontinued amiodarone 7/29 with no  immediate issues, but had some brief self-resolving a fib/flutter starting overnight 7/31.   - started metoprolol 25 mg two times a day 8/1  - Continue telemetry  - will hold off on restarting amiodarone given longterm sequelae. If needed to restart, would refer to cardiology as outpatient.   - No indication for anticoagulation for A fib at this time  - CTM    Protein/calorie malnutrition  - discontinued tube feeds as of 7/29. Removed feeding tube 7/31.   - speech cleared for regular diet with thin liquids.     Lines and tubes  - Arterial line placed 7/17. Removed 7/28  - Feeding tube removed 7/31  - PICC placed on 6/26. No longer using. Removed 7/31.   - Hillman catheter placed on 7/22. Will continue for now due to scrotal edema  - Rectal tube placed on 7/18.  Removed 7/31      # DVT Prophylaxis:  D-Dimer, Quant   Date Value Ref Range Status   06/26/2020 0.72 (H) <=0.50 FEU ug/mL Final   - PROPHYLACTIC dosing: lovenox 40mg two times a day  - At high risk of thrombosis due to COVID-19; consider anticoagulation on discharge    # Code Status: Full Code.   # Diet: Diet Regular; Thin liquids  # Disposition: Expected discharge on 2-3 days to TCU once safe disposition plan/TCU bed available. SW/CC working on getting MA pending, so he can go to TCU. He is medically ready for discharge when this is available.   # Bedside iPad: NOT used due to severity of illness/medical appropriateness.    Larry Arreola MD  [facility]  ______________________________________________________________________    Subjective:  No acute events.  Tolerating regular diet well. Continues to have scrotal edema. Continues to desat while asleep only. Denies pain. Brief episodes of self-resolved a fib/flutter noted on monitor overnight.     4 pt ROS otherwise negative    Data reviewed today: I reviewed all medications, new labs and imaging results over the last 24 hours.     Physical Exam:  Temp:  [98.2  F (36.8  C)-99  F (37.2  C)] 98.3  F  (36.8  C)  Heart Rate:  [] 97  Resp:  [20-39] 20  BP: (110-144)/(77-89) 127/87  SpO2:  [92 %-98 %] 95 %  Vitals:    06/22/20 1430 06/25/20 0530 06/28/20 0400 06/29/20 0000   Weight: (!) 274 lb 4 oz (124.4 kg) (!) 263 lb 0.1 oz (119.3 kg) (!) 264 lb 8.8 oz (120 kg) (!) 254 lb 10.1 oz (115.5 kg)    07/03/20 0606 07/05/20 2010 07/08/20 0000 07/09/20 0400   Weight: (!) 255 lb (115.7 kg) (!) 284 lb 9.8 oz (129.1 kg) (!) 281 lb 1.4 oz (127.5 kg) (!) 275 lb 5.7 oz (124.9 kg)    07/10/20 0600 07/11/20 0600 07/12/20 0400 07/14/20 0500   Weight: (!) 276 lb 10.8 oz (125.5 kg) (!) 276 lb 14.4 oz (125.6 kg) (!) 270 lb 15.1 oz (122.9 kg) (!) 272 lb 11.3 oz (123.7 kg)    07/15/20 0400 07/16/20 0547 07/17/20 0600 07/18/20 0333   Weight: (!) 262 lb 5.6 oz (119 kg) (!) 265 lb 10.5 oz (120.5 kg) (!) 266 lb 3.2 oz (120.7 kg) (!) 266 lb 12.1 oz (121 kg)    07/19/20 0512 07/20/20 0558 07/21/20 0600 07/22/20 0600   Weight: (!) 267 lb 8 oz (121.3 kg) (!) 270 lb 5.9 oz (122.6 kg) (!) 254 lb 10.1 oz (115.5 kg) (!) 254 lb (115.2 kg)    07/23/20 0600 07/24/20 0500 07/24/20 2300 07/27/20 0400   Weight: (!) 265 lb 3.4 oz (120.3 kg) (!) 259 lb 0.7 oz (117.5 kg) (!) 251 lb 5.2 oz (114 kg) (!) 250 lb (113.4 kg)    07/29/20 0600 07/30/20 0600 07/31/20 0600   Weight: (!) 249 lb 5.4 oz (113.1 kg) (!) 250 lb (113.4 kg) (!) 224 lb 13.9 oz (102 kg)       GEN:  healthy, alert and no distress  HEENT:  extra ocular movement intact, sclera clear, anicteric and oropharynx clear, no lesions, feeding tube in place   CV:   Pulses 2+ no LE edema  PULM: nonlabored, mild tachypnea  GI:  Soft, NT, ND  : improving scrotal edema. Hillman in place  EXT:  extremities normal, atraumatic, no cyanosis or edema  SKIN:  no rashes or significant lesions  NEURO:  Grossly normal

## 2021-06-10 NOTE — PROGRESS NOTES
Respiratory Care Note    Patient was weaned off of HFNC and is now on 2L nasal cannula with saturations in the mid 90's. He has a strong productive cough. Saturations in the mid 90's. RT will continue to follow.    DEBORAH PachecoT

## 2021-06-10 NOTE — PROGRESS NOTES
07/27/20 1200   Visit Specifics   Eval Type Inital eval   Inital OT Consult  07/27/20   Treatment Time   Therapeutic Activity 30   General   Onset date 06/22/20   Additional Pertinent History Tobias Palmer is a 54 y.o. male with PMHx of obesity who presents in Transfer from [facility] on 6/22 after presentation on 6/21 with 3 days of fevers/cough, COVID +, rapid decompensation from HFNC to intubation   Chart Reviewed Yes   PT/OT Patient/Caregiver Stated Goals unable to state   Home Living   Additional Comments per chart review pt lives in a rambler.    Prior Status   Comments per chart review pt is a  and full time employed as well as I with all ADL/IADL's.    ADL   Lower Body Dressing Socks   Socks Max assist   Activity Tolerance   Endurance Poor   Balance   Sitting Balance Min assist;Mod assist   Bed Mobility   Supine to Sit Max assist   Functional Transfers   Functional Transfers Chair Transfers   Chair Transfers Dependent/total assist   Cognition   Overall Cognitive Status Impaired   Comments difficult to assess cognition at this time, pt very soft spoken. further testing required.    Vision-Basic Assessment   Current Vision Other (Comment)  (pt endorses blurry vision, grossly intact. )   RUE Assessment   RUE Assessment   (NWFL)   LUE Assessment   LUE Assessment   (NWFL)   Hand Function   Gross Grasp Impaired   Assessment   Assessment Decreased ADL status;Decreased funtional mobility;Decreased cognition;Decreased UE strength;Decreased Safe judgement during ADL;Decreased endurance;Decreased fine motor control;Non-func R UE;Non-func L UE   Prognosis Fair   OT Frequency 5x/week   Goal Formulation Patient   Recommendations   OT Discharge Recommendation TCU   Treatment Suggestions for Next Session EOB ADL's, UE and core strengthening, monitor cognition as able.    OT Summary Pt max assist x2 for sitting up at EOB today. Abl eto particiapte in basic lateral/ant/post leaning ex's sitting EOB  with assist x2. pt fatiguing easily. At this time pt is max assist to dependent for all ADL's.

## 2021-06-10 NOTE — PLAN OF CARE
Problem: Inadequate Airway Clearance  Goal: Patient will maintain patent airway  Outcome: Progressing     Problem: Mechanical Ventilation  Goal: Patient will maintain patent airway  Outcome: Progressing  Goal: ET tube will be managed safely  Outcome: Progressing

## 2021-06-10 NOTE — PROGRESS NOTES
[facility]    Hospitalist Progress Note    Name: Tobias Palmer    MRN: [id number]  YOB: 1965    Age: 54 y.o.  Date of Admission:  6/22/2020  Primary Provider:  Provider, No Primary Care, None  Physician:  Larry Arreola MD      Assessment and Plan:  Tobias Palmer is a 54 y.o. male with history of obesity who was transferred to [facility] on 6/22/2020 for further management of confirmed COVID-19 infection.    COVID-19 Diagnosis Details:  Onset of COVID symptoms ~6/18/20   Date of positive test results 6/21/20   Research study Yes: [facility]CP     # Confirmed COVID-19 infection    # Acute Hypoxic Respiratory Failure secondary to COVID-19 infection  # Viral Pneumonia secondary to COVID-19 infection  # Acute Respiratory Distress Syndrome  # Bacterial Superinfection  # Septic shock  Patient initially admitted to [facility] on 6/21/2020 after presenting with fever, cough and dyspnea. He was intubated and transferred to [place] on 6/22. Imaging required diffuse bilateral infiltrates.  He required flolan. He remained intubated until 7/26 at which time he was successfully extubated. And is now stable on RA with exception of with sleep.   - Continue supportive care with continuous pulse oximetry, COVID-19 special precautions, minimized lab draws, and care consolidation  - Oxygen: continue current support as needed--currently on RA as of 7/29; Appears to have some degree of JEROMY. Desats only when asleep. Refuses CPAP/BiPAP. O2 as needed with sleep but okay with O2 sats 85% or greater when sleeping as long as >89% when awake.    - Labs: trend PRN  - Imaging: no additional imaging needed at this time  - Breathing treatments: Combivent inhaler four times a day and PRN; avoid nebulizers in favor of MDIs   - IV fluids: not indicated at this time  - Antibiotics: Multiple bacterial infections and septic shock thoughout his course including MSSA and serattia pneumonia and  serratia UTI. S/p  Antimicobials have included vancomycin  (6/27 - 7/7), cefepime (6/27 - 7/2) meropenem (7/2 - 7/7), cefazolin (7/7 - 7/14), ceftriaxone (7/20 - 7/27)  - Research study protocol/COVID medications: convalescent plasma, remdesivir, tocilizumab  - Can be seen in ICU survivorship clinic after discharge    ACTIVE HOSPITAL PROBLEMS:  ICU delirium  Resolving as he weans from ICU sedation. Was sedated for weeks so likely will benefit from slow wean of benzos and opaites Started wean 7/29  - Ativan 1 mg q8h --> 0.5 mg q8h 7/29 --> 0.25 mg q8h 7/31 -- stop date 8/2  - Oxycodone 5 mg q4h --> 2.5 mg q4h 7/30 --> 2.5 mg q8h 8/1 --> stop date 8/3  - PRN oxycodone and ativan available   - Continue seroquel 25 mg daily (would attempt to stop on 8/4)  - continue trazodone 50 mg at bedtime (would be last to discontinue)    A fib:   brief a fib with RVR when critically ill. Resolved with amiodarone. Discontinued amiodarone 7/29 with no immediate issues, but had some brief self-resolving a fib/flutter starting overnight 7/31.   - start metoprolol 25 mg two times a day  - check BMP, Mg  - Continue telemetry  - will hold off on restarting amiodarone given longterm sequelae. If needed to restart, would refer to cardiology as outpatient.   - No indication for anticoagulation for A fib at this time  - CTM    Protein/calorie malnutrition  - discontinued tube feeds as of 7/29. Removed feeding tube 7/31.   - speech cleared for regular diet with thin liquids.     Scrotal edema  Marked scrotal edema, preventing removal of barlow--penis is fully retracted within swelling; unsure if he would be able to void, and we would not be able to easily cath afterward.   - scrotal elevation  - lasix 20 mg IV once 8/1   - Repeat electrolytes in am  - continue to reassess ability to remove barlow    Lines and tubes  - Arterial line placed 7/17. Removed 7/28  - Feeding tube removed 7/31  - PICC placed on 6/26. No longer using. Removed 7/31.   -  Hillman catheter placed on 7/22. Will continue for now due to scrotal edema  - Rectal tube placed on 7/18.  Removed 7/31      # DVT Prophylaxis:  D-Dimer, Quant   Date Value Ref Range Status   06/26/2020 0.72 (H) <=0.50 FEU ug/mL Final   - PROPHYLACTIC dosing: lovenox 40mg two times a day  - At high risk of thrombosis due to COVID-19; consider anticoagulation on discharge    # Code Status: Full Code.   # Diet: Diet Regular; Thin liquids  # Disposition: Expected discharge on 4-7 days to TCU once safe disposition plan/TCU bed available.  # Bedside iPad: NOT used due to severity of illness/medical appropriateness.    Larry Arreola MD  [facility]  ______________________________________________________________________    Subjective:  No acute events.  Tolerating regular diet well. Continues to have scrotal edema. Continues to desat while asleep only. Denies pain. Brief episodes of self-resolved a fib/flutter noted on monitor overnight.     4 pt ROS otherwise negative    Data reviewed today: I reviewed all medications, new labs and imaging results over the last 24 hours.     Physical Exam:  Temp:  [98  F (36.7  C)-99  F (37.2  C)] 99  F (37.2  C)  Heart Rate:  [] 108  Resp:  [26-39] 31  BP: (133-152)/(65-94) 133/65  SpO2:  [95 %-100 %] 98 %  Vitals:    06/22/20 1430 06/25/20 0530 06/28/20 0400 06/29/20 0000   Weight: (!) 274 lb 4 oz (124.4 kg) (!) 263 lb 0.1 oz (119.3 kg) (!) 264 lb 8.8 oz (120 kg) (!) 254 lb 10.1 oz (115.5 kg)    07/03/20 0606 07/05/20 2010 07/08/20 0000 07/09/20 0400   Weight: (!) 255 lb (115.7 kg) (!) 284 lb 9.8 oz (129.1 kg) (!) 281 lb 1.4 oz (127.5 kg) (!) 275 lb 5.7 oz (124.9 kg)    07/10/20 0600 07/11/20 0600 07/12/20 0400 07/14/20 0500   Weight: (!) 276 lb 10.8 oz (125.5 kg) (!) 276 lb 14.4 oz (125.6 kg) (!) 270 lb 15.1 oz (122.9 kg) (!) 272 lb 11.3 oz (123.7 kg)    07/15/20 0400 07/16/20 0547 07/17/20 0600 07/18/20 0333   Weight: (!) 262 lb 5.6 oz (119 kg) (!) 265 lb 10.5 oz  (120.5 kg) (!) 266 lb 3.2 oz (120.7 kg) (!) 266 lb 12.1 oz (121 kg)    07/19/20 0512 07/20/20 0558 07/21/20 0600 07/22/20 0600   Weight: (!) 267 lb 8 oz (121.3 kg) (!) 270 lb 5.9 oz (122.6 kg) (!) 254 lb 10.1 oz (115.5 kg) (!) 254 lb (115.2 kg)    07/23/20 0600 07/24/20 0500 07/24/20 2300 07/27/20 0400   Weight: (!) 265 lb 3.4 oz (120.3 kg) (!) 259 lb 0.7 oz (117.5 kg) (!) 251 lb 5.2 oz (114 kg) (!) 250 lb (113.4 kg)    07/29/20 0600 07/30/20 0600 07/31/20 0600   Weight: (!) 249 lb 5.4 oz (113.1 kg) (!) 250 lb (113.4 kg) (!) 224 lb 13.9 oz (102 kg)       GEN:  healthy, alert and no distress  HEENT:  extra ocular movement intact, sclera clear, anicteric and oropharynx clear, no lesions, feeding tube in place   CV:   Pulses 2+ no LE edema  PULM: nonlabored, mild tachypnea  GI:  Soft, NT, ND  : improving scrotal edema. Hillman in place  EXT:  extremities normal, atraumatic, no cyanosis or edema  SKIN:  no rashes or significant lesions  NEURO:  Grossly normal

## 2021-06-10 NOTE — PROGRESS NOTES
DATA:     VENT DAY# 32     CURRENT SETTINGS:  VENT SETTINGS: AC  16/450/+8              FIO2:  40%     PATIENT PARAMETERS:     PIP: 21  Pplat: 16  Pmean: 10  SBT: 5/5, 40% for 12 hours  SECRETIONS: mod tanish, thick secretions  02 SATS: 98%     ETT SIZE 8.0  Secured at  24 cm at teeth     Respiratory Medications: Duoneb/mucomyst BID     HX: no significant medical history      ABG:   Results for RJ GUADALUPE (MRN [id number]) as of 7/25/2020 16:50    Ref. Range 7/25/2020 04:36   pH, Arterial Latest Ref Range: 7.37 - 7.44  7.50 (H)   pCO2, Arterial Latest Ref Range: 35 - 45 mm Hg 34 (L)   pO2, Arterial Latest Ref Range: 80 - 90 mm Hg 65 (L)   Bicarbonate, Arterial Calc Latest Ref Range: 23.0 - 29.0 mmol/L 27.3   O2 Sat, Arterial Latest Ref Range: 96.0 - 97.0 % 94.0 (L)   Oxyhemoglobin Latest Ref Range: 96.0 - 97.0 % 92.2 (L)   POC Base Excess Calc Latest Units: mmol/L 3.1         NOTE / PLAN:   Patient tolerated SBT 5/5 12 hours today. Plan is to extubate when stable. Will continue to monitor and make changes as needed.                          DEZ Degroot

## 2021-06-10 NOTE — PLAN OF CARE
Pt rested well during nigh shift no distress or discomfort noted.  VSS this shift.progressing toward goals

## 2021-06-10 NOTE — PROGRESS NOTES
"ICU PROGRESS NOTE:    Patient Summary:  Tobias Palmer is a 54 y.o. gentleman with a past medical history significant for morbid obesity, who presented to the hospital on 6/22/2020 11:38 AM for ARDS secondary to Covid-19 complicated by septic shock requiring pronating and paralysis    Lines/Drains/Tubes:  Central line Right cephalic PICC, Placed on 6/26  Arterial line Left brachial, Placed on 7/17  ETT placed on 6/21; Size 8.0  Feeding tube placed on 7/24, tube feeds @ 45  Hillman catheter placed on 7/22  Rectal tube placed on 7/18    Overnight events:  No acute events overnight.    Subjective:  Was getting quite anxious with his sedation being lightened, complained of nonspecific chest pain.    Objective:  Physical Exam:  Vent settings for last 24 hours:  Vent Mode: CPAP/PSV  FiO2 (%):  [35 %-40 %] 40 %  S RR:  [16] 16  S VT:  [450 mL] 450 mL  PEEP/CPAP (cm H2O):  [5 cm H2O] 5 cm H2O  Minute Ventilation (L/min):  [10.3 L/min-15.7 L/min] 13 L/min  PIP:  [12 cm H2O-20 cm H2O] 14 cm H2O  WA SUP:  [5 cm H20] 5 cm H20  MAP (cm H2O):  [7.4-8.6] 7.4    BP (!) 130/102   Pulse 67   Temp 99  F (37.2  C)   Resp 23   Ht 5' 5\" (1.651 m)   Wt (!) 251 lb 5.2 oz (114 kg)   SpO2 99%   BMI 41.82 kg/m      Intake/Output last 3 shifts:  I/O last 3 completed shifts:  In: 1728 [I.V.:1058; NG/GT:670]  Out: 4200 [Urine:4100; Stool:100]  Intake/Output this shift:  No intake/output data recorded.    Physical Exam   Constitutional: He is oriented to person, place, and time. He appears distressed.   HENT:   Head: Normocephalic and atraumatic.   Eyes: Pupils are equal, round, and reactive to light.   Neck: Normal range of motion.   Cardiovascular: Normal rate and regular rhythm.   Pulmonary/Chest:   Coarse BS BL.   Abdominal: Soft.   Musculoskeletal:         General: No edema.   Neurological: He is alert and oriented to person, place, and time.   Skin: Skin is warm. He is diaphoretic.         LAB:  Results from last 7 days   Lab Units " 07/25/20  0647   LN-WHITE BLOOD CELL COUNT thou/uL 9.5   LN-HEMOGLOBIN g/dL 10.4*   LN-HEMATOCRIT % 31.8*   LN-PLATELET COUNT thou/uL 331     Results from last 7 days   Lab Units 07/25/20  0647 07/24/20  0518 07/23/20  0600   LN-SODIUM mmol/L 137 139 142   LN-POTASSIUM mmol/L 3.2* 3.6 3.6   LN-CHLORIDE mmol/L 103 105 108*   LN-CO2 mmol/L 25 25 26   LN-BLOOD UREA NITROGEN mg/dL 13 14 15   LN-CREATININE mg/dL 0.53* 0.52* 0.51*   LN-CALCIUM mg/dL 8.2* 8.3* 8.3*       RADIOLOGY:  Xr Chest 1 View Portable    Result Date: 7/19/2020  EXAM: XR CHEST 1 VIEW PORTABLE LOCATION: [facility] DATE/TIME: 7/19/2020 6:08 AM INDICATION: Evaluate lines/tubes. COMPARISON: 07/11/2020     Endotracheal tube is 3.2 cm superior to the melissa. Enteric tube extends off the inferior aspect of the film. Stable right PICC. No pneumothorax. No significant interval change in multifocal airspace disease. No pleural effusion. The cardiac silhouette is stable at the upper limits of normal for size.    Xr Chest 1 View Portable    Result Date: 7/11/2020  EXAM: XR CHEST 1 VIEW PORTABLE LOCATION: [facility] DATE/TIME: 7/11/2020 8:01 AM INDICATION: ARDS eval for interval change since 7/5. COMPARISON: 7/5/2020.     Diffuse left lung opacities and consolidation appear stable to slightly improved. More patchy right lung opacities appear marginally increased, though could be exaggerated by differences in exams. No significant pleural effusion. No pneumothorax. Stable cardiomediastinal silhouette. ET tube tip 2.1 cm above the melissa. Feeding tube courses into the stomach. Right PICC tip near the cavoatrial junction.     Xr Chest 1 View Portable    Result Date: 7/5/2020  EXAM: XR CHEST 1 VIEW PORTABLE LOCATION: [facility] DATE/TIME: 7/5/2020 12:25 PM INDICATION: ETT, Covid COMPARISON: 07/04/2020     Endotracheal tube tip mid way between clavicles and melissa. Right PICC tip projects over the mid SVC. Feeding tube tip below the film. Limited  inspiratory volume. Normal heart size. Unchanged bilateral diffuse lung infiltrates.    Xr Chest 1 View Portable    Result Date: 7/4/2020  EXAM: XR CHEST 1 VIEW PORTABLE LOCATION: [facility] DATE/TIME: 7/4/2020 11:18 AM INDICATION: Worsening hypoxemia. COMPARISON: 07/02/2020     Orogastric tube is been removed with other lines and tubes unchanged. Heart size magnified in AP projection. No significant change in patchy bilateral interstitial and airspace opacities when taking into account difference in technique and ventilation. No pneumothorax.    Xr Chest 1 View Portable    Result Date: 7/2/2020  EXAM: XR CHEST 1 VIEW PORTABLE LOCATION: [facility] DATE/TIME: 7/2/2020 5:49 PM INDICATION: fever, COVID pneumonia COMPARISON: 06/27/2020 and older studies     Endotracheal tube is in good position. Feeding tube can be seen coursing into the stomach. It is again coiled in the fundus. The tip is out of the field-of-view. Marked increase in the patchy interstitial and airspace opacities bilaterally. Heart is of normal size. No pneumothorax.    Xr Chest 1 View Portable    Result Date: 6/27/2020  EXAM: XR CHEST 1 VIEW PORTABLE LOCATION: [facility] DATE/TIME: 6/27/2020 8:57 AM INDICATION: ARDS. Difficulty breathing. COMPARISON: 06/26/2020.     Endotracheal tube terminates 4 cm above the melissa. Feeding tube passes through the stomach with tip out of the field of view. Interval removal of right internal jugular venous catheter. Mild improvement in patchy perihilar and left lower lobe infiltrates. Normal heart size. No pleural effusion or pneumothorax.    Xr Chest 1 View Portable    Result Date: 6/26/2020  EXAM: XR CHEST 1 VIEW PORTABLE LOCATION: [facility] DATE/TIME: 6/26/2020 2:55 PM INDICATION: assess ETT location, COVID pneumonia. COMPARISON: 06/22/2020     Patient's rotated to the right. Endotracheal tube is in the distal trachea 2 cm from the melissa. Right IJ central line overlies the region of  the proximal SVC. Feeding tube has been placed and can be seen coursing into the stomach. There is a loop within the fundus. The NG tube has been removed. Better aeration of the left lower lobe with clearing of some of the consolidating opacities in the left base as well as in the right midlung. No change in the bilateral perihilar interstitial opacities. Elevation of the right hemidiaphragm. Heart and pulmonary vascularity are normal.     Xr Chest 1 View Portable    Result Date: 6/22/2020  EXAM: XR CHEST 1 VIEW PORTABLE LOCATION: [facility] DATE/TIME: 6/22/2020 3:58 PM INDICATION: Covid 19 pneumonia. Intubate and right IJ CVC placement COMPARISON: CXRs 06/22/2020 1:18 PM and 2:51 AM and 06/21/2020.     Endotracheal tube tip 4 cm in the melissa. Right IJ central venous catheter has been placed with tip in the mid SVC. Nasogastric tube tip in the stomach just inferior to the field-of-view. Bilateral airspace opacity with slight volume loss in the mid and lower lungs unchanged. No pneumothorax.    Xr Chest 1 View Portable    Result Date: 6/22/2020  EXAM: XR CHEST 1 VIEW PORTABLE LOCATION: [facility] DATE/TIME: 6/22/2020 1:41 PM INDICATION: confirm ET Tube position COMPARISON: Earlier today at 0250 hours     ET tube has been pulled back and is now in good position the level of the mid clavicular heads. NG tube remains in good position. Improved lung aeration, especially at left lung base. Bilateral patchy mid lung airspace infiltrates persist consistent with pneumonia. Heart size normal.    Xr Abdomen Ap    Result Date: 7/22/2020  EXAM: XR ABDOMEN AP LOCATION: [facility] DATE/TIME: 7/22/2020 4:40 PM INDICATION: abdominal pain r/o constipation COMPARISON: 7/10/2020     Feeding tube tip is in the proximal duodenum. Bowel gas pattern is nonobstructed. There is not a significant amount of stool in the colon.     Xr Abdomen Ap    Result Date: 6/22/2020  EXAM: XR ABDOMEN AP LOCATION: [facility]  DATE/TIME: 6/22/2020 4:50 PM INDICATION: Feeding tube COMPARISON: Chest x-ray earlier today     Feeding tube in the distal stomach. Bowel gas pattern is normal. Infiltrates on the lung better seen on chest x-ray.     Xr Abdomen Ap Portable    Result Date: 7/24/2020  EXAM: ABDOMEN SINGLE VIEW PORTABLE LOCATION: [facility] DATE/TIME: 7/24/2020 10:07 PM INDICATION: Feeding tube placement. COMPARISON: None.     An enteric feeding tube is present with distal tube tip in gastric body.     Xr Abdomen Ap Portable    Result Date: 7/24/2020  EXAM: XR ABDOMEN AP PORTABLE LOCATION: [facility] DATE/TIME: 7/24/2020 9:12 PM INDICATION: Check feeding tube placement after being looped back on itself COMPARISON: Abdominal radiography 07/24/2020 at 1904 hours     A loop of the enteric tube extends below the diaphragmatic hiatus and in the cardia of the stomach however extends back into the lower esophagus. The tip of the weighted enteric tube is similar in position to 2 hours earlier located just above the diaphragmatic hiatus. Repositioning is suggested. NOTE: ABNORMAL REPORT THE DICTATION ABOVE DESCRIBES AN ABNORMALITY FOR WHICH FOLLOW-UP IS NEEDED.     Xr Abdomen Ap Portable    Result Date: 7/24/2020  EXAM: XR ABDOMEN AP PORTABLE LOCATION: [facility] DATE/TIME: 7/24/2020 7:15 PM INDICATION: assess NJ location post placement COMPARISON: 07/22/2020     Small bore enteric tube tip in distal esophagus needs to be advanced considerably. Remainder stable. NOTE: ABNORMAL REPORT THE DICTATION ABOVE DESCRIBES AN ABNORMALITY FOR WHICH FOLLOW-UP IS NEEDED.      Xr Abdomen Ap Portable    Result Date: 7/10/2020  EXAM: XR ABDOMEN AP PORTABLE LOCATION: [facility] DATE/TIME: 7/10/2020 2:48 PM INDICATION: Confirmation of DHT placement, COVID pneumonia. COMPARISON: 06/23/2020     Feeding tube in place. Tip overlies the inferior aspect of the descending duodenum. Bowel gas pattern is normal. Prior NG tube has been removed.      Xr Abdomen Ap Portable    Result Date: 6/23/2020  EXAM: XR ABDOMEN AP PORTABLE LOCATION: [facility] DATE/TIME: 6/23/2020 2:34 PM INDICATION: check feeding tube placement COMPARISON: 6/22/2020     NG tube appears in good position within mid stomach. Feeding tube has a persistent extra loop within the stomach but likely has its tip at the level of mid stomach and adjacent to the NG tube. Visible bowel gas pattern normal.    Us Venous Legs Bilateral    Result Date: 7/16/2020  EXAM: US VENOUS LEGS BILATERAL LOCATION: [facility] DATE/TIME: 7/16/2020 5:10 PM INDICATION: Respiratory failure. fevers, r/out dvt COMPARISON: 06/27/2020 TECHNIQUE: Venous Duplex ultrasound of bilateral lower extremities with and without compression, augmentation and duplex. Color flow and spectral Doppler with waveform analysis performed. FINDINGS: Exam includes the common femoral, femoral, popliteal veins as well as segmentally visualized deep calf veins and greater saphenous vein. RIGHT: No deep vein thrombosis. No superficial thrombophlebitis. No popliteal cyst. LEFT: No deep vein thrombosis. No superficial thrombophlebitis. No popliteal cyst.     1.  No deep venous thrombosis in the bilateral lower extremities.    Us Venous Legs Bilateral    Result Date: 6/27/2020  EXAM: US VENOUS LEGS BILATERAL LOCATION: [facility] DATE/TIME: 6/27/2020 12:12 PM INDICATION: Bilateral lower extremity pain and swelling. Positive for corona virus. COMPARISON: None. TECHNIQUE: Venous Duplex ultrasound of bilateral lower extremities with and without compression, augmentation and duplex. Color flow and spectral Doppler with waveform analysis performed. FINDINGS: Exam includes the common femoral, femoral, popliteal veins as well as segmentally visualized deep calf veins and greater saphenous vein. Examination is technically difficult due to the patient's condition. RIGHT: No deep vein thrombosis. No superficial thrombophlebitis. No  popliteal cyst. LEFT: No deep vein thrombosis. No superficial thrombophlebitis. No popliteal cyst.     No deep venous thrombosis in the bilateral lower extremities.    Us Venous Arms Bilateral    Result Date: 7/16/2020  EXAM: US VENOUS ARMS BILATERAL LOCATION: [facility] DATE/TIME: 7/16/2020 5:10 PM INDICATION: fevers, r/out dvt COMPARISON: None. TECHNIQUE: Venous Duplex ultrasound of both upper extremities with (when possible) and without compression, augmentation, and duplex. Color flow and spectral Doppler with waveform analysis performed. FINDINGS: Ultrasound includes evaluation of the internal jugular veins, innominate veins, subclavian veins, axillary veins, and brachial veins. The superficial cephalic and basilic veins were also evaluated where seen. RIGHT: No deep venous thrombosis. No superficial thrombophlebitis. Subcutaneous edema is present in the right forearm. The right basilic vein was not identified. A catheter is present in the right cephalic and subclavian veins. LEFT: No deep venous thrombosis. No superficial thrombophlebitis.     1.  No deep venous thrombosis in the bilateral upper extremities.    Us Venous Arms Bilateral    Result Date: 6/27/2020  EXAM: US VENOUS ARMS BILATERAL LOCATION: [facility] DATE/TIME: 6/27/2020 12:11 PM INDICATION: Bilateral upper extremity pain and swelling. COMPARISON: None. TECHNIQUE: Venous Duplex ultrasound of both upper extremities with (when possible) and without compression, augmentation, and duplex. Color flow and spectral Doppler with waveform analysis performed. FINDINGS: Ultrasound includes evaluation of the internal jugular veins, innominate veins, subclavian veins, axillary veins, and brachial veins. The superficial cephalic and basilic veins were also evaluated where seen. Examination is difficult due to the patient's condition and presence of dressings at the base of the right neck and right brachium. RIGHT: Right internal jugular and  "innominate veins not well seen. No deep venous thrombosis. No superficial thrombophlebitis. LEFT: No deep venous thrombosis. No superficial thrombophlebitis.     No deep venous thrombosis in the bilateral upper extremities.    Poc Us 3cg Picc Placement Guidance    Result Date: 6/26/2020  Exam was performed as guidance for PICC line insertion.  Click \"PACS images\" hyperlink below to view any stored images.  For specific procedure details, view procedure note authored by PICC/Vascular Access Nurse.        MICRO:  Date Source Organism   6/22 Blood NGTD    Blood NGTD   6/24 Sputum Yeast   6/27 Blood NGTD    Blood NGTD    Sputum NGTD   7/2 Sputum S. Aureus    Urine NGTD    Blood NGTD    Blood NGTD   7/4 Sputum S. Aureus   7/14 Sputum S. Aureus   7/15 Blood NGTD    Blood NGTD   7/19 Urine S. Marcescens    Sputum S. Aureus  S. Marcescens    Blood NGTD    Blood NGTD       Organism Antibiotic Antibiotic Start Date Antibiotic End Date   S. Aureus Ceftriaxone 6/22 6/22   S. Marcescens Remdesivir 6/22 7/1    Vancomycin  6/27 6/27    Ivermectin 6/25 6/26    Tocilizumab 6/25 6/27 (2 doses)    Cefepime 6/27 7/2    Meropenem 7/2 7/7    Vancomycin 7/2 7/7    Cefazolin 7/7 7/15    Ceftriaxone 7/20 7/24     MAR REVIEWED    ICU DAILY CHECKLIST                           Can patient transfer out of MICU? no    FAST HUG:    Feeding:  Feeding: Yes.  Patient is receiving TUBE FEEDS    Hillman:Yes  Analgesia/Sedation:Yes fentanyl and precedex.  Thromboembolic prophylaxis: yes; Mode:  Lovenox  HOB>30:  Yes  Stress Ulcer Protocol Active: yes; Mode: PPI  Glycemic Control: Any glucose > 180 yes; Mode of Insulin Therapy: Sliding Scale Insulin    INTUBATED:  Can patient have daily waking:  yes  Can patient have spontaneous breathing trial:  yes    Restraints? yes    Progress Note  Restraint Application    I recognize that restraints are physical and/or chemical interventions intended to restrict a person's movements. Restraints are currently needed " to ensure the safety of this patient and/or others. My clinical rationale appears below.    --  Category/Type of Restraint  Non Violent:  Hand mitt x2 (if patient cannot self-remove)  --  Behavior  (Example: Patient attempted to assault his nurse)  Tugging at lines.  --  Root Cause of the Behavior  (Example: Cocaine intoxication)  ARDS.  --  Less-Restrictive Measures that Failed  Non Violent Measures:  Close Observation, Repositioning and Pain Management  --  Response to the Restraint  (Example: Patient stopped attempting to pull out her NG tube)  Patient stopped pulling at lines  --  Criteria for Release from the Restraint  (Example: Patient is calm and agrees to not strike staff)  Patient is calm.     PHYSICAL THERAPY AND MOBILITY:  Can patient have PT and mobility trial: no  Activity: Bed Rest    Assessment/Plan:  Tobias Palmer is a 54 y.o. gentleman with a past medical history significant for obesity, presenting to the hospital for ARDS secondary to Covid-19 infection.    1. NEURO:Is presently intubated and minimally sedated.  Follows all commands and is appropriate.    RASS goal of 1 to 0.    Fentanyl for analgesia; Precedex for sedation.    Continue scheduled oxycodone and Seroquel.  We will increase his Ativan dose to 1 mg every 8 hours.  2. CVS:Has no known medical history of CAD.  Was briefly noted to have developed atrial fibrillation with a rapid ventricular response early in the course of his illness and it also developed septic shock but both of these have resolved.    Continue telemetry monitoring.    MAP goal of 60mmHg with SBP>90mmHg.    Afib:Being treated with Amiodarone 200mg Daily.  3. RESP:Admitted with ARDS with a P:F of 216 secondary to COVID-19.  Last chest x-ray was performed on 7/19 which demonstrated bilateral infiltrates.  Over the last several days the patient has continued his pressure support on a pressure support of 7/5 and for several hours today and 5/5.  He appears to be  following commands and is oxygenating well.      Maintain SpO2 of >92%.    Did very well on a pressure support trial today and is following commands and is appropriate.    Plan on extubating him tomorrow morning.    Vent Mode: CPAP/PSV    FiO2 (%):  [35 %-40 %] 40 %    S RR:  [16] 16    S VT:  [450 mL] 450 mL    PEEP/CPAP (cm H2O):  [5 cm H2O] 5 cm H2O    Minute Ventilation (L/min):  [10.3 L/min-15.7 L/min] 13 L/min    PIP:  [12 cm H2O-20 cm H2O] 14 cm H2O    MO SUP:  [5 cm H20] 5 cm H20    MAP (cm H2O):  [7.4-8.6] 7.4  4. GI: Noted to have protein calorie malnutrition and is presently being fed through tube feeds at close to goal rate.    Continue Protonix daily for ulcer prophylaxis.  5. RENAL : Is noted to be mildly hyponatremic today and 8.9 L down from the time of admission.    Would trend renal function daily and closely follow I/O.    Follow electrolytes and correct as abnormalities arise.  6. ID:Presents with Covid-19 infection.    MSSA pneumonia; colonized    Serratia pneumonia    Urine culture with gram negative rods; waiting sensitivities              * S/P CCP and toci 6/25 and 6/27               * S/P Ivermectin x 2 doses.               * S/P Meropenem x 10 days               * S/P Remdesivir x 10 days              * Cefazolin for MSSA; 7/7 - 7/14              * 7/14 sputum culture with staph aureus and serratia, thought to be colonized              * COVID retest 7/16 positive; plan to test once a week              * Ceftriaxone started 7/20 for serratia  7. HEMATOLOGIC: Noted to have a coagulopathy in the setting of COVID-19.    Daily CBC.    Continue twice daily Lovenox 60 mg.  8. ENDOCRINE:.    ICU insulin sliding scale.  9. ICU PROPHYLAXIS:Protonix, Lovenox.  10. DISPO:Unclear. Continues to require ICU levels of care.    Total Critical Care Time : 1 Hour      Lisseth Garcia  Pulmonary and Critical Care  [id number]

## 2021-06-10 NOTE — PROGRESS NOTES
"ICU PROGRESS NOTE:    Patient Summary:  Tobias Palmer is a 54 y.o. gentleman with a past medical history significant for morbid obesity, who presented to the hospital on 6/22/2020 11:38 AM for ARDS secondary to Covid-19 complicated by septic shock requiring pronating and paralysis.    Lines/Drains/Tubes:  Central line Right cephalic PICC, Placed on 6/26  Arterial line Left brachial, Placed on 7/17  ETT placed on 6/21; Size 8.0  Feeding tube placed on 7/24, tube feeds @ 45  Hillman catheter placed on 7/22  Rectal tube placed on 7/18    Overnight events:  No acute events overnight.    Subjective:  Doing very well today.    Objective:  Physical Exam:  /88   Pulse (!) 104   Temp 98.6  F (37  C) (Axillary)   Resp (!) 42   Ht 5' 5\" (1.651 m)   Wt (!) 250 lb (113.4 kg)   SpO2 96%   BMI 41.60 kg/m      Intake/Output last 3 shifts:  I/O last 3 completed shifts:  In: 440 [NG/GT:440]  Out: 1850 [Urine:1800; Stool:50]  Intake/Output this shift:  No intake/output data recorded.    Physical Exam   Constitutional: He is oriented to person, place, and time.   HENT:   Head: Normocephalic and atraumatic.   Eyes: Pupils are equal, round, and reactive to light.   Neck: Normal range of motion.   Cardiovascular: Normal rate and regular rhythm.   Pulmonary/Chest:   Coarse BS BL.   Abdominal: Soft.   Musculoskeletal:         General: No edema.   Neurological: He is alert and oriented to person, place, and time.   Skin: Skin is warm.         LAB:  Results from last 7 days   Lab Units 07/28/20  0420   LN-WHITE BLOOD CELL COUNT thou/uL 8.8   LN-HEMOGLOBIN g/dL 11.4*   LN-HEMATOCRIT % 36.5*   LN-PLATELET COUNT thou/uL 337     Results from last 7 days   Lab Units 07/28/20  0420 07/27/20  0414 07/26/20  0822   LN-SODIUM mmol/L 140 137 138   LN-POTASSIUM mmol/L 3.7 3.8 3.4*   LN-CHLORIDE mmol/L 106 106 108*   LN-CO2 mmol/L 22 25 21*   LN-BLOOD UREA NITROGEN mg/dL 18 18 18   LN-CREATININE mg/dL 0.60* 0.57* 0.50*   LN-CALCIUM mg/dL " 9.1 8.5 7.6*       RADIOLOGY:  Xr Chest 1 View Portable    Result Date: 7/19/2020  EXAM: XR CHEST 1 VIEW PORTABLE LOCATION: [facility] DATE/TIME: 7/19/2020 6:08 AM INDICATION: Evaluate lines/tubes. COMPARISON: 07/11/2020     Endotracheal tube is 3.2 cm superior to the melissa. Enteric tube extends off the inferior aspect of the film. Stable right PICC. No pneumothorax. No significant interval change in multifocal airspace disease. No pleural effusion. The cardiac silhouette is stable at the upper limits of normal for size.    Xr Chest 1 View Portable    Result Date: 7/11/2020  EXAM: XR CHEST 1 VIEW PORTABLE LOCATION: [facility] DATE/TIME: 7/11/2020 8:01 AM INDICATION: ARDS eval for interval change since 7/5. COMPARISON: 7/5/2020.     Diffuse left lung opacities and consolidation appear stable to slightly improved. More patchy right lung opacities appear marginally increased, though could be exaggerated by differences in exams. No significant pleural effusion. No pneumothorax. Stable cardiomediastinal silhouette. ET tube tip 2.1 cm above the melissa. Feeding tube courses into the stomach. Right PICC tip near the cavoatrial junction.     Xr Chest 1 View Portable    Result Date: 7/5/2020  EXAM: XR CHEST 1 VIEW PORTABLE LOCATION: [facility] DATE/TIME: 7/5/2020 12:25 PM INDICATION: ETT, Covid COMPARISON: 07/04/2020     Endotracheal tube tip mid way between clavicles and melissa. Right PICC tip projects over the mid SVC. Feeding tube tip below the film. Limited inspiratory volume. Normal heart size. Unchanged bilateral diffuse lung infiltrates.    Xr Chest 1 View Portable    Result Date: 7/4/2020  EXAM: XR CHEST 1 VIEW PORTABLE LOCATION: [facility] DATE/TIME: 7/4/2020 11:18 AM INDICATION: Worsening hypoxemia. COMPARISON: 07/02/2020     Orogastric tube is been removed with other lines and tubes unchanged. Heart size magnified in AP projection. No significant change in patchy bilateral interstitial  and airspace opacities when taking into account difference in technique and ventilation. No pneumothorax.    Xr Chest 1 View Portable    Result Date: 7/2/2020  EXAM: XR CHEST 1 VIEW PORTABLE LOCATION: [facility] DATE/TIME: 7/2/2020 5:49 PM INDICATION: fever, COVID pneumonia COMPARISON: 06/27/2020 and older studies     Endotracheal tube is in good position. Feeding tube can be seen coursing into the stomach. It is again coiled in the fundus. The tip is out of the field-of-view. Marked increase in the patchy interstitial and airspace opacities bilaterally. Heart is of normal size. No pneumothorax.    Xr Chest 1 View Portable    Result Date: 6/27/2020  EXAM: XR CHEST 1 VIEW PORTABLE LOCATION: [facility] DATE/TIME: 6/27/2020 8:57 AM INDICATION: ARDS. Difficulty breathing. COMPARISON: 06/26/2020.     Endotracheal tube terminates 4 cm above the melissa. Feeding tube passes through the stomach with tip out of the field of view. Interval removal of right internal jugular venous catheter. Mild improvement in patchy perihilar and left lower lobe infiltrates. Normal heart size. No pleural effusion or pneumothorax.    Xr Chest 1 View Portable    Result Date: 6/26/2020  EXAM: XR CHEST 1 VIEW PORTABLE LOCATION: [facility] DATE/TIME: 6/26/2020 2:55 PM INDICATION: assess ETT location, COVID pneumonia. COMPARISON: 06/22/2020     Patient's rotated to the right. Endotracheal tube is in the distal trachea 2 cm from the melissa. Right IJ central line overlies the region of the proximal SVC. Feeding tube has been placed and can be seen coursing into the stomach. There is a loop within the fundus. The NG tube has been removed. Better aeration of the left lower lobe with clearing of some of the consolidating opacities in the left base as well as in the right midlung. No change in the bilateral perihilar interstitial opacities. Elevation of the right hemidiaphragm. Heart and pulmonary vascularity are normal.     Xr Chest  1 View Portable    Result Date: 6/22/2020  EXAM: XR CHEST 1 VIEW PORTABLE LOCATION: [facility] DATE/TIME: 6/22/2020 3:58 PM INDICATION: Covid 19 pneumonia. Intubate and right IJ CVC placement COMPARISON: CXRs 06/22/2020 1:18 PM and 2:51 AM and 06/21/2020.     Endotracheal tube tip 4 cm in the melissa. Right IJ central venous catheter has been placed with tip in the mid SVC. Nasogastric tube tip in the stomach just inferior to the field-of-view. Bilateral airspace opacity with slight volume loss in the mid and lower lungs unchanged. No pneumothorax.    Xr Chest 1 View Portable    Result Date: 6/22/2020  EXAM: XR CHEST 1 VIEW PORTABLE LOCATION: [facility] DATE/TIME: 6/22/2020 1:41 PM INDICATION: confirm ET Tube position COMPARISON: Earlier today at 0250 hours     ET tube has been pulled back and is now in good position the level of the mid clavicular heads. NG tube remains in good position. Improved lung aeration, especially at left lung base. Bilateral patchy mid lung airspace infiltrates persist consistent with pneumonia. Heart size normal.    Xr Abdomen Ap    Result Date: 7/22/2020  EXAM: XR ABDOMEN AP LOCATION: [facility] DATE/TIME: 7/22/2020 4:40 PM INDICATION: abdominal pain r/o constipation COMPARISON: 7/10/2020     Feeding tube tip is in the proximal duodenum. Bowel gas pattern is nonobstructed. There is not a significant amount of stool in the colon.     Xr Abdomen Ap    Result Date: 6/22/2020  EXAM: XR ABDOMEN AP LOCATION: [facility] DATE/TIME: 6/22/2020 4:50 PM INDICATION: Feeding tube COMPARISON: Chest x-ray earlier today     Feeding tube in the distal stomach. Bowel gas pattern is normal. Infiltrates on the lung better seen on chest x-ray.     Xr Abdomen Ap Portable    Result Date: 7/26/2020  EXAM: ABDOMEN SINGLE VIEW PORTABLE LOCATION: [facility] DATE/TIME: 07/26/2020, 3:59 AM INDICATION: Feeding tube pulled out. COMPARISON: 07/24/2020 at 2202 hours.     An enteric  feeding tube is present with distal tip in the gastric fundus.     Xr Abdomen Ap Portable    Result Date: 7/24/2020  EXAM: ABDOMEN SINGLE VIEW PORTABLE LOCATION: [facility] DATE/TIME: 7/24/2020 10:07 PM INDICATION: Feeding tube placement. COMPARISON: None.     An enteric feeding tube is present with distal tube tip in gastric body.     Xr Abdomen Ap Portable    Result Date: 7/24/2020  EXAM: XR ABDOMEN AP PORTABLE LOCATION: [facility] DATE/TIME: 7/24/2020 9:12 PM INDICATION: Check feeding tube placement after being looped back on itself COMPARISON: Abdominal radiography 07/24/2020 at 1904 hours     A loop of the enteric tube extends below the diaphragmatic hiatus and in the cardia of the stomach however extends back into the lower esophagus. The tip of the weighted enteric tube is similar in position to 2 hours earlier located just above the diaphragmatic hiatus. Repositioning is suggested. NOTE: ABNORMAL REPORT THE DICTATION ABOVE DESCRIBES AN ABNORMALITY FOR WHICH FOLLOW-UP IS NEEDED.     Xr Abdomen Ap Portable    Result Date: 7/24/2020  EXAM: XR ABDOMEN AP PORTABLE LOCATION: [facility] DATE/TIME: 7/24/2020 7:15 PM INDICATION: assess NJ location post placement COMPARISON: 07/22/2020     Small bore enteric tube tip in distal esophagus needs to be advanced considerably. Remainder stable. NOTE: ABNORMAL REPORT THE DICTATION ABOVE DESCRIBES AN ABNORMALITY FOR WHICH FOLLOW-UP IS NEEDED.      Xr Abdomen Ap Portable    Result Date: 7/10/2020  EXAM: XR ABDOMEN AP PORTABLE LOCATION: [facility] DATE/TIME: 7/10/2020 2:48 PM INDICATION: Confirmation of DHT placement, COVID pneumonia. COMPARISON: 06/23/2020     Feeding tube in place. Tip overlies the inferior aspect of the descending duodenum. Bowel gas pattern is normal. Prior NG tube has been removed.     Xr Abdomen Ap Portable    Result Date: 6/23/2020  EXAM: XR ABDOMEN AP PORTABLE LOCATION: [facility] DATE/TIME: 6/23/2020 2:34 PM  INDICATION: check feeding tube placement COMPARISON: 6/22/2020     NG tube appears in good position within mid stomach. Feeding tube has a persistent extra loop within the stomach but likely has its tip at the level of mid stomach and adjacent to the NG tube. Visible bowel gas pattern normal.    Us Venous Legs Bilateral    Result Date: 7/16/2020  EXAM: US VENOUS LEGS BILATERAL LOCATION: [facility] DATE/TIME: 7/16/2020 5:10 PM INDICATION: Respiratory failure. fevers, r/out dvt COMPARISON: 06/27/2020 TECHNIQUE: Venous Duplex ultrasound of bilateral lower extremities with and without compression, augmentation and duplex. Color flow and spectral Doppler with waveform analysis performed. FINDINGS: Exam includes the common femoral, femoral, popliteal veins as well as segmentally visualized deep calf veins and greater saphenous vein. RIGHT: No deep vein thrombosis. No superficial thrombophlebitis. No popliteal cyst. LEFT: No deep vein thrombosis. No superficial thrombophlebitis. No popliteal cyst.     1.  No deep venous thrombosis in the bilateral lower extremities.    Us Venous Legs Bilateral    Result Date: 6/27/2020  EXAM: US VENOUS LEGS BILATERAL LOCATION: [facility] DATE/TIME: 6/27/2020 12:12 PM INDICATION: Bilateral lower extremity pain and swelling. Positive for corona virus. COMPARISON: None. TECHNIQUE: Venous Duplex ultrasound of bilateral lower extremities with and without compression, augmentation and duplex. Color flow and spectral Doppler with waveform analysis performed. FINDINGS: Exam includes the common femoral, femoral, popliteal veins as well as segmentally visualized deep calf veins and greater saphenous vein. Examination is technically difficult due to the patient's condition. RIGHT: No deep vein thrombosis. No superficial thrombophlebitis. No popliteal cyst. LEFT: No deep vein thrombosis. No superficial thrombophlebitis. No popliteal cyst.     No deep venous thrombosis in the bilateral  lower extremities.    Us Venous Arms Bilateral    Result Date: 7/16/2020  EXAM: US VENOUS ARMS BILATERAL LOCATION: [facility] DATE/TIME: 7/16/2020 5:10 PM INDICATION: fevers, r/out dvt COMPARISON: None. TECHNIQUE: Venous Duplex ultrasound of both upper extremities with (when possible) and without compression, augmentation, and duplex. Color flow and spectral Doppler with waveform analysis performed. FINDINGS: Ultrasound includes evaluation of the internal jugular veins, innominate veins, subclavian veins, axillary veins, and brachial veins. The superficial cephalic and basilic veins were also evaluated where seen. RIGHT: No deep venous thrombosis. No superficial thrombophlebitis. Subcutaneous edema is present in the right forearm. The right basilic vein was not identified. A catheter is present in the right cephalic and subclavian veins. LEFT: No deep venous thrombosis. No superficial thrombophlebitis.     1.  No deep venous thrombosis in the bilateral upper extremities.    Us Venous Arms Bilateral    Result Date: 6/27/2020  EXAM: US VENOUS ARMS BILATERAL LOCATION: [facility] DATE/TIME: 6/27/2020 12:11 PM INDICATION: Bilateral upper extremity pain and swelling. COMPARISON: None. TECHNIQUE: Venous Duplex ultrasound of both upper extremities with (when possible) and without compression, augmentation, and duplex. Color flow and spectral Doppler with waveform analysis performed. FINDINGS: Ultrasound includes evaluation of the internal jugular veins, innominate veins, subclavian veins, axillary veins, and brachial veins. The superficial cephalic and basilic veins were also evaluated where seen. Examination is difficult due to the patient's condition and presence of dressings at the base of the right neck and right brachium. RIGHT: Right internal jugular and innominate veins not well seen. No deep venous thrombosis. No superficial thrombophlebitis. LEFT: No deep venous thrombosis. No superficial  "thrombophlebitis.     No deep venous thrombosis in the bilateral upper extremities.    Poc Us 3cg Picc Placement Guidance    Result Date: 6/26/2020  Exam was performed as guidance for PICC line insertion.  Click \"PACS images\" hyperlink below to view any stored images.  For specific procedure details, view procedure note authored by PICC/Vascular Access Nurse.      MICRO:  Date Source Organism   6/22 Blood NGTD    Blood NGTD   6/24 Sputum Yeast   6/27 Blood NGTD    Blood NGTD    Sputum NGTD   7/2 Sputum S. Aureus    Urine NGTD    Blood NGTD    Blood NGTD   7/4 Sputum S. Aureus   7/14 Sputum S. Aureus   7/15 Blood NGTD    Blood NGTD   7/19 Urine S. Marcescens    Sputum S. Aureus  S. Marcescens    Blood NGTD    Blood NGTD       Organism Antibiotic Antibiotic Start Date Antibiotic End Date   S. Aureus Ceftriaxone 6/22 6/22   S. Marcescens Remdesivir 6/22 7/1    Vancomycin  6/27 6/27    Ivermectin 6/25 6/26    Tocilizumab 6/25 6/27 (2 doses)    Cefepime 6/27 7/2    Meropenem 7/2 7/7    Vancomycin 7/2 7/7    Cefazolin 7/7 7/15    Ceftriaxone 7/20 7/24     MAR REVIEWED    ICU DAILY CHECKLIST                           Can patient transfer out of MICU? Yes.    FAST HUG:    Feeding:  Feeding: Yes.  Patient is receiving TUBE FEEDS    Hillman:Yes  Analgesia/Sedation:Yes fentanyl and precedex.  Thromboembolic prophylaxis: yes; Mode:  Lovenox  HOB>30:  Yes  Stress Ulcer Protocol Active: yes; Mode: PPI  Glycemic Control: Any glucose > 180 yes; Mode of Insulin Therapy: Sliding Scale Insulin    INTUBATED:  Can patient have daily waking:  yes  Can patient have spontaneous breathing trial:  yes    Restraints? yes    Progress Note  Restraint Application    I recognize that restraints are physical and/or chemical interventions intended to restrict a person's movements. Restraints are currently needed to ensure the safety of this patient and/or others. My clinical rationale appears below.    --  Category/Type of Restraint  Non Violent:  " Hand mitt x2 (if patient cannot self-remove)  --  Behavior  (Example: Patient attempted to assault his nurse)  Tugging at lines.  --  Root Cause of the Behavior  (Example: Cocaine intoxication)  ARDS.  --  Less-Restrictive Measures that Failed  Non Violent Measures:  Close Observation, Repositioning and Pain Management  --  Response to the Restraint  (Example: Patient stopped attempting to pull out her NG tube)  Patient stopped pulling at lines  --  Criteria for Release from the Restraint  (Example: Patient is calm and agrees to not strike staff)  Patient is calm.     PHYSICAL THERAPY AND MOBILITY:  Can patient have PT and mobility trial: no  Activity: Bed Rest    Assessment/Plan:  Tobias Palmer is a 54 y.o. gentleman with a past medical history significant for obesity, presenting to the hospital for ARDS secondary to Covid-19 infection.    1. NEURO: Follows all commands and is appropriate.    Continue scheduled oxycodone, Seroquel and Ativan---should wean over the next several days.  2. CVS:Has no known medical history of CAD.  Was briefly noted to have developed atrial fibrillation with a rapid ventricular response early in the course of his illness and it also developed septic shock but both of these have resolved.    Discontinue telemetry monitoring..    Afib:Being treated with Amiodarone 200mg Daily.  3. RESP:Admitted with ARDS secondary to COVID-19.  Last chest x-ray was performed on 7/19 which demonstrated bilateral infiltrates.  Was extubated on 7/26 and is doing very well.      Maintain SpO2 of >92%.  4. GI: Noted to have protein calorie malnutrition and is presently being fed through tube feeds at close to goal rate.    Continue Protonix daily for ulcer prophylaxis.    Will have a video swallow evaluation today.  5. RENAL : 8.1 L down from the time of admission.    Would trend renal function daily and closely follow I/O.    Follow electrolytes and correct as abnormalities arise.  6. ID:Presents with  Covid-19 infection.    MSSA pneumonia; colonized    Serratia pneumonia    Urine culture with gram negative rods; waiting sensitivities              * S/P CCP and toci 6/25 and 6/27               * S/P Ivermectin x 2 doses.               * S/P Meropenem x 10 days               * S/P Remdesivir x 10 days              * Cefazolin for MSSA; 7/7 - 7/14              * 7/14 sputum culture with staph aureus and serratia, thought to be colonized              * COVID retest 7/16 positive; plan to test once a week              * Ceftriaxone started 7/20 for serratia  7. HEMATOLOGIC: Noted to have a coagulopathy in the setting of COVID-19.    Daily CBC.    Continue twice daily Lovenox 60 mg.  8. ENDOCRINE:.    ICU insulin sliding scale.  9. ICU PROPHYLAXIS:Protonix, Lovenox.  10. DISPO: Transfer out of the ICU today. Should be set up to be seen in the ICU survivorship clinic at the time of discharge.    Total Critical Care Time : 30 minutes.      Lisseth SIGALA [facility]  Pulmonary and Critical Care  [id number]

## 2021-06-10 NOTE — PROGRESS NOTES
FERN COVID RT HFNC PROGRESS NOTE    DATA:    HFNC DAY#  2   L/M:  40     FIO2:   40    ABLE TO WEAN: decreased Flow from 50L to 40L  SECRETIONS:  Managing on his own   VITAL SIGNS:  73 22  98%     Respiratory Medications: Duoneb/Mucomyst BID     ABG: Results for RJ GUADALUPE (MRN [id number]) as of 7/27/2020 04:54   Ref. Range 7/27/2020 04:29   pH, Arterial Latest Ref Range: 7.37 - 7.44  7.44   pCO2, Arterial Latest Ref Range: 35 - 45 mm Hg 36   pO2, Arterial Latest Ref Range: 80 - 90 mm Hg 86   Bicarbonate, Arterial Calc Latest Ref Range: 23.0 - 29.0 mmol/L 25.3   O2 Sat, Arterial Latest Ref Range: 96.0 - 97.0 % 98.2 (H)   Oxyhemoglobin Latest Ref Range: 96.0 - 97.0 % 95.1 (L)   POC Base Excess Calc Latest Units: mmol/L 0.5   Ventilation Mode Unknown High flow nasal cannula       NOTE / PLAN:     Patient doing well post extubation yesterday. Will continue to monitor and make changes as needed.  DEBORAH DegrootT

## 2021-06-10 NOTE — PROGRESS NOTES
[facility]    Hospitalist Progress Note    Name: Tobias Palmer    MRN: [id number]  YOB: 1965    Age: 54 y.o.  Date of Admission:  6/22/2020  Primary Provider:  Provider, No Primary Care, None  Physician:  Larry Arreola MD      Assessment and Plan:  Tobias Palmer is a 54 y.o. male with history of obesity who was transferred to [facility] on 6/22/2020 for further management of confirmed COVID-19 infection.    COVID-19 Diagnosis Details:  Onset of COVID symptoms ~6/18/20   Date of positive test results 6/21/20   Research study Yes: CCP     # Confirmed COVID-19 infection    # Acute Hypoxic Respiratory Failure secondary to COVID-19 infection  # Viral Pneumonia secondary to COVID-19 infection  # Acute Respiratory Distress Syndrome  # Bacterial Superinfection  # Septic shock  Patient initially admitted to [facility] on 6/21/2020 after presenting with fever, cough and dyspnea. He was intubated and transferred to [place] on 6/22. Imaging required diffuse bilateral infiltrates.  He required flolan. He remained intubated until 7/26 at which time he was successfully extubated. And is now working on weaning O2.   - Continue supportive care with continuous pulse oximetry, COVID-19 special precautions, minimized lab draws, and care consolidation  - Oxygen: continue current support with nasal cannula at 2-5 L/min; titrate to keep SpO2 between 90-96%  - Labs: trend PRN  - Imaging: no additional imaging needed at this time  - Breathing treatments: Combivent inhaler four times a day and PRN; avoid nebulizers in favor of MDIs   - IV fluids: not indicated at this time  - Antibiotics: Multiple bacterial infections and septic shock thoughout his course including MSSA and serattia pneumonia and serratia UTI. S/p  Antimicobials have included vancomycin  (6/27 - 7/7), cefepime (6/27 - 7/2) meropenem (7/2 - 7/7), cefazolin (7/7 - 7/14), ceftriaxone (7/20 - 7/27)  - Research  study protocol/COVID medications: convalescent plasma, remdesivir, tocilizumab  - Can be seen in ICU survivorship clinic after discharge    ACTIVE HOSPITAL PROBLEMS:  ICU delirium  Resolving. Coming down from ICU sedation.   - Currently on scheduled oxycodone, seroquel, ativan, trazodone. Will start to wean by decreasing ativan dose to 0.5 mg 7/29    A fib:   brief a fib with RVR when critically ill. Resolved with amiodarone.   - discontinued amiodarone 7/29    Protein/calorie malnutrition  - on tube feeds.  - speech cleared for DD2 thin liquids      # DVT Prophylaxis:  D-Dimer, Quant   Date Value Ref Range Status   06/26/2020 0.72 (H) <=0.50 FEU ug/mL Final   - PROPHYLACTIC dosing: lovenox 40mg two times a day  - At high risk of thrombosis due to COVID-19; consider anticoagulation on discharge    # Code Status: Full Code.   # Diet: Diet Dysphagia II (Mechanically Altered); Thin liquids  # Disposition: Expected discharge on 4-7 days to TCU once safe disposition plan/TCU bed available.  # Bedside iPad: NOT used due to severity of illness/medical appropriateness.    Larry Arreola MD  [facility]  ______________________________________________________________________    Subjective:  No acute events. Weaning O2 well. Mentally clearing. Denies pain. Breathing comfortably. Tolerating feeds.     4 pt ROS otherwise negative    Data reviewed today: I reviewed all medications, new labs and imaging results over the last 24 hours.     Physical Exam:  Temp:  [98.3  F (36.8  C)-99.2  F (37.3  C)] 99.2  F (37.3  C)  Heart Rate:  [] 113  Resp:  [30-42] 39  BP: (121-165)/() 121/79  SpO2:  [92 %-99 %] 98 %  Vitals:    06/22/20 1430 06/25/20 0530 06/28/20 0400 06/29/20 0000   Weight: (!) 274 lb 4 oz (124.4 kg) (!) 263 lb 0.1 oz (119.3 kg) (!) 264 lb 8.8 oz (120 kg) (!) 254 lb 10.1 oz (115.5 kg)    07/03/20 0606 07/05/20 2010 07/08/20 0000 07/09/20 0400   Weight: (!) 255 lb (115.7 kg) (!) 284 lb 9.8 oz (129.1 kg)  (!) 281 lb 1.4 oz (127.5 kg) (!) 275 lb 5.7 oz (124.9 kg)    07/10/20 0600 07/11/20 0600 07/12/20 0400 07/14/20 0500   Weight: (!) 276 lb 10.8 oz (125.5 kg) (!) 276 lb 14.4 oz (125.6 kg) (!) 270 lb 15.1 oz (122.9 kg) (!) 272 lb 11.3 oz (123.7 kg)    07/15/20 0400 07/16/20 0547 07/17/20 0600 07/18/20 0333   Weight: (!) 262 lb 5.6 oz (119 kg) (!) 265 lb 10.5 oz (120.5 kg) (!) 266 lb 3.2 oz (120.7 kg) (!) 266 lb 12.1 oz (121 kg)    07/19/20 0512 07/20/20 0558 07/21/20 0600 07/22/20 0600   Weight: (!) 267 lb 8 oz (121.3 kg) (!) 270 lb 5.9 oz (122.6 kg) (!) 254 lb 10.1 oz (115.5 kg) (!) 254 lb (115.2 kg)    07/23/20 0600 07/24/20 0500 07/24/20 2300 07/27/20 0400   Weight: (!) 265 lb 3.4 oz (120.3 kg) (!) 259 lb 0.7 oz (117.5 kg) (!) 251 lb 5.2 oz (114 kg) (!) 250 lb (113.4 kg)    07/29/20 0600   Weight: (!) 249 lb 5.4 oz (113.1 kg)       GEN:  healthy, alert and no distress  HEENT:  extra ocular movement intact, sclera clear, anicteric and oropharynx clear, no lesions   CV:   Regular rate and rhythm, S1S2 present and no LE edema  PULM: lungs clear to auscultation - no rales, rhonchi or wheezes  GI:  soft, non-tender, without masses or organomegaly and nondistended  EXT:  extremities normal, atraumatic, no cyanosis or edema  SKIN:  no rashes or significant lesions  NEURO:  Grossly normal

## 2021-06-10 NOTE — PLAN OF CARE
Assumed care of patient at 15:00.  Pt denies pain, nausea, appears comfortable. Denies shortness of breath, occasional cough. Tachycardic, 105-115. Diet changes noted. Oxygen saturation stable on 2L nasal cannula.

## 2021-06-10 NOTE — PROGRESS NOTES
Pt A&O x4, LS clear/diminished on room air, no complaints of pain or shortness of breath during shift. Pt does desat when sleeping, 3L NC applied, pt refused BiPap. Hillman intact with adequate output and 1 BM during shift. Pt uses call light to let needs be known. Pt slept majority of shift without event. Will continue to monitor.    Dori Haywood RN

## 2021-06-10 NOTE — PROGRESS NOTES
Patient's is 96-99% with Room air. O2 titrated to Room air in this morning and is tolerating it well. Continue to monitor and support.

## 2021-06-10 NOTE — PLAN OF CARE
Problem: Occupational Therapy  Goal: OT Goals  Description: 1. Pt will demo Mod I with G/H task in standing for total of 5 min.   2. Pt will demo Mod I toilet transfer and toileting.  3. Pt will demo Mod I with whole body dressing including gathering appropriate items.   4. Pt will demo Mod I with new medication setup task.       Outcome: Not Applicable this Shift

## 2021-06-10 NOTE — PROGRESS NOTES
[facility]    Hospitalist Progress Note    Name: Tobias Palmer    MRN: [id number]  YOB: 1965    Age: 54 y.o.  Date of Admission:  6/22/2020  Primary Provider:  Provider, No Primary Care, None  Physician:  Joel Tierney MD      Assessment and Plan:  Tobias Palmer is a 54 y.o. male with history of obesity who was transferred to [facility] on 6/22/2020 for further management of confirmed COVID-19 infection.    COVID-19 Diagnosis Details:  Onset of COVID symptoms ~6/18/20   Date of positive test results 6/21/2020, NEGATIVE 7/29/2020.   Research study Yes: CCP     # Confirmed COVID-19 infection    # Acute Hypoxic Respiratory Failure secondary to COVID-19 infection  # Viral Pneumonia secondary to COVID-19 infection  # Acute Respiratory Distress Syndrome  # Bacterial Superinfection  # Septic shock  Patient initially admitted to [facility] on 6/21/2020 after presenting with fever, cough and dyspnea. He was intubated and transferred to [place] on 6/22. Imaging revealed diffuse bilateral infiltrates.  He required flolan. He remained intubated until 7/26 at which time he was successfully extubated. And is now stable on RA with exception of with sleep.  Cough remains his most bothersome symptom, and prevents him from getting comfortable and sometimes from sleeping.  He does not currently have a antitussives available.  - Continue supportive care with continuous pulse oximetry, COVID-19 special precautions, minimized lab draws, and care consolidation.  - Will begin scheduled benzonatate 100 mg p.o. 3 times daily, and make available guaifenesin with codeine as needed.  - Oxygen: continue current support as needed--currently on RA as of 7/29; Appears to have some degree of JEROMY. Desats only when asleep. Resolves when wearing CPAP--but pt does not like it and at times refuses. O2 as needed with sleep but okay with O2 sats 85% or greater when sleeping as long as >89%  when awake.    - Labs: trend PRN  - Imaging: no additional imaging needed at this time  - Breathing treatments: Combivent inhaler four times a day and PRN; avoid nebulizers in favor of MDIs   - IV fluids: not indicated at this time  - Antibiotics: Multiple bacterial infections and septic shock thoughout his course including MSSA and serattia pneumonia and serratia UTI. S/p  Antimicobials have included vancomycin  (6/27 - 7/7), cefepime (6/27 - 7/2) meropenem (7/2 - 7/7), cefazolin (7/7 - 7/14), ceftriaxone (7/20 - 7/27)  - Research study protocol/COVID medications: convalescent plasma, remdesivir, tocilizumab  - Can be seen in ICU survivorship clinic after discharge    ACTIVE HOSPITAL PROBLEMS:  JEROMY  After weaning from O2 continued to desat while in deep sleep and required O2. Pt tried BiPAP/CPAP but would request to be taken off due to not being comfortable on it. Overnight 8/1 desat to 70's and bradycardic to 30's associated with apnea. Recovered quickly when awoken. Did agree to wear CPAP and was able to tolerate for a few hours.   - Refer to sleep medicine once ready for discharge to TCU/medical assistance pending  - Unclear if he would be able to do CPAP at TCU without sleep study. If not, it is likely not dangerous in the short term as he likely has longstanding JEROMY.     Scrotal edema  Marked scrotal edema, preventing removal of Hillman; penis is fully retracted within swelling.  It is unclear if he would be able to void, and we would not be able to easily replace the catheter if he were not.  As a result, the Hillman is going to need to remain in place another day or 2 until scrotal swelling improves.  I discussed this with the patient; he agrees.  - scrotal elevation  - lasix 40 mg PO given 8/1 and 8/2, but have not seemed to have much effect, and will therefore not be repeated today.  - continue to reassess ability to remove Hillman.    ICU delirium  Resolved as he weaned from ICU sedation. Was sedated for weeks  so likely will benefit from slow wean of benzos and opaites Started wean 7/29 as below and tolerating well.  He has been calm and cooperative, without expressed hallucinations or delusions.  - Ativan 1 mg q8h --> 0.5 mg q8h 7/29 --> 0.25 mg q8h 7/31 --> stopped 8/2  - Oxycodone 5 mg q4h --> 2.5 mg q4h 7/30 --> 2.5 mg q8h 8/1 --> will stop today 8/3  - PRN oxycodone and ativan available   - Continue seroquel 25 mg daily (would attempt to stop on 8/4)  - Continue trazodone 50 mg at bedtime (would be last to discontinue)    A fib  Had brief episode a-fib with RVR when critically ill. Resolved with amiodarone. Discontinued amiodarone 7/29 with no immediate issues, but had some brief self-resolving a fib/flutter overnight on 7/31.  He has remained on telemetry since, without any observed recurrence of atrial fibrillation.  - Continue metoprolol 25 mg two times a day.  - Given stability of rhythm over the last 48 hours, will discontinue telemetry.  - Will hold off on restarting amiodarone given longterm sequelae. If needed to restart, would refer to cardiology as outpatient.   - No indication for anticoagulation for A-fib at this time.    Protein/calorie malnutrition  Discontinued tube feeds as of 7/29. Removed feeding tube 7/31.  Speech therapy cleared him for regular textures and thin liquids as of 7/31/2020.  - Continue regular diet with thin liquids.     Lines and tubes  - Arterial line placed 7/17. Removed 7/28  - Feeding tube removed 7/31  - PICC placed on 6/26. No longer using. Removed 7/31.   - Hillman catheter placed on 7/22. Will continue for now due to scrotal edema  - Rectal tube placed on 7/18.  Removed 7/31      # DVT Prophylaxis:  D-Dimer, Quant   Date Value Ref Range Status   06/26/2020 0.72 (H) <=0.50 FEU ug/mL Final   - PROPHYLACTIC dosing: lovenox 40mg two times a day  - At high risk of thrombosis due to COVID-19; consider anticoagulation on discharge    # Code Status: Full Code.   # Diet: Diet Regular;  Thin liquids  # Disposition: He is medically stable for discharge today.  However, he is uninsured, and the social work team has not yet been successful in establishing medical assistance for him.  He will be discharged either to an acute rehab unit or TCU, though he would certainly be able to participate in ARU level rehab.     # Bedside iPad: NOT used due to severity of illness/medical appropriateness.    Joel Tierney MD  [facility]  ______________________________________________________________________    Subjective:  He did not feel an  was required for today's visit.  His most bothersome symptom is cough, which prevents him from getting comfortable, and sometimes prevents sleep.  His cough is productive of small amounts of clear sputum.  He is not short of breath at rest.  He started walking in rehab today, and even taking a few steps causes significant dyspnea.  He has no respiratory chest pain.  He is swallowing well, and denies nausea, vomiting, or diarrhea.  He has no discomfort from the Hillman catheter.  He does not think that his scrotal swelling is any better today.  He denies any musculoskeletal pain.    4 pt ROS otherwise negative    Data reviewed today: I reviewed all medications, new labs and imaging results over the last 24 hours.     Physical Exam:  Temp:  [98.6  F (37  C)-98.8  F (37.1  C)] 98.8  F (37.1  C)  Heart Rate:  [] 91  Resp:  [27-40] 32  BP: (123-145)/(82-97) 136/97  SpO2:  [94 %-99 %] 94 %  Vitals:    06/22/20 1430 06/25/20 0530 06/28/20 0400 06/29/20 0000   Weight: (!) 274 lb 4 oz (124.4 kg) (!) 263 lb 0.1 oz (119.3 kg) (!) 264 lb 8.8 oz (120 kg) (!) 254 lb 10.1 oz (115.5 kg)    07/03/20 0606 07/05/20 2010 07/08/20 0000 07/09/20 0400   Weight: (!) 255 lb (115.7 kg) (!) 284 lb 9.8 oz (129.1 kg) (!) 281 lb 1.4 oz (127.5 kg) (!) 275 lb 5.7 oz (124.9 kg)    07/10/20 0600 07/11/20 0600 07/12/20 0400 07/14/20 0500   Weight: (!) 276 lb 10.8 oz (125.5 kg) (!) 276 lb 14.4  oz (125.6 kg) (!) 270 lb 15.1 oz (122.9 kg) (!) 272 lb 11.3 oz (123.7 kg)    07/15/20 0400 07/16/20 0547 07/17/20 0600 07/18/20 0333   Weight: (!) 262 lb 5.6 oz (119 kg) (!) 265 lb 10.5 oz (120.5 kg) (!) 266 lb 3.2 oz (120.7 kg) (!) 266 lb 12.1 oz (121 kg)    07/19/20 0512 07/20/20 0558 07/21/20 0600 07/22/20 0600   Weight: (!) 267 lb 8 oz (121.3 kg) (!) 270 lb 5.9 oz (122.6 kg) (!) 254 lb 10.1 oz (115.5 kg) (!) 254 lb (115.2 kg)    07/23/20 0600 07/24/20 0500 07/24/20 2300 07/27/20 0400   Weight: (!) 265 lb 3.4 oz (120.3 kg) (!) 259 lb 0.7 oz (117.5 kg) (!) 251 lb 5.2 oz (114 kg) (!) 250 lb (113.4 kg)    07/29/20 0600 07/30/20 0600 07/31/20 0600   Weight: (!) 249 lb 5.4 oz (113.1 kg) (!) 250 lb (113.4 kg) (!) 224 lb 13.9 oz (102 kg)       GENERAL: Pleasant man, sitting up in a recliner, looks comfortable.  He coughed throughout my visit.  EYES: Eyes grossly normal to inspection, extraocular movements intact  HENT: Nares patent bilaterally.  Nasal mucosa normal, no discharge.   NECK: Trachea midline, no stridor.    RESP: No accessory muscle use at rest.  Lungs clear throughout on inspiration and expiration.  Expiration not prolonged, no wheeze.  CV: Regular rate and rhythm, non-tachycardic.  Normal S1 S2, no murmur or extra sound.  No lower extremity edema.  ABDOMEN: Soft, non-tender, no guarding.  Liver and spleen not palpable, no masses palpable.  Bowel sounds positive.  MS: No bony deformities noted.  No red or inflamed joints.  SKIN: Warm and dry, no rashes.  NEURO: Alert, oriented, conversant.  Cranial nerves III - XII grossly intact.  No gross motor or sensory deficits.    : Continues to have marked scrotal swelling.  The foreskin and glans are retracted within the swelling to be nearly flush with the outer aspect of the scrotum.  The scrotum is not tense, red, or warm.  It is only mildly tender to palpation.  PSYCH: Calm, alert, conversant.  Able to articulate logical thoughts, no tangential thoughts, no  hallucinations or delusions.  Affect normal.    Joel Tierney MD  [facility]

## 2021-06-10 NOTE — PROGRESS NOTES
Patient is maintain on 2L saturation of 95% doing well, nebs are now discontinued.  Will continue to monitor

## 2021-06-10 NOTE — PLAN OF CARE
Neuro: pt alert; able to follow commands. Pupils 3+brisk. CV: BP stable this shift, HR-pedro this evening at 1800. MD notified. Will titrate Precedex. T-max-100.0. Resp: pt remain intubated; some vent changes today. Plan to extubated tomorrow. Thin white secretion. GI: tube feeding infusing; tolerating well. No BM this shift. : Barlow in place; barlow care done. Lasix given; urine output  per hour. Pain: well controlled. Event: pt wants water; explain he can't have any at the moment; will swab mouth and provide oral care. Family updated on plans to extubate tomorrow; family explain it to pt via video call. MD made change to Ativan dose.     Problem: Pain  Goal: Patient's pain/discomfort is manageable  Outcome: Progressing     Problem: Safety  Goal: Patient will be injury free during hospitalization  Outcome: Progressing     Problem: Daily Care  Goal: Daily care needs are met  Outcome: Progressing     Problem: Psychosocial Needs  Goal: Demonstrates ability to cope with hospitalization/illness  Outcome: Progressing  Goal: Collaborate with patient/family/caregiver to identify patient specific goals for this hospitalization  Outcome: Progressing     Problem: Discharge Barriers  Goal: Patient's discharge needs are met  Outcome: Progressing     Problem: Knowledge Deficit  Goal: Patient/family/caregiver demonstrates understanding of disease process, treatment plan, medications, and discharge instructions  Outcome: Progressing     Problem: Potential for Compromised Skin Integrity  Goal: Skin integrity is maintained or improved  Outcome: Progressing  Goal: Nutritional status is improving  Outcome: Progressing     Problem: Urinary Incontinence  Goal: Perineal skin integrity is maintained or improved  Outcome: Progressing     Problem: Potential for Falls  Goal: Patient will remain free of falls  Outcome: Progressing     Problem: Risk of Injury Due to Unsafe Behavior  Goal: Patient will remain safe while in restraint;  physical/psychological needs will be met  Outcome: Progressing  Goal: Alternatives to restraint will be continually assessed with use of least restrictive device and discontinuation as soon as possible  Outcome: Progressing  Goal: Patient/Family will be able to communicate reason for restraint and steps for restraint application and removal  Outcome: Progressing     Problem: Inadequate Airway Clearance  Goal: Patient will maintain patent airway  Outcome: Progressing     Problem: Mechanical Ventilation  Goal: Patient will maintain patent airway  Outcome: Progressing  Goal: ET tube will be managed safely  Outcome: Progressing     Problem: Glucose Imbalance  Goal: Achieve optimal glucose control  Outcome: Progressing     Problem: Inadequate Gas Exchange  Goal: Patient is adequately oxygenated and ventilation is improved  Outcome: Progressing  Goal: Patient will achieve/maintain normal respiratory rate/effort  Outcome: Progressing

## 2021-06-10 NOTE — PROGRESS NOTES
[facility]    Hospitalist Progress Note    Name: Tobias Palmer    MRN: [id number]  YOB: 1965    Age: 54 y.o.  Date of Admission:  6/22/2020  Primary Provider:  Provider, No Primary Care, None  Physician:  Larry Arreola MD      Assessment and Plan:  Tobias Palmer is a 54 y.o. male with history of obesity who was transferred to [facility] on 6/22/2020 for further management of confirmed COVID-19 infection.    COVID-19 Diagnosis Details:  Onset of COVID symptoms ~6/18/20   Date of positive test results 6/21/20   Research study Yes: [facility]     # Confirmed COVID-19 infection    # Acute Hypoxic Respiratory Failure secondary to COVID-19 infection  # Viral Pneumonia secondary to COVID-19 infection  # Acute Respiratory Distress Syndrome  # Bacterial Superinfection  # Septic shock  Patient initially admitted to [facility] on 6/21/2020 after presenting with fever, cough and dyspnea. He was intubated and transferred to [facility] on 6/22. Imaging required diffuse bilateral infiltrates.  He required flolan. He remained intubated until 7/26 at which time he was successfully extubated. And is now working on weaning O2.   - Continue supportive care with continuous pulse oximetry, COVID-19 special precautions, minimized lab draws, and care consolidation  - Oxygen: continue current support as needed--currently on RA as of 7/29; titrate to keep SpO2 between 90-96%  - Labs: trend PRN  - Imaging: no additional imaging needed at this time  - Breathing treatments: Combivent inhaler four times a day and PRN; avoid nebulizers in favor of MDIs   - IV fluids: not indicated at this time  - Antibiotics: Multiple bacterial infections and septic shock thoughout his course including MSSA and serattia pneumonia and serratia UTI. S/p  Antimicobials have included vancomycin  (6/27 - 7/7), cefepime (6/27 - 7/2) meropenem (7/2 - 7/7), cefazolin (7/7 - 7/14), ceftriaxone (7/20 - 7/27)  -  Research study protocol/COVID medications: convalescent plasma, remdesivir, tocilizumab  - Can be seen in ICU survivorship clinic after discharge    ACTIVE HOSPITAL PROBLEMS:  ICU delirium  Resolving as he weans from ICU sedation. Was sedated for weeks so likely needs to wean slowly. Started wean 7/29  - Ativan 1 mg q8h --> 0.5 mg q8h 7/29. (plan to go to 0.25 mg 7/31)  - Oxycodone 5 mg q4h --> 2.5 mg q4h 7/30 (plan to go to 2.5 mg q6h 8/1)  - Continue seroquel 25 mg daily (would not stop until off ativan and oxycodone)  - continue trazodone 50 mg at bedtime (would be last to discontinue)    A fib:   brief a fib with RVR when critically ill. Resolved with amiodarone.   - discontinued amiodarone 7/29  - No indication for anticoagulation for A fib at this time  - CTM    Protein/calorie malnutrition  - holding tube feeds as of 7/29.  - speech cleared for DD2 thin liquids  - may remove tube in 1 - 2 days.     Lines and tubes  - Feeding tube  - PICC laced on 6/26 continue for now. Can remove at d/c   - Hillman catheter placed on 7/22. Will continue for now due to scrotal edema  - Rectal tube placed on 7/18. Will remove based on RN assessment of stools.       # DVT Prophylaxis:  D-Dimer, Quant   Date Value Ref Range Status   06/26/2020 0.72 (H) <=0.50 FEU ug/mL Final   - PROPHYLACTIC dosing: lovenox 40mg two times a day  - At high risk of thrombosis due to COVID-19; consider anticoagulation on discharge    # Code Status: Full Code.   # Diet: Diet Dysphagia II (Mechanically Altered); Thin liquids  # Disposition: Expected discharge on 4-7 days to TCU once safe disposition plan/TCU bed available.  # Bedside iPad: NOT used due to severity of illness/medical appropriateness.    Larry Arreola MD  [facility]  ______________________________________________________________________    Subjective:  No acute events.  Tolerating dysphagia level 2 diet with thin liquids well.  Weaned off of oxygen yesterday.  Tube feeds have  been held.  He denies any pain for me this morning.  He does tell me that he found out that his brother was killed in [place] yesterday and he is quite upset about this.    4 pt ROS otherwise negative    Data reviewed today: I reviewed all medications, new labs and imaging results over the last 24 hours.     Physical Exam:  Temp:  [98.1  F (36.7  C)-99.1  F (37.3  C)] 98.1  F (36.7  C)  Heart Rate:  [] 115  Resp:  [20-40] 34  BP: (112-154)/(76-95) 116/79  SpO2:  [91 %-99 %] 96 %  Vitals:    06/22/20 1430 06/25/20 0530 06/28/20 0400 06/29/20 0000   Weight: (!) 274 lb 4 oz (124.4 kg) (!) 263 lb 0.1 oz (119.3 kg) (!) 264 lb 8.8 oz (120 kg) (!) 254 lb 10.1 oz (115.5 kg)    07/03/20 0606 07/05/20 2010 07/08/20 0000 07/09/20 0400   Weight: (!) 255 lb (115.7 kg) (!) 284 lb 9.8 oz (129.1 kg) (!) 281 lb 1.4 oz (127.5 kg) (!) 275 lb 5.7 oz (124.9 kg)    07/10/20 0600 07/11/20 0600 07/12/20 0400 07/14/20 0500   Weight: (!) 276 lb 10.8 oz (125.5 kg) (!) 276 lb 14.4 oz (125.6 kg) (!) 270 lb 15.1 oz (122.9 kg) (!) 272 lb 11.3 oz (123.7 kg)    07/15/20 0400 07/16/20 0547 07/17/20 0600 07/18/20 0333   Weight: (!) 262 lb 5.6 oz (119 kg) (!) 265 lb 10.5 oz (120.5 kg) (!) 266 lb 3.2 oz (120.7 kg) (!) 266 lb 12.1 oz (121 kg)    07/19/20 0512 07/20/20 0558 07/21/20 0600 07/22/20 0600   Weight: (!) 267 lb 8 oz (121.3 kg) (!) 270 lb 5.9 oz (122.6 kg) (!) 254 lb 10.1 oz (115.5 kg) (!) 254 lb (115.2 kg)    07/23/20 0600 07/24/20 0500 07/24/20 2300 07/27/20 0400   Weight: (!) 265 lb 3.4 oz (120.3 kg) (!) 259 lb 0.7 oz (117.5 kg) (!) 251 lb 5.2 oz (114 kg) (!) 250 lb (113.4 kg)    07/29/20 0600 07/30/20 0600   Weight: (!) 249 lb 5.4 oz (113.1 kg) (!) 250 lb (113.4 kg)       GEN:  healthy, alert and no distress  HEENT:  extra ocular movement intact, sclera clear, anicteric and oropharynx clear, no lesions, feeding tube in place   CV:   Regular rate and rhythm, S1S2 present and no LE edema  PULM: lungs clear to auscultation - no rales,  rhonchi or wheezes  GI:  soft, non-tender, without masses or organomegaly and nondistended, rectal tube in place   : severe scrotal edema. Hillman in place  EXT:  extremities normal, atraumatic, no cyanosis or edema  SKIN:  no rashes or significant lesions  NEURO:  Grossly normal

## 2021-06-10 NOTE — PLAN OF CARE
Problem: Pain  Goal: Patient's pain/discomfort is manageable  Outcome: Progressing     Problem: Safety  Goal: Patient will be injury free during hospitalization  Outcome: Progressing     Problem: Daily Care  Goal: Daily care needs are met  Outcome: Progressing     Problem: Psychosocial Needs  Goal: Demonstrates ability to cope with hospitalization/illness  Outcome: Progressing  Goal: Collaborate with patient/family/caregiver to identify patient specific goals for this hospitalization  Outcome: Progressing     Problem: Discharge Barriers  Goal: Patient's discharge needs are met  Outcome: Progressing     Problem: Knowledge Deficit  Goal: Patient/family/caregiver demonstrates understanding of disease process, treatment plan, medications, and discharge instructions  Outcome: Progressing     Problem: Potential for Compromised Skin Integrity  Goal: Skin integrity is maintained or improved  Outcome: Progressing  Goal: Nutritional status is improving  Outcome: Progressing     Problem: Urinary Incontinence  Goal: Perineal skin integrity is maintained or improved  Outcome: Progressing     Problem: Potential for Falls  Goal: Patient will remain free of falls  Outcome: Progressing     Problem: Risk of Injury Due to Unsafe Behavior  Goal: Patient will remain safe while in restraint; physical/psychological needs will be met  Outcome: Progressing     Problem: Glucose Imbalance  Goal: Achieve optimal glucose control  Outcome: Progressing     Problem: Potential for transmission of organism by Contact, Enteric, Droplet and/or Airborne routes  Goal: Prevent transmission of organisms  Outcome: Progressing     Problem: Inadequate Gas Exchange  Goal: Patient will achieve/maintain normal respiratory rate/effort  Outcome: Progressing   Pt moved to 3west, no s/s of acute distress.

## 2021-06-10 NOTE — DISCHARGE SUMMARY
[facility] DISCHARGE SUMMARY     Primary Care Physician: Provider, No Primary Care  Admission Date: 6/22/2020   Discharge Provider: Mary Parker MD Discharge Date: 8/4/2020   Diet: Diet Regular; Thin liquids   Code Status: Full Code   Activity: activity as tolerated          COVID-19 DIAGNOSIS DETAILS     Onset of COVID symptoms ~6/18/20   Date of 1st positive test results 6/21/20   Current isolation precautions Special Precautions-COVID-19   COVID isolations end date  8/3/20     PRINCIPAL & ACTIVE DISCHARGE DIAGNOSES     Confirmed COVID-19 Infection  Principal Problem:    COVID-19  Active Problems:    Shock (H)    Acute and chronic respiratory failure with hypoxia (H)    On mechanically assisted ventilation (H)    ARDS (adult respiratory distress syndrome) (H)    Atrial fibrillation with rapid ventricular response (H)    Atrial fibrillation with rapid ventricular response (H)    Encephalopathy    Bacterial pneumonia    FOLLOW-UPS AFTER DISCHARGE     Patient will be taken care of in acute inpatient rehab.         DISPOSITION     Discharge Location: Acute rehab.   Condition at Discharge: improving.     SUMMARY OF HOSPITAL COURSE     Tobias Palmer is a 54 y.o. male who was transferred to [facility] on 6/22/2020 for further management of confirmed COVID-19 infection.    COVID-19 Diagnosis Details:  Onset of COVID symptoms ~6/18/20   Date of positive test results 6/21/20   Research study Yes: CCP      # Confirmed COVID-19 infection    # Acute Hypoxic Respiratory Failure secondary to COVID-19 infection  # Viral Pneumonia secondary to COVID-19 infection  # Acute Respiratory Distress Syndrome  # Bacterial Superinfection  # Septic shock  Mr. Ney Palmer was initially admitted to [facility] on 6/21/2020 after presenting with fever, cough and dyspnea. He was intubated and transferred to [facility] on 6/22. Imaging revealed diffuse bilateral infiltrates.  He required flolan. He  remained intubated until 7/26 at which time he was successfully extubated. And is now stable on RA with exception of some desaturation with sleep, c/w JEROMY (see below)     Antibiotics: Multiple bacterial infections and septic shock thoughout his course including MSSA and serattia pneumonia and serratia UTI. S/p  Antimicobials have included vancomycin  (6/27 - 7/7), cefepime (6/27 - 7/2) meropenem (7/2 - 7/7), cefazolin (7/7 - 7/14), ceftriaxone (7/20 - 7/27)     Research study protocol/COVID medications: convalescent plasma, remdesivir, tocilizumab     He should be seen in ICU survivorship clinic after discharge     ICU delirium  Resolved as he weaned from ICU sedation. Was sedated for weeks so likely will benefit from slow wean of benzos and opaites Started wean 7/29 as below and tolerating well. Ativan discontinued 8/2. Oxycodone discontinued 8/3, still on  Seroquel and Trazodone. May taper as appropriate in acute rehab.      Suspected Obstructive Sleep Apnea  After weaning from O2, he continued to desat while in deep sleep and required O2. Pt tried BiPAP/CPAP but would request to be taken off due to not being comfortable on it. Overnight 8/1 desat to 70's and bradycardic to 30's associated with apnea. Recovered quickly when awoken. Did agree to wear CPAP and was able to tolerate for a few hours.   - Refer to sleep medicine once ready for discharge to TCU/medical assistance pending  - Unclear if he would be able to do CPAP at TCU without sleep study. If not, it is likely not dangerous in the short term as he likely has longstanding JEROMY. It may be possible to arrange ambulatory sleep study at facility     A fib:   Developed A fib with RVR when critically ill. Resolved with amiodarone. Discontinued amiodarone 7/29 with no immediate issues, but had some brief self-resolving a fib/flutter starting overnight 7/31. Started metoprolol 25 mg two times a day 8/1 , HR in 60s to 110, BP slightly high, may need med adjustment.    - No indication for anticoagulation for A fib at this time.     Protein/calorie malnutrition  Discontinued tube feeds as of 7/29. Removed feeding tube 7/31.   Speech cleared for regular diet with thin liquids.      Scrotal edema  Marked scrotal edema, preventing removal of barlow--penis is fully retracted within swelling;  able to void, and we would not be able to easily cath afterward. Elevated scrotum and treated with intermittent doses of lasix with improvement, received on 8/2 and 8/3 lasix two times a day . Lasix 20mg daily for 10 days ordered on 8/4/20.      Lines and tubes  - Arterial line placed 7/17. Removed 7/28  - Feeding tube removed 7/31  - PICC placed on 6/26. No longer using. Removed 7/31.   - Barlow catheter placed on 7/22. Removed 8/3  - Rectal tube placed on 7/18.  Removed 7/31        DVT Prophylaxis:            D-Dimer, Quant   Date Value Ref Range Status   06/26/2020 0.72 (H) <=0.50 FEU ug/mL Final   - PROPHYLACTIC dosing: lovenox 40mg two times a day on discharge for 30 days.     # Consults:  IP CONSULT TO NUTRITION SERVICES  IP CONSULT TO INFECTIOUS DISEASES  IP CONSULT TO PALLIATIVE CARE  IP CONSULT TO SPIRITUAL CARE  IP CONSULT TO NUTRITION SERVICES  # Code Status: Full Code      Total time spent on discharge: 30 minutes    Mary Parker MD  [facility]    Discharge Medications with Med changes        Medication List      START taking these medications    codeine-guaiFENesin  mg/5 mL liquid  Quantity:  120 mL  Dose:  5 mL  Commonly known as:  GUAIFENESIN AC  5 mL, Oral, Every 4 hours PRN     enoxaparin ANTICOAGULANT 40 mg/0.4 mL syringe  Quantity:  60 Syringe  Dose:  40 mg  Commonly known as:  LOVENOX  40 mg, Subcutaneous, 2 times daily     furosemide 20 MG tablet  Quantity:  10 tablet  Dose:  20 mg  Commonly known as:  LASIX  20 mg, Oral, DAILY     Lactobacillus rhamnosus GG 15 billion cell Cpsp  Quantity:     Dose:  1 capsule  Commonly known as:  CULTURELLE  1 capsule, Oral, 2  times daily     melatonin 3 mg Tab tablet  Quantity:     Dose:  3 mg  3 mg, Oral, Bedtime     metoprolol tartrate 25 MG tablet  Quantity:  60 tablet  Dose:  25 mg  Commonly known as:  LOPRESSOR  25 mg, Oral, 2 times daily     multivitamin therapeutic tablet  Quantity:     Dose:  1 tablet  Start taking on:  August 5, 2020  1 tablet, Oral, DAILY     oxyCODONE 5 MG immediate release tablet  Quantity:  13 tablet  Dose:  5-10 mg  Commonly known as:  ROXICODONE  5-10 mg, Oral, Every 4 hours PRN     QUEtiapine 25 MG tablet  Quantity:  60 tablet  Dose:  25 mg  Commonly known as:  SEROquel  25 mg, Oral, 2 times daily     traZODone 50 MG tablet  Quantity:  30 tablet  Dose:  50 mg  Commonly known as:  DESYREL  50 mg, Oral, Bedtime          Rationale for medication changes       Anticoagulation Information      lovenox 40mg two times a day sq for 30 days.     Discharge Orders     Discharge Orders   Level of Care:  Skilled     Give Pneumococcal Vaccine If Not Current     Discharge Condition:  Improving     Admission H&P Valid:  Yes     Patient Aware of Diagnosis: Yes     Free of Communicable Disease:   Yes   Order Comments: Negative covid test on 7/29 and 8/3     Rehab Potential:  Good     Discharge Potential:  Length of Stay < 30 Days     Activity:  Per Rehab Recommendations     Continue diet upon discharge: Diet Diet Regular; Thin liquids   Order Comments:   Diet  Diet Regular; Thin liquids     Vital Signs Per Facility Protocol     Weights Per Facility Protocol   Order Comments: Daily while on lasix orally.     Treatment Options: Full Resuscitation     Agency Standing Orders:  Yes     Physical Therapy Eval and Treat     Occupational Therapy Eval and Treat     Respiratory Therapy     Follow-up: Next Physician Nursing Home Rounds     Follow Up:  Update Status Report to Physician Within 1 Week     Future Lab Orders at [facility]   Order Comments: BRANDI on 8/7/20     Electronically Signed     Examination     Temp:  [98.2  F (36.8  C)-99  F  (37.2  C)] 98.2  F (36.8  C)  Heart Rate:  [] 118  Resp:  [16-38] 16  BP: (135-150)/(84-92) 135/84  SpO2:  [93 %-100 %] 98 %  Vitals:    06/22/20 1430 06/25/20 0530 06/28/20 0400 06/29/20 0000   Weight: (!) 274 lb 4 oz (124.4 kg) (!) 263 lb 0.1 oz (119.3 kg) (!) 264 lb 8.8 oz (120 kg) (!) 254 lb 10.1 oz (115.5 kg)    07/03/20 0606 07/05/20 2010 07/08/20 0000 07/09/20 0400   Weight: (!) 255 lb (115.7 kg) (!) 284 lb 9.8 oz (129.1 kg) (!) 281 lb 1.4 oz (127.5 kg) (!) 275 lb 5.7 oz (124.9 kg)    07/10/20 0600 07/11/20 0600 07/12/20 0400 07/14/20 0500   Weight: (!) 276 lb 10.8 oz (125.5 kg) (!) 276 lb 14.4 oz (125.6 kg) (!) 270 lb 15.1 oz (122.9 kg) (!) 272 lb 11.3 oz (123.7 kg)    07/15/20 0400 07/16/20 0547 07/17/20 0600 07/18/20 0333   Weight: (!) 262 lb 5.6 oz (119 kg) (!) 265 lb 10.5 oz (120.5 kg) (!) 266 lb 3.2 oz (120.7 kg) (!) 266 lb 12.1 oz (121 kg)    07/19/20 0512 07/20/20 0558 07/21/20 0600 07/22/20 0600   Weight: (!) 267 lb 8 oz (121.3 kg) (!) 270 lb 5.9 oz (122.6 kg) (!) 254 lb 10.1 oz (115.5 kg) (!) 254 lb (115.2 kg)    07/23/20 0600 07/24/20 0500 07/24/20 2300 07/27/20 0400   Weight: (!) 265 lb 3.4 oz (120.3 kg) (!) 259 lb 0.7 oz (117.5 kg) (!) 251 lb 5.2 oz (114 kg) (!) 250 lb (113.4 kg)    07/29/20 0600 07/30/20 0600 07/31/20 0600 08/04/20 0600   Weight: (!) 249 lb 5.4 oz (113.1 kg) (!) 250 lb (113.4 kg) (!) 224 lb 13.9 oz (102 kg) (!) 224 lb 3.2 oz (101.7 kg)       GEN:  awaje and alert, comfortably sitting in chair without 02  HEENT:  PERRLA, extra ocular movement intact, sclera clear, anicteric and oropharynx clear, no lesions   CV:   Regular rate and rhythm  PULM: lungs clear to auscultation - no rales, rhonchi or wheezes  GI:  nondistended  NEURO:  Grossly normal    PROCEDURES (this hospitalization only)      * No surgery found *  SIGNIFICANT FINDINGS (Imaging, labs)     COVID-19 PCR Results    COVID-19 PCR Results 6/21/20 6/21/20 7/16/20 7/16/20 7/29/20 7/29/20 8/3/20 8/3/20    1331 1331  1725 1725 1610 1610 1604 1604   2019-nCOV Test received-See reflex to IDDL test SARS CoV2 (COVID-19) Virus RT-PCR  Test received-See reflex to IDDL test SARS CoV2 (COVID-19) Virus RT-PCR  Test received-See reflex to IDDL test SARS CoV2 (COVID-19) Virus RT-PCR  Test received-See reflex to IDDL test SARS CoV2 (COVID-19) Virus RT-PCR    SARS-CoV-2 PCR Result  POSITIVE (A)  POSITIVE (A)  NEGATIVE  NEGATIVE   (A) Abnormal value       Comments are available for some flowsheets but are not being displayed.             Imaging results from past 30 days: Xr Chest 1 View Portable    Result Date: 7/19/2020  EXAM: XR CHEST 1 VIEW PORTABLE LOCATION: [facility] DATE/TIME: 7/19/2020 6:08 AM INDICATION: Evaluate lines/tubes. COMPARISON: 07/11/2020     Endotracheal tube is 3.2 cm superior to the melissa. Enteric tube extends off the inferior aspect of the film. Stable right PICC. No pneumothorax. No significant interval change in multifocal airspace disease. No pleural effusion. The cardiac silhouette is stable at the upper limits of normal for size.    Xr Chest 1 View Portable    Result Date: 7/11/2020  EXAM: XR CHEST 1 VIEW PORTABLE LOCATION: [facility] DATE/TIME: 7/11/2020 8:01 AM INDICATION: ARDS eval for interval change since 7/5. COMPARISON: 7/5/2020.     Diffuse left lung opacities and consolidation appear stable to slightly improved. More patchy right lung opacities appear marginally increased, though could be exaggerated by differences in exams. No significant pleural effusion. No pneumothorax. Stable cardiomediastinal silhouette. ET tube tip 2.1 cm above the melissa. Feeding tube courses into the stomach. Right PICC tip near the cavoatrial junction.     Xr Chest 1 View Portable    Result Date: 7/5/2020  EXAM: XR CHEST 1 VIEW PORTABLE LOCATION: [facility] DATE/TIME: 7/5/2020 12:25 PM INDICATION: ETT, Covid COMPARISON: 07/04/2020     Endotracheal tube tip mid way between clavicles and melissa. Right PICC  tip projects over the mid SVC. Feeding tube tip below the film. Limited inspiratory volume. Normal heart size. Unchanged bilateral diffuse lung infiltrates.    Xr Abdomen Ap    Result Date: 7/22/2020  EXAM: XR ABDOMEN AP LOCATION: [facility] DATE/TIME: 7/22/2020 4:40 PM INDICATION: abdominal pain r/o constipation COMPARISON: 7/10/2020     Feeding tube tip is in the proximal duodenum. Bowel gas pattern is nonobstructed. There is not a significant amount of stool in the colon.     Xr Abdomen Ap Portable    Result Date: 7/26/2020  EXAM: ABDOMEN SINGLE VIEW PORTABLE LOCATION: [facility] DATE/TIME: 07/26/2020, 3:59 AM INDICATION: Feeding tube pulled out. COMPARISON: 07/24/2020 at 2202 hours.     An enteric feeding tube is present with distal tip in the gastric fundus.     Xr Abdomen Ap Portable    Result Date: 7/24/2020  EXAM: ABDOMEN SINGLE VIEW PORTABLE LOCATION: [facility] DATE/TIME: 7/24/2020 10:07 PM INDICATION: Feeding tube placement. COMPARISON: None.     An enteric feeding tube is present with distal tube tip in gastric body.     Xr Abdomen Ap Portable    Result Date: 7/24/2020  EXAM: XR ABDOMEN AP PORTABLE LOCATION: [facility] DATE/TIME: 7/24/2020 9:12 PM INDICATION: Check feeding tube placement after being looped back on itself COMPARISON: Abdominal radiography 07/24/2020 at 1904 hours     A loop of the enteric tube extends below the diaphragmatic hiatus and in the cardia of the stomach however extends back into the lower esophagus. The tip of the weighted enteric tube is similar in position to 2 hours earlier located just above the diaphragmatic hiatus. Repositioning is suggested. NOTE: ABNORMAL REPORT THE DICTATION ABOVE DESCRIBES AN ABNORMALITY FOR WHICH FOLLOW-UP IS NEEDED.     Xr Abdomen Ap Portable    Result Date: 7/24/2020  EXAM: XR ABDOMEN AP PORTABLE LOCATION: [facility] DATE/TIME: 7/24/2020 7:15 PM INDICATION: assess NJ location post placement COMPARISON: 07/22/2020      Small bore enteric tube tip in distal esophagus needs to be advanced considerably. Remainder stable. NOTE: ABNORMAL REPORT THE DICTATION ABOVE DESCRIBES AN ABNORMALITY FOR WHICH FOLLOW-UP IS NEEDED.      Xr Abdomen Ap Portable    Result Date: 7/10/2020  EXAM: XR ABDOMEN AP PORTABLE LOCATION: [facility] DATE/TIME: 7/10/2020 2:48 PM INDICATION: Confirmation of DHT placement, COVID pneumonia. COMPARISON: 06/23/2020     Feeding tube in place. Tip overlies the inferior aspect of the descending duodenum. Bowel gas pattern is normal. Prior NG tube has been removed.     Xr Swallow Study W Speech    Result Date: 7/29/2020  EXAM: XR SWALLOW STUDY W SPEECH OR OT LOCATION: [facility] DATE/TIME: 7/29/2020 2:09 PM INDICATION: Difficulty swallowing. COMPARISON: None. TECHNIQUE: Routine. FINDINGS: FLUOROSCOPIC TIME: 1.4 minutes NUMBER OF IMAGES: 0 Swallow study with Speech Pathology using multiple barium thicknesses. No penetration or aspiration with all barium consistencies. Slight poor over 2 the piriforms with honey, puree, and cracker. No significant risk for aspiration.     1.  No significant risk for aspiration.     Us Venous Legs Bilateral    Result Date: 7/16/2020  EXAM: US VENOUS LEGS BILATERAL LOCATION: [facility] DATE/TIME: 7/16/2020 5:10 PM INDICATION: Respiratory failure. fevers, r/out dvt COMPARISON: 06/27/2020 TECHNIQUE: Venous Duplex ultrasound of bilateral lower extremities with and without compression, augmentation and duplex. Color flow and spectral Doppler with waveform analysis performed. FINDINGS: Exam includes the common femoral, femoral, popliteal veins as well as segmentally visualized deep calf veins and greater saphenous vein. RIGHT: No deep vein thrombosis. No superficial thrombophlebitis. No popliteal cyst. LEFT: No deep vein thrombosis. No superficial thrombophlebitis. No popliteal cyst.     1.  No deep venous thrombosis in the bilateral lower extremities.    Us Venous Arms  Bilateral    Result Date: 7/16/2020  EXAM: US VENOUS ARMS BILATERAL LOCATION: [facility] DATE/TIME: 7/16/2020 5:10 PM INDICATION: fevers, r/out dvt COMPARISON: None. TECHNIQUE: Venous Duplex ultrasound of both upper extremities with (when possible) and without compression, augmentation, and duplex. Color flow and spectral Doppler with waveform analysis performed. FINDINGS: Ultrasound includes evaluation of the internal jugular veins, innominate veins, subclavian veins, axillary veins, and brachial veins. The superficial cephalic and basilic veins were also evaluated where seen. RIGHT: No deep venous thrombosis. No superficial thrombophlebitis. Subcutaneous edema is present in the right forearm. The right basilic vein was not identified. A catheter is present in the right cephalic and subclavian veins. LEFT: No deep venous thrombosis. No superficial thrombophlebitis.     1.  No deep venous thrombosis in the bilateral upper extremities.

## 2021-06-10 NOTE — PROGRESS NOTES
D:   Pt presently on RA. Pt tachypneic while sitting in w/c but breathing appears non-labored.    A/R:   V60 on s/b to tx sleep-associated hypoxia.    P:   Continue resp support as needed & monitor closely.

## 2021-06-10 NOTE — PLAN OF CARE
Problem: Safety  Goal: Patient will be injury free during hospitalization  Outcome: Progressing   Restraints removed after extubation, pt using yankaur for self suctioning  Problem: Daily Care  Goal: Daily care needs are met  Outcome: Progressing  Oob to chair with ceiling lift  Problem: Psychosocial Needs  Goal: Demonstrates ability to cope with hospitalization/illness  Outcome: Progressing  Visit with family on ipad in room  Problem: Inadequate Gas Exchange  Goal: Patient will achieve/maintain normal respiratory rate/effort  Outcome: Progressing  Maintained high flow and saturations wnl

## 2021-06-10 NOTE — PROGRESS NOTES
FERN COVID RT PROGRESS NOTE    DATA:    VENT DAY# 31    CURRENT SETTINGS:  VENT SETTINGS: 450VT/AC16/+5P        FIO2:  40%    PATIENT PARAMETERS:    PIP: 14  Pplat: 16  Pmean: 8.1  SBT: 5/5, 40% since 0748am  SECRETIONS: mod tanish, thick secretions  02 SATS: 98%    ETT SIZE 8.0  Secured at  24 cm at teeth    Respiratory Medications: Duoneb/mucomyst BID     ABG:   Results for RJ GUADALUPE (MRN [id number]) as of 7/25/2020 16:50   Ref. Range 7/25/2020 04:36   pH, Arterial Latest Ref Range: 7.37 - 7.44  7.50 (H)   pCO2, Arterial Latest Ref Range: 35 - 45 mm Hg 34 (L)   pO2, Arterial Latest Ref Range: 80 - 90 mm Hg 65 (L)   Bicarbonate, Arterial Calc Latest Ref Range: 23.0 - 29.0 mmol/L 27.3   O2 Sat, Arterial Latest Ref Range: 96.0 - 97.0 % 94.0 (L)   Oxyhemoglobin Latest Ref Range: 96.0 - 97.0 % 92.2 (L)   POC Base Excess Calc Latest Units: mmol/L 3.1       NOTE / PLAN:   Patient placed on SBT 5/5 0748am and yang RSBI 32-50. Patient is awkae/alert and follows commands. Plan to hopefully extubate soon.

## 2021-06-10 NOTE — PROGRESS NOTES
Speech Language/Pathology  Bedside Swallow Evaluation    Problem:  Patient Active Problem List   Diagnosis     COVID-19     Shock (H)     Acute and chronic respiratory failure with hypoxia (H)     On mechanically assisted ventilation (H)     ARDS (adult respiratory distress syndrome) (H)     Atrial fibrillation with rapid ventricular response (H)     Atrial fibrillation with rapid ventricular response (H)     Encephalopathy     Bacterial pneumonia       Onset date: 6/21/2020  Reason for evaluation: assess clinical tolerate of oral intake  Pertinent History: Per H&P (italics added): Tobias Palmer is a 54 y.o. male with PMHx of obesity who presents in Transfer from [facility] on 6/22 after presentation on 6/21 with 3 days of fevers/cough, COVID +, rapid decompensation from HFNC to intubation.  Given 6mg IV dexamethasone.  Progressive hypoxemic respiratory prompted initiation of Flolan prior to transfer.    Pt intubated and sedated on transfer - unable to participate   No overt PMHx.  Patient was intubated on 6/22/20 and extubated on 7/26/20 for a total of 32 days intubated.   Current Diet: NPO  Baseline Diet: Presumed regular textures with thin liquids   3L NC; SATS 96. NG tube present in nasopharynx.     Assessment:    Patient presents with no overt signs and symptoms of aspiration with all textures trialed but was noted to demonstrate increased WOB as feeding progressed.     Patient demonstrated mild oral dysphagia.    Suspect mild pharyngeal dysphagia due to prolonged oral intubation with recent extubation in setting of COVID-19.    A Video Swallow Study is recommended at this time to better ID risk of aspiration.    Rehab potential is good based on prior level of function and evaluation results.    Recommendations/Plan:    Recommended diet of NPO; ice chips OK. VFSS recommended given minimal overt s/s of aspiration yet notable dysphonia post prolonged intubation increasing concern for potential silent  aspiration.     Recommend patient to take oral medications via IV/NG tube    Strategies: NA    Recommend oral motor exercises? no    Speech therapy 5 times per week    Referrals: video swallow study    Subjective:    Patient presents as alert and cooperative during this session.   An  was not applicable    Patient was given puree and thin. Patient is not able to self feed/drink.    Objective:    Dentition/Oral hygiene: adequate    Oral motor function was moderately impaired. Reduced ROM, strength, and coordination as noted with boluses presented via spoon.    Bolus prep and oral control was mildly impaired. Mastication was slow but functional and the patient used rotary chewing with ice chips.    Anterior-Posterior transit was prolonged yet appeared functional.    Oral stasis did not occur with all textures trialed.    Ice chips: Patient trialed 3 bites by spoon and presented with no s/s of aspiration. Initiation of swallow appeared mildly delayed.    Puree: Patient trialed 2 ounces and presented with no s/s of aspiration. Initiation of swallow appeared timely.    Thin:Patient trialed 5 sips by straw and presented with x1 gurgle quality delayed cough and multiple swallows. Initiation of swallow appeared mildly delayed.     Hyolaryngeal movement appeared reduced.    25 dysphagia minutes     Sarika Holland MS, CCC-SLP

## 2021-06-10 NOTE — PROGRESS NOTES
Speech Language/Pathology  Speech Therapy Daily Progress Note    Patient presents as alert and cooperative during this session.  An  was not present patient was able to communicate in english for simple instructions..    Objective    PO trials:  -regular: patient had x11 bites and presented with no s/s aspiration or fatigue. Mastication was adequate.  -thin: pt had x12 ounces via straw and presented with no s/s aspiration.     Orientation:  -Person: patient oriented to name, age, and  [place].  -Place: Pt oriented to , city and state [place]  -Time: Pt oriented to month, date (off by x1 day), and year. Pt reports using a calendar to keep track of appointments and date.     A/C:  -2-step commands: pt was 100% acc JARRED.  -y/n questions simple-moderate: 100% acc J[place]ED.    Assessment    Per report and observation patient has been tolerating diet of NDD2 and thin. He had no fatigue, oral dysphagia, or s/s aspiration with advanced textures and is appropriate for diet upgrade to regular textures, continue thin liquids. Cognitive-linguistic skills are intact and pt reports that he feels back to baseline. Further ST is not warranted at this time.    Plan/Recommendations  Diet upgraded to regular textures, continue thin liquids.     The ST Care Plan has been reviewed and recommend discontinue ST.    15 speech/language minutes and 20 dysphagia minutes     Susie Chinchilla MA, CCC-SLP

## 2021-06-10 NOTE — PROGRESS NOTES
07/26/20 0141   Patient Data   Vt (observed, mL) 434 mL   Minute Ventilation (L/min) 11 L/min   Total Resp Rate  24 BPM   PIP Observed (cm H2O) 21 cm H2O   MAP (cm H2O) 10   Dynamic Compliance (L/cm H2O) 32 L/cm H2O   SpO2 95 %   Heart Rate 62

## 2021-06-10 NOTE — PROGRESS NOTES
07/26/20 0723   Vent Information   Vent Mode CPAP/PSV   Vent Settings   FiO2 (%) 40 %   PEEP/CPAP (cm H2O) 5 cm H2O   I:E Ratio 1:2.4   Pressure Support (cm H2O) 5 cm H20   Patient Data   Vt Exp (mL) 435 mL   Minute Ventilation (L/min) 11.6 L/min   Total Resp Rate  27 BPM   PIP Observed (cm H2O) 15 cm H2O   MAP (cm H2O) 8.2   SpO2 96 %   Heart Rate 100   Weaning Assessment    Total RSBI 54

## 2021-06-10 NOTE — SIGNIFICANT EVENT
Patient is transfer from the intensive care unit to the floor under the Dr. Kyle Mathis service, [facility] team #2.  As per nurse practitioner Nacho, patient is improving his ventilator on nasal cannula 2 L.

## 2021-06-10 NOTE — PLAN OF CARE
Problem: Speech Therapy  Goal: SLP Goals  Description: Initial Goals entered on 7/27/2020 by Keyon Duran, SLP, updated on 7/28/2020 by Sarika Holland MS, CCC-SLP, and to be met by 8/7/20  Frequency: 5x/wk  Patient Stated Goal(s): I want to go home  Patient will:   1) Improve auditory comprehension at simple sentence level (y/n questions, 1 step directions) to 100% accuracy independently in [place] using  - MET  2) [place] x4 given min cues and external aids- MET  3) participate in VFSS to better ID risk of aspiration- MET  4) pt will tolerate snack size portion of regular textures without fatigue, oral stasis, or s/s aspiration prior to upgrade.- MET        Outcome: Completed    Discharge: Patient was seen for swallowing. See daily notes for details. Discharge diet of: regular textures and thin liquids. NO ST at discharge.

## 2021-06-10 NOTE — PLAN OF CARE
Problem: Potential for Compromised Skin Integrity  Goal: Skin integrity is maintained or improved  Outcome: Progressing     Problem: Inadequate Gas Exchange  Goal: Patient will achieve/maintain normal respiratory rate/effort  Outcome: Progressing

## 2021-06-10 NOTE — PROGRESS NOTES
Rt was called to patient's room to placed pt on bipap. RN stated that pt had been desating while sleeping. Upon entering room pt was found on 1L nasal cannula. Attempted to place bipap on patient. Pt stated he did not like it and wanted it off. Explained to patient that when he sleeps his oxygen occasionally drops, bipap would benefit him. Pt still stated he did not want it. Pt's NC was increased to 3L while sleeping. Plan to wean NC when pt is awake.     Respiratory Care will continue to follow and monitor this patient.   Ana M Griffith  LRT, RRT,

## 2021-06-10 NOTE — PLAN OF CARE
Care of Pt assumed at 1000. Pt assessment as charted. Afebrile. VSS. Sating well on RA. Good UOP. 2 large loose Bm's. Pt started on tessalon pearls and PRN antitussive to help with strong dry cough.

## 2021-06-10 NOTE — PROGRESS NOTES
"ICU PROGRESS NOTE:    Patient Summary:  Tobias Palmer is a 54 y.o. gentleman with a past medical history significant for morbid obesity, who presented to the hospital on 6/22/2020 11:38 AM for ARDS secondary to Covid-19 complicated by septic shock requiring pronating and paralysis    Lines/Drains/Tubes:  Central line Right cephalic PICC, Placed on 6/26  Arterial line Left brachial, Placed on 7/17  ETT placed on 6/21; Size 8.0  Feeding tube placed on 7/24, tube feeds @ 45  Hillman catheter placed on 7/22  Rectal tube placed on 7/18    Overnight events:  No acute events overnight.    Subjective:  Keen on being extubated.    Objective:  Physical Exam:  Vent settings for last 24 hours:  Vent Mode: CPAP/PSV  FiO2 (%):  [40 %] 40 %  S RR:  [16] 16  S VT:  [450 mL] 450 mL  PEEP/CPAP (cm H2O):  [5 cm H2O] 5 cm H2O  Minute Ventilation (L/min):  [11 L/min-20 L/min] 11.6 L/min  PIP:  [14 cm H2O-21 cm H2O] 15 cm H2O  ME SUP:  [5 cm H20] 5 cm H20  MAP (cm H2O):  [7.4-10] 8.2    /71   Pulse 74   Temp 99.5  F (37.5  C)   Resp 28   Ht 5' 5\" (1.651 m)   Wt (!) 251 lb 5.2 oz (114 kg)   SpO2 98%   BMI 41.82 kg/m      Intake/Output last 3 shifts:  I/O last 3 completed shifts:  In: 1115.5 [I.V.:490.5; NG/GT:575; IV Piggyback:50]  Out: 1750 [Urine:1750]  Intake/Output this shift:  No intake/output data recorded.    Physical Exam   Constitutional: He is oriented to person, place, and time.   HENT:   Head: Normocephalic and atraumatic.   Eyes: Pupils are equal, round, and reactive to light.   Neck: Normal range of motion.   Cardiovascular: Normal rate and regular rhythm.   Pulmonary/Chest:   Coarse BS BL.   Abdominal: Soft.   Musculoskeletal:         General: No edema.   Neurological: He is alert and oriented to person, place, and time.   Skin: Skin is warm.         LAB:  Results from last 7 days   Lab Units 07/26/20  0822   LN-WHITE BLOOD CELL COUNT thou/uL 9.7   LN-HEMOGLOBIN g/dL 10.2*   LN-HEMATOCRIT % 32.2* "   LN-PLATELET COUNT thou/uL 358     Results from last 7 days   Lab Units 07/26/20  0822 07/25/20  0647 07/24/20  0518   LN-SODIUM mmol/L 138 137 139   LN-POTASSIUM mmol/L 3.4* 3.2* 3.6   LN-CHLORIDE mmol/L 108* 103 105   LN-CO2 mmol/L 21* 25 25   LN-BLOOD UREA NITROGEN mg/dL 18 13 14   LN-CREATININE mg/dL 0.50* 0.53* 0.52*   LN-CALCIUM mg/dL 7.6* 8.2* 8.3*       RADIOLOGY:  Xr Chest 1 View Portable    Result Date: 7/19/2020  EXAM: XR CHEST 1 VIEW PORTABLE LOCATION: [facility] DATE/TIME: 7/19/2020 6:08 AM INDICATION: Evaluate lines/tubes. COMPARISON: 07/11/2020     Endotracheal tube is 3.2 cm superior to the melissa. Enteric tube extends off the inferior aspect of the film. Stable right PICC. No pneumothorax. No significant interval change in multifocal airspace disease. No pleural effusion. The cardiac silhouette is stable at the upper limits of normal for size.    Xr Chest 1 View Portable    Result Date: 7/11/2020  EXAM: XR CHEST 1 VIEW PORTABLE LOCATION: [facility] DATE/TIME: 7/11/2020 8:01 AM INDICATION: ARDS eval for interval change since 7/5. COMPARISON: 7/5/2020.     Diffuse left lung opacities and consolidation appear stable to slightly improved. More patchy right lung opacities appear marginally increased, though could be exaggerated by differences in exams. No significant pleural effusion. No pneumothorax. Stable cardiomediastinal silhouette. ET tube tip 2.1 cm above the melissa. Feeding tube courses into the stomach. Right PICC tip near the cavoatrial junction.     Xr Chest 1 View Portable    Result Date: 7/5/2020  EXAM: XR CHEST 1 VIEW PORTABLE LOCATION: [facility] DATE/TIME: 7/5/2020 12:25 PM INDICATION: ETT, Covid COMPARISON: 07/04/2020     Endotracheal tube tip mid way between clavicles and melissa. Right PICC tip projects over the mid SVC. Feeding tube tip below the film. Limited inspiratory volume. Normal heart size. Unchanged bilateral diffuse lung infiltrates.    Xr Chest 1 View  Portable    Result Date: 7/4/2020  EXAM: XR CHEST 1 VIEW PORTABLE LOCATION: [facility] DATE/TIME: 7/4/2020 11:18 AM INDICATION: Worsening hypoxemia. COMPARISON: 07/02/2020     Orogastric tube is been removed with other lines and tubes unchanged. Heart size magnified in AP projection. No significant change in patchy bilateral interstitial and airspace opacities when taking into account difference in technique and ventilation. No pneumothorax.    Xr Chest 1 View Portable    Result Date: 7/2/2020  EXAM: XR CHEST 1 VIEW PORTABLE LOCATION: [facility] DATE/TIME: 7/2/2020 5:49 PM INDICATION: fever, COVID pneumonia COMPARISON: 06/27/2020 and older studies     Endotracheal tube is in good position. Feeding tube can be seen coursing into the stomach. It is again coiled in the fundus. The tip is out of the field-of-view. Marked increase in the patchy interstitial and airspace opacities bilaterally. Heart is of normal size. No pneumothorax.    Xr Chest 1 View Portable    Result Date: 6/27/2020  EXAM: XR CHEST 1 VIEW PORTABLE LOCATION: [facility] DATE/TIME: 6/27/2020 8:57 AM INDICATION: ARDS. Difficulty breathing. COMPARISON: 06/26/2020.     Endotracheal tube terminates 4 cm above the melissa. Feeding tube passes through the stomach with tip out of the field of view. Interval removal of right internal jugular venous catheter. Mild improvement in patchy perihilar and left lower lobe infiltrates. Normal heart size. No pleural effusion or pneumothorax.    Xr Chest 1 View Portable    Result Date: 6/26/2020  EXAM: XR CHEST 1 VIEW PORTABLE LOCATION: [facility] DATE/TIME: 6/26/2020 2:55 PM INDICATION: assess ETT location, COVID pneumonia. COMPARISON: 06/22/2020     Patient's rotated to the right. Endotracheal tube is in the distal trachea 2 cm from the melissa. Right IJ central line overlies the region of the proximal SVC. Feeding tube has been placed and can be seen coursing into the stomach. There is a loop  within the fundus. The NG tube has been removed. Better aeration of the left lower lobe with clearing of some of the consolidating opacities in the left base as well as in the right midlung. No change in the bilateral perihilar interstitial opacities. Elevation of the right hemidiaphragm. Heart and pulmonary vascularity are normal.     Xr Chest 1 View Portable    Result Date: 6/22/2020  EXAM: XR CHEST 1 VIEW PORTABLE LOCATION: [facility] DATE/TIME: 6/22/2020 3:58 PM INDICATION: Covid 19 pneumonia. Intubate and right IJ CVC placement COMPARISON: CXRs 06/22/2020 1:18 PM and 2:51 AM and 06/21/2020.     Endotracheal tube tip 4 cm in the melissa. Right IJ central venous catheter has been placed with tip in the mid SVC. Nasogastric tube tip in the stomach just inferior to the field-of-view. Bilateral airspace opacity with slight volume loss in the mid and lower lungs unchanged. No pneumothorax.    Xr Chest 1 View Portable    Result Date: 6/22/2020  EXAM: XR CHEST 1 VIEW PORTABLE LOCATION: [facility] DATE/TIME: 6/22/2020 1:41 PM INDICATION: confirm ET Tube position COMPARISON: Earlier today at 0250 hours     ET tube has been pulled back and is now in good position the level of the mid clavicular heads. NG tube remains in good position. Improved lung aeration, especially at left lung base. Bilateral patchy mid lung airspace infiltrates persist consistent with pneumonia. Heart size normal.    Xr Abdomen Ap    Result Date: 7/22/2020  EXAM: XR ABDOMEN AP LOCATION: [facility] DATE/TIME: 7/22/2020 4:40 PM INDICATION: abdominal pain r/o constipation COMPARISON: 7/10/2020     Feeding tube tip is in the proximal duodenum. Bowel gas pattern is nonobstructed. There is not a significant amount of stool in the colon.     Xr Abdomen Ap    Result Date: 6/22/2020  EXAM: XR ABDOMEN AP LOCATION: [facility] DATE/TIME: 6/22/2020 4:50 PM INDICATION: Feeding tube COMPARISON: Chest x-ray earlier today     Feeding tube  in the distal stomach. Bowel gas pattern is normal. Infiltrates on the lung better seen on chest x-ray.     Xr Abdomen Ap Portable    Result Date: 7/26/2020  EXAM: ABDOMEN SINGLE VIEW PORTABLE LOCATION: [facility] DATE/TIME: 07/26/2020, 3:59 AM INDICATION: Feeding tube pulled out. COMPARISON: 07/24/2020 at 2202 hours.     An enteric feeding tube is present with distal tip in the gastric fundus.     Xr Abdomen Ap Portable    Result Date: 7/24/2020  EXAM: ABDOMEN SINGLE VIEW PORTABLE LOCATION: [facility] DATE/TIME: 7/24/2020 10:07 PM INDICATION: Feeding tube placement. COMPARISON: None.     An enteric feeding tube is present with distal tube tip in gastric body.     Xr Abdomen Ap Portable    Result Date: 7/24/2020  EXAM: XR ABDOMEN AP PORTABLE LOCATION: [facility] DATE/TIME: 7/24/2020 9:12 PM INDICATION: Check feeding tube placement after being looped back on itself COMPARISON: Abdominal radiography 07/24/2020 at 1904 hours     A loop of the enteric tube extends below the diaphragmatic hiatus and in the cardia of the stomach however extends back into the lower esophagus. The tip of the weighted enteric tube is similar in position to 2 hours earlier located just above the diaphragmatic hiatus. Repositioning is suggested. NOTE: ABNORMAL REPORT THE DICTATION ABOVE DESCRIBES AN ABNORMALITY FOR WHICH FOLLOW-UP IS NEEDED.     Xr Abdomen Ap Portable    Result Date: 7/24/2020  EXAM: XR ABDOMEN AP PORTABLE LOCATION: [facility] DATE/TIME: 7/24/2020 7:15 PM INDICATION: assess NJ location post placement COMPARISON: 07/22/2020     Small bore enteric tube tip in distal esophagus needs to be advanced considerably. Remainder stable. NOTE: ABNORMAL REPORT THE DICTATION ABOVE DESCRIBES AN ABNORMALITY FOR WHICH FOLLOW-UP IS NEEDED.      Xr Abdomen Ap Portable    Result Date: 7/10/2020  EXAM: XR ABDOMEN AP PORTABLE LOCATION: [facility] DATE/TIME: 7/10/2020 2:48 PM INDICATION: Confirmation of DHT  placement, COVID pneumonia. COMPARISON: 06/23/2020     Feeding tube in place. Tip overlies the inferior aspect of the descending duodenum. Bowel gas pattern is normal. Prior NG tube has been removed.     Xr Abdomen Ap Portable    Result Date: 6/23/2020  EXAM: XR ABDOMEN AP PORTABLE LOCATION: [facility] DATE/TIME: 6/23/2020 2:34 PM INDICATION: check feeding tube placement COMPARISON: 6/22/2020     NG tube appears in good position within mid stomach. Feeding tube has a persistent extra loop within the stomach but likely has its tip at the level of mid stomach and adjacent to the NG tube. Visible bowel gas pattern normal.    Us Venous Legs Bilateral    Result Date: 7/16/2020  EXAM: US VENOUS LEGS BILATERAL LOCATION: [facility] DATE/TIME: 7/16/2020 5:10 PM INDICATION: Respiratory failure. fevers, r/out dvt COMPARISON: 06/27/2020 TECHNIQUE: Venous Duplex ultrasound of bilateral lower extremities with and without compression, augmentation and duplex. Color flow and spectral Doppler with waveform analysis performed. FINDINGS: Exam includes the common femoral, femoral, popliteal veins as well as segmentally visualized deep calf veins and greater saphenous vein. RIGHT: No deep vein thrombosis. No superficial thrombophlebitis. No popliteal cyst. LEFT: No deep vein thrombosis. No superficial thrombophlebitis. No popliteal cyst.     1.  No deep venous thrombosis in the bilateral lower extremities.    Us Venous Legs Bilateral    Result Date: 6/27/2020  EXAM: US VENOUS LEGS BILATERAL LOCATION: [facility] DATE/TIME: 6/27/2020 12:12 PM INDICATION: Bilateral lower extremity pain and swelling. Positive for corona virus. COMPARISON: None. TECHNIQUE: Venous Duplex ultrasound of bilateral lower extremities with and without compression, augmentation and duplex. Color flow and spectral Doppler with waveform analysis performed. FINDINGS: Exam includes the common femoral, femoral, popliteal veins as well as segmentally  visualized deep calf veins and greater saphenous vein. Examination is technically difficult due to the patient's condition. RIGHT: No deep vein thrombosis. No superficial thrombophlebitis. No popliteal cyst. LEFT: No deep vein thrombosis. No superficial thrombophlebitis. No popliteal cyst.     No deep venous thrombosis in the bilateral lower extremities.    Us Venous Arms Bilateral    Result Date: 7/16/2020  EXAM: US VENOUS ARMS BILATERAL LOCATION: [facility] DATE/TIME: 7/16/2020 5:10 PM INDICATION: fevers, r/out dvt COMPARISON: None. TECHNIQUE: Venous Duplex ultrasound of both upper extremities with (when possible) and without compression, augmentation, and duplex. Color flow and spectral Doppler with waveform analysis performed. FINDINGS: Ultrasound includes evaluation of the internal jugular veins, innominate veins, subclavian veins, axillary veins, and brachial veins. The superficial cephalic and basilic veins were also evaluated where seen. RIGHT: No deep venous thrombosis. No superficial thrombophlebitis. Subcutaneous edema is present in the right forearm. The right basilic vein was not identified. A catheter is present in the right cephalic and subclavian veins. LEFT: No deep venous thrombosis. No superficial thrombophlebitis.     1.  No deep venous thrombosis in the bilateral upper extremities.    Us Venous Arms Bilateral    Result Date: 6/27/2020  EXAM: US VENOUS ARMS BILATERAL LOCATION: [facility] DATE/TIME: 6/27/2020 12:11 PM INDICATION: Bilateral upper extremity pain and swelling. COMPARISON: None. TECHNIQUE: Venous Duplex ultrasound of both upper extremities with (when possible) and without compression, augmentation, and duplex. Color flow and spectral Doppler with waveform analysis performed. FINDINGS: Ultrasound includes evaluation of the internal jugular veins, innominate veins, subclavian veins, axillary veins, and brachial veins. The superficial cephalic and basilic veins were also  "evaluated where seen. Examination is difficult due to the patient's condition and presence of dressings at the base of the right neck and right brachium. RIGHT: Right internal jugular and innominate veins not well seen. No deep venous thrombosis. No superficial thrombophlebitis. LEFT: No deep venous thrombosis. No superficial thrombophlebitis.     No deep venous thrombosis in the bilateral upper extremities.    Poc Us 3cg Picc Placement Guidance    Result Date: 6/26/2020  Exam was performed as guidance for PICC line insertion.  Click \"PACS images\" hyperlink below to view any stored images.  For specific procedure details, view procedure note authored by PICC/Vascular Access Nurse.      MICRO:  Date Source Organism   6/22 Blood NGTD    Blood NGTD   6/24 Sputum Yeast   6/27 Blood NGTD    Blood NGTD    Sputum NGTD   7/2 Sputum S. Aureus    Urine NGTD    Blood NGTD    Blood NGTD   7/4 Sputum S. Aureus   7/14 Sputum S. Aureus   7/15 Blood NGTD    Blood NGTD   7/19 Urine S. Marcescens    Sputum S. Aureus  S. Marcescens    Blood NGTD    Blood NGTD       Organism Antibiotic Antibiotic Start Date Antibiotic End Date   S. Aureus Ceftriaxone 6/22 6/22   S. Marcescens Remdesivir 6/22 7/1    Vancomycin  6/27 6/27    Ivermectin 6/25 6/26    Tocilizumab 6/25 6/27 (2 doses)    Cefepime 6/27 7/2    Meropenem 7/2 7/7    Vancomycin 7/2 7/7    Cefazolin 7/7 7/15    Ceftriaxone 7/20 7/24     MAR REVIEWED    ICU DAILY CHECKLIST                           Can patient transfer out of MICU? no    FAST HUG:    Feeding:  Feeding: Yes.  Patient is receiving TUBE FEEDS    Hillman:Yes  Analgesia/Sedation:Yes fentanyl and precedex.  Thromboembolic prophylaxis: yes; Mode:  Lovenox  HOB>30:  Yes  Stress Ulcer Protocol Active: yes; Mode: PPI  Glycemic Control: Any glucose > 180 yes; Mode of Insulin Therapy: Sliding Scale Insulin    INTUBATED:  Can patient have daily waking:  yes  Can patient have spontaneous breathing trial:  yes    Restraints? " yes    Progress Note  Restraint Application    I recognize that restraints are physical and/or chemical interventions intended to restrict a person's movements. Restraints are currently needed to ensure the safety of this patient and/or others. My clinical rationale appears below.    --  Category/Type of Restraint  Non Violent:  Hand mitt x2 (if patient cannot self-remove)  --  Behavior  (Example: Patient attempted to assault his nurse)  Tugging at lines.  --  Root Cause of the Behavior  (Example: Cocaine intoxication)  ARDS.  --  Less-Restrictive Measures that Failed  Non Violent Measures:  Close Observation, Repositioning and Pain Management  --  Response to the Restraint  (Example: Patient stopped attempting to pull out her NG tube)  Patient stopped pulling at lines  --  Criteria for Release from the Restraint  (Example: Patient is calm and agrees to not strike staff)  Patient is calm.     PHYSICAL THERAPY AND MOBILITY:  Can patient have PT and mobility trial: no  Activity: Bed Rest    Assessment/Plan:  Tobias Palmer is a 54 y.o. gentleman with a past medical history significant for obesity, presenting to the hospital for ARDS secondary to Covid-19 infection.    1. NEURO:Is presently intubated and minimally sedated.  Follows all commands and is appropriate.    RASS goal of 1 to 0.    Discontinue fentanyl for analgesia; Precedex for sedation.    Continue scheduled oxycodone, Seroquel and Ativan.  2. CVS:Has no known medical history of CAD.  Was briefly noted to have developed atrial fibrillation with a rapid ventricular response early in the course of his illness and it also developed septic shock but both of these have resolved.    Continue telemetry monitoring.    MAP goal of 60mmHg with SBP>90mmHg.    Afib:Being treated with Amiodarone 200mg Daily.  3. RESP:Admitted with ARDS with a P:F of 216 secondary to COVID-19.  Last chest x-ray was performed on 7/19 which demonstrated bilateral infiltrates.  Over  the last several days the patient has continued his pressure support on a pressure support of 7/5 and for several hours today and 5/5.  He appears to be following commands and is oxygenating well.      Maintain SpO2 of >92%.    Successfully extubated this morning.   Vent Mode: CPAP/PSV  FiO2 (%):  [40 %] 40 %  S RR:  [16] 16  S VT:  [450 mL] 450 mL  PEEP/CPAP (cm H2O):  [5 cm H2O] 5 cm H2O  Minute Ventilation (L/min):  [11 L/min-20 L/min] 11.6 L/min  PIP:  [14 cm H2O-21 cm H2O] 15 cm H2O  [place] SUP:  [5 cm H20] 5 cm H20  MAP (cm H2O):  [7.4-10] 8.2  4. GI: Noted to have protein calorie malnutrition and is presently being fed through tube feeds at close to goal rate.    Continue Protonix daily for ulcer prophylaxis.    Will have a formal swallow evaluation tomorrow.  5. RENAL : Is noted to be mildly hyponatremic today and 8.9 L down from the time of admission.    Would trend renal function daily and closely follow I/O.    Follow electrolytes and correct as abnormalities arise.  6. ID:Presents with Covid-19 infection.    MSSA pneumonia; colonized    Serratia pneumonia    Urine culture with gram negative rods; waiting sensitivities              * S/P CCP and toci 6/25 and 6/27               * S/P Ivermectin x 2 doses.               * S/P Meropenem x 10 days               * S/P Remdesivir x 10 days              * Cefazolin for MSSA; 7/7 - 7/14              * 7/14 sputum culture with staph aureus and serratia, thought to be colonized              * COVID retest 7/16 positive; plan to test once a week              * Ceftriaxone started 7/20 for serratia  7. HEMATOLOGIC: Noted to have a coagulopathy in the setting of COVID-19.    Daily CBC.    Continue twice daily Lovenox 60 mg.  8. ENDOCRINE:.    ICU insulin sliding scale.  9. ICU PROPHYLAXIS:Protonix, Lovenox.  10. DISPO:Unclear. Continues to require ICU levels of care given that he continues to be at high risk for respiratory decompensation.    Total Critical Care Time :  50 minutes.      Lisseth Sánchezvi  Pulmonary and Critical Care  [id number]

## 2021-06-10 NOTE — PROGRESS NOTES
Patient sleeping and desaturated to 78. Hernandez rate dropped to 60's with PVC's. Upon stimulation, patient O2 came back up to the 90's. Patient placed in 2L NC. Dr. Larry Arreola Notified. Will continue to monitor.

## 2021-06-10 NOTE — PROGRESS NOTES
"Clinical Nutrition Therapy Follow Up Note     Pt on HFNC    Current Nutrition Prescription:   Tube Feeding: Peptamen AF @ 45 ml/hr (formula changed, 7/14 due to persistent diarrhea)   Supplements and Modulars: No Carb Prosource- 2 pkts two times a day, Benefiber- 1 pkt three times a day   Flush Orders: H2O- 30 ml q 4 hrs      Current Nutrition Intake:  Enteral nutrition access is a nasogastric tube, with a placement date of 7/10/20. Last x-ray showed tip in gastric fundus on 7/26. The current tube feeding order will provide 1536 kcals, 141 grams protein, 121 grams carbohydrate, 15 grams fiber, 873 mls free water from formula, 180 mls from fluid flush, for a total of 1053 mls free water daily.    Pt received 684 ml fluid via IVs yesterday.    Total nutrient intake from all sources meets estimated nutritional needs.    Anthropometrics:  Height: 5' 5\" (1.651 m)  Admit wt: 274 lb 4 oz,6/22/20  Most recent weight: (!) 250 lb (113.4 kg)- nonpitting edema in all extremities, +1 generalized  BMI:Body mass index is 41.6 kg/m .  BMI indication: >40 extreme obesity (class 3)  Weight History:268 lb(6/22/20).   Recent wt's: 270 lb(7/12),265 lb(7/16),267 lb(7/19),254 lb(7/21),265 lb(7/23) Wt range since admit: 250-284 lb. Expected weight loss due to hypocaloric feeding for majority of admission.    RD Nutrition Focused Physical Exam:  The patient has the following physical signs which could indicate malnutrition:   Unable to complete assessment d/t [place] guidelines for RDs during COVID-19 pandemic.     GI Status/Output:   Bowel Sounds present per nurse  Last BM: x 2 so far today (small), x 3 yesterday per nurse    Skin/Wound:  Bayron Scale Score: 15    Medications:  Medications reviewed.  Noted: IV abx, Lasix, SSI, Culturelle, mvit w/min, Prilosec, oxycodone q 4  Hrs, electrolyte replacement protocols    Labs:  Results from last 7 days   Lab Units 07/27/20  0414   LN-SODIUM mmol/L 137   LN-POTASSIUM mmol/L 3.8   LN-CHLORIDE " mmol/L 106   LN-CO2 mmol/L 25   LN-BLOOD UREA NITROGEN mg/dL 18   LN-CREATININE mg/dL 0.57*   LN-CALCIUM mg/dL 8.5         Results from last 7 days   Lab Units 07/27/20  0414   LN-MAGNESIUM mg/dL 2.0     Results from last 7 days   Lab Units 07/27/20  0414   LN-PHOSPHORUS mg/dL 3.2       Lab Results   Component Value Date    POCGLUFGR 118 07/27/2020    POCGLUFGR 126 07/27/2020    POCGLUFGR 115 07/26/2020    POCGLUFGR 141 (H) 07/26/2020    POCGLUFGR 133 07/26/2020    POCGLUFGR 132 07/25/2020    POCGLUFGR 131 07/25/2020    POCGLUFGR 148 (H) 07/25/2020    POCGLUFGR 146 (H) 07/25/2020    POCGLUFGR 147 (H) 07/24/2020      Lab Results   Component Value Date    HGB 10.3 (L) 07/27/2020     Hemoglobin A1c   Date Value Ref Range Status   07/18/2020 6.5 (H) <=5.6 % Final     Comment:     Prediabetes:   HBA1c       5.7 to 6.4%        Diabetes:        HBA1c        >=6.5%   Patients with Hgb F >5%, total bilirubin >10.0 mg/dL, abnormal red cell turnover, severe renal or hepatic disease or malignancy should not have this A1C method used to diagnose or monitor diabetes.             Estimated Nutrition Needs: (reassessed needs)  Energy Needs: 8729-4655 kcals daily, 25-30 kcal/kg (62 kg IBW)  Protein Needs: 124-155 g daily, 2-2.5 g/kg.(dose: 62 kg IBW)  Fluid Needs: 1536-9473 mls daily, 25-35 mls/kg(dose wt: 77 kg adj.wt)     Malnutrition: protein calorie malnutrition noted per doctor     Nutrition Risk Level: high risk     Nutrition dx:   Swallowing difficulty r/t respiratory failure as evidenced by intubated,NPO.      Goals:   Meet estimated nutrition needs and Tolerate tube feeding- progressing  D/c RT; <3 loose stools/day; more formed- met     Intervention:   Continue current nutrition Rx  Provider managing fluid flushes     Monitoring:  Labs, wt, TF tolerance, & skin integrity.    See Care Plan for further Problems, Goals, and Interventions    Electronically signed by:  Raina Jensen RD

## 2021-06-10 NOTE — PROGRESS NOTES
"Clinical Nutrition Therapy Follow Up Note       Pt weaned to RA today    Current Nutrition Prescription:   Diet: Regular with thin liquids.   Diet upgraded today from NDD2 with thin liquids. Diet initiated 7/29  TF held as of 7/29. FT removed today  Supplements and Modulars:  Benefiber- 1 pkt three times a day (prosource dc'd)    Current Nutrition Intake:  Meal intake is good, % with most meals > 75%  Good fluid intake, 660 ml so far today    Total nutrient intake from all sources meets estimated nutritional needs.    Anthropometrics:  Height: 5' 5\" (1.651 m)  Admit wt: 274 lb 4 oz,6/22/20  Most recent weight: (!) 224 lb 13.9 oz (102 kg)-7/31, question accuracy. Down 26 lb from day prior?  nonpitting edema in all extremities  Recent weights have been trending down, but gradually.  BMI:Body mass index is 37.42 kg/m .  BMI indication: >40 extreme obesity (class 3)  Weight History:268 lb(6/22/20).   Recent wt's: 270 lb(7/12),265 lb(7/16),267 lb(7/19),254 lb(7/21),265 lb(7/23), 250 lb(7/27 and 7/30),    Wt range since admit: 250-284 lb. Expected weight loss due to hypocaloric feeding for majority of admission and expected muscle loss with lengthy hospitalization, 10% of weight from baseline would be 242 lb expected new weight    RD Nutrition Focused Physical Exam:  The patient has the following physical signs which could indicate malnutrition:   Unable to complete assessment d/t [place] guidelines for RDs during COVID-19 pandemic.     GI Status/Output:   Rectal tube removed 7/29 with 300 ml OP for the day  1 loose BM/day since    Skin/Wound:  Bayron Scale Score: 17    Medications:  Medications reviewed.  Noted:  Culturelle, mvit w/min, Prilosec, oxycodone q 8  Hrs,   oxycodone decreased. Lasix, ssi,  iv abx dc'd    Labs:  Lab Results   Component Value Date/Time    ALBUMIN 2.1 (L) 07/06/2020 03:52 AM     07/30/2020 06:20 AM    K 4.0 07/30/2020 06:20 AM    BUN 29 (H) 07/30/2020 06:20 AM    CREATININE 0.65 (L) " 07/30/2020 06:20 AM     07/30/2020 06:20 AM    PHOS 4.2 07/30/2020 06:20 AM    MG 2.2 07/30/2020 06:20 AM       Estimated Nutrition Needs: (reassessed needs)  Energy Needs: 2348-7596 kcals daily, 25-30 kcal/kg (62 kg IBW)  Protein Needs: 124-155 g daily, 2-2.5 g/kg.(dose: 62 kg IBW)  Fluid Needs: 5462-4145 mls daily, 25-35 mls/kg(dose wt: 77 kg adj.wt)     Malnutrition: protein calorie malnutrition noted per doctor     Nutrition Risk Level: moderate     Nutrition dx:   Swallowing difficulty r/t respiratory failure as evidenced by intubated,NPO.- resolved     Goals:   Meet estimated nutrition needs and Tolerate tube feeding-met  P.o intake > 75% of nutrition needs - progressing     Intervention:   Discharge plan to ARU pending stability of O2 needs and bed availability  D/c benefiber now that pt is receiving adequate fiber in his food   Anticipate continued good and adequate p.o intake    Monitoring:  Labs, wt, intake, & skin integrity.    See Care Plan for further Problems, Goals, and Interventions    Electronically signed by:  Patricia Webster RDN

## 2021-06-10 NOTE — PROGRESS NOTES
"ICU PROGRESS NOTE:    Patient Summary:  Tobias Palmer is a 54 y.o. gentleman with a past medical history significant for morbid obesity, who presented to the hospital on 6/22/2020 11:38 AM for ARDS secondary to Covid-19 complicated by septic shock requiring pronating and paralysis.    Lines/Drains/Tubes:  Central line Right cephalic PICC, Placed on 6/26  Arterial line Left brachial, Placed on 7/17  ETT placed on 6/21; Size 8.0  Feeding tube placed on 7/24, tube feeds @ 45  Hillman catheter placed on 7/22  Rectal tube placed on 7/18    Overnight events:  No acute events overnight.    Subjective:  Doing very well today.    Objective:  Physical Exam:  Vent settings for last 24 hours:  FiO2 (%):  [30 %-40 %] 30 %    /69   Pulse 97   Temp 99.1  F (37.3  C) (Oral)   Resp 20   Ht 5' 5\" (1.651 m)   Wt (!) 250 lb (113.4 kg)   SpO2 94%   BMI 41.60 kg/m      Intake/Output last 3 shifts:  I/O last 3 completed shifts:  In: 1477.5 [I.V.:633.5; NG/GT:794; IV Piggyback:50]  Out: 2825 [Urine:2175; Stool:650]  Intake/Output this shift:  No intake/output data recorded.    Physical Exam   Constitutional: He is oriented to person, place, and time.   HENT:   Head: Normocephalic and atraumatic.   Eyes: Pupils are equal, round, and reactive to light.   Neck: Normal range of motion.   Cardiovascular: Normal rate and regular rhythm.   Pulmonary/Chest:   Coarse BS BL.   Abdominal: Soft.   Musculoskeletal:         General: No edema.   Neurological: He is alert and oriented to person, place, and time.   Skin: Skin is warm.         LAB:  Results from last 7 days   Lab Units 07/27/20  0414   LN-WHITE BLOOD CELL COUNT thou/uL 7.2   LN-HEMOGLOBIN g/dL 10.3*   LN-HEMATOCRIT % 33.2*   LN-PLATELET COUNT thou/uL 339     Results from last 7 days   Lab Units 07/27/20  0414 07/26/20  0822 07/25/20  0647   LN-SODIUM mmol/L 137 138 137   LN-POTASSIUM mmol/L 3.8 3.4* 3.2*   LN-CHLORIDE mmol/L 106 108* 103   LN-CO2 mmol/L 25 21* 25   LN-BLOOD " UREA NITROGEN mg/dL 18 18 13   LN-CREATININE mg/dL 0.57* 0.50* 0.53*   LN-CALCIUM mg/dL 8.5 7.6* 8.2*       RADIOLOGY:  Xr Chest 1 View Portable    Result Date: 7/19/2020  EXAM: XR CHEST 1 VIEW PORTABLE LOCATION: [facility] DATE/TIME: 7/19/2020 6:08 AM INDICATION: Evaluate lines/tubes. COMPARISON: 07/11/2020     Endotracheal tube is 3.2 cm superior to the melissa. Enteric tube extends off the inferior aspect of the film. Stable right PICC. No pneumothorax. No significant interval change in multifocal airspace disease. No pleural effusion. The cardiac silhouette is stable at the upper limits of normal for size.    Xr Chest 1 View Portable    Result Date: 7/11/2020  EXAM: XR CHEST 1 VIEW PORTABLE LOCATION: [facility] DATE/TIME: 7/11/2020 8:01 AM INDICATION: ARDS eval for interval change since 7/5. COMPARISON: 7/5/2020.     Diffuse left lung opacities and consolidation appear stable to slightly improved. More patchy right lung opacities appear marginally increased, though could be exaggerated by differences in exams. No significant pleural effusion. No pneumothorax. Stable cardiomediastinal silhouette. ET tube tip 2.1 cm above the melissa. Feeding tube courses into the stomach. Right PICC tip near the cavoatrial junction.     Xr Chest 1 View Portable    Result Date: 7/5/2020  EXAM: XR CHEST 1 VIEW PORTABLE LOCATION: [facility] DATE/TIME: 7/5/2020 12:25 PM INDICATION: ETT, Covid COMPARISON: 07/04/2020     Endotracheal tube tip mid way between clavicles and melissa. Right PICC tip projects over the mid SVC. Feeding tube tip below the film. Limited inspiratory volume. Normal heart size. Unchanged bilateral diffuse lung infiltrates.    Xr Chest 1 View Portable    Result Date: 7/4/2020  EXAM: XR CHEST 1 VIEW PORTABLE LOCATION: [facility] DATE/TIME: 7/4/2020 11:18 AM INDICATION: Worsening hypoxemia. COMPARISON: 07/02/2020     Orogastric tube is been removed with other lines and tubes unchanged. Heart  size magnified in AP projection. No significant change in patchy bilateral interstitial and airspace opacities when taking into account difference in technique and ventilation. No pneumothorax.    Xr Chest 1 View Portable    Result Date: 7/2/2020  EXAM: XR CHEST 1 VIEW PORTABLE LOCATION: [facility] DATE/TIME: 7/2/2020 5:49 PM INDICATION: fever, COVID pneumonia COMPARISON: 06/27/2020 and older studies     Endotracheal tube is in good position. Feeding tube can be seen coursing into the stomach. It is again coiled in the fundus. The tip is out of the field-of-view. Marked increase in the patchy interstitial and airspace opacities bilaterally. Heart is of normal size. No pneumothorax.    Xr Chest 1 View Portable    Result Date: 6/27/2020  EXAM: XR CHEST 1 VIEW PORTABLE LOCATION: [facility] DATE/TIME: 6/27/2020 8:57 AM INDICATION: ARDS. Difficulty breathing. COMPARISON: 06/26/2020.     Endotracheal tube terminates 4 cm above the melissa. Feeding tube passes through the stomach with tip out of the field of view. Interval removal of right internal jugular venous catheter. Mild improvement in patchy perihilar and left lower lobe infiltrates. Normal heart size. No pleural effusion or pneumothorax.    Xr Chest 1 View Portable    Result Date: 6/26/2020  EXAM: XR CHEST 1 VIEW PORTABLE LOCATION: [facility] DATE/TIME: 6/26/2020 2:55 PM INDICATION: assess ETT location, COVID pneumonia. COMPARISON: 06/22/2020     Patient's rotated to the right. Endotracheal tube is in the distal trachea 2 cm from the melissa. Right IJ central line overlies the region of the proximal SVC. Feeding tube has been placed and can be seen coursing into the stomach. There is a loop within the fundus. The NG tube has been removed. Better aeration of the left lower lobe with clearing of some of the consolidating opacities in the left base as well as in the right midlung. No change in the bilateral perihilar interstitial opacities.  Elevation of the right hemidiaphragm. Heart and pulmonary vascularity are normal.     Xr Chest 1 View Portable    Result Date: 6/22/2020  EXAM: XR CHEST 1 VIEW PORTABLE LOCATION: [facility] DATE/TIME: 6/22/2020 3:58 PM INDICATION: Covid 19 pneumonia. Intubate and right IJ CVC placement COMPARISON: CXRs 06/22/2020 1:18 PM and 2:51 AM and 06/21/2020.     Endotracheal tube tip 4 cm in the melissa. Right IJ central venous catheter has been placed with tip in the mid SVC. Nasogastric tube tip in the stomach just inferior to the field-of-view. Bilateral airspace opacity with slight volume loss in the mid and lower lungs unchanged. No pneumothorax.    Xr Chest 1 View Portable    Result Date: 6/22/2020  EXAM: XR CHEST 1 VIEW PORTABLE LOCATION: [facility] DATE/TIME: 6/22/2020 1:41 PM INDICATION: confirm ET Tube position COMPARISON: Earlier today at 0250 hours     ET tube has been pulled back and is now in good position the level of the mid clavicular heads. NG tube remains in good position. Improved lung aeration, especially at left lung base. Bilateral patchy mid lung airspace infiltrates persist consistent with pneumonia. Heart size normal.    Xr Abdomen Ap    Result Date: 7/22/2020  EXAM: XR ABDOMEN AP LOCATION: [facility] DATE/TIME: 7/22/2020 4:40 PM INDICATION: abdominal pain r/o constipation COMPARISON: 7/10/2020     Feeding tube tip is in the proximal duodenum. Bowel gas pattern is nonobstructed. There is not a significant amount of stool in the colon.     Xr Abdomen Ap    Result Date: 6/22/2020  EXAM: XR ABDOMEN AP LOCATION: [facility] DATE/TIME: 6/22/2020 4:50 PM INDICATION: Feeding tube COMPARISON: Chest x-ray earlier today     Feeding tube in the distal stomach. Bowel gas pattern is normal. Infiltrates on the lung better seen on chest x-ray.     Xr Abdomen Ap Portable    Result Date: 7/26/2020  EXAM: ABDOMEN SINGLE VIEW PORTABLE LOCATION: [facility] DATE/TIME: 07/26/2020, 3:59 AM  INDICATION: Feeding tube pulled out. COMPARISON: 07/24/2020 at 2202 hours.     An enteric feeding tube is present with distal tip in the gastric fundus.     Xr Abdomen Ap Portable    Result Date: 7/24/2020  EXAM: ABDOMEN SINGLE VIEW PORTABLE LOCATION: [facility] DATE/TIME: 7/24/2020 10:07 PM INDICATION: Feeding tube placement. COMPARISON: None.     An enteric feeding tube is present with distal tube tip in gastric body.     Xr Abdomen Ap Portable    Result Date: 7/24/2020  EXAM: XR ABDOMEN AP PORTABLE LOCATION: [facility] DATE/TIME: 7/24/2020 9:12 PM INDICATION: Check feeding tube placement after being looped back on itself COMPARISON: Abdominal radiography 07/24/2020 at 1904 hours     A loop of the enteric tube extends below the diaphragmatic hiatus and in the cardia of the stomach however extends back into the lower esophagus. The tip of the weighted enteric tube is similar in position to 2 hours earlier located just above the diaphragmatic hiatus. Repositioning is suggested. NOTE: ABNORMAL REPORT THE DICTATION ABOVE DESCRIBES AN ABNORMALITY FOR WHICH FOLLOW-UP IS NEEDED.     Xr Abdomen Ap Portable    Result Date: 7/24/2020  EXAM: XR ABDOMEN AP PORTABLE LOCATION: [facility] DATE/TIME: 7/24/2020 7:15 PM INDICATION: assess NJ location post placement COMPARISON: 07/22/2020     Small bore enteric tube tip in distal esophagus needs to be advanced considerably. Remainder stable. NOTE: ABNORMAL REPORT THE DICTATION ABOVE DESCRIBES AN ABNORMALITY FOR WHICH FOLLOW-UP IS NEEDED.      Xr Abdomen Ap Portable    Result Date: 7/10/2020  EXAM: XR ABDOMEN AP PORTABLE LOCATION: [facility] DATE/TIME: 7/10/2020 2:48 PM INDICATION: Confirmation of DHT placement, COVID pneumonia. COMPARISON: 06/23/2020     Feeding tube in place. Tip overlies the inferior aspect of the descending duodenum. Bowel gas pattern is normal. Prior NG tube has been removed.     Xr Abdomen Ap Portable    Result Date: 6/23/2020  EXAM:  XR ABDOMEN AP PORTABLE LOCATION: [facility] DATE/TIME: 6/23/2020 2:34 PM INDICATION: check feeding tube placement COMPARISON: 6/22/2020     NG tube appears in good position within mid stomach. Feeding tube has a persistent extra loop within the stomach but likely has its tip at the level of mid stomach and adjacent to the NG tube. Visible bowel gas pattern normal.    Us Venous Legs Bilateral    Result Date: 7/16/2020  EXAM: US VENOUS LEGS BILATERAL LOCATION: [facility] DATE/TIME: 7/16/2020 5:10 PM INDICATION: Respiratory failure. fevers, r/out dvt COMPARISON: 06/27/2020 TECHNIQUE: Venous Duplex ultrasound of bilateral lower extremities with and without compression, augmentation and duplex. Color flow and spectral Doppler with waveform analysis performed. FINDINGS: Exam includes the common femoral, femoral, popliteal veins as well as segmentally visualized deep calf veins and greater saphenous vein. RIGHT: No deep vein thrombosis. No superficial thrombophlebitis. No popliteal cyst. LEFT: No deep vein thrombosis. No superficial thrombophlebitis. No popliteal cyst.     1.  No deep venous thrombosis in the bilateral lower extremities.    Us Venous Legs Bilateral    Result Date: 6/27/2020  EXAM: US VENOUS LEGS BILATERAL LOCATION: [facility] DATE/TIME: 6/27/2020 12:12 PM INDICATION: Bilateral lower extremity pain and swelling. Positive for corona virus. COMPARISON: None. TECHNIQUE: Venous Duplex ultrasound of bilateral lower extremities with and without compression, augmentation and duplex. Color flow and spectral Doppler with waveform analysis performed. FINDINGS: Exam includes the common femoral, femoral, popliteal veins as well as segmentally visualized deep calf veins and greater saphenous vein. Examination is technically difficult due to the patient's condition. RIGHT: No deep vein thrombosis. No superficial thrombophlebitis. No popliteal cyst. LEFT: No deep vein thrombosis. No superficial  thrombophlebitis. No popliteal cyst.     No deep venous thrombosis in the bilateral lower extremities.    Us Venous Arms Bilateral    Result Date: 7/16/2020  EXAM: US VENOUS ARMS BILATERAL LOCATION: [facility] DATE/TIME: 7/16/2020 5:10 PM INDICATION: fevers, r/out dvt COMPARISON: None. TECHNIQUE: Venous Duplex ultrasound of both upper extremities with (when possible) and without compression, augmentation, and duplex. Color flow and spectral Doppler with waveform analysis performed. FINDINGS: Ultrasound includes evaluation of the internal jugular veins, innominate veins, subclavian veins, axillary veins, and brachial veins. The superficial cephalic and basilic veins were also evaluated where seen. RIGHT: No deep venous thrombosis. No superficial thrombophlebitis. Subcutaneous edema is present in the right forearm. The right basilic vein was not identified. A catheter is present in the right cephalic and subclavian veins. LEFT: No deep venous thrombosis. No superficial thrombophlebitis.     1.  No deep venous thrombosis in the bilateral upper extremities.    Us Venous Arms Bilateral    Result Date: 6/27/2020  EXAM: US VENOUS ARMS BILATERAL LOCATION: [facility] DATE/TIME: 6/27/2020 12:11 PM INDICATION: Bilateral upper extremity pain and swelling. COMPARISON: None. TECHNIQUE: Venous Duplex ultrasound of both upper extremities with (when possible) and without compression, augmentation, and duplex. Color flow and spectral Doppler with waveform analysis performed. FINDINGS: Ultrasound includes evaluation of the internal jugular veins, innominate veins, subclavian veins, axillary veins, and brachial veins. The superficial cephalic and basilic veins were also evaluated where seen. Examination is difficult due to the patient's condition and presence of dressings at the base of the right neck and right brachium. RIGHT: Right internal jugular and innominate veins not well seen. No deep venous thrombosis. No  "superficial thrombophlebitis. LEFT: No deep venous thrombosis. No superficial thrombophlebitis.     No deep venous thrombosis in the bilateral upper extremities.    Poc Us 3cg Picc Placement Guidance    Result Date: 6/26/2020  Exam was performed as guidance for PICC line insertion.  Click \"PACS images\" hyperlink below to view any stored images.  For specific procedure details, view procedure note authored by PICC/Vascular Access Nurse.      MICRO:  Date Source Organism   6/22 Blood NGTD    Blood NGTD   6/24 Sputum Yeast   6/27 Blood NGTD    Blood NGTD    Sputum NGTD   7/2 Sputum S. Aureus    Urine NGTD    Blood NGTD    Blood NGTD   7/4 Sputum S. Aureus   7/14 Sputum S. Aureus   7/15 Blood NGTD    Blood NGTD   7/19 Urine S. Marcescens    Sputum S. Aureus  S. Marcescens    Blood NGTD    Blood NGTD       Organism Antibiotic Antibiotic Start Date Antibiotic End Date   S. Aureus Ceftriaxone 6/22 6/22   S. Marcescens Remdesivir 6/22 7/1    Vancomycin  6/27 6/27    Ivermectin 6/25 6/26    Tocilizumab 6/25 6/27 (2 doses)    Cefepime 6/27 7/2    Meropenem 7/2 7/7    Vancomycin 7/2 7/7    Cefazolin 7/7 7/15    Ceftriaxone 7/20 7/24     MAR REVIEWED    ICU DAILY CHECKLIST                           Can patient transfer out of MICU? Yes.    FAST HUG:    Feeding:  Feeding: Yes.  Patient is receiving TUBE FEEDS    Hillman:Yes  Analgesia/Sedation:Yes fentanyl and precedex.  Thromboembolic prophylaxis: yes; Mode:  Lovenox  HOB>30:  Yes  Stress Ulcer Protocol Active: yes; Mode: PPI  Glycemic Control: Any glucose > 180 yes; Mode of Insulin Therapy: Sliding Scale Insulin    INTUBATED:  Can patient have daily waking:  yes  Can patient have spontaneous breathing trial:  yes    Restraints? yes    Progress Note  Restraint Application    I recognize that restraints are physical and/or chemical interventions intended to restrict a person's movements. Restraints are currently needed to ensure the safety of this patient and/or others. My " clinical rationale appears below.    --  Category/Type of Restraint  Non Violent:  Hand mitt x2 (if patient cannot self-remove)  --  Behavior  (Example: Patient attempted to assault his nurse)  Tugging at lines.  --  Root Cause of the Behavior  (Example: Cocaine intoxication)  ARDS.  --  Less-Restrictive Measures that Failed  Non Violent Measures:  Close Observation, Repositioning and Pain Management  --  Response to the Restraint  (Example: Patient stopped attempting to pull out her NG tube)  Patient stopped pulling at lines  --  Criteria for Release from the Restraint  (Example: Patient is calm and agrees to not strike staff)  Patient is calm.     PHYSICAL THERAPY AND MOBILITY:  Can patient have PT and mobility trial: no  Activity: Bed Rest    Assessment/Plan:  Tobias Palmer is a 54 y.o. gentleman with a past medical history significant for obesity, presenting to the hospital for ARDS secondary to Covid-19 infection.    1. NEURO:Is presently intubated and minimally sedated.  Follows all commands and is appropriate.    RASS goal of 1 to 0.    Continue scheduled oxycodone, Seroquel and Ativan.  2. CVS:Has no known medical history of CAD.  Was briefly noted to have developed atrial fibrillation with a rapid ventricular response early in the course of his illness and it also developed septic shock but both of these have resolved.    Continue telemetry monitoring--can transfer out tomorrow and we will discontinue telemetry.    Afib:Being treated with Amiodarone 200mg Daily.  3. RESP:Admitted with ARDS secondary to COVID-19.  Last chest x-ray was performed on 7/19 which demonstrated bilateral infiltrates.  Was extubated on 7/26 and is doing very well.      Maintain SpO2 of >92%.  FiO2 (%):  [30 %-40 %] 30 %  4. GI: Noted to have protein calorie malnutrition and is presently being fed through tube feeds at close to goal rate.    Continue Protonix daily for ulcer prophylaxis.    Will have a formal swallow evaluation  tomorrow.  5. RENAL : 8.1 L down from the time of admission.    Would trend renal function daily and closely follow I/O.    Follow electrolytes and correct as abnormalities arise.  6. ID:Presents with Covid-19 infection.    MSSA pneumonia; colonized    Serratia pneumonia    Urine culture with gram negative rods; waiting sensitivities              * S/P CCP and toci 6/25 and 6/27               * S/P Ivermectin x 2 doses.               * S/P Meropenem x 10 days               * S/P Remdesivir x 10 days              * Cefazolin for MSSA; 7/7 - 7/14              * 7/14 sputum culture with staph aureus and serratia, thought to be colonized              * COVID retest 7/16 positive; plan to test once a week              * Ceftriaxone started 7/20 for serratia  7. HEMATOLOGIC: Noted to have a coagulopathy in the setting of COVID-19.    Daily CBC.    Continue twice daily Lovenox 60 mg.  8. ENDOCRINE:.    ICU insulin sliding scale.  9. ICU PROPHYLAXIS:Protonix, Lovenox.  10. DISPO: Will likely transfer out tomorrow.    Total Critical Care Time : 30 minutes.      Lisseth Garcia  Pulmonary and Critical Care  [id number]

## 2021-06-29 NOTE — PROGRESS NOTES
Progress Notes by Amber Wheat MD at 7/20/2020  5:01 PM     Author: Amber Wheat MD Service: Infectious Disease Author Type: Physician    Filed: 7/20/2020  5:12 PM Date of Service: 7/20/2020  5:01 PM Status: Addendum    : Amber Wheat MD (Physician)    Related Notes: Original Note by Amber Wheat MD (Physician) filed at 7/20/2020  5:11 PM       Infectious Disease Progress Note    Assessment/Plan  Impression:  COVID-19 pneumonia   Received CCP. Received dexamethasone (6/22-29) and remdesivir for 10 day course  Received 400 mg tocilizumab on 6/25, 6/27 for suspected cytokine release syndrome. Prolonged hospitalization.    Quantiferon gold negative  Hep B and C negative  Strongyloides antibody negative -- no need for further ivermectin    Ongoing fever and leukocytosis. Steroid may contribute to leukocytosis--> now normalized. Upper and lower ext u/s on 7/16 without DVTs. A-line changed on 7/17.      Colonized with yeast in sputum. Risk for invasive candidiasis -- broad spectrum antibiotics, central line, candida colonization. Beta-D-glucan negative 6/29 -- less likely therefore that he has invasive candidiasis.     Now with MSSA in sputum as of 7/2, 7/4 -- could be the cause of pneumonia. Completed 7-days of cefazolin. Recent sputum again shows MSSA, which represents colonization at this point. GNR: Serratia.    Has been having heavy secretions. Recent procalcitonin normal.         Recommend:      F/up on repeat blood cultures  Ceftriaxone x 7 days  Patient is on amiodarone, which has drug interaction with levofloxacin (prolonged QT interval risk).  Therefore avoiding Levofloxacin at this time  Monitor clinically  Infectious disease will sign off.  Please call with questions      Principal Problem:    COVID-19  Active Problems:    Shock (H)    Acute and chronic respiratory failure with hypoxia (H)    On mechanically assisted ventilation (H)    ARDS (adult respiratory distress syndrome) (H)    Atrial  fibrillation with rapid ventricular response (H)    Atrial fibrillation with rapid ventricular response (H)    Encephalopathy      Amber Wheat MD  [facility]  [phone]      ______________________________________________________________________       Subjective  No fevers, chart reviewed.  Patient new to me.    7/17: No fever spikes overnight, but was on cooling blanket when proned. No cooling blanket currently. A-line changed today. PICC line remains in place.    Objective    Anti-infectives:  Cefazolin 7/7-7/14  Meropenem 7/2-7  Ceftriaxone 6/22  Ivermectin 6/25-26  Cefepime 6/27-7/2  Vancomycin 6/27-7    Vital signs in last 24 hours  Temp:  [98.8  F (37.1  C)-99.9  F (37.7  C)] 99.9  F (37.7  C)  Heart Rate:  [] 95  Resp:  [21-34] 21  BP: (103-138)/(56-82) 103/56  Arterial Line BP: ()/(47-73) 119/64  FiO2 (%):  [40 %] 40 %  Weight:   (!) 270 lb 5.9 oz (122.6 kg)    Intake/Output last 3 shifts  I/O last 3 completed shifts:  In: 2754.3 [I.V.:1554.3; NG/GT:1200]  Out: 2175 [Urine:1825; Stool:350]  Intake/Output this shift:  I/O this shift:  In: 287.3 [I.V.:97.3; NG/GT:190]  Out: 300 [Urine:300]    Review of Systems   Review of systems not obtained due to inability to communicate with the patient.     Physical Exam--  Vent Mode: AC  FiO2 (%):  [40 %] 40 %  S RR:  [16] 16  S VT:  [450 mL] 450 mL  PEEP/CPAP (cm H2O):  [8 cm H2O-10 cm H2O] 8 cm H2O  Minute Ventilation (L/min):  [11.4 L/min-13.7 L/min] 13.7 L/min  PIP:  [13 cm H2O-27 cm H2O] 13 cm H2O  MAP (cm H2O):  [10-16] 10    Limited exam to conserve PPE.  See intensivist note for full exam.  Lying in bed, supine, no distress. intubated     Pertinent Labs   Lab Results: personally reviewed.     Results from last 7 days   Lab Units 07/20/20  0421 07/19/20  0505 07/18/20  0442   LN-WHITE BLOOD CELL COUNT thou/uL 8.0 8.2 6.9   LN-HEMOGLOBIN g/dL 10.7* 11.1* 11.1*   LN-HEMATOCRIT % 33.3* 35.1* 35.6*   LN-PLATELET COUNT  thou/uL 229 209 174        Results from last 7 days   Lab Units 07/20/20  0421 07/19/20  0505 07/18/20  0441   LN-SODIUM mmol/L 137 137 138   LN-POTASSIUM mmol/L 3.7 4.0 3.7   LN-CHLORIDE mmol/L 105 103 104   LN-CO2 mmol/L 25 26 27   LN-BLOOD UREA NITROGEN mg/dL 11 8 13   LN-CREATININE mg/dL 0.46* 0.49* 0.48*   LN-CALCIUM mg/dL 8.0* 8.2* 8.0*     Lab Results   Component Value Date    ALT 27 07/06/2020    AST 30 07/06/2020    ALKPHOS 44 (L) 07/06/2020    BILITOT 0.3 07/06/2020     Micro:  6/22 blood negative  6/24 sputum usual bruno and yeast  6/27 blood including fungal isolate no growth to date   6/27 sputum gram stain with yeast; culture usual bruno  7/2 sputum MSSA  7/2 blood culture negative to date  7/4 sputum MSSA  7/14: sputum MSSA, Serratia    Sputum culture [[id number]]   Collected: 07/19/20 2304    Order Status: Completed  Specimen: Respiratory  Updated: 07/20/20 1022     Gram Stain Result  4+ Polymorphonuclear leukocytes      4+ Gram positive cocci in clusters      3+ Gram negative bacilli    Blood Culture from PERIPHERAL SITE (2nd One) [[id number]]   Collected: 07/19/20 2305    Order Status: Sent  Specimen: Blood from Vein, Peripheral  Updated: 07/19/20 2311    Urine culture [[id number]]   Collected: 07/19/20 2304    Order Status: Sent  Specimen: Urine  Updated: 07/19/20 2310    Culture/Gram Stain: Sputum [[id number]]  (Abnormal)   Collected: 07/14/20 1432    Order Status: Completed  Specimen: Respiratory  Updated: 07/18/20 0828     Culture  4+ Staphylococcus aureusAbnormal        2+ Serratia marcescensAbnormal       Gram Stain Result  4+ Polymorphonuclear leukocytes      3+ Gram positive cocci in clusters      2+ Gram negative bacilli    Susceptibility     Serratia marcescens (2)     Antibiotic  Interpretation  Microscan  Method  Status     Amoxicillin + Clavulanate  Resistant  >16/8  FELICITA  Final     Ampicillin  Resistant  >16  FELICITA  Final     Cefazolin  Resistant  >16  FELICITA  Final     Cefepime  Sensitive  <=1   FELICITA  Final     Ceftriaxone  Sensitive  <=1  FELICITA  Final     Ciprofloxacin  Sensitive  <=0.5  FELICITA  Final     Gentamicin  Sensitive  <=2  FELICITA  Final     Levofloxacin  Sensitive  <=1  FELICITA  Final     Meropenem  Sensitive  <=0.25  FELICITA  Final     Piperacillin + Tazobactam  Sensitive  <=4/4  FELICITA  Final     Tetracycline  Resistant  >8  FELICITA  Final     Tobramycin  Sensitive  4  FELICITA  Final     Staphylococcus aureus (1)     Antibiotic  Interpretation  Microscan  Method  Status     Clindamycin  Sensitive  <=0.5  FELICITA  Final     Cefazolin  Sensitive  <=2  FELICITA  Final     Doxycycline  Sensitive  <=0.5  FELICITA  Final     Erythromycin  Sensitive  <=0.5  FELICITA  Final     Levofloxacin  Sensitive  <=0.5  FELICITA  Final     Oxacillin  Sensitive  0.5  FELICITA  Final     Trimethoprim + Sulfamethoxazole  Sensitive  <=1/19  FELICITA  Final     Vancomycin  Sensitive  1  FELICITA  Final                   Results for RJ GUADALUPE (MRN [id number]) as of 6/26/2020 11:04   Ref. Range 6/24/2020 16:46   Hepatitis B Surface Ag Latest Ref Range: Negative  Negative   Hepatitis C Ab Latest Ref Range: Negative  Negative   Results for RJ GUADALUPE (MRN [id number]) as of 6/26/2020 11:04   Ref. Range 6/23/2020 04:26 6/24/2020 05:24 6/24/2020 16:46 6/25/2020 04:40 6/26/2020 04:22   CRP Latest Ref Range: 0.0 - 0.8 mg/dL 12.0 (H) 10.0 (H)  4.2 (H) 2.1 (H)   LD (LDH) Latest Ref Range: 125 - 220 U/L 480 (H) 477 (H)  434 (H) 452 (H)     Pertinent Radiology   Radiology Results: Personally reviewed image/s and Personally reviewed impression/s  EXAM: XR CHEST 1 VIEW PORTABLE  LOCATION: [facility]  DATE/TIME: 7/19/2020 6:08 AM   INDICATION: Evaluate lines/tubes.  COMPARISON: 07/11/2020     IMPRESSION:   Endotracheal tube is 3.2 cm superior to the melissa. Enteric tube extends off the inferior aspect of the film. Stable right PICC. No pneumothorax. No significant interval change in multifocal airspace disease. No pleural effusion. The cardiac   silhouette is stable at the  upper limits of normal for size.

## 2021-06-29 NOTE — PROGRESS NOTES
Progress Notes by Amber Wheat MD at 7/21/2020 11:51 AM     Author: Amber Wheat MD Service: Infectious Disease Author Type: Physician    Filed: 7/21/2020 11:52 AM Date of Service: 7/21/2020 11:51 AM Status: Signed    : Amber Wheat MD (Physician)       Infectious Disease Progress Note    Assessment/Plan  Impression:  COVID-19 pneumonia   Received CCP. Received dexamethasone (6/22-29) and remdesivir for 10 day course  Received 400 mg tocilizumab on 6/25, 6/27 for suspected cytokine release syndrome. Prolonged hospitalization.    Quantiferon gold negative  Hep B and C negative  Strongyloides antibody negative -- no need for further ivermectin    Ongoing fever and leukocytosis. Steroid may contribute to leukocytosis--> now normalized. Upper and lower ext u/s on 7/16 without DVTs. A-line changed on 7/17.      Colonized with yeast in sputum. Risk for invasive candidiasis -- broad spectrum antibiotics, central line, candida colonization. Beta-D-glucan negative 6/29 -- less likely therefore that he has invasive candidiasis.     Now with MSSA in sputum as of 7/2, 7/4 -- could be the cause of pneumonia. Completed 7-days of cefazolin. Recent sputum again shows MSSA, which represents colonization at this point. GNR: Serratia.    Has been having heavy secretions. Recent procalcitonin normal.         Recommend:      F/up on repeat blood cultures  Ceftriaxone x 7 days  Patient is on amiodarone, which has drug interaction with levofloxacin (prolonged QT interval risk).  Therefore avoiding Levofloxacin at this time  Monitor clinically  Monitor CBC, CMP  Check C diff if diarrhea develops  Infectious disease will sign off.  Please call with questions      Principal Problem:    COVID-19  Active Problems:    Shock (H)    Acute and chronic respiratory failure with hypoxia (H)    On mechanically assisted ventilation (H)    ARDS (adult respiratory distress syndrome) (H)    Atrial fibrillation with rapid ventricular  response (H)    Atrial fibrillation with rapid ventricular response (H)    Encephalopathy    Bacterial pneumonia      Amber Wheat MD  [facility]  [phone]      ______________________________________________________________________       Subjective  No fevers, chart reviewed.  Patient new to me on 7/20/2020.       7/17: No fever spikes overnight, but was on cooling blanket when proned. No cooling blanket currently. A-line changed today. PICC line remains in place.    Objective    Anti-infectives:  Cefazolin 7/7-7/14  Meropenem 7/2-7  Ceftriaxone 6/22  Ivermectin 6/25-26  Cefepime 6/27-7/2  Vancomycin 6/27-7    Vital signs in last 24 hours  Temp:  [99.7  F (37.6  C)-100.9  F (38.3  C)] 100  F (37.8  C)  Heart Rate:  [] 110  Resp:  [19-34] 20  Arterial Line BP: ()/(50-93) 150/83  FiO2 (%):  [40 %] 40 %  Weight:   (!) 254 lb 10.1 oz (115.5 kg)    Intake/Output last 3 shifts  I/O last 3 completed shifts:  In: 3804.3 [I.V.:1308.3; NG/GT:2496]  Out: 3880 [Urine:3530; Stool:350]  Intake/Output this shift:  I/O this shift:  In: 484.8 [I.V.:204.8; NG/GT:280]  Out: 250 [Urine:250]    Review of Systems   Review of systems not obtained due to inability to communicate with the patient.     Physical Exam--  Vent Mode: AC  FiO2 (%):  [40 %] 40 %  S RR:  [6-16] 6  S VT:  [450 mL] 450 mL  PEEP/CPAP (cm H2O):  [8 cm H2O] 8 cm H2O  Minute Ventilation (L/min):  [8.7 L/min-13.7 L/min] 13 L/min  PIP:  [12 cm H2O-21 cm H2O] 12 cm H2O  MAP (cm H2O):  [8-12] 8    Limited exam to conserve PPE.  See intensivist note for full exam.  Lying in bed, supine, no distress. intubated     Pertinent Labs   Lab Results: personally reviewed.     Results from last 7 days   Lab Units 07/21/20  0344 07/20/20  0421 07/19/20  0505   LN-WHITE BLOOD CELL COUNT thou/uL 15.3* 8.0 8.2   LN-HEMOGLOBIN g/dL 10.5* 10.7* 11.1*   LN-HEMATOCRIT % 33.0* 33.3* 35.1*   LN-PLATELET COUNT thou/uL 250 229 209        Results from  last 7 days   Lab Units 07/21/20  0344 07/20/20  0421 07/19/20  0505   LN-SODIUM mmol/L 136 137 137   LN-POTASSIUM mmol/L 4.0 3.7 4.0   LN-CHLORIDE mmol/L 103 105 103   LN-CO2 mmol/L 23 25 26   LN-BLOOD UREA NITROGEN mg/dL 10 11 8   LN-CREATININE mg/dL 0.52* 0.46* 0.49*   LN-CALCIUM mg/dL 8.3* 8.0* 8.2*     Lab Results   Component Value Date    ALT 27 07/06/2020    AST 30 07/06/2020    ALKPHOS 44 (L) 07/06/2020    BILITOT 0.3 07/06/2020     Micro:  6/22 blood negative  6/24 sputum usual bruno and yeast  6/27 blood including fungal isolate no growth to date   6/27 sputum gram stain with yeast; culture usual bruno  7/2 sputum MSSA  7/2 blood culture negative to date  7/4 sputum MSSA  7/14: sputum MSSA, Serratia    Sputum culture [[id number]]   Collected: 07/19/20 2304    Order Status: Completed  Specimen: Respiratory  Updated: 07/20/20 1022     Gram Stain Result  4+ Polymorphonuclear leukocytes      4+ Gram positive cocci in clusters      3+ Gram negative bacilli    Blood Culture from PERIPHERAL SITE (2nd One) [[id number]]   Collected: 07/19/20 2305    Order Status: Sent  Specimen: Blood from Vein, Peripheral  Updated: 07/19/20 2311    Urine culture [[id number]]   Collected: 07/19/20 2304    Order Status: Sent  Specimen: Urine  Updated: 07/19/20 2310    Culture/Gram Stain: Sputum [[id number]]  (Abnormal)   Collected: 07/14/20 1432    Order Status: Completed  Specimen: Respiratory  Updated: 07/18/20 0828     Culture  4+ Staphylococcus aureusAbnormal        2+ Serratia marcescensAbnormal       Gram Stain Result  4+ Polymorphonuclear leukocytes      3+ Gram positive cocci in clusters      2+ Gram negative bacilli    Susceptibility     Serratia marcescens (2)     Antibiotic  Interpretation  Microscan  Method  Status     Amoxicillin + Clavulanate  Resistant  >16/8  FELICITA  Final     Ampicillin  Resistant  >16  FELICITA  Final     Cefazolin  Resistant  >16  FELICITA  Final     Cefepime  Sensitive  <=1  FELICITA  Final     Ceftriaxone  Sensitive   <=1  FELICITA  Final     Ciprofloxacin  Sensitive  <=0.5  FELICITA  Final     Gentamicin  Sensitive  <=2  FELICITA  Final     Levofloxacin  Sensitive  <=1  FELICITA  Final     Meropenem  Sensitive  <=0.25  FELICITA  Final     Piperacillin + Tazobactam  Sensitive  <=4/4  FELICITA  Final     Tetracycline  Resistant  >8  FELICITA  Final     Tobramycin  Sensitive  4  FELICITA  Final     Staphylococcus aureus (1)     Antibiotic  Interpretation  Microscan  Method  Status     Clindamycin  Sensitive  <=0.5  FELICITA  Final     Cefazolin  Sensitive  <=2  FELICITA  Final     Doxycycline  Sensitive  <=0.5  FELICITA  Final     Erythromycin  Sensitive  <=0.5  FELICITA  Final     Levofloxacin  Sensitive  <=0.5  FELICITA  Final     Oxacillin  Sensitive  0.5  FELICITA  Final     Trimethoprim + Sulfamethoxazole  Sensitive  <=1/19  FELICITA  Final     Vancomycin  Sensitive  1  FELICITA  Final                   Results for RJ GUADALUPE (MRN [id number]) as of 6/26/2020 11:04   Ref. Range 6/24/2020 16:46   Hepatitis B Surface Ag Latest Ref Range: Negative  Negative   Hepatitis C Ab Latest Ref Range: Negative  Negative   Results for RJ GUADALUPE (MRN [id number]) as of 6/26/2020 11:04   Ref. Range 6/23/2020 04:26 6/24/2020 05:24 6/24/2020 16:46 6/25/2020 04:40 6/26/2020 04:22   CRP Latest Ref Range: 0.0 - 0.8 mg/dL 12.0 (H) 10.0 (H)  4.2 (H) 2.1 (H)   LD (LDH) Latest Ref Range: 125 - 220 U/L 480 (H) 477 (H)  434 (H) 452 (H)     Pertinent Radiology   Radiology Results: Personally reviewed image/s and Personally reviewed impression/s  EXAM: XR CHEST 1 VIEW PORTABLE  LOCATION: [facility]  DATE/TIME: 7/19/2020 6:08 AM   INDICATION: Evaluate lines/tubes.  COMPARISON: 07/11/2020     IMPRESSION:   Endotracheal tube is 3.2 cm superior to the melissa. Enteric tube extends off the inferior aspect of the film. Stable right PICC. No pneumothorax. No significant interval change in multifocal airspace disease. No pleural effusion. The cardiac   silhouette is stable at the upper limits of normal for size.

## 2021-06-29 NOTE — PROGRESS NOTES
Progress Notes by Amber Wheat MD at 7/24/2020  2:43 PM     Author: Amber Wheat MD Service: Infectious Disease Author Type: Physician    Filed: 7/24/2020  2:53 PM Date of Service: 7/24/2020  2:43 PM Status: Signed    : Amber Wheat MD (Physician)       Infectious Disease Progress Note    Assessment/Plan  Impression:  COVID-19 pneumonia   Received CCP. Received dexamethasone (6/22-29) and remdesivir for 10 day course  Received 400 mg tocilizumab on 6/25, 6/27 for suspected cytokine release syndrome. Prolonged hospitalization.    Quantiferon gold negative  Hep B and C negative  Strongyloides antibody negative -- no need for further ivermectin    Fever and leukocytosis resolved. Steroid may contribute to leukocytosis--> now normalized. Upper and lower ext u/s on 7/16 without DVTs. A-line changed on 7/17.      Colonized with yeast in sputum. Risk for invasive candidiasis -- broad spectrum antibiotics, central line, candida colonization. Beta-D-glucan negative 6/29 -- less likely therefore that he has invasive candidiasis.     Now with MSSA in sputum as of 7/2, 7/4 -- could be the cause of pneumonia. Completed 7-days of cefazolin. Recent sputum again shows MSSA, which represents colonization at this point. GNR: Serratia.    Has been having heavy secretions. Recent procalcitonin normal.         Recommend:      F/up on repeat blood cultures  Ceftriaxone x 7 days  Patient is on amiodarone, which has drug interaction with levofloxacin (prolonged QT interval risk).  Therefore avoiding Levofloxacin at this time  Monitor clinically  Monitor CBC, CMP  Check C diff if diarrhea develops  Infectious disease will sign off.  Please call with questions      Principal Problem:    COVID-19  Active Problems:    Shock (H)    Acute and chronic respiratory failure with hypoxia (H)    On mechanically assisted ventilation (H)    ARDS (adult respiratory distress syndrome) (H)    Atrial fibrillation with rapid ventricular  response (H)    Atrial fibrillation with rapid ventricular response (H)    Encephalopathy    Bacterial pneumonia      Amber Wheat MD  [facility]  [phone]      ______________________________________________________________________       Subjective  No fevers, chart reviewed.  Patient new to me on 7/20/2020.  Patient remains intubated       7/17: No fever spikes overnight, but was on cooling blanket when proned. No cooling blanket currently. A-line changed today. PICC line remains in place.    Objective    Anti-infectives:  Cefazolin 7/7-7/14  Meropenem 7/2-7  Ceftriaxone 6/22  Ivermectin 6/25-26  Cefepime 6/27-7/2  Vancomycin 6/27-7    Vital signs in last 24 hours  Heart Rate:  [] 64  Resp:  [13-31] 13  BP: (115-157)/() 130/102  Arterial Line BP: ()/(58-96) 133/76  FiO2 (%):  [35 %] 35 %  Weight:   (!) 259 lb 0.7 oz (117.5 kg)    Intake/Output last 3 shifts  I/O last 3 completed shifts:  In: 2171.5 [I.V.:1541.5; NG/GT:630]  Out: 1150 [Urine:850; Stool:300]  Intake/Output this shift:  I/O this shift:  In: 283.9 [I.V.:238.9; NG/GT:45]  Out: 500 [Urine:350; Stool:150]    Review of Systems   Review of systems not obtained due to inability to communicate with the patient.     Physical Exam--  Vent Mode: CPAP/PSV  FiO2 (%):  [35 %] 35 %  S RR:  [16] 16  S VT:  [450 mL] 450 mL  PEEP/CPAP (cm H2O):  [5 cm H2O-8 cm H2O] 5 cm H2O  Minute Ventilation (L/min):  [8.9 L/min-14.5 L/min] 9.7 L/min  PIP:  [14 cm H2O-24 cm H2O] 15 cm H2O  NJ SUP:  [5 cm H20] 5 cm H20  MAP (cm H2O):  [7-12] 7    Limited exam to conserve PPE.  See intensivist note for full exam.  Lying in bed, supine, no distress. intubated     Pertinent Labs   Lab Results: personally reviewed.     Results from last 7 days   Lab Units 07/24/20  0518 07/23/20  0600 07/22/20  0623   LN-WHITE BLOOD CELL COUNT thou/uL 8.3 12.1* 10.4   LN-HEMOGLOBIN g/dL 9.7* 9.4* 10.1*   LN-HEMATOCRIT % 31.1* 29.3* 32.0*   LN-PLATELET  COUNT thou/uL 293 275 270        Results from last 7 days   Lab Units 07/24/20  0518 07/23/20  0600 07/22/20  0545   LN-SODIUM mmol/L 139 142 139   LN-POTASSIUM mmol/L 3.6 3.6 3.9   LN-CHLORIDE mmol/L 105 108* 105   LN-CO2 mmol/L 25 26 23   LN-BLOOD UREA NITROGEN mg/dL 14 15 12   LN-CREATININE mg/dL 0.52* 0.51* 0.55*   LN-CALCIUM mg/dL 8.3* 8.3* 8.6     Lab Results   Component Value Date    ALT 27 07/06/2020    AST 30 07/06/2020    ALKPHOS 44 (L) 07/06/2020    BILITOT 0.3 07/06/2020     Micro:  6/22 blood negative  6/24 sputum usual bruno and yeast  6/27 blood including fungal isolate no growth to date   6/27 sputum gram stain with yeast; culture usual bruno  7/2 sputum MSSA  7/2 blood culture negative to date  7/4 sputum MSSA  7/14: sputum MSSA, Serratia    Sputum culture [[id number]]   Collected: 07/19/20 2304    Order Status: Completed  Specimen: Respiratory  Updated: 07/20/20 1022     Gram Stain Result  4+ Polymorphonuclear leukocytes      4+ Gram positive cocci in clusters      3+ Gram negative bacilli    Blood Culture from PERIPHERAL SITE (2nd One) [[id number]]   Collected: 07/19/20 2305    Order Status: Sent  Specimen: Blood from Vein, Peripheral  Updated: 07/19/20 2311    Urine culture [[id number]]   Collected: 07/19/20 2304    Order Status: Sent  Specimen: Urine  Updated: 07/19/20 2310    Culture/Gram Stain: Sputum [[id number]]  (Abnormal)   Collected: 07/14/20 1432    Order Status: Completed  Specimen: Respiratory  Updated: 07/18/20 0828     Culture  4+ Staphylococcus aureusAbnormal        2+ Serratia marcescensAbnormal       Gram Stain Result  4+ Polymorphonuclear leukocytes      3+ Gram positive cocci in clusters      2+ Gram negative bacilli    Susceptibility     Serratia marcescens (2)     Antibiotic  Interpretation  Microscan  Method  Status     Amoxicillin + Clavulanate  Resistant  >16/8  FELICITA  Final     Ampicillin  Resistant  >16  FELICITA  Final     Cefazolin  Resistant  >16  FELICITA  Final     Cefepime   Sensitive  <=1  FELICITA  Final     Ceftriaxone  Sensitive  <=1  FELICITA  Final     Ciprofloxacin  Sensitive  <=0.5  FELICITA  Final     Gentamicin  Sensitive  <=2  FELICITA  Final     Levofloxacin  Sensitive  <=1  FELICITA  Final     Meropenem  Sensitive  <=0.25  FELICITA  Final     Piperacillin + Tazobactam  Sensitive  <=4/4  FELICITA  Final     Tetracycline  Resistant  >8  FELICITA  Final     Tobramycin  Sensitive  4  FELICITA  Final     Staphylococcus aureus (1)     Antibiotic  Interpretation  Microscan  Method  Status     Clindamycin  Sensitive  <=0.5  FELICITA  Final     Cefazolin  Sensitive  <=2  FELICITA  Final     Doxycycline  Sensitive  <=0.5  FELICITA  Final     Erythromycin  Sensitive  <=0.5  FELICITA  Final     Levofloxacin  Sensitive  <=0.5  FELICITA  Final     Oxacillin  Sensitive  0.5  FELICITA  Final     Trimethoprim + Sulfamethoxazole  Sensitive  <=1/19  FELICITA  Final     Vancomycin  Sensitive  1  FELICITA  Final                   Results for RJ GUADALUPE (MRN [id number]) as of 6/26/2020 11:04   Ref. Range 6/24/2020 16:46   Hepatitis B Surface Ag Latest Ref Range: Negative  Negative   Hepatitis C Ab Latest Ref Range: Negative  Negative   Results for RJ GUADALUPE (MRN [id number]) as of 6/26/2020 11:04   Ref. Range 6/23/2020 04:26 6/24/2020 05:24 6/24/2020 16:46 6/25/2020 04:40 6/26/2020 04:22   CRP Latest Ref Range: 0.0 - 0.8 mg/dL 12.0 (H) 10.0 (H)  4.2 (H) 2.1 (H)   LD (LDH) Latest Ref Range: 125 - 220 U/L 480 (H) 477 (H)  434 (H) 452 (H)     Pertinent Radiology   Radiology Results: Personally reviewed image/s and Personally reviewed impression/s  EXAM: XR CHEST 1 VIEW PORTABLE  LOCATION: [facility]  DATE/TIME: 7/19/2020 6:08 AM   INDICATION: Evaluate lines/tubes.  COMPARISON: 07/11/2020     IMPRESSION:   Endotracheal tube is 3.2 cm superior to the melissa. Enteric tube extends off the inferior aspect of the film. Stable right PICC. No pneumothorax. No significant interval change in multifocal airspace disease. No pleural effusion. The cardiac   silhouette is  stable at the upper limits of normal for size.

## 2021-07-02 NOTE — H&P
H&P by Elsa Tan CNP at 6/22/2020  4:05 PM     Author: Britney, Elsa, CNP Service: Gastroenterology Author Type: Nurse Practitioner    Filed: 6/22/2020  5:14 PM Date of Service: 6/22/2020  4:05 PM Status: Attested    : Elsa Tan CNP (Nurse Practitioner)    Related Notes: Original Note by Elsa Tan CNP (Nurse Practitioner) filed at 6/22/2020  5:11 PM    Cosigner: Leventhal, Thomas Michael, MD at 6/23/2020  3:32 PM    Attestation signed by Leventhal, Thomas Michael, MD at 6/23/2020  3:32 PM    [facility]  CRITICAL CARE STAFF NOTE  06/23/20      Brief patient summary:   54y M with PMHx of obesity admitted in transfer from [facility] after progression from needing HFNC to intubation for COVID-related ARDS  - Given Dexamethasone 6mg IV prior to transfer  - Approvied for remdesivir prior to transfer  - Started on Flolan prior to transfer    Acute critical care issues and supportive interventions managed by me today include:   - Will plan to paralyze and prone position for 16 hours (supine for 8 every 24 hours) given low P/F  - Will continue to need sedation IV  - Will start remdesivir  - Continue on decadron  - Continue on Flolan and wean as able      The patient was seen and examined with the APRN.  We have discussed the patient in detail and I agree with the findings, assessment, and plan as documented when this note was cosigned on this day. The plan was formulated in conjunction with pharmacy, ICU nurses, and respiratory therapist. I have evaluated all laboratory values and imaging studies for the past 24 hours. I have reviewed all the consults that have been ordered and are active for this patient.      Critical Care Time: 45 min.  I spent this time (excluding procedures) personally providing and directing critical care services at the bedside and on the critical care unit.      Thomas M. Leventhal, M.D.   of Medicine  [facility]  Advanced/Transplant  Hepatology & Critical Care Medicine                 Critical Care Progress Note      06/22/2020    Name: Tobias Palmer MRN#: [id number]   Age: 54 y.o. YOB: 1965     [facility] Day# 0  ICU DAY # 2 (Day #1 )                 Problem List:   Principal Problem:    COVID-19           HPI:     Tobias Palmer is a 54 y.o. male with PMHx of obesity who presents in Transfer from [facility] on 6/22 after presentation on 6/21 with 3 days of fevers/cough, COVID +, rapid decompensation from HFNC to intubation.  Given 6mg IV dexamethasone.  Progressive hypoxemic respiratory prompted initiation of Flolan prior to transfer.    Pt intubated and sedated on transfer - unable to participate.      No overt PMHx.  Per chart review, no overt sick contacts and no known COVID contacts.  No overt personal or family history of blood clots.      Assessment and plan :     Tobias Palmer IS a 54 y.o. male admitted on 6/22/2020 for COVID19 infection.   I have personally reviewed the daily labs, imaging studies, cultures and discussed the case with referring physician and consulting physicians.   My assessment and plan by system for this patient is as follows:    Neurology:   1. Sedation: ON a versed and fentanyl gtt; transferred on Propofol - will wean off given elevated CK  2. Will start paralytic with Vec gtt given refractory hypoxemia  - RASS -4, TOF 4/4    Cardiovascular:   1. Normotensive at this time    Pulmonary/Ventilator Management:   1. Acute hypoxemia:  Due to underlying covid-19 infection.    Vent 20/450/16/100  - P/F ratio: ~75  - Will continue with sedation, will add paralytic  - Will plan to prone for 16 hours starting this PM  -continue lung protective ventilation at this time     GI and Nutrition :   1. Nutrition: NPO currently  - Will plan for tube feeds    Infectious Disease:   1. COVID-19 infection:   - Screened and a Candidate for Remdesivir  - Have contacted research team to start - medication  ordered  2. ? CAP  -Was on Vanc/Zosyn - will de-escalate to ceftriaxone  -no leukocytosis, febrile 100   -blood, urine sputum cultures pending   -Cont on dexamethasone     Renal   #Rhabdomylysis   -CK peaked at outside hospital, continuing to downtrend at this time, will monitor daily   -cont to monitor I/O     IV/Access:   1. Venous access - R IJ CVC (Placed 6/22/2020)  2. Arterial access - R radial Art Line (Placed 6/22/2020)    ICU Prophylaxis:   1. DVT: Hep Subq/ mechanical per covid protocol  2. Feeding - NPO  3. Family Update:on admit  4. Disposition - critically ill in ICU    Elsa Tan MICU [place]   Staffed with Dr. Leventhal ICU attending     Critical care time >40min excluding procedures          Physical Examination:   Temp:  [98.9  F (37.2  C)] 98.9  F (37.2  C)  Heart Rate:  [58-70] 63  Resp:  [20-21] 20  BP: ()/(47-59) 97/53  Arterial Line BP: (106)/(53) 106/53  FiO2 (%):  [100 %] 100 %  No intake or output data in the 24 hours ending 06/22/20 1606  Wt Readings from Last 4 Encounters:   No data found for Wt        Vent Mode: AC  FiO2 (%):  [100 %] 100 %  S RR:  [20] 20  S VT:  [450 mL-550 mL] 450 mL  PEEP/CPAP (cm H2O):  [14 cm H2O-16 cm H2O] 16 cm H2O  Minute Ventilation (L/min):  [8.2 L/min-10.1 L/min] 8.2 L/min  PIP:  [30 cm H2O-31 cm H2O] 30 cm H2O  MAP (cm H2O):  [19-21] 21    GEN: sedated, intermittent distress  HEENT: head ncat, ET tube  NECK: supple, redundant skin  PULM: poor air movement, scattered rhonchi  CV/COR: RRR   ABD: soft, obese, paucity of bowel sounds  EXT:  warm x4  NEURO: sedated  Skin: no rashes  LINES: clean, dry intact         Data     Labs personally reviewed/interpreted: see a/p.      Billing: This patient is critically ill: Yes. Total critical care time today 45 min, excluding procedures.     Histories:    Current Medications              omeprazole capsule 20 mg (PriLOSEC)  Daily before breakfast          omeprazole suspension 20 mg (PriLOSEC)  Daily before  breakfast          pantoprazole 40 mg injection  Daily before breakfast          senna-docusate 8.6-50 mg tablet 1 tablet (PERICOLACE)  2 times daily          sennosides syrup 8.8 mg (for SENOKOT)  2 times daily          enoxaparin ANTICOAGULANT syringe 60 mg (LOVENOX)  2 times daily          vecuronium 1 mg/1 mL infusion (NORCURON)  Continuous          white petrolatum-mineral oiL 83-15 % ophthalmic ointment 1 application (LUBRIFRESH PM)  Every 8 hours          epoprostenol 0.5 mg/50 mL (Full Strength) (VELETRI) inhalation solution  Continuous          fentaNYL 2500 mcg/50 mL CADD infusion (50 mcg/mL)  Continuous          midazolam 100 mg/100 mL (1 mg/mL) infusion (VERSED)  Continuous          dexamethasone injection 6 mg (DECADRON)  DAILY          cefTRIAXone 1 g in NaCl 0.9 % 50 mL (MINI-BAG Plus) (ROCEPHIN)  Every 24 hours          chlorhexidine 0.12 % solution 15 mL (PERIDEX)  Every 12 hours          naloxone injection 0.2-0.4 mg (NARCAN)  As needed          naloxone injection 0.2-0.4 mg (NARCAN)  As needed          polyvinyl alcohol 1.4 % ophthalmic solution 1-2 drop (LIQUIFILM TEARS)  Every 1 hour prn          benzocaine-menthoL lozenge 1 lozenge (CEPACOL)  Every 1 hour prn          bisacodyL suppository 10 mg (DULCOLAX)  Daily PRN          magnesium hydroxide suspension 30 mL (MILK OF MAG)  Daily PRN          ondansetron injection 4 mg (ZOFRAN)  Every 4 hours PRN          ondansetron tablet 8 mg (ZOFRAN)  Every 8 hours PRN          dextrose 50 % (D50W) syringe 20-50 mL  Every 15 min PRN          glucagon (human recombinant) injection 1 mg  Every 15 min PRN                     No Known Allergies    PMHx:  - Obesity    PSH:  - none    SHx  - Has a life partner  - No reported history of alcohol misuse    FHx:  - No overt reported history of blood clots    ROS:  Unable to obtain secondary to sedation and intubation

## 2022-02-08 NOTE — PROGRESS NOTES
PULMONARY / CRITICAL CARE PROGRESS NOTE    Date / Time of Admission:  6/22/2020 11:38 AM    Assessment:   1. Acute and chronic respiratory failure , ARDS s/p intubation 6/21  Tolerating prolonged periods of ventilator weaning trial  Slowly improvement of encephalopathy, continue to work weaning off sedation.   Patient is likely to be extubated soon.   2. COVID-19 viral infection  S/p CCP, remdesivir, Tocilizumab and dexamethasone.   3. Serratia / MSSA pneumonia  4. Encephalopathy  5. Atrial fibrillation  6. Obesity BMI 42  7. Anemia    Advance Directives: Full code    Plan:   1. Titrate vent settings A/C 16/450/5/40%  2. Daily PS weaning trial, start 5/5.   3. Sedation to keep RASS 0 to -1, dexmedetomidine drip, versed and fentanyl drips are been titrated off.   4. Scheduled oxycodone, seroquel, ativan   5. Heplock IV fluids  6. Continue amiodarone  7. Abx per ID recommendation (currently on ceftriaxone)  8. Continue tube feedings  9. PPI for GI prophylaxis  10. Glucose level monitoring  11. DVT prophylaxis lovenox SQ  12. PT , OT evaluation     Please contact me if you have any questions.  Total critical care time, not including separately billable procedure time: 45 minutes  This patient had a high probability of imminent or life threatening deterioration due to acute respiratory failure which required my direct attention, intervention and personal management.     Harman Condon  Pulmonary / Critical Care  7/24/2020   12:00 PM      ICU DAILY CHECKLIST                           Can patient transfer out of MICU? no    FAST HUG:    Feeding:  Feeding: Yes.  Patient is receiving TUBE FEEDS    Hillman:Yes  Analgesia/Sedation:Yes fentanyl, midazolam and precedex  Thromboembolic prophylaxis: yes; Mode:  Lovenox  HOB>30:  Yes  Stress Ulcer Protocol Active: yes; Mode: PPI  Glycemic Control: Any glucose > 180 no; Mode of Insulin Therapy: Sliding Scale Insulin    INTUBATED:  Can patient have daily waking: yes  Can  patient have spontaneous breathing trial:  yes    Restraints? yes    PHYSICAL THERAPY AND MOBILITY:  Can patient have PT and mobility trial: yes  Activity: PT    Subjective:   HPI:  Tobias Palmer is a 54 y.o. male with history of obesity (BMI 45) who was admitted and intubated on 6/21 at [facility] with COVID-19 pneumonia, transferred to [place] ICU on 6/22. Course complicated by ARDS and shock, requiring proning and paralytics. S/p CCP, remdesivir, Tocilizumab and dexamethasone.     Events overnight  - Hemodynamically stable  - Unchanged O2 requirements, tolerating PS 8/8 trial  - Afebrile    Allergies: Patient has no known allergies.     MEDS:  Scheduled Meds:    acetaminophen  650 mg Enteral Tube Q6H     acetylcysteine (MUCOMYST) 20% inhalation solution  4 mL Inhalation Q12H - RT     amino acids-protein hydrolys  2 packet Enteral Tube BID     amiodarone  200 mg Enteral Tube DAILY     cefTRIAXone  2 g Intravenous Q24H     chlorhexidine  15 mL Topical Q12H     enoxaparin ANTICOAGULANT  60 mg Subcutaneous BID     guar gum  1 packet Enteral Tube TID     insulin aspart (NovoLOG) injection   Subcutaneous Q6H FIXED TIMES     ipratropium-albuteroL  3 mL Nebulization Q12H - RT     Lactobacillus rhamnosus GG  1 capsule Enteral Tube BID     LORazepam  1 mg Oral Q6H     melatonin  3 mg Enteral Tube QHS     midodrine  10 mg Oral TID with meals     multivitamin with iron-mineral  15 mL Enteral Tube DAILY     omeprazole  20 mg Oral QAM AC    Or     omeprazole  20 mg Enteral Tube QAM AC    Or     pantoprazole  40 mg Intravenous QAM AC     oxyCODONE  10 mg Enteral Tube Q4H     QUEtiapine  50 mg Oral Q8H     sodium chloride  10-30 mL Intravenous Q8H FIXED TIMES     traZODone  50 mg Enteral Tube QPM     white petrolatum-mineral oiL  1 application Both Eyes Q8H     Continuous Infusions:    dexmedetomidine infusion orderable (PRECEDEX) 1.5 mcg/kg/hr (07/24/20 1230)     fentaNYL citrate (PF) 25 mcg/hr (07/24/20 1200)      "midazolam Stopped (07/24/20 1038)     norepinephrine Stopped (07/24/20 0900)     PRN Meds:.acetaminophen, benzocaine-menthoL, bisacodyL, dextrose 50 % (D50W), fentaNYL - BOLUS DOSE from infusion, glucagon (human recombinant), labetalol, lipase-protease-amylase **AND** sodium bicarbonate, LORazepam, magnesium hydroxide, metoprolol tartrate, midazolam (VERSED) - BOLUS DOSE from infusion, naloxone **OR** naloxone, ondansetron **OR** ondansetron, oxyCODONE, polyvinyl alcohol, sodium chloride bacteriostatic, sodium chloride, sodium chloride, sodium chloride    Objective:   VITALS:  BP (!) 130/102   Pulse 64   Temp 99  F (37.2  C)   Resp 13   Ht 5' 5\" (1.651 m)   Wt (!) 259 lb 0.7 oz (117.5 kg)   SpO2 100%   BMI 43.11 kg/m       EXAM:   Gen: obese, sedated, arousable, following commands   HEENT: pale conjunctiva, moist mucosa  Neck: no thyromegaly, masses or JVD  Lungs: ronchi both HT  CV: regular, no murmurs or gallops appreciated  Abdomen: soft, NT, BS wnl  Ext: no edema  Neuro: sedated, arousbale, following commands.     I&O:      Intake/Output Summary (Last 24 hours) at 7/24/2020 1533  Last data filed at 7/24/2020 1400  Gross per 24 hour   Intake 1252.55 ml   Output 550 ml   Net 702.55 ml       Data Review:  Results from last 7 days   Lab Units 07/24/20  0518   LN-WHITE BLOOD CELL COUNT thou/uL 8.3   LN-HEMOGLOBIN g/dL 9.7*   LN-HEMATOCRIT % 31.1*   LN-PLATELET COUNT thou/uL 293     Results from last 7 days   Lab Units 07/24/20  0518   LN-SODIUM mmol/L 139   LN-POTASSIUM mmol/L 3.6   LN-CHLORIDE mmol/L 105   LN-CO2 mmol/L 25   LN-BLOOD UREA NITROGEN mg/dL 14   LN-CREATININE mg/dL 0.52*   LN-CALCIUM mg/dL 8.3*     TRACHEAL CULTUIRE  4+ Staphylococcus aureusAbnormal         3+ Serratia marcescensAbnormal      Reported and sent to MD as part of the  Emerging Infections Surveillance Program.            Gram Stain Result   4+ Polymorphonuclear leukocytes       4+ Gram positive cocci in clusters       3+ Gram " normal negative bacilli               7/24/2020 05:18   pH, Arterial 7.46 (H)   pCO2, Arterial 39   pO2, Arterial 76 (L)   Bicarbonate, Arterial Calc 27.0   O2 Sat, Arterial 96.0   Oxyhemoglobin 93.8 (L)   POC Base Excess Calc 3.1   Sample Stabilized Temperature 37.0       XR CHEST 1 VIEW PORTABLE  LOCATION: [facility]  DATE/TIME: 7/19/2020 6:08 AM  INDICATION: Evaluate lines/tubes.  COMPARISON: 07/11/2020   IMPRESSION:   Endotracheal tube is 3.2 cm superior to the melissa. Enteric tube extends off the inferior aspect of the film. Stable right PICC. No pneumothorax. No significant interval change in multifocal airspace disease. No pleural effusion. The cardiac silhouette is stable at the upper limits of normal for size.      By:  Harman Condon, 7/24/2020, 12:00 PM    Primary Care Physician:  Provider, No Primary Care

## 2023-04-10 ENCOUNTER — HOSPITAL ENCOUNTER (EMERGENCY)
Facility: CLINIC | Age: 58
Discharge: HOME | End: 2023-04-10
Attending: PHYSICIAN ASSISTANT | Admitting: PHYSICIAN ASSISTANT
Payer: MEDICAID

## 2023-04-10 ENCOUNTER — APPOINTMENT (OUTPATIENT)
Dept: GENERAL RADIOLOGY | Facility: CLINIC | Age: 58
End: 2023-04-10
Attending: PHYSICIAN ASSISTANT
Payer: MEDICAID

## 2023-04-10 VITALS
OXYGEN SATURATION: 95 % | SYSTOLIC BLOOD PRESSURE: 152 MMHG | RESPIRATION RATE: 22 BRPM | WEIGHT: 230 LBS | DIASTOLIC BLOOD PRESSURE: 79 MMHG | TEMPERATURE: 97 F | BODY MASS INDEX: 42.07 KG/M2 | HEART RATE: 88 BPM

## 2023-04-10 DIAGNOSIS — J06.9 VIRAL URI WITH COUGH: ICD-10-CM

## 2023-04-10 LAB
FLUAV RNA SPEC QL NAA+PROBE: NEGATIVE
FLUBV RNA RESP QL NAA+PROBE: NEGATIVE
RSV RNA SPEC NAA+PROBE: NEGATIVE
SARS-COV-2 RNA RESP QL NAA+PROBE: NEGATIVE

## 2023-04-10 PROCEDURE — 87637 SARSCOV2&INF A&B&RSV AMP PRB: CPT | Performed by: PHYSICIAN ASSISTANT

## 2023-04-10 PROCEDURE — C9803 HOPD COVID-19 SPEC COLLECT: HCPCS

## 2023-04-10 PROCEDURE — 87081 CULTURE SCREEN ONLY: CPT | Performed by: PHYSICIAN ASSISTANT

## 2023-04-10 PROCEDURE — 71046 X-RAY EXAM CHEST 2 VIEWS: CPT

## 2023-04-10 PROCEDURE — 99284 EMERGENCY DEPT VISIT MOD MDM: CPT | Mod: CS,25

## 2023-04-10 RX ORDER — ALBUTEROL SULFATE 90 UG/1
2 AEROSOL, METERED RESPIRATORY (INHALATION) EVERY 4 HOURS PRN
Qty: 6.7 G | Refills: 0 | Status: SHIPPED | OUTPATIENT
Start: 2023-04-10

## 2023-04-10 RX ORDER — BENZONATATE 200 MG/1
200 CAPSULE ORAL 3 TIMES DAILY PRN
Qty: 15 CAPSULE | Refills: 0 | Status: SHIPPED | OUTPATIENT
Start: 2023-04-10

## 2023-04-10 ASSESSMENT — ENCOUNTER SYMPTOMS
ABDOMINAL PAIN: 1
NUMBNESS: 1
COUGH: 1
TROUBLE SWALLOWING: 1
FEVER: 1
SORE THROAT: 1
DIZZINESS: 1

## 2023-04-10 ASSESSMENT — ACTIVITIES OF DAILY LIVING (ADL): ADLS_ACUITY_SCORE: 33

## 2023-04-11 NOTE — ED PROVIDER NOTES
History     Chief Complaint:  Cough     The history is provided by the patient.      Tobias Palmer is a 57 year old male with a history of atrial fibrillation with RVR and morbid obesity who presents with a persisting cough for the last 3-4 days. The patient reports that he has been having intermittent coughing attacks that lead him to feel tingly all over during coughing fits and also notes his stomach hurts when he coughs but not otherwise. Yesterday, he began feeling feverish, but did not take his temperature, and decided to present to the ED for evaluation when this continued. Presently, he endorses having a sore throat and has had some pain swallowing but has noticed no hemoptysis. Has chronic mild leg swelling unchanged.  No chest pain, sob, or neck stiffness.. He denies any history of asthma, COPD, tobacco use, or other heart/lung problems. He also denies taking any daily medications.     Independent Historian:   Daughter and wife assist with above hx    Review of External Notes: none    ROS:  Review of Systems   Constitutional: Positive for fever (suggestive).   HENT: Positive for sore throat and trouble swallowing.    Respiratory: Positive for cough (no blood).    Cardiovascular: Positive for leg swelling.   Gastrointestinal: Positive for abdominal pain.   Neurological: Positive for dizziness and numbness.   All other systems reviewed and are negative.    Allergies:  Aspirin     Medications:    The patient is not currently taking any prescribed medications.    Past Medical History:    Morbid obesity   A-fib with RVR  Acute and chronic respiratory failure with hypoxia  ARDS (adult respiratory distress syndrome)  Encephalopathy  Bacterial pneumonia    Past Surgical History:    PICC     Social History:  The patient presents to the ED with his wife and daughter.  The patient arrived to the ED in a private vehicle.  PCP: Stephani Steele     Physical Exam     Patient Vitals for the past 24 hrs:   BP Temp  Pulse Resp SpO2 Weight   04/10/23 2215 (!) 152/79 -- 88 22 95 % --   04/10/23 2024 (!) 154/82 97  F (36.1  C) 88 18 94 % 104.3 kg (230 lb)      Physical Exam  General: Awake, alert, non-toxic.  Head:  Scalp is NC/AT  Eyes:  Conjunctiva normal, PERRL  ENT:  The external nose and ears are normal.     The oropharynx is mildly red without tonsillar swelling. Uvula midline, no submandibular swelling, sublingual swelling, trismus.  TM's normal BL, no mastoid swelling or tenderness.  External canals normal, no tragal ttp. Pinna and helical structures normal.  Neck:  Normal range of motion without rigidity.  CV:  Regular rate and rhythm    No pathologic murmur, rubs, or gallops.  Resp:  Slight end expiratory wheezing.  No crackles, rhonchi, or stridor.    Non-labored, no retractions or accessory muscle use  Abdomen: Abdomen is soft, obese no distension, no tenderness, no masses.   MS:  No lower extremity edema or asymmetric calf swelling. No midline cervical, thoracic, or lumbar tenderness  Skin:  Warm and dry, No rash or lesions noted.  Neuro: Alert and oriented.  GCS 15 No gross motor deficits.  No facial asymmetry.  Psych:  Awake. Alert. Normal affect. Appropriate interactions.      Emergency Department Course     Imaging:  Chest XR,  PA & LAT   Final Result   IMPRESSION: Heart size and vascularity are normal. Small amount of subpleural linear parenchymal scar in the left midlung. No focal consolidation, pneumothorax nor pleural effusion.         Report per radiology    Laboratory:  Labs Ordered and Resulted from Time of ED Arrival to Time of ED Departure   INFLUENZA A/B, RSV, & SARS-COV2 PCR - Normal       Result Value    Influenza A PCR Negative      Influenza B PCR Negative      RSV PCR Negative      SARS CoV2 PCR Negative     BETA HEMOLYTIC STREP GROUP A CULTURE      Emergency Department Course & Assessments:  Assessments:  2159: I performed an exam of the patient and obtained history, as documented above. I believe  that he is safe for discharge at this time.    Independent Interpretation (X-rays, CTs, rhythm strip):  I independently reviewed chest x-ray note no evidence of infiltrate, fluid, or pneumothorax.    Consultations/Discussion of Management or Tests:  None     Social Determinants of Health affecting care:   none    Disposition:  The patient was discharged to home.     Impression & Plan      Medical Decision Makin year old male who presents for evaluation of cough, sore throat, subjective fever.  This is consistent with an upper respiratory tract infection.  There is no signs at this point of serious bacterial infection such as OM, RPA, epiglottitis, PTA, strep pharyngitis, pneumonia, sinusitis, meningitis, bacteremia, serious bacterial infection.  They are not hypoxic and there are no signs of respiratory distress.strep covid, flu negative.  Chest -xray clear.  Nothing to suggest more serious cardiopulmonary etiology such as acs, pe, dissection, chf, myocarditis, etc and I feel these are very unlikely.    There are no gastrointestinal symptoms at this point and no signs of dehydration.  Close followup with primary care physician is indicated.  Discussed symptomatic treatment and prescription given as below.  Return to ED if any new or worsening symptoms, fever >102, neck stiffness, increasing weakness, etc.        Diagnosis:    ICD-10-CM    1. Viral URI with cough  J06.9          Discharge Medications:  Discharge Medication List as of 4/10/2023 10:11 PM      START taking these medications    Details   benzonatate (TESSALON) 200 MG capsule Take 1 capsule (200 mg) by mouth 3 times daily as needed for cough, Disp-15 capsule, R-0, E-Prescribe            Scribe Disclosure:  I, Sujey Santa, am serving as a scribe at 10:25 PM on 4/10/2023 to document services personally performed by Sanchez Broussard PA-C based on my observations and the provider's statements to me.      4/10/2023   Sanchez Broussard,  KIRSTY Broussard, Sanchez Braswell PA-C  04/11/23 1549

## 2023-04-11 NOTE — ED TRIAGE NOTES
Coughing x4 days, reports coughs until he feels dizzy and has abd pain from coughing.     Triage Assessment     Row Name 04/10/23 2025       Triage Assessment (Adult)    Airway WDL WDL       Respiratory WDL    Respiratory WDL cough    Cough Frequency frequent       Skin Circulation/Temperature WDL    Skin Circulation/Temperature WDL WDL       Cardiac WDL    Cardiac WDL WDL       Peripheral/Neurovascular WDL    Peripheral Neurovascular WDL WDL       Cognitive/Neuro/Behavioral WDL    Cognitive/Neuro/Behavioral WDL WDL

## 2023-04-11 NOTE — DISCHARGE INSTRUCTIONS

## 2023-04-13 LAB — BACTERIA SPEC CULT: NORMAL

## 2024-03-12 NOTE — PLAN OF CARE
Problem: Mechanical Ventilation  Goal: Patient will maintain patent airway  Outcome: Progressing     Problem: Inadequate Gas Exchange  Goal: Patient is adequately oxygenated and ventilation is improved  Outcome: Progressing      Angel is requesting a rx of allopurinol (ZYLOPRIM) 300 MG tablet , #90      Last Office Visit  -  12/28/23  Next Office Visit  -  3/26/24

## 2024-06-25 NOTE — PROGRESS NOTES
Medication:   Requested Prescriptions     Pending Prescriptions Disp Refills    apixaban (ELIQUIS) 5 MG TABS tablet [Pharmacy Med Name: ELIQUIS 5MG TABLETS] 60 tablet      Sig: TAKE 1 TABLET BY MOUTH TWICE DAILY        Last Filled:      Patient Phone Number: [phone] (home)     Last appt: 6/12/2024   Next appt: 9/10/2024    Last OARRS:        No data to display                   Patient has been restless tonight, frequently pulling O2 off.   Plan is to continue to monitor and wean flow as able.  Em Khan, LRT        Results for RJ GUADALUPE (MRN [id number]) as of 7/28/2020 04:44   Ref. Range 7/28/2020 04:24   pH, Arterial Latest Ref Range: 7.37 - 7.44  7.46 (H)   pCO2, Arterial Latest Ref Range: 35 - 45 mm Hg 34 (L)   pO2, Arterial Latest Ref Range: 80 - 90 mm Hg 84   Bicarbonate, Arterial Calc Latest Ref Range: 23.0 - 29.0 mmol/L 25.4   O2 Sat, Arterial Latest Ref Range: 96.0 - 97.0 % 97.9 (H)   Oxyhemoglobin Latest Ref Range: 96.0 - 97.0 % 94.8 (L)